# Patient Record
Sex: MALE | Race: WHITE | NOT HISPANIC OR LATINO | Employment: FULL TIME | URBAN - METROPOLITAN AREA
[De-identification: names, ages, dates, MRNs, and addresses within clinical notes are randomized per-mention and may not be internally consistent; named-entity substitution may affect disease eponyms.]

---

## 2014-05-07 LAB
CHOLESTEROL, TOTAL: 141 MG/DL (ref 100–199)
HDLC SERPL-MCNC: 23 MG/DL
LDLC SERPL CALC-MCNC: 59 MG/DL (ref 0–99)
TRIGL SERPL-MCNC: 297 MG/DL (ref 0–149)
VLDLC SERPL-MCNC: 59 MG/DL (ref 5–40)

## 2017-05-01 ENCOUNTER — OFFICE VISIT (OUTPATIENT)
Dept: URGENT CARE | Facility: CLINIC | Age: 32
End: 2017-05-01
Payer: COMMERCIAL

## 2017-05-01 VITALS
OXYGEN SATURATION: 98 % | WEIGHT: 276.25 LBS | HEART RATE: 98 BPM | BODY MASS INDEX: 41.87 KG/M2 | HEIGHT: 68 IN | TEMPERATURE: 97 F | DIASTOLIC BLOOD PRESSURE: 80 MMHG | SYSTOLIC BLOOD PRESSURE: 120 MMHG

## 2017-05-01 DIAGNOSIS — R10.12 LEFT UPPER QUADRANT PAIN: ICD-10-CM

## 2017-05-01 PROCEDURE — 99213 OFFICE O/P EST LOW 20 MIN: CPT | Mod: S$GLB,,, | Performed by: NURSE PRACTITIONER

## 2017-05-01 PROCEDURE — 99999 PR PBB SHADOW E&M-EST. PATIENT-LVL III: CPT | Mod: PBBFAC,,, | Performed by: NURSE PRACTITIONER

## 2017-05-01 PROCEDURE — 1160F RVW MEDS BY RX/DR IN RCRD: CPT | Mod: S$GLB,,, | Performed by: NURSE PRACTITIONER

## 2017-05-01 NOTE — PROGRESS NOTES
Subjective:       Patient ID: Jonas Abdalla is a 32 y.o. male.    Chief Complaint: Abdominal Pain  Jonas states he woke up 4 hours ago with abdominal pain located in the LUQ. He states he has had two bowel movements since that time and the pain is subsiding. The character of the stool was normal. He is requesting a work excuse.  Abdominal Pain   This is a new problem. The current episode started today (4 hrs ago). The onset quality is sudden. The problem occurs constantly. The problem has been gradually improving. The abdominal pain radiates to the LUQ. Pertinent negatives include no anorexia, belching, constipation, diarrhea, dysuria, fever, flatus, frequency, headaches, myalgias, nausea or vomiting. The pain is aggravated by movement. He has tried nothing for the symptoms.     Review of Systems   Constitutional: Negative for chills and fever.   HENT: Negative for congestion and sore throat.    Respiratory: Negative for shortness of breath.    Gastrointestinal: Positive for abdominal pain. Negative for anorexia, constipation, diarrhea, flatus, nausea and vomiting.   Genitourinary: Negative for dysuria and frequency.   Musculoskeletal: Negative for myalgias.   Neurological: Negative for headaches.   Psychiatric/Behavioral: Negative for behavioral problems and confusion.       Objective:      Physical Exam   Constitutional: He is oriented to person, place, and time. He appears well-developed and well-nourished.   Eyes: Conjunctivae are normal.   Neck: Normal range of motion.   Cardiovascular: Normal rate and regular rhythm.    Pulmonary/Chest: Effort normal and breath sounds normal. No respiratory distress.   Abdominal: Soft. Bowel sounds are normal. He exhibits no distension. There is no tenderness. There is no rebound, no guarding, no tenderness at McBurney's point and negative Espinoza's sign.   Musculoskeletal: Normal range of motion.   Neurological: He is alert and oriented to person, place, and time.    Skin: Skin is warm and dry.   Psychiatric: He has a normal mood and affect. His behavior is normal.   Vitals reviewed.      Assessment:       1. Left upper quadrant pain        Plan:   Jonas was seen today for abdominal pain.    Diagnoses and all orders for this visit:    Left upper quadrant pain  Unclear the cause of the pain which is resolving. It is improving after two bowel movements. Likely related to constipation as he admits he had not had a BM for several days prior.   If symptoms worsen or fail to improve, follow-up with primary care doctor or nearest ER. After visit summary given and discussed. Patient verbalized understanding and agrees with treatment plan. Patient remained stable and was discharged in no acute distress.

## 2017-05-01 NOTE — LETTER
May 1, 2017      [facility]  [address] [place] [address]  Phone: [phone]  Fax: [phone]       Patient: Jonas Abdalla   YOB: 1985  Date of Visit: 05/01/2017    To Whom It May Concern:    Jonas Bob was at [facility] on 05/01/2017. He may return to work/school on 05/03/2017 with no restrictions. If you have any questions or concerns, or if I can be of further assistance, please do not hesitate to contact me.    Sincerely,    COLLEEN Mendiola

## 2017-05-01 NOTE — PATIENT INSTRUCTIONS
Uncertain Causes of Abdominal Pain (Male)  Based on your visit today, the exact cause of your abdominal pain is not clear. Your exam and tests do not suggest a dangerous cause at this time. However, the signs of a serious problem may take more time to appear. Although your evaluation was reassuring today, sometimes early in the course of many conditions, exam and lab tests can appear normal. Therefore, it is important for you to watch for any new symptoms or worsening of your condition.  It may not be obvious what caused your symptoms. Pay attention to things that do seem to make your symptoms worse or better and discuss this with your doctor when you follow up.  The evaluation of abdominal pain in the emergency department may only require an exam by the doctor or it may include blood, urine or imaging studies, depending on many factors. Sometimes exams and tests can identify a cause but in many cases, a clear cause is not found. Further testing at follow up visits may help to suggest a clear diagnosis.  Home care  · Rest as much as you can until your next exam.  · Try to avoid any medicines (unless otherwise directed by your doctor), foods, activities, or other factors that may have contributed to your symptoms.  · Try to eat foods that you know that you have tolerated well in the past. Certain diets may be recommended for some conditions that cause abdominal pain. However, since the cause of your symptoms may not be clear, discuss your diet more with your healthcare provider or specialist for further recommendations.   · If you have diarrhea, it may help to avoid dairy (lactose) for the time being. A low fat, low fiber diet can also help.  · Eating several small meals per day as opposed to 2 or 3 larger meals may help.  · Avoid dehydration. Make sure to drink plenty of water. Other options include broth, soup, gelatin, sports drinks, or other clear liquids.  · Watch closely for anything that may make your  symptoms worse or better. Pay close attention to symptoms below that may mean your condition is getting worse.  Follow-up care  Follow up with your healthcare provider if your symptoms are not improving, or as advised. In some cases, you may need more testing.  When to seek medical advice  Call your healthcare provider right away if any of these occur:  · Pain is becoming worse  · You are unable to take your medicines or can't keep water down due to excessive vomiting  · Swelling of the abdomen  · Fever of 100.4ºF (38ºC) or higher, or as directed by your healthcare provider  · Blood in vomit or bowel movements (dark red or black color)  · Jaundice (yellow color of eyes and skin)  · New onset of weakness, dizziness or fainting  · New onset of chest, arm, back, neck or jaw pain  Date Last Reviewed: 12/30/2015  © 3572-4388 [employer]. [address] [place] [address]. All rights reserved. This information is not intended as a substitute for professional medical care. Always follow your healthcare professional's instructions.        Uncertain Causes of Abdominal Pain (Male)  Based on your visit today, the exact cause of your abdominal pain is not clear. Your exam and tests do not suggest a dangerous cause at this time. However, the signs of a serious problem may take more time to appear. Although your evaluation was reassuring today, sometimes early in the course of many conditions, exam and lab tests can appear normal. Therefore, it is important for you to watch for any new symptoms or worsening of your condition.  It may not be obvious what caused your symptoms. Pay attention to things that do seem to make your symptoms worse or better and discuss this with your doctor when you follow up.  The evaluation of abdominal pain in the emergency department may only require an exam by the doctor or it may include blood, urine or imaging studies, depending on many factors. Sometimes exams and tests can  identify a cause but in many cases, a clear cause is not found. Further testing at follow up visits may help to suggest a clear diagnosis.  Home care  · Rest as much as you can until your next exam.  · Try to avoid any medicines (unless otherwise directed by your doctor), foods, activities, or other factors that may have contributed to your symptoms.  · Try to eat foods that you know that you have tolerated well in the past. Certain diets may be recommended for some conditions that cause abdominal pain. However, since the cause of your symptoms may not be clear, discuss your diet more with your healthcare provider or specialist for further recommendations.   · If you have diarrhea, it may help to avoid dairy (lactose) for the time being. A low fat, low fiber diet can also help.  · Eating several small meals per day as opposed to 2 or 3 larger meals may help.  · Avoid dehydration. Make sure to drink plenty of water. Other options include broth, soup, gelatin, sports drinks, or other clear liquids.  · Watch closely for anything that may make your symptoms worse or better. Pay close attention to symptoms below that may mean your condition is getting worse.  Follow-up care  Follow up with your healthcare provider if your symptoms are not improving, or as advised. In some cases, you may need more testing.  When to seek medical advice  Call your healthcare provider right away if any of these occur:  · Pain is becoming worse  · You are unable to take your medicines or can't keep water down due to excessive vomiting  · Swelling of the abdomen  · Fever of 100.4ºF (38ºC) or higher, or as directed by your healthcare provider  · Blood in vomit or bowel movements (dark red or black color)  · Jaundice (yellow color of eyes and skin)  · New onset of weakness, dizziness or fainting  · New onset of chest, arm, back, neck or jaw pain  Date Last Reviewed: 12/30/2015  © 2481-7393 [employer]. [address] [facility],  [place] [address]. All rights reserved. This information is not intended as a substitute for professional medical care. Always follow your healthcare professional's instructions.

## 2017-05-01 NOTE — MR AVS SNAPSHOT
"    [facility]  [address] [place] [place] [address]  Phone: [phone]  Fax: [phone]                  Jonas Abdalla   2017 5:30 PM   Office Visit    Description:  Male : 1985   Provider:  COLLEEN Mendiola   Department:  [facility]           Reason for Visit     Abdominal Pain                To Do List           Goals (5 Years of Data)     None      [facility]     [facility] Nurse Care Line -  Assistance  Unless otherwise directed by your provider, please contact [facility], our nurse care line that is available for  assistance.     Registered nurses in the [facility] Call Center provide: appointment scheduling, clinical advisement, health education, and other advisory services.  Call: [phone] (toll free)               Medications           Message regarding Medications     Verify the changes and/or additions to your medication regime listed below are the same as discussed with your clinician today.  If any of these changes or additions are incorrect, please notify your healthcare provider.             Verify that the below list of medications is an accurate representation of the medications you are currently taking.  If none reported, the list may be blank. If incorrect, please contact your healthcare provider. Carry this list with you in case of emergency.                Clinical Reference Information           Your Vitals Were     BP Pulse Temp Height    120/80 (BP Location: Left arm, Patient Position: Sitting, BP Method: Manual) 98 97.2 °F (36.2 °C) (Tympanic) 5' 7.5" (1.715 m)    Weight SpO2 BMI    125.3 kg (276 lb 3.8 oz) 98% 42.63 kg/m2      Blood Pressure          Most Recent Value    BP  120/80      Allergies as of 2017     Acetaminophen      Immunizations Administered on Date of Encounter - 2017     None      Instructions      Uncertain Causes of Abdominal Pain (Male)  Based on your visit today, " the exact cause of your abdominal pain is not clear. Your exam and tests do not suggest a dangerous cause at this time. However, the signs of a serious problem may take more time to appear. Although your evaluation was reassuring today, sometimes early in the course of many conditions, exam and lab tests can appear normal. Therefore, it is important for you to watch for any new symptoms or worsening of your condition.  It may not be obvious what caused your symptoms. Pay attention to things that do seem to make your symptoms worse or better and discuss this with your doctor when you follow up.  The evaluation of abdominal pain in the emergency department may only require an exam by the doctor or it may include blood, urine or imaging studies, depending on many factors. Sometimes exams and tests can identify a cause but in many cases, a clear cause is not found. Further testing at follow up visits may help to suggest a clear diagnosis.  Home care  · Rest as much as you can until your next exam.  · Try to avoid any medicines (unless otherwise directed by your doctor), foods, activities, or other factors that may have contributed to your symptoms.  · Try to eat foods that you know that you have tolerated well in the past. Certain diets may be recommended for some conditions that cause abdominal pain. However, since the cause of your symptoms may not be clear, discuss your diet more with your healthcare provider or specialist for further recommendations.   · If you have diarrhea, it may help to avoid dairy (lactose) for the time being. A low fat, low fiber diet can also help.  · Eating several small meals per day as opposed to 2 or 3 larger meals may help.  · Avoid dehydration. Make sure to drink plenty of water. Other options include broth, soup, gelatin, sports drinks, or other clear liquids.  · Watch closely for anything that may make your symptoms worse or better. Pay close attention to symptoms below that may mean  your condition is getting worse.  Follow-up care  Follow up with your healthcare provider if your symptoms are not improving, or as advised. In some cases, you may need more testing.  When to seek medical advice  Call your healthcare provider right away if any of these occur:  · Pain is becoming worse  · You are unable to take your medicines or can't keep water down due to excessive vomiting  · Swelling of the abdomen  · Fever of 100.4ºF (38ºC) or higher, or as directed by your healthcare provider  · Blood in vomit or bowel movements (dark red or black color)  · Jaundice (yellow color of eyes and skin)  · New onset of weakness, dizziness or fainting  · New onset of chest, arm, back, neck or jaw pain  Date Last Reviewed: 12/30/2015  © 3143-1490 [employer]. [address] [place] [address], [place] [address]. All rights reserved. This information is not intended as a substitute for professional medical care. Always follow your healthcare professional's instructions.        Uncertain Causes of Abdominal Pain (Male)  Based on your visit today, the exact cause of your abdominal pain is not clear. Your exam and tests do not suggest a dangerous cause at this time. However, the signs of a serious problem may take more time to appear. Although your evaluation was reassuring today, sometimes early in the course of many conditions, exam and lab tests can appear normal. Therefore, it is important for you to watch for any new symptoms or worsening of your condition.  It may not be obvious what caused your symptoms. Pay attention to things that do seem to make your symptoms worse or better and discuss this with your doctor when you follow up.  The evaluation of abdominal pain in the emergency department may only require an exam by the doctor or it may include blood, urine or imaging studies, depending on many factors. Sometimes exams and tests can identify a cause but in many cases, a clear cause is not found. Further  testing at follow up visits may help to suggest a clear diagnosis.  Home care  · Rest as much as you can until your next exam.  · Try to avoid any medicines (unless otherwise directed by your doctor), foods, activities, or other factors that may have contributed to your symptoms.  · Try to eat foods that you know that you have tolerated well in the past. Certain diets may be recommended for some conditions that cause abdominal pain. However, since the cause of your symptoms may not be clear, discuss your diet more with your healthcare provider or specialist for further recommendations.   · If you have diarrhea, it may help to avoid dairy (lactose) for the time being. A low fat, low fiber diet can also help.  · Eating several small meals per day as opposed to 2 or 3 larger meals may help.  · Avoid dehydration. Make sure to drink plenty of water. Other options include broth, soup, gelatin, sports drinks, or other clear liquids.  · Watch closely for anything that may make your symptoms worse or better. Pay close attention to symptoms below that may mean your condition is getting worse.  Follow-up care  Follow up with your healthcare provider if your symptoms are not improving, or as advised. In some cases, you may need more testing.  When to seek medical advice  Call your healthcare provider right away if any of these occur:  · Pain is becoming worse  · You are unable to take your medicines or can't keep water down due to excessive vomiting  · Swelling of the abdomen  · Fever of 100.4ºF (38ºC) or higher, or as directed by your healthcare provider  · Blood in vomit or bowel movements (dark red or black color)  · Jaundice (yellow color of eyes and skin)  · New onset of weakness, dizziness or fainting  · New onset of chest, arm, back, neck or jaw pain  Date Last Reviewed: 12/30/2015  © 4939-1703 [employer]. [address] [place] [address], [place] [address]. All rights reserved. This information is not intended as a  substitute for professional medical care. Always follow your healthcare professional's instructions.             Language Assistance Services     ATTENTION: Language assistance services are available, free of charge. Please call [phone].      ATENCIÓN: Si habsuly shane, tiene a rivas disposición servicios gratuitos de asistencia lingüística. Llame al [phone].     CHÚ Ý: N?u b?n nói Ti?ng Vi?t, có các d?ch v? h? tr? ngôn ng? mi?n phí dành cho b?n. G?i s? [phone].         [facility] complies with applicable Federal civil rights laws and does not discriminate on the basis of race, color, national origin, age, disability, or sex.

## 2017-05-02 PROBLEM — R10.12 LEFT UPPER QUADRANT PAIN: Status: ACTIVE | Noted: 2017-05-02

## 2018-07-09 ENCOUNTER — HOSPITAL ENCOUNTER (OUTPATIENT)
Dept: RADIOLOGY | Facility: HOSPITAL | Age: 33
Discharge: HOME | End: 2018-07-09
Attending: PHYSICIAN ASSISTANT
Payer: COMMERCIAL

## 2018-07-09 ENCOUNTER — OFFICE VISIT (OUTPATIENT)
Dept: INTERNAL MEDICINE | Facility: CLINIC | Age: 33
End: 2018-07-09
Payer: COMMERCIAL

## 2018-07-09 VITALS
WEIGHT: 283.75 LBS | SYSTOLIC BLOOD PRESSURE: 120 MMHG | BODY MASS INDEX: 43.01 KG/M2 | HEIGHT: 68 IN | DIASTOLIC BLOOD PRESSURE: 86 MMHG | TEMPERATURE: 97 F | OXYGEN SATURATION: 98 % | HEART RATE: 94 BPM

## 2018-07-09 DIAGNOSIS — M79.641 BILATERAL HAND PAIN: ICD-10-CM

## 2018-07-09 DIAGNOSIS — B07.9 WART OF SCALP: ICD-10-CM

## 2018-07-09 DIAGNOSIS — K52.9 ENTERITIS: Primary | ICD-10-CM

## 2018-07-09 DIAGNOSIS — M79.642 BILATERAL HAND PAIN: ICD-10-CM

## 2018-07-09 PROCEDURE — 99214 OFFICE O/P EST MOD 30 MIN: CPT | Mod: S$GLB,,, | Performed by: PHYSICIAN ASSISTANT

## 2018-07-09 PROCEDURE — 99999 PR PBB SHADOW E&M-EST. PATIENT-LVL IV: CPT | Mod: PBBFAC,,, | Performed by: PHYSICIAN ASSISTANT

## 2018-07-09 PROCEDURE — 3008F BODY MASS INDEX DOCD: CPT | Mod: CPTII,S$GLB,, | Performed by: PHYSICIAN ASSISTANT

## 2018-07-09 PROCEDURE — 73110 X-RAY EXAM OF WRIST: CPT | Mod: 26,50,, | Performed by: RADIOLOGY

## 2018-07-09 PROCEDURE — 73110 X-RAY EXAM OF WRIST: CPT | Mod: 50,TC

## 2018-07-09 NOTE — PROGRESS NOTES
Subjective:       Patient ID: Jonas Abdalla is a 33 y.o. male.    Chief Complaint: Abdominal Pain    Diarrhea    This is a new problem. The current episode started today. The problem occurs 2 to 4 times per day. The problem has been waxing and waning. The stool consistency is described as watery. The patient states that diarrhea awakens him from sleep. Pertinent negatives include no abdominal pain, bloating, chills, fever or vomiting. Associated symptoms comments: Reports and egg taste with belching. Nothing aggravates the symptoms. He has tried nothing for the symptoms.   Wrist Pain    The pain is present in the left wrist, left hand, right wrist and right hand. This is a new problem. The current episode started more than 1 month ago. There has been no history of extremity trauma. The problem occurs daily. The problem has been waxing and waning. The pain is moderate. Pertinent negatives include no fever. The symptoms are aggravated by activity. He has tried nothing for the symptoms.     Past Medical History:   Diagnosis Date    Gallstone        Review of Systems   Constitutional: Negative for chills and fever.   HENT: Negative for congestion.    Respiratory: Negative for chest tightness and shortness of breath.    Cardiovascular: Negative for chest pain.   Gastrointestinal: Positive for diarrhea. Negative for abdominal pain, bloating and vomiting.   Skin:        Patient has flat warty lesions on the forehead. He desires to see dermatology       Objective:      Physical Exam   Constitutional: He appears well-developed and well-nourished. No distress.   Neck: Neck supple.   Cardiovascular: Normal rate and regular rhythm.  Exam reveals no gallop and no friction rub.    No murmur heard.  Pulmonary/Chest: Effort normal and breath sounds normal. No respiratory distress. He has no wheezes. He has no rales. He exhibits no tenderness.   Abdominal: Soft. Bowel sounds are normal. He exhibits no distension and no  mass. There is no tenderness. There is no rebound and no guarding. No hernia.   Musculoskeletal:        Right hand: Normal.        Left hand: Normal.   Lymphadenopathy:     He has no cervical adenopathy.   Skin: Rash noted. Rash is nodular. He is not diaphoretic.   Nursing note and vitals reviewed.      Assessment:       1. Enteritis    2. Bilateral hand pain    3. Wart of scalp        Plan:       Enteritis    Bilateral hand pain  -     X-Ray Wrist Complete Right; Future; Expected date: 07/09/2018  -     X-Ray Wrist Complete Left; Future; Expected date: 07/09/2018  -     Nerve conduction test; Future    Wart of scalp  -     Ambulatory referral to Dermatology

## 2018-07-09 NOTE — PATIENT INSTRUCTIONS
Noninfectious Gastroenteritis (Ages 6 Years to Adult)    Gastroenteritis can cause nausea, vomiting, diarrhea, and abdominal cramping. This may occur as a result of food sensitivity, inflammation of your gastrointestinal tract, medicines, stress, or other causes not related to infection. Your symptoms will usually last from 1 to 3 days, but can last longer. Antibiotics are not effective, but simple home treatment will be helpful.  Home care  Medicine  · You may use acetaminophen or NSAID medicines like ibuprofen or naproxen to control fever, unless another medicine is prescribed. (Note: If you have chronic liver or kidney disease, or ever had a stomach ulcer or gastrointestinalI bleeding, talk with your healthcare provider before using these medicines.) Aspirin should never be used in anyone under 18 years of age who is ill with a fever. It may cause severe liver damage. Don't increase your NSAID medicines if you are already taking these medicines for another condition (like arthritis). Don't use NSAIDS if you are on aspirin (such as for heart disease, or after a stroke).  · If medicines for diarrhea or vomiting are prescribed, take only as directed.  General care and preventing spread of the illness  · If symptoms are severe, rest at home for the next 24 hours or until you feel better.  · Hand washing with soap and water is the best way to prevent the spread of infection. Wash your hands after touching anyone who is sick.  · Wash your hands after using the toilet and before meals. Clean the toilet after each use.  · Caffeine, tobacco, and alcohol can make your diarrhea, cramping, and pain worse.  Diet  · Water and clear liquids are important so you do not get dehydrated. Drink a small amount at a time.  · Do not force yourself to eat, especially if you have cramps, vomiting, or diarrhea. When you finally decide to start eating, do not eat large amounts at a time, even if you are hungry.  · If you eat, avoid  fatty, greasy, spicy, or fried foods.  · Do not eat dairy products if you have diarrhea; they can make the diarrhea worse.  During the first 24 hours (the first full day), follow the diet below:  · Beverages: Water, clear liquids, soft drinks without caffeine, like ginger ale; mineral water (plain or flavored); decaffeinated tea and coffee.  · Soups: Clear broth, consommé, and bouillon Sports drinks aren't a good choice because they have too much sugar and not enough electrolytes. In this case, commercially available products called oral rehydration solutions are best.  · Desserts: Plain gelatin, popsicles, and fruit juice bars.  During the next 24 hours (the second day), you may add the following to the above if you have improved. If not, continue what you did the first day:  · Hot cereal, plain toast, bread, rolls, crackers  · Plain noodles, rice, mashed potatoes, chicken noodle or rice soup  · Unsweetened canned fruit (avoid pineapple), bananas  · Limit caffeine and chocolate. No spices or seasonings except salt.  During the next 24 hours  · Gradually resume a normal diet, as you feel better and your symptoms improve.  · If at any time your symptoms start getting worse, go back to clear liquids until you feel better.  Food preparation  · If you have diarrhea, you should not prepare food for others. When you  prepare food for yourself, wash your hands before and after.  · Wash your hands after using cutting boards, countertops, and knives that have been in contact with raw food.  · Keep uncooked meats away from cooked and ready-to-eat foods.  Follow-up care  Follow up with your healthcare provider if you are not improving over the next 2 to 3 days, or as advised. If a stool (diarrhea) sample was taken, call for the results as directed.  When to seek medical care  Call your healthcare provider right away if any of these occur:   · Increasing abdominal pain or constant lower right abdominal pain  · Continued  vomiting (unable to keep liquids down)  · Frequent diarrhea (more than 5 times a day)  · Blood in vomit or stool (black or red color)  · Inability to tolerate solid food after a few days.  · Dark urine, reduced urine output  · Weakness, dizziness  · Drowsiness  · Fever of 100.4ºF (38.0ºC) or higher, or as directed by your healthcare provider  · New rash  Call 911  Call 911 if any of these occur:  · Trouble breathing  · Chest pain  · Confusion  · Severe drowsiness or trouble awakening  · Seizure  · Stiff neck  Date Last Reviewed: 11/16/2015  © 4903-2426 [employer]. [address] [place] [address], [place] [address]. All rights reserved. This information is not intended as a substitute for professional medical care. Always follow your healthcare professional's instructions.      Clear liquids to a soft bland diet as tolerated. Take tylenol as needed for fever. Avoid alcohol and coffee.  Go to the emergency room if symptoms do not improve in 24 to 48 hours.

## 2018-07-26 ENCOUNTER — OFFICE VISIT (OUTPATIENT)
Dept: PHYSICAL MEDICINE AND REHAB | Facility: CLINIC | Age: 33
End: 2018-07-26
Payer: COMMERCIAL

## 2018-07-26 VITALS
RESPIRATION RATE: 14 BRPM | HEART RATE: 69 BPM | SYSTOLIC BLOOD PRESSURE: 140 MMHG | BODY MASS INDEX: 42.89 KG/M2 | DIASTOLIC BLOOD PRESSURE: 90 MMHG | WEIGHT: 283 LBS | HEIGHT: 68 IN

## 2018-07-26 DIAGNOSIS — G56.03 BILATERAL CARPAL TUNNEL SYNDROME: ICD-10-CM

## 2018-07-26 PROBLEM — E66.01 MORBID OBESITY WITH BMI OF 40.0-44.9, ADULT: Status: ACTIVE | Noted: 2018-07-26

## 2018-07-26 PROCEDURE — 99999 PR PBB SHADOW E&M-EST. PATIENT-LVL III: CPT | Mod: PBBFAC,,, | Performed by: PHYSICAL MEDICINE & REHABILITATION

## 2018-07-26 PROCEDURE — 3008F BODY MASS INDEX DOCD: CPT | Mod: CPTII,S$GLB,, | Performed by: PHYSICAL MEDICINE & REHABILITATION

## 2018-07-26 PROCEDURE — 95911 NRV CNDJ TEST 9-10 STUDIES: CPT | Mod: S$GLB,,, | Performed by: PHYSICAL MEDICINE & REHABILITATION

## 2018-07-26 PROCEDURE — 99204 OFFICE O/P NEW MOD 45 MIN: CPT | Mod: 25,S$GLB,, | Performed by: PHYSICAL MEDICINE & REHABILITATION

## 2018-07-26 NOTE — PATIENT INSTRUCTIONS
Carpal Tunnel Syndrome Prevention Tips  Some repetitive hand activities put you at higher risk for carpal tunnel syndrome (CTS). But you can reduce your risk. Learn how to change the way you use your hands. Below are tips for at home and on the job. Be sure to also follow the hand and wrist safety policies at your workplace.      Keep your wrist in a neutral (straight) position when exercising.      Keep your wrist in neutral  Keep a neutral (straight) wrist position as often as you can. Dont use your wrist in a bent (flexed) position for long periods of time. This includes extended or twisted positions.  Watch your   Dont just use your thumb and index finger to grasp or lift. This can put stress on your wrist. When you can, use your whole hand and all its fingers to grasp an object.  Minimize repetition  Dont move your arms or hands or hold an object in the same way for long periods of time. Even simple, light tasks can cause injury this way. Instead, alternate tasks or switch hands.  Rest your hands  Give your hands a break from time to time with a rest. Even a few minutes once an hour can help.  Reduce speed and force  Slow down the speed in which you do a forceful, repetitive motion. This gives your wrist time to recover from the effort. Use power tools to help reduce the force.  Strengthen the muscles  Weak muscles may lead to a poor wrist or arm position. Exercises will make your hand and arm muscles stronger. This can help you keep a better position.  Date Last Reviewed: 9/11/2015  © 4792-1887 "[employer]. [address] [place] [address], [place] [address]. All rights reserved. This information is not intended as a substitute for professional medical care. Always follow your healthcare professional's instructions.        Understanding Carpal Tunnel Syndrome    The carpal tunnel is a narrow space inside the wrist. It is ringed by bone and a band of tough tissue called the transverse carpal  ligament. A major nerve called the median nerve runs from the forearm into the hand through the carpal tunnel. Tendons also run through the carpal tunnel.  With carpal tunnel syndrome, the tendons or nearby tissues within the carpal tunnel may swell or thicken. Or the transverse carpal ligament may harden and shorten. This narrows the space in the carpal tunnel and puts pressure on the median nerve. This pressure leads to tingling and numbness of the hand and wrist. In time, the condition can make even simple tasks hard to do.  What causes carpal tunnel syndrome?  Doctors arent entirely clear why the condition occurs. Certain things may make a person more likely to have it. These include:  · Being female  · Being pregnant  · Being overweight  · Having diabetes or rheumatoid arthritis  Symptoms of carpal tunnel syndrome  Symptoms often come and go. At first, symptoms may occur mainly at night. Later, they may be noticed during the day as well. They may get worse with activities such as driving, reading, typing, or holding a phone. Symptoms can include:  · Tingling and numbness in the hand or wrist  · Sharp pain that shoots up the arm or down to the fingers  · Hand stiffness or cramping, especially in the morning  · Trouble making a fist  · Hand weakness and clumsiness  Treatment for carpal tunnel syndrome  Certain treatments help reduce the pressure on the median nerve and relieve symptoms. Choices for treatment may include one or more of the following:  · Wrist splint. This involves wearing a special brace on the wrist and hand. The splint holds the wrist straight, in a neutral position. This helps keep the carpal tunnel as open as possible.  · Cortisone shots. Cortisone is a medicine that helps reduce swelling. It is injected directly into the wrist. It helps shrink tissues inside the carpal tunnel. This relieves symptoms for a time.  · Pain medicines. You may take over-the-counter or prescription medicines to  help reduce swelling and relieve symptoms.  · Surgery. If the condition doesnt respond to other treatments and doesnt go away on its own, you may need surgery. During surgery, the surgeon cuts the transverse carpal ligament to relieve pressure on the median nerve.     When to call your healthcare provider  Call your healthcare provider right away if you have any of these:  · Fever of 100.4°F (38°C) or higher, or as directed  · Symptoms that dont get better, or get worse  · New symptoms   Date Last Reviewed: 3/10/2016  © 4954-4534 [employer]. [address] [place] [address]. All rights reserved. This information is not intended as a substitute for professional medical care. Always follow your healthcare professional's instructions.        Carpal Tunnel Syndrome    Carpal tunnel syndrome is a painful condition of the wrist and arm. It is caused by pressure on the median nerve.  The median nerve is one of the nerves that give feeling and movement to the hand. It passes through a tunnel in the wrist called the carpal tunnel. This tunnel is made up of bones and ligaments. Narrowing of this tunnel or swelling of the tissues inside the tunnel puts pressure on the median nerve. This causes numbness, pins and needles, or electric shooting pains in your hand and forearm. Often the pain is worse at night and may wake you when you are asleep.  Carpal tunnel syndrome may occur during pregnancy and with use of birth control pills. It is more common in workers who must often bend their wrists. It is also common in people who work with power tools that cause strong vibrations.  Home care  · Rest the painful wrist. Avoid repeated bending of the wrist back and forth. This puts pressure on the median nerve. Avoid using power tools with strong vibrations.  · If you were given a splint, wear it at night while you sleep. You may also wear it during the day for comfort.  · Move your fingers and wrists often to  avoid stiffness.  · Elevate your arms on pillows when you lie down.  · Try using the unaffected hand more.  · Try not to hold your wrists in a bent, downward position.  · Sometimes changes in the work place may ease symptoms. If you type most of the day, it may help to change the position of your keyboard or add a wrist support. Your wrist should be in a neutral position and not bent back when typing.  · You may use over-the-counter pain medicine to treat pain and inflammation, unless another medicine was prescribed. Anti-inflammatory pain medicines, such as ibuprofen or naproxen may be more effective than acetaminophen, which treats pain, but not inflammation. If you have chronic liver or kidney disease or ever had a stomach ulcer or GI bleeding, talk with your doctor before using these medicines.  · Opioid pain medicine will only give temporary relief and does not treat the problem. If pain continues, you may need a shot of a steroid drug into your wrist.  · If the above methods fail, you may need surgery. This will open the carpal tunnel and release the pressure on the trapped nerve.  Follow-up care  Follow up with your healthcare provider, or as advised, if the pain doesnt begin to improve within the next week.  If X-rays were taken, you will be notified of any new findings that may affect your care.  When to seek medical advice  Call your healthcare provider right away if any of these occur:  · Pain not improving with the above treatment  · Fingers or hand become cold, blue, numb, or tingly  · Your whole arm becomes swollen or weak  Date Last Reviewed: 11/23/2015  © 6688-5567 [employer]. [address] [place] [address], [place] [address]. All rights reserved. This information is not intended as a substitute for professional medical care. Always follow your healthcare professional's instructions.

## 2018-07-26 NOTE — PROGRESS NOTES
[facility] [address] [place] [address]  Phone: [phone]          Full Name: Jonas Abdalla YOB: 1985  Patient ID: [id number]      Visit Date: 7/26/2018 08:01  Age: 33 Years 4 Months Old  Examining Physician: Lakeshia Amanda M.D.  Referring Physician: Thanh  Reason for Referral: ue pain      Chief Complaint   Patient presents with    Hand Pain     bilateral wrist/hand pain     HPI: This is a 33 y.o.  male being seen in clinic today for evaluation of bilateral wrist/hand achy pain over the past 2 months.  He describes numbness/tingling into his hands/fingers-worse with increased hand usage, driving, and at night when trying to rest.  Changing position of his hands or rubbing them provide minimal relief.  He denies significant weakness or loss of  strength.    History obtained from patient    Past family, medical, social, and surgical history reviewed in chart    Review of Systems:     General- denies lethargy, weight change, fever, chills  Head/neck- denies swallowing difficulties  ENT- denies hearing changes  Cardiovascular-denies chest pain  Pulmonary- denies shortness of breath  GI- denies constipation or bowel incontinence  - denies bladder incontinence  Skin- denies wounds or rashes  Musculoskeletal- denies weakness, +pain  Neurologic- denies numbness and tingling  Psychiatric- denies depressive or psychotic features, denies anxiety  Lymphatic-denies swelling  Endocrine- denies hypoglycemic symptoms/DM history  All other pertinent systems negative     Physical Examination:  General: Well developed, well nourished male, NAD  HEENT:NCAT EOMI bilaterally   Pulmonary:Normal respirations    Spinal Examination: CERVICAL  Active ROM is within normal limits.  Inspection: No deformity of spinal alignment.     Spinal Examination: LUMBAR or THORACIC  Active ROM is within normal limits.  Inspection: No deformity of spinal alignment.      Musculoskeletal  Tests:  Phalen: +on right  Elbow compression (ulnar): neg  Tinels at wrist: neg    Bilateral Upper and Lower Extremities:  Pulses are 2+ at radial bilaterally.  Shoulder/Elbow/Wrist/Hand ROM wnl  Hip/Knee/Ankle ROM   Bilateral Extremities show normal capillary refill.  No signs of cyanosis, rubor, edema, skin changes, or dysvascular changes of appendages.  Nails appear intact.    Neurological Exam:  Cranial Nerves:  II-XII grossly intact    Manual Muscle Testing: (Motor 5=normal)  5/5 strength bilateral upper extremities    No focal atrophy is noted of either upper extremity.    Bilateral Reflexes:hypo at bic tric br  Gautam's response is absent bilaterally.    Sensation: tested to light touch  - intact in arms  Gait: Narrow base and good arm swing.      Entire procedure explained to patient prior to proceeding.  Verbal consent obtained        SNC      Nerve / Sites Rec. Site Onset Lat Peak Lat Amp Segments Distance Velocity     ms ms µV  mm m/s   R Median - Digit II (Antidromic)      Wrist Dig II 3.4 4.0 24.4 Wrist - Dig  41   L Median - Digit II (Antidromic)      Wrist Dig II 3.3 4.0 26.3 Wrist - Dig  43   R Ulnar - Digit V (Antidromic)      Wrist Dig V 2.7 3.2 37.7 Wrist - Dig V 140 53   L Ulnar - Digit V (Antidromic)      Wrist Dig V 2.7 3.3 28.3 Wrist - Dig V 140 53   R Radial - Anatomical snuff box (Forearm)      Forearm Wrist 1.7 2.2 11.6 Forearm - Wrist 100 58   L Radial - Anatomical snuff box (Forearm)      Forearm Wrist 1.4 2.3 15.6 Forearm - Wrist 100 71       MNC      Nerve / Sites Muscle Latency Amplitude Duration Rel Amp Segments Distance Lat Diff Velocity     ms mV ms %  mm ms m/s   R Median - APB      Wrist APB 4.5 6.6 5.9 100 Wrist - APB 80        Elbow APB 8.6 4.1 4.9 62 Elbow - Wrist 210 4.1 51   L Median - APB      Wrist APB 4.2 5.2 5.9 100 Wrist - APB 80        Elbow APB 8.8 5.2 6.1 101 Elbow - Wrist 200 4.6 44   R Ulnar - ADM      Wrist ADM 2.9 8.0 6.0 100 Wrist - ADM 80         B.Elbow ADM 6.3 8.4 6.3 106 B.Elbow - Wrist 220 3.3 66      A.Elbow ADM 7.8 8.1 6.2 95.9 A.Elbow - B.Elbow 100 1.5 66         A.Elbow - Wrist  4.8    L Ulnar - ADM      Wrist ADM 2.8 9.2 5.5 100 Wrist - ADM 80        B.Elbow ADM 6.2 8.3 5.8 90 B.Elbow - Wrist 230 3.4 67      A.Elbow ADM 7.6 8.0 5.9 96.2 A.Elbow - B.Elbow 90 1.4 64         A.Elbow - Wrist  4.8                                         INTERPRETATION  -Bilateral median motor nerve conduction study showed prolonged latency on the right, normal amplitude, and dec conduction velocity on the left  -Bilateral median sensory nerve conduction study showed prolonged peak latency and normal amplitude  -Bilateral ulnar motor nerve conduction study showed normal latency, amplitude, and conduction velocity  -Bilateral ulnar sensory nerve conduction study showed normal peak latency and amplitude  -Bilateral radial sensory nerve conduction study showed normal peak latency and amplitude      IMPRESSION  1. ABNORMAL study  2. 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    PLAN  1. Follow up with referring provider: Yemi Queen III  2. Handouts on CTS and wrist braces provided. Rec eval with orthopedics   3. This study is good for one year. If symptoms worsen or do not improve, please re-consult.    Lakeshia Amanda M.D.  Physical Medicine and Rehab

## 2018-07-26 NOTE — LETTER
July 26, 2018      Yemi Queen III, PA-C  [address] [place] [address]           [facility] - Physiatry  [address] [place] [address]  Phone: [phone]  Fax: [phone]          Patient: Jonas Abdalla   MR Number: [id number]   YOB: 1985   Date of Visit: 7/26/2018       Dear Yemi Queen III:    Thank you for referring Jonas Abdalla to me for evaluation. Attached you will find relevant portions of my assessment and plan of care.    If you have questions, please do not hesitate to call me. I look forward to following Jonas Abdalla along with you.    Sincerely,    Lakeshia Amanda MD    Enclosure  CC:  No Recipients    If you would like to receive this communication electronically, please contact [email or web address] or [phone] to request more information on [employer] Link access.    For providers and/or their staff who would like to refer a patient to [facility], please contact us through our one-stop-shop provider referral line, [facility], at [phone].    If you feel you have received this communication in error or would no longer like to receive these types of communications, please e-mail [email or web address]

## 2018-10-16 ENCOUNTER — OFFICE VISIT (OUTPATIENT)
Dept: DERMATOLOGY | Facility: CLINIC | Age: 33
End: 2018-10-16
Payer: COMMERCIAL

## 2018-10-16 DIAGNOSIS — L98.9 DERMATOSIS: Primary | ICD-10-CM

## 2018-10-16 PROCEDURE — 99202 OFFICE O/P NEW SF 15 MIN: CPT | Mod: 25,S$GLB,, | Performed by: DERMATOLOGY

## 2018-10-16 PROCEDURE — 11100 PR BIOPSY OF SKIN LESION: CPT | Mod: S$GLB,,, | Performed by: DERMATOLOGY

## 2018-10-16 PROCEDURE — 88305 TISSUE EXAM BY PATHOLOGIST: CPT | Performed by: PATHOLOGY

## 2018-10-16 PROCEDURE — 99999 PR PBB SHADOW E&M-EST. PATIENT-LVL II: CPT | Mod: PBBFAC,,, | Performed by: DERMATOLOGY

## 2018-10-16 PROCEDURE — 88305 TISSUE EXAM BY PATHOLOGIST: CPT | Mod: 26,,, | Performed by: PATHOLOGY

## 2018-10-16 NOTE — PROGRESS NOTES
Subjective:       Patient ID:  Jonas Abdalla is a 33 y.o. male who presents for   Chief Complaint   Patient presents with    Mass     bumps on face x 1 year + worsening      History of Present Illness: The patient presents with chief complaint of bumps.  Location: face forehead, left cheek, left temple  Duration: 1 year  Signs/Symptoms: worsening, painful to touch    Prior treatments: none    Pt denies personal and family hx of skin cancer          Review of Systems   Constitutional: Negative for fever and chills.   Gastrointestinal: Negative for nausea and vomiting.   Skin: Positive for activity-related sunscreen use. Negative for itching, rash, daily sunscreen use and recent sunburn.   Hematologic/Lymphatic: Does not bruise/bleed easily.        Objective:    Physical Exam   Constitutional: He appears well-developed and well-nourished. No distress.   Neurological: He is alert and oriented to person, place, and time. He is not disoriented.   Psychiatric: He has a normal mood and affect.   Skin:   Areas Examined (abnormalities noted in diagram):   Head / Face Inspection Performed  Neck Inspection Performed  Chest / Axilla Inspection Performed  Abdomen Inspection Performed  Back Inspection Performed  RUE Inspected  LUE Inspection Performed  Nails and Digits Inspection Performed                       Assessment / Plan:      Pathology Orders:     Normal Orders This Visit    Tissue Specimen To Pathology, Dermatology     Questions:    Directional Terms:  Other(comment)    Clinical information:  molluscum vs. flat wart vs. other    Specific Site:  forehead        Dermatosis  -     Shave biopsy(-ies) done of 1 site(s).   Patient informed to call for results within 2 weeks if have not [facility]             Follow-up for call for results.     PROCEDURE NOTE - SHAVE BIOPSY   Location: see above    After risk, benefits, and alternatives were discussed with the patient, the  patient agrees to the procedure by verbal informed consent.  The area(s) were cleansed with alcohol. 2 cc of lidocaine 1% with epinephrine was injected for local anesthesia into each lesion(s).  A sharp dermablade was used to remove part or all of the lesion(s).  The specimen(s) will be sent for tissue pathology.  Hemostasis was obtained with aluminum chloride and/or hyfrecation.  The area(s) were dressed with vaseline ointment and bandaged.  The patient tolerated the procedure well without adverse events.  Wound care instructions were given to the patient on the AVS.  The patient will be notified of pathology results once available. Results will also be available in Epic.

## 2018-10-16 NOTE — PATIENT INSTRUCTIONS
Shave Biopsy Wound Care    Your doctor has performed a shave biopsy today.  A band aid and vaseline ointment has been placed over the site.  This should remain in place for 24 hours.  It is recommended that you keep the area dry for the first 24 hours.  After 24 hours, you may remove the band aid and wash the area with warm soap and water and apply Vaseline jelly.  Many patients prefer to use Neosporin or Bacitracin ointment.  This is acceptable; however, know that you can develop an allergy to this medication even if you have used it safely for years.  It is important to keep the area moist.  Letting it dry out and get air slows healing time, and will worsen the scar.  Band aid is optional after first 24 hours.      If you notice increasing redness, tenderness, pain, or yellow drainage at the biopsy site, please notify your doctor.  These are signs of an infection.    If your biopsy site is bleeding, apply firm pressure for 15 minutes straight.  Repeat for another 15 minutes, if it is still bleeding.   If the surgical site continues to bleed, then please contact your doctor.      [facility]   DERMATOLOGY  [address]   [place] [address]   Dept: [phone]   Dept Fax: [phone]

## 2018-10-16 NOTE — LETTER
October 16, 2018      Yemi Queen III, PA-C  [address] [place] [address]           [facility]  [address] [place] [address]  Phone: [phone]  Fax: [phone]          Patient: Jonas Abdalla   MR Number: [id number]   YOB: 1985   Date of Visit: 10/16/2018       Dear Yemi Queen III:    Thank you for referring Jonas Abdalla to me for evaluation. Attached you will find relevant portions of my assessment and plan of care.    If you have questions, please do not hesitate to call me. I look forward to following Jonas Abdalla along with you.    Sincerely,    Kalpana Andrade MD    Enclosure  CC:  No Recipients    If you would like to receive this communication electronically, please contact [email or web address] or [phone] to request more information on [employer] Link access.    For providers and/or their staff who would like to refer a patient to [facility], please contact us through our one-stop-shop provider referral line, [facility] , at [phone].    If you feel you have received this communication in error or would no longer like to receive these types of communications, please e-mail [email or web address]

## 2018-10-22 ENCOUNTER — PATIENT MESSAGE (OUTPATIENT)
Dept: DERMATOLOGY | Facility: CLINIC | Age: 33
End: 2018-10-22

## 2019-01-01 ENCOUNTER — OFFICE VISIT (OUTPATIENT)
Dept: INTERNAL MEDICINE | Facility: CLINIC | Age: 34
End: 2019-01-01
Payer: COMMERCIAL

## 2019-01-01 VITALS
WEIGHT: 304.44 LBS | HEIGHT: 67 IN | HEART RATE: 66 BPM | BODY MASS INDEX: 47.78 KG/M2 | DIASTOLIC BLOOD PRESSURE: 80 MMHG | TEMPERATURE: 98 F | SYSTOLIC BLOOD PRESSURE: 120 MMHG

## 2019-01-01 DIAGNOSIS — M25.562 PATELLAR TENDERNESS, LEFT: Primary | ICD-10-CM

## 2019-01-01 PROCEDURE — 99999 PR PBB SHADOW E&M-EST. PATIENT-LVL III: ICD-10-PCS | Mod: PBBFAC,,, | Performed by: PHYSICIAN ASSISTANT

## 2019-01-01 PROCEDURE — 3008F PR BODY MASS INDEX (BMI) DOCUMENTED: ICD-10-PCS | Mod: CPTII,S$GLB,, | Performed by: PHYSICIAN ASSISTANT

## 2019-01-01 PROCEDURE — 3008F BODY MASS INDEX DOCD: CPT | Mod: CPTII,S$GLB,, | Performed by: PHYSICIAN ASSISTANT

## 2019-01-01 PROCEDURE — 99213 PR OFFICE/OUTPT VISIT, EST, LEVL III, 20-29 MIN: ICD-10-PCS | Mod: S$GLB,,, | Performed by: PHYSICIAN ASSISTANT

## 2019-01-01 PROCEDURE — 99999 PR PBB SHADOW E&M-EST. PATIENT-LVL III: CPT | Mod: PBBFAC,,, | Performed by: PHYSICIAN ASSISTANT

## 2019-01-01 PROCEDURE — 99213 OFFICE O/P EST LOW 20 MIN: CPT | Mod: S$GLB,,, | Performed by: PHYSICIAN ASSISTANT

## 2019-01-30 ENCOUNTER — OFFICE VISIT (OUTPATIENT)
Dept: INTERNAL MEDICINE | Facility: CLINIC | Age: 34
End: 2019-01-30
Payer: COMMERCIAL

## 2019-01-30 VITALS
BODY MASS INDEX: 46.19 KG/M2 | HEIGHT: 67 IN | WEIGHT: 294.31 LBS | RESPIRATION RATE: 16 BRPM | SYSTOLIC BLOOD PRESSURE: 122 MMHG | DIASTOLIC BLOOD PRESSURE: 88 MMHG | HEART RATE: 72 BPM | TEMPERATURE: 97 F

## 2019-01-30 DIAGNOSIS — R19.7 DIARRHEA, UNSPECIFIED TYPE: Primary | ICD-10-CM

## 2019-01-30 PROCEDURE — 99999 PR PBB SHADOW E&M-EST. PATIENT-LVL III: CPT | Mod: PBBFAC,,, | Performed by: NURSE PRACTITIONER

## 2019-01-30 PROCEDURE — 3008F BODY MASS INDEX DOCD: CPT | Mod: CPTII,S$GLB,, | Performed by: NURSE PRACTITIONER

## 2019-01-30 PROCEDURE — 99213 OFFICE O/P EST LOW 20 MIN: CPT | Mod: S$GLB,,, | Performed by: NURSE PRACTITIONER

## 2019-01-30 PROCEDURE — 99213 PR OFFICE/OUTPT VISIT, EST, LEVL III, 20-29 MIN: ICD-10-PCS | Mod: S$GLB,,, | Performed by: NURSE PRACTITIONER

## 2019-01-30 PROCEDURE — 99999 PR PBB SHADOW E&M-EST. PATIENT-LVL III: ICD-10-PCS | Mod: PBBFAC,,, | Performed by: NURSE PRACTITIONER

## 2019-01-30 PROCEDURE — 3008F PR BODY MASS INDEX (BMI) DOCUMENTED: ICD-10-PCS | Mod: CPTII,S$GLB,, | Performed by: NURSE PRACTITIONER

## 2019-01-30 NOTE — PROGRESS NOTES
"Subjective:       Patient ID: Jonas Abdalla is a 33 y.o. male.    Chief Complaint: Diarrhea    Diarrhea    This is a new problem. The current episode started today. Episode frequency: 3x. The problem has been waxing and waning. The stool consistency is described as watery. The patient states that diarrhea does not awaken him from sleep. Associated symptoms include abdominal pain (generalized, resolves w bm) and bloating. Pertinent negatives include no arthralgias, chills, coughing, fever, headaches, increased  flatus, myalgias, sweats, URI, vomiting or weight loss. Nothing aggravates the symptoms. Risk factors include ill contacts (works at a senior care, unknown exposures. no recent travel, recent antibiotics, undercooked foods). He has tried nothing for the symptoms. The treatment provided no relief. There is no history of bowel resection, inflammatory bowel disease, malabsorption or a recent abdominal surgery.       /88 (BP Location: Left arm, Patient Position: Sitting, BP Method: Medium (Automatic))   Pulse 72   Temp 97 °F (36.1 °C) (Tympanic)   Resp 16   Ht 5' 7" (1.702 m)   Wt 133.5 kg (294 lb 5 oz)   BMI 46.10 kg/m²     Review of Systems   Constitutional: Positive for activity change. Negative for appetite change, chills, diaphoresis, fatigue, fever, unexpected weight change and weight loss.   HENT: Negative for congestion, ear pain, nosebleeds, postnasal drip, rhinorrhea, sinus pressure, sneezing, sore throat and trouble swallowing.    Eyes: Negative for photophobia, pain and visual disturbance.   Respiratory: Negative for apnea, cough, choking, chest tightness, shortness of breath and wheezing.    Cardiovascular: Negative for chest pain, palpitations and leg swelling.   Gastrointestinal: Positive for abdominal pain (generalized, resolves w bm), bloating and diarrhea. Negative for abdominal distention, anal bleeding, blood in stool, constipation, flatus, nausea, rectal pain and vomiting. "   Genitourinary: Negative for decreased urine volume, difficulty urinating, dysuria, hematuria and urgency.   Musculoskeletal: Negative for arthralgias, gait problem, joint swelling and myalgias.   Skin: Negative for rash.   Neurological: Negative for dizziness, tremors, seizures, syncope, weakness, light-headedness, numbness and headaches.   Psychiatric/Behavioral: Negative for agitation, confusion, decreased concentration, hallucinations and sleep disturbance. The patient is not nervous/anxious.        Objective:      Physical Exam   Constitutional: He is oriented to person, place, and time. He appears well-developed and well-nourished. He is cooperative. No distress.   HENT:   Head: Normocephalic and atraumatic.   Eyes: Conjunctivae are normal. Right eye exhibits no discharge. Left eye exhibits no discharge.   Cardiovascular: Normal rate, regular rhythm and normal heart sounds.   No murmur heard.  Pulmonary/Chest: Effort normal and breath sounds normal. No respiratory distress. He has no wheezes. He has no rales. He exhibits no tenderness.   Abdominal: Soft. Normal appearance and bowel sounds are normal. He exhibits no distension, no pulsatile liver, no fluid wave, no ascites, no pulsatile midline mass and no mass. There is no tenderness. There is no rigidity, no rebound, no guarding, no CVA tenderness, no tenderness at McBurney's point and negative Espinoza's sign.   Musculoskeletal: Normal range of motion.   Neurological: He is alert and oriented to person, place, and time.   Skin: Skin is warm and dry. No rash noted. He is not diaphoretic.   Psychiatric: He has a normal mood and affect. His behavior is normal. Judgment and thought content normal.   Nursing note and vitals reviewed.      Assessment:       1. Diarrhea, unspecified type        Plan:       oJnas was seen today for diarrhea.    Diagnoses and all orders for this visit:    Diarrhea, unspecified type    Follow clear liquid diet guidelines, may progress  to bland diet as tolerated.   Denies nausea  Drink a small sips of clear liquids every 15-20 minutes to re-hydrate yourself.  Avoid Imodium because this can trap the virus in your system.   Work excuse given for today  See PCP or go to ER if decreased urination, inability to hold down fluids, if any dizziness or lightheadedness occurs, or if symptoms worsen of fail to improve with treatment.    Patient admits that he has had diarrhea issues on and off for years. No recent issues. He states that years ago, he was about to have a cscope, and left scope center right before scope due to fear, I informed him if diarrhea continues to be an issue on and off, he should see GI

## 2019-01-30 NOTE — PATIENT INSTRUCTIONS
Diet for Vomiting or Diarrhea (Adult)    Your symptoms may return or get worse after eating certain foods listed below. If this happens, stop eating these foods until your symptoms ease and you feel better.  Once the vomiting stops, follow the steps below.   During the first 12 to 24 hours  During the first 12 to 24 hours, follow this diet:  · Drinks. Plain water, sport drinks like electrolyte solutions, soft drinks without caffeine, mineral water (plain or flavored), clear fruit juices, and decaffeinated tea and coffee.  · Soups. Clear broth.  · Desserts. Plain gelatin, popsicles, and fruit juice bars. As you feel better, you may add 6 to 8 ounces of yogurt per day. If you have diarrhea, don't have foods or drinks that contain sugar, high-fructose corn syrup, or sugar alcohols.  During the next 24 hours  During the next 24 hours you may add the following to the above:  · Hot cereal, plain toast, bread, rolls, and crackers  · Plain noodles, rice, mashed potatoes, and chicken noodle or rice soup  · Unsweetened canned fruit (but not pineapple) and bananas  Don't eat more than 15 grams of fat a day. Do this by staying away from margarine, butter, oils, mayonnaise, sauces, gravies, fried foods, peanut butter, meat, poultry, and fish.  Don't eat much fiber. Stay away from raw or cooked vegetables, fresh fruits (except bananas), and bran cereals.  Limit how much caffeine and chocolate you have. Do not use any spices or seasonings except salt.  During the next 24 hours  Slowly go back to your normal diet, as you feel better and your symptoms ease.  Date Last Reviewed: 8/1/2016  © 8757-9817 [employer]. [address] [place] [address], [place] [address]. All rights reserved. This information is not intended as a substitute for professional medical care. Always follow your healthcare professional's instructions.

## 2019-12-06 NOTE — PATIENT INSTRUCTIONS
Tendonitis  A tendon is the thick fibrous cord that joins muscle to bone and allows joints to move. When a tendon becomes inflamed, it is called tendonitis. This can occur from overuse, injury, or infection. This usually involves the shoulders, forearm, wrist, hands and foot. Symptoms include pain, swelling and tenderness to the touch. Moving the joint increases the pain.  It takes 4 to 6 weeks for tendonitis to heal. It is treated by preventing motion of the tendon with a splint or brace and the use of anti-inflammatory medicine.  Home care  · Some people find relief with ice packs. These can be crushed or cubed ice in a plastic bag or a bag of frozen vegetables wrapped in a thin towel. Other people get better relief with heat. This can include a hot shower, hot bath, or a moist towel warmed in a microwave. Try each and use the method that feels best, for 15 to 20 minutes several times a day.  · Rest the inflamed joint and protect it from movement.  · You may use over-the-counter ibuprofen or naproxen to treat pain and inflammation, unless another medicine was prescribed. If you can't take these medicines, acetaminophen may help with the pain, but does not treat inflammation. If you have chronic liver or kidney disease or ever had a stomach ulcer or gastrointestinal bleeding, talk with your doctor before using these medicines.  · As your symptoms improve, begin gradual motion at the involved joint.  Follow-up care  Follow up with your healthcare provider if you are not improving after 5 days of treatment.  When to seek medical advice  Call your healthcare provider right away if any of these occur:  · Redness over the painful area  · Increasing pain or swelling at the joint  · Fever (1 degree above your normal temperature) lasting 24 to 48 hours Or, whatever your healthcare provider told you to report based on your condition  Date Last Reviewed: 11/21/2015  © 0673-5115 [employer]. [address] [place] [address]  Road, [place] [address]. All rights reserved. This information is not intended as a substitute for professional medical care. Always follow your healthcare professional's instructions.

## 2019-12-06 NOTE — PROGRESS NOTES
"Subjective:       Patient ID: Jonas Abdalla is a 34 y.o. male.    Chief Complaint: Knee Pain (l)    Knee Pain    The incident occurred more than 1 week ago. The incident occurred at home. The pain is present in the left knee. The quality of the pain is described as aching. Pertinent negatives include no inability to bear weight, loss of motion, loss of sensation, muscle weakness, numbness or tingling.     Patient states pain has resolved  But started a few weeks ago   Gets in and out of a truck daily, noticed pain with that activity, pain when present located anteriorly     No swelling, no limited ROM   Pain does not radiate      Health Maintenance Due   Topic Date Due    Lipid Panel  1985    TETANUS VACCINE  03/02/2005       Past Medical History:   Diagnosis Date    Carpal tunnel syndrome     Gallstone        No current outpatient medications on file.     No current facility-administered medications for this visit.        Review of Systems   Constitutional: Negative for activity change and unexpected weight change.   HENT: Negative for hearing loss, rhinorrhea and trouble swallowing.    Eyes: Negative for discharge and visual disturbance.   Respiratory: Negative for chest tightness and wheezing.    Cardiovascular: Negative for chest pain and palpitations.   Gastrointestinal: Negative for blood in stool, constipation, diarrhea and vomiting.   Endocrine: Negative for polydipsia and polyuria.   Genitourinary: Negative for difficulty urinating, hematuria and urgency.   Musculoskeletal: Positive for arthralgias. Negative for joint swelling and neck pain.   Neurological: Negative for tingling, weakness, numbness and headaches.   Psychiatric/Behavioral: Negative for confusion and dysphoric mood.       Objective:   /80   Pulse 66   Temp 98.4 °F (36.9 °C)   Ht 5' 7" (1.702 m)   Wt (!) 138.1 kg (304 lb 7.3 oz)   BMI 47.68 kg/m²      Physical Exam   Constitutional: He appears well-developed. "   Obese    HENT:   Head: Normocephalic and atraumatic.   Right Ear: External ear normal.   Left Ear: External ear normal.   Nose: Nose normal.   Mouth/Throat: Oropharynx is clear and moist.   Cardiovascular: Intact distal pulses.   Pulmonary/Chest: Effort normal and breath sounds normal.   Musculoskeletal:        Right knee: Normal. He exhibits normal range of motion and no swelling. No tenderness found. No medial joint line, no lateral joint line, no MCL and no LCL tenderness noted.        Left knee: Normal. He exhibits normal range of motion and no swelling. No tenderness found. No medial joint line, no lateral joint line, no MCL, no LCL and no patellar tendon tenderness noted.   Skin: He is not diaphoretic.         Lab Results   Component Value Date    WBC 12.99 (H) 09/24/2014    HGB 16.0 09/24/2014    HCT 45.3 09/24/2014     09/24/2014    ALT 42 09/24/2014    AST 19 09/24/2014     09/24/2014    K 3.9 09/24/2014     09/24/2014    CREATININE 1.2 09/24/2014    BUN 13 09/24/2014    CO2 24 09/24/2014       Assessment:       1. Patellar tenderness, left        Plan:   Patellar tenderness, left      Resolved  Discussed tendonitis   Home care for this     Activity modification   No follow-ups on file.

## 2020-01-01 ENCOUNTER — PATIENT MESSAGE (OUTPATIENT)
Dept: INTERNAL MEDICINE | Facility: CLINIC | Age: 35
End: 2020-01-01

## 2020-01-01 ENCOUNTER — OFFICE VISIT (OUTPATIENT)
Dept: INTERNAL MEDICINE | Facility: CLINIC | Age: 35
End: 2020-01-01
Payer: COMMERCIAL

## 2020-01-01 ENCOUNTER — TELEPHONE (OUTPATIENT)
Dept: PULMONOLOGY | Facility: CLINIC | Age: 35
End: 2020-01-01

## 2020-01-01 ENCOUNTER — TELEPHONE (OUTPATIENT)
Dept: PRIMARY CARE CLINIC | Facility: CLINIC | Age: 35
End: 2020-01-01

## 2020-01-01 ENCOUNTER — OFFICE VISIT (OUTPATIENT)
Dept: GASTROENTEROLOGY | Facility: CLINIC | Age: 35
End: 2020-01-01
Payer: COMMERCIAL

## 2020-01-01 ENCOUNTER — ANESTHESIA (OUTPATIENT)
Dept: INTENSIVE CARE | Facility: HOSPITAL | Age: 35
DRG: 870 | End: 2020-01-01
Payer: COMMERCIAL

## 2020-01-01 ENCOUNTER — OFFICE VISIT (OUTPATIENT)
Dept: URGENT CARE | Facility: CLINIC | Age: 35
End: 2020-01-01
Payer: COMMERCIAL

## 2020-01-01 ENCOUNTER — HOSPITAL ENCOUNTER (INPATIENT)
Facility: HOSPITAL | Age: 35
LOS: 19 days | DRG: 870 | End: 2020-08-01
Attending: EMERGENCY MEDICINE | Admitting: INTERNAL MEDICINE
Payer: COMMERCIAL

## 2020-01-01 ENCOUNTER — LAB VISIT (OUTPATIENT)
Dept: LAB | Facility: HOSPITAL | Age: 35
End: 2020-01-01
Attending: FAMILY MEDICINE
Payer: COMMERCIAL

## 2020-01-01 ENCOUNTER — TELEPHONE (OUTPATIENT)
Dept: INTERNAL MEDICINE | Facility: CLINIC | Age: 35
End: 2020-01-01

## 2020-01-01 ENCOUNTER — TELEPHONE (OUTPATIENT)
Dept: OBSTETRICS AND GYNECOLOGY | Facility: CLINIC | Age: 35
End: 2020-01-01

## 2020-01-01 ENCOUNTER — TELEPHONE (OUTPATIENT)
Dept: URGENT CARE | Facility: CLINIC | Age: 35
End: 2020-01-01

## 2020-01-01 ENCOUNTER — ANESTHESIA EVENT (OUTPATIENT)
Dept: INTENSIVE CARE | Facility: HOSPITAL | Age: 35
DRG: 870 | End: 2020-01-01
Payer: COMMERCIAL

## 2020-01-01 ENCOUNTER — TELEPHONE (OUTPATIENT)
Dept: RADIOLOGY | Facility: HOSPITAL | Age: 35
End: 2020-01-01

## 2020-01-01 ENCOUNTER — PATIENT OUTREACH (OUTPATIENT)
Dept: ADMINISTRATIVE | Facility: HOSPITAL | Age: 35
End: 2020-01-01

## 2020-01-01 ENCOUNTER — HOSPITAL ENCOUNTER (OUTPATIENT)
Dept: RADIOLOGY | Facility: HOSPITAL | Age: 35
Discharge: HOME | End: 2020-05-12
Attending: FAMILY MEDICINE
Payer: COMMERCIAL

## 2020-01-01 ENCOUNTER — NURSE TRIAGE (OUTPATIENT)
Dept: ADMINISTRATIVE | Facility: CLINIC | Age: 35
End: 2020-01-01

## 2020-01-01 ENCOUNTER — HOSPITAL ENCOUNTER (OUTPATIENT)
Dept: RADIOLOGY | Facility: HOSPITAL | Age: 35
Discharge: HOME | End: 2020-03-20
Attending: FAMILY MEDICINE
Payer: COMMERCIAL

## 2020-01-01 VITALS
HEIGHT: 67 IN | WEIGHT: 295.44 LBS | HEART RATE: 74 BPM | SYSTOLIC BLOOD PRESSURE: 126 MMHG | DIASTOLIC BLOOD PRESSURE: 80 MMHG | BODY MASS INDEX: 46.37 KG/M2

## 2020-01-01 VITALS
WEIGHT: 295 LBS | SYSTOLIC BLOOD PRESSURE: 140 MMHG | DIASTOLIC BLOOD PRESSURE: 85 MMHG | HEIGHT: 67 IN | RESPIRATION RATE: 16 BRPM | HEART RATE: 120 BPM | OXYGEN SATURATION: 98 % | BODY MASS INDEX: 46.3 KG/M2 | TEMPERATURE: 102 F

## 2020-01-01 VITALS
DIASTOLIC BLOOD PRESSURE: 48 MMHG | TEMPERATURE: 104 F | HEIGHT: 67 IN | BODY MASS INDEX: 45.78 KG/M2 | WEIGHT: 291.69 LBS | SYSTOLIC BLOOD PRESSURE: 90 MMHG | OXYGEN SATURATION: 59 %

## 2020-01-01 VITALS
BODY MASS INDEX: 47.74 KG/M2 | WEIGHT: 315 LBS | HEIGHT: 68 IN | HEART RATE: 66 BPM | SYSTOLIC BLOOD PRESSURE: 136 MMHG | TEMPERATURE: 99 F | DIASTOLIC BLOOD PRESSURE: 100 MMHG

## 2020-01-01 VITALS
DIASTOLIC BLOOD PRESSURE: 84 MMHG | WEIGHT: 298.5 LBS | TEMPERATURE: 97 F | SYSTOLIC BLOOD PRESSURE: 126 MMHG | HEIGHT: 68 IN | BODY MASS INDEX: 45.24 KG/M2 | HEART RATE: 82 BPM

## 2020-01-01 DIAGNOSIS — E78.2 ELEVATED TRIGLYCERIDES WITH HIGH CHOLESTEROL: ICD-10-CM

## 2020-01-01 DIAGNOSIS — N44.2 TESTICULAR CYST: Primary | ICD-10-CM

## 2020-01-01 DIAGNOSIS — E88.819 INSULIN RESISTANCE: ICD-10-CM

## 2020-01-01 DIAGNOSIS — N50.811 TESTICULAR PAIN, RIGHT: Primary | ICD-10-CM

## 2020-01-01 DIAGNOSIS — J96.01 ACUTE RESPIRATORY FAILURE WITH HYPOXIA: ICD-10-CM

## 2020-01-01 DIAGNOSIS — R65.20 SEVERE SEPSIS WITH ACUTE ORGAN DYSFUNCTION: ICD-10-CM

## 2020-01-01 DIAGNOSIS — R74.8 ELEVATED LIVER ENZYMES: ICD-10-CM

## 2020-01-01 DIAGNOSIS — R50.9 FEVER, UNSPECIFIED FEVER CAUSE: ICD-10-CM

## 2020-01-01 DIAGNOSIS — R79.89 TROPONIN LEVEL ELEVATED: ICD-10-CM

## 2020-01-01 DIAGNOSIS — G47.30 SLEEP APNEA, UNSPECIFIED TYPE: ICD-10-CM

## 2020-01-01 DIAGNOSIS — J12.82 PNEUMONIA DUE TO COVID-19 VIRUS: ICD-10-CM

## 2020-01-01 DIAGNOSIS — R51.9 ACUTE NONINTRACTABLE HEADACHE, UNSPECIFIED HEADACHE TYPE: ICD-10-CM

## 2020-01-01 DIAGNOSIS — K62.5 BRIGHT RED BLOOD PER RECTUM: ICD-10-CM

## 2020-01-01 DIAGNOSIS — E66.01 MORBID OBESITY WITH BMI OF 45.0-49.9, ADULT: ICD-10-CM

## 2020-01-01 DIAGNOSIS — I10 HYPERTENSION, UNSPECIFIED TYPE: ICD-10-CM

## 2020-01-01 DIAGNOSIS — R74.8 ELEVATED LIVER ENZYMES: Primary | ICD-10-CM

## 2020-01-01 DIAGNOSIS — I10 ESSENTIAL HYPERTENSION: Chronic | ICD-10-CM

## 2020-01-01 DIAGNOSIS — N17.9 AKI (ACUTE KIDNEY INJURY): ICD-10-CM

## 2020-01-01 DIAGNOSIS — U07.1 PNEUMONIA DUE TO COVID-19 VIRUS: ICD-10-CM

## 2020-01-01 DIAGNOSIS — N50.811 TESTICULAR PAIN, RIGHT: ICD-10-CM

## 2020-01-01 DIAGNOSIS — K62.5 BRIGHT RED BLOOD PER RECTUM: Primary | ICD-10-CM

## 2020-01-01 DIAGNOSIS — E43 SEVERE PROTEIN-CALORIE MALNUTRITION: ICD-10-CM

## 2020-01-01 DIAGNOSIS — R53.83 FATIGUE, UNSPECIFIED TYPE: ICD-10-CM

## 2020-01-01 DIAGNOSIS — R00.0 TACHYCARDIA: ICD-10-CM

## 2020-01-01 DIAGNOSIS — R65.21 SEPTIC SHOCK: ICD-10-CM

## 2020-01-01 DIAGNOSIS — Z20.822 SUSPECTED COVID-19 VIRUS INFECTION: Primary | ICD-10-CM

## 2020-01-01 DIAGNOSIS — U07.1 COVID-19 VIRUS DETECTED: ICD-10-CM

## 2020-01-01 DIAGNOSIS — R11.0 NAUSEA: ICD-10-CM

## 2020-01-01 DIAGNOSIS — G47.30 SLEEP APNEA, UNSPECIFIED TYPE: Primary | ICD-10-CM

## 2020-01-01 DIAGNOSIS — Z00.00 ROUTINE GENERAL MEDICAL EXAMINATION AT A HEALTH CARE FACILITY: ICD-10-CM

## 2020-01-01 DIAGNOSIS — E87.5 HYPERKALEMIA: ICD-10-CM

## 2020-01-01 DIAGNOSIS — D64.9 ANEMIA, UNSPECIFIED TYPE: ICD-10-CM

## 2020-01-01 DIAGNOSIS — A41.9 SEPTIC SHOCK: ICD-10-CM

## 2020-01-01 DIAGNOSIS — R09.02 HYPOXIA: ICD-10-CM

## 2020-01-01 DIAGNOSIS — E66.01 MORBID OBESITY WITH BMI OF 40.0-44.9, ADULT: ICD-10-CM

## 2020-01-01 DIAGNOSIS — J80 ACUTE RESPIRATORY DISTRESS SYNDROME (ARDS) DUE TO COVID-19 VIRUS: ICD-10-CM

## 2020-01-01 DIAGNOSIS — U07.1 ACUTE RESPIRATORY DISTRESS SYNDROME (ARDS) DUE TO COVID-19 VIRUS: ICD-10-CM

## 2020-01-01 DIAGNOSIS — R52 BODY ACHES: ICD-10-CM

## 2020-01-01 DIAGNOSIS — E78.1 HIGH TRIGLYCERIDES: ICD-10-CM

## 2020-01-01 DIAGNOSIS — E66.01 MORBID OBESITY WITH BMI OF 45.0-49.9, ADULT: Chronic | ICD-10-CM

## 2020-01-01 DIAGNOSIS — K62.89 ANAL PAIN: Primary | ICD-10-CM

## 2020-01-01 DIAGNOSIS — J18.9 PNEUMONIA OF RIGHT LOWER LOBE DUE TO INFECTIOUS ORGANISM: Primary | ICD-10-CM

## 2020-01-01 DIAGNOSIS — U07.1 COVID-19 VIRUS INFECTION: ICD-10-CM

## 2020-01-01 DIAGNOSIS — Z31.41 FERTILITY TESTING: ICD-10-CM

## 2020-01-01 DIAGNOSIS — R50.9 FEVER, UNSPECIFIED FEVER CAUSE: Primary | ICD-10-CM

## 2020-01-01 DIAGNOSIS — I49.9 IRREGULAR CARDIAC RHYTHM: ICD-10-CM

## 2020-01-01 DIAGNOSIS — A41.9 SEVERE SEPSIS WITH ACUTE ORGAN DYSFUNCTION: ICD-10-CM

## 2020-01-01 LAB
ABO + RH BLD: NORMAL
ABO + RH BLD: NORMAL
ALBUMIN SERPL BCP-MCNC: 1.7 G/DL (ref 3.5–5.2)
ALBUMIN SERPL BCP-MCNC: 1.8 G/DL (ref 3.5–5.2)
ALBUMIN SERPL BCP-MCNC: 1.9 G/DL (ref 3.5–5.2)
ALBUMIN SERPL BCP-MCNC: 2 G/DL (ref 3.5–5.2)
ALBUMIN SERPL BCP-MCNC: 2.2 G/DL (ref 3.5–5.2)
ALBUMIN SERPL BCP-MCNC: 2.2 G/DL (ref 3.5–5.2)
ALBUMIN SERPL BCP-MCNC: 2.4 G/DL (ref 3.5–5.2)
ALBUMIN SERPL BCP-MCNC: 2.7 G/DL (ref 3.5–5.2)
ALBUMIN SERPL BCP-MCNC: 2.7 G/DL (ref 3.5–5.2)
ALBUMIN SERPL BCP-MCNC: 2.9 G/DL (ref 3.5–5.2)
ALBUMIN SERPL BCP-MCNC: 3 G/DL (ref 3.5–5.2)
ALBUMIN SERPL BCP-MCNC: 3.1 G/DL (ref 3.5–5.2)
ALBUMIN SERPL BCP-MCNC: 3.7 G/DL (ref 3.5–5.2)
ALBUMIN SERPL BCP-MCNC: 4 G/DL (ref 3.5–5.2)
ALBUMIN SERPL BCP-MCNC: 4.3 G/DL (ref 3.5–5.2)
ALLENS TEST: ABNORMAL
ALLENS TEST: NORMAL
ALP SERPL-CCNC: 136 U/L (ref 55–135)
ALP SERPL-CCNC: 40 U/L (ref 55–135)
ALP SERPL-CCNC: 42 U/L (ref 55–135)
ALP SERPL-CCNC: 43 U/L (ref 55–135)
ALP SERPL-CCNC: 44 U/L (ref 55–135)
ALP SERPL-CCNC: 45 U/L (ref 55–135)
ALP SERPL-CCNC: 48 U/L (ref 55–135)
ALP SERPL-CCNC: 57 U/L (ref 55–135)
ALP SERPL-CCNC: 60 U/L (ref 55–135)
ALP SERPL-CCNC: 62 U/L (ref 55–135)
ALP SERPL-CCNC: 63 U/L (ref 55–135)
ALP SERPL-CCNC: 64 U/L (ref 55–135)
ALP SERPL-CCNC: 65 U/L (ref 55–135)
ALP SERPL-CCNC: 66 U/L (ref 55–135)
ALP SERPL-CCNC: 67 U/L (ref 55–135)
ALP SERPL-CCNC: 68 U/L (ref 55–135)
ALP SERPL-CCNC: 69 U/L (ref 55–135)
ALP SERPL-CCNC: 74 U/L (ref 55–135)
ALP SERPL-CCNC: 83 U/L (ref 55–135)
ALT SERPL W/O P-5'-P-CCNC: 127 U/L (ref 10–44)
ALT SERPL W/O P-5'-P-CCNC: 213 U/L (ref 10–44)
ALT SERPL W/O P-5'-P-CCNC: 36 U/L (ref 10–44)
ALT SERPL W/O P-5'-P-CCNC: 36 U/L (ref 10–44)
ALT SERPL W/O P-5'-P-CCNC: 40 U/L (ref 10–44)
ALT SERPL W/O P-5'-P-CCNC: 41 U/L (ref 10–44)
ALT SERPL W/O P-5'-P-CCNC: 45 U/L (ref 10–44)
ALT SERPL W/O P-5'-P-CCNC: 45 U/L (ref 10–44)
ALT SERPL W/O P-5'-P-CCNC: 52 U/L (ref 10–44)
ALT SERPL W/O P-5'-P-CCNC: 55 U/L (ref 10–44)
ALT SERPL W/O P-5'-P-CCNC: 55 U/L (ref 10–44)
ALT SERPL W/O P-5'-P-CCNC: 60 U/L (ref 10–44)
ALT SERPL W/O P-5'-P-CCNC: 65 U/L (ref 10–44)
ALT SERPL W/O P-5'-P-CCNC: 66 U/L (ref 10–44)
ALT SERPL W/O P-5'-P-CCNC: 68 U/L (ref 10–44)
ALT SERPL W/O P-5'-P-CCNC: 73 U/L (ref 10–44)
ALT SERPL W/O P-5'-P-CCNC: 74 U/L (ref 10–44)
ALT SERPL W/O P-5'-P-CCNC: 80 U/L (ref 10–44)
ALT SERPL W/O P-5'-P-CCNC: 850 U/L (ref 10–44)
ALT SERPL W/O P-5'-P-CCNC: 88 U/L (ref 10–44)
ALT SERPL W/O P-5'-P-CCNC: 95 U/L (ref 10–44)
ANION GAP SERPL CALC-SCNC: 10 MMOL/L (ref 8–16)
ANION GAP SERPL CALC-SCNC: 11 MMOL/L (ref 8–16)
ANION GAP SERPL CALC-SCNC: 12 MMOL/L (ref 8–16)
ANION GAP SERPL CALC-SCNC: 13 MMOL/L (ref 8–16)
ANION GAP SERPL CALC-SCNC: 13 MMOL/L (ref 8–16)
ANION GAP SERPL CALC-SCNC: 5 MMOL/L (ref 8–16)
ANION GAP SERPL CALC-SCNC: 6 MMOL/L (ref 8–16)
ANION GAP SERPL CALC-SCNC: 7 MMOL/L (ref 8–16)
ANION GAP SERPL CALC-SCNC: 7 MMOL/L (ref 8–16)
ANION GAP SERPL CALC-SCNC: 8 MMOL/L (ref 8–16)
ANION GAP SERPL CALC-SCNC: 8 MMOL/L (ref 8–16)
ANION GAP SERPL CALC-SCNC: 9 MMOL/L (ref 8–16)
ANISOCYTOSIS BLD QL SMEAR: SLIGHT
APTT BLDCRRT: 116.1 SEC (ref 21–32)
APTT BLDCRRT: 25.9 SEC (ref 21–32)
APTT BLDCRRT: 33.5 SEC (ref 21–32)
APTT BLDCRRT: 35.2 SEC (ref 21–32)
APTT BLDCRRT: 35.3 SEC (ref 21–32)
APTT BLDCRRT: 36.5 SEC (ref 21–32)
APTT BLDCRRT: 36.5 SEC (ref 21–32)
APTT BLDCRRT: 37.4 SEC (ref 21–32)
APTT BLDCRRT: 38.2 SEC (ref 21–32)
APTT BLDCRRT: 38.3 SEC (ref 21–32)
APTT BLDCRRT: 38.7 SEC (ref 21–32)
APTT BLDCRRT: 39.1 SEC (ref 21–32)
APTT BLDCRRT: 39.5 SEC (ref 21–32)
APTT BLDCRRT: 39.6 SEC (ref 21–32)
APTT BLDCRRT: 39.7 SEC (ref 21–32)
APTT BLDCRRT: 39.8 SEC (ref 21–32)
APTT BLDCRRT: 40.9 SEC (ref 21–32)
APTT BLDCRRT: 41.4 SEC (ref 21–32)
APTT BLDCRRT: 41.5 SEC (ref 21–32)
APTT BLDCRRT: 42.2 SEC (ref 21–32)
APTT BLDCRRT: 43.6 SEC (ref 21–32)
APTT BLDCRRT: 46.4 SEC (ref 21–32)
APTT BLDCRRT: 48.1 SEC (ref 21–32)
APTT BLDCRRT: 52.4 SEC (ref 21–32)
APTT BLDCRRT: 56.3 SEC (ref 21–32)
APTT BLDCRRT: 79.2 SEC (ref 21–32)
APTT BLDCRRT: NORMAL SEC (ref 21–32)
AST SERPL-CCNC: 231 U/L (ref 10–40)
AST SERPL-CCNC: 27 U/L (ref 10–40)
AST SERPL-CCNC: 28 U/L (ref 10–40)
AST SERPL-CCNC: 29 U/L (ref 10–40)
AST SERPL-CCNC: 33 U/L (ref 10–40)
AST SERPL-CCNC: 34 U/L (ref 10–40)
AST SERPL-CCNC: 34 U/L (ref 10–40)
AST SERPL-CCNC: 35 U/L (ref 10–40)
AST SERPL-CCNC: 37 U/L (ref 10–40)
AST SERPL-CCNC: 37 U/L (ref 10–40)
AST SERPL-CCNC: 38 U/L (ref 10–40)
AST SERPL-CCNC: 38 U/L (ref 10–40)
AST SERPL-CCNC: 40 U/L (ref 10–40)
AST SERPL-CCNC: 40 U/L (ref 10–40)
AST SERPL-CCNC: 43 U/L (ref 10–40)
AST SERPL-CCNC: 45 U/L (ref 10–40)
AST SERPL-CCNC: 48 U/L (ref 10–40)
AST SERPL-CCNC: 49 U/L (ref 10–40)
AST SERPL-CCNC: 60 U/L (ref 10–40)
AST SERPL-CCNC: 61 U/L (ref 10–40)
AST SERPL-CCNC: 67 U/L (ref 10–40)
AST SERPL-CCNC: 895 U/L (ref 10–40)
AST SERPL-CCNC: 91 U/L (ref 10–40)
BACTERIA BLD CULT: NORMAL
BACTERIA SPEC AEROBE CULT: ABNORMAL
BACTERIA SPEC AEROBE CULT: ABNORMAL
BACTERIA SPEC AEROBE CULT: NORMAL
BACTERIA SPEC AEROBE CULT: NORMAL
BASO STIPL BLD QL SMEAR: ABNORMAL
BASOPHILS # BLD AUTO: 0 K/UL (ref 0–0.2)
BASOPHILS # BLD AUTO: 0 K/UL (ref 0–0.2)
BASOPHILS # BLD AUTO: 0.01 K/UL (ref 0–0.2)
BASOPHILS # BLD AUTO: 0.03 K/UL (ref 0–0.2)
BASOPHILS # BLD AUTO: 0.03 K/UL (ref 0–0.2)
BASOPHILS # BLD AUTO: 0.04 K/UL (ref 0–0.2)
BASOPHILS # BLD AUTO: 0.04 K/UL (ref 0–0.2)
BASOPHILS # BLD AUTO: 0.05 K/UL (ref 0–0.2)
BASOPHILS # BLD AUTO: 0.05 K/UL (ref 0–0.2)
BASOPHILS # BLD AUTO: 0.06 K/UL (ref 0–0.2)
BASOPHILS # BLD AUTO: 0.06 K/UL (ref 0–0.2)
BASOPHILS # BLD AUTO: ABNORMAL K/UL (ref 0–0.2)
BASOPHILS NFR BLD: 0 % (ref 0–1.9)
BASOPHILS NFR BLD: 0.2 % (ref 0–1.9)
BASOPHILS NFR BLD: 0.2 % (ref 0–1.9)
BASOPHILS NFR BLD: 0.3 % (ref 0–1.9)
BASOPHILS NFR BLD: 0.4 % (ref 0–1.9)
BASOPHILS NFR BLD: 1 % (ref 0–1.9)
BASOPHILS NFR BLD: 1 % (ref 0–1.9)
BILIRUB DIRECT SERPL-MCNC: 0.3 MG/DL (ref 0.1–0.3)
BILIRUB SERPL-MCNC: 0.2 MG/DL (ref 0.1–1)
BILIRUB SERPL-MCNC: 0.3 MG/DL (ref 0.1–1)
BILIRUB SERPL-MCNC: 0.3 MG/DL (ref 0.1–1)
BILIRUB SERPL-MCNC: 0.4 MG/DL (ref 0.1–1)
BILIRUB SERPL-MCNC: 0.5 MG/DL (ref 0.1–1)
BILIRUB SERPL-MCNC: 0.6 MG/DL (ref 0.1–1)
BILIRUB SERPL-MCNC: 0.7 MG/DL (ref 0.1–1)
BILIRUB SERPL-MCNC: 0.8 MG/DL (ref 0.1–1)
BILIRUB SERPL-MCNC: 0.8 MG/DL (ref 0.1–1)
BILIRUB SERPL-MCNC: 1 MG/DL (ref 0.1–1)
BILIRUB SERPL-MCNC: 1.1 MG/DL (ref 0.1–1)
BILIRUB UR QL STRIP: NEGATIVE
BLD GP AB SCN CELLS X3 SERPL QL: NORMAL
BLD GP AB SCN CELLS X3 SERPL QL: NORMAL
BLD PROD TYP BPU: NORMAL
BLOOD UNIT EXPIRATION DATE: NORMAL
BLOOD UNIT TYPE CODE: 6200
BLOOD UNIT TYPE: NORMAL
BNP SERPL-MCNC: 19 PG/ML (ref 0–99)
BNP SERPL-MCNC: <10 PG/ML (ref 0–99)
BUN SERPL-MCNC: 12 MG/DL (ref 6–20)
BUN SERPL-MCNC: 13 MG/DL (ref 6–20)
BUN SERPL-MCNC: 16 MG/DL (ref 6–20)
BUN SERPL-MCNC: 17 MG/DL (ref 6–20)
BUN SERPL-MCNC: 18 MG/DL (ref 6–20)
BUN SERPL-MCNC: 18 MG/DL (ref 6–20)
BUN SERPL-MCNC: 19 MG/DL (ref 6–20)
BUN SERPL-MCNC: 23 MG/DL (ref 6–20)
BUN SERPL-MCNC: 23 MG/DL (ref 6–20)
BUN SERPL-MCNC: 24 MG/DL (ref 6–20)
BUN SERPL-MCNC: 25 MG/DL (ref 6–20)
BUN SERPL-MCNC: 27 MG/DL (ref 6–20)
BUN SERPL-MCNC: 28 MG/DL (ref 6–20)
BUN SERPL-MCNC: 34 MG/DL (ref 6–20)
BUN SERPL-MCNC: 35 MG/DL (ref 6–20)
BUN SERPL-MCNC: 38 MG/DL (ref 6–20)
BUN SERPL-MCNC: 40 MG/DL (ref 6–20)
BUN SERPL-MCNC: 41 MG/DL (ref 6–20)
BUN SERPL-MCNC: 42 MG/DL (ref 6–20)
BUN SERPL-MCNC: 43 MG/DL (ref 6–20)
BUN SERPL-MCNC: 44 MG/DL (ref 6–20)
BUN SERPL-MCNC: 46 MG/DL (ref 6–20)
BUN SERPL-MCNC: 48 MG/DL (ref 6–20)
BUN SERPL-MCNC: 52 MG/DL (ref 6–20)
BUN SERPL-MCNC: 54 MG/DL (ref 6–20)
BUN SERPL-MCNC: 55 MG/DL (ref 6–20)
BUN SERPL-MCNC: 57 MG/DL (ref 6–20)
BURR CELLS BLD QL SMEAR: ABNORMAL
CALCIUM SERPL-MCNC: 10.1 MG/DL (ref 8.7–10.5)
CALCIUM SERPL-MCNC: 8.3 MG/DL (ref 8.7–10.5)
CALCIUM SERPL-MCNC: 8.4 MG/DL (ref 8.7–10.5)
CALCIUM SERPL-MCNC: 8.4 MG/DL (ref 8.7–10.5)
CALCIUM SERPL-MCNC: 8.5 MG/DL (ref 8.7–10.5)
CALCIUM SERPL-MCNC: 8.7 MG/DL (ref 8.7–10.5)
CALCIUM SERPL-MCNC: 8.8 MG/DL (ref 8.7–10.5)
CALCIUM SERPL-MCNC: 8.9 MG/DL (ref 8.7–10.5)
CALCIUM SERPL-MCNC: 8.9 MG/DL (ref 8.7–10.5)
CALCIUM SERPL-MCNC: 9 MG/DL (ref 8.7–10.5)
CALCIUM SERPL-MCNC: 9 MG/DL (ref 8.7–10.5)
CALCIUM SERPL-MCNC: 9.1 MG/DL (ref 8.7–10.5)
CALCIUM SERPL-MCNC: 9.2 MG/DL (ref 8.7–10.5)
CALCIUM SERPL-MCNC: 9.2 MG/DL (ref 8.7–10.5)
CALCIUM SERPL-MCNC: 9.3 MG/DL (ref 8.7–10.5)
CALCIUM SERPL-MCNC: 9.4 MG/DL (ref 8.7–10.5)
CALCIUM SERPL-MCNC: 9.4 MG/DL (ref 8.7–10.5)
CALCIUM SERPL-MCNC: 9.6 MG/DL (ref 8.7–10.5)
CALCIUM SERPL-MCNC: 9.8 MG/DL (ref 8.7–10.5)
CHLORIDE SERPL-SCNC: 100 MMOL/L (ref 95–110)
CHLORIDE SERPL-SCNC: 101 MMOL/L (ref 95–110)
CHLORIDE SERPL-SCNC: 102 MMOL/L (ref 95–110)
CHLORIDE SERPL-SCNC: 102 MMOL/L (ref 95–110)
CHLORIDE SERPL-SCNC: 103 MMOL/L (ref 95–110)
CHLORIDE SERPL-SCNC: 104 MMOL/L (ref 95–110)
CHLORIDE SERPL-SCNC: 104 MMOL/L (ref 95–110)
CHLORIDE SERPL-SCNC: 105 MMOL/L (ref 95–110)
CHLORIDE SERPL-SCNC: 106 MMOL/L (ref 95–110)
CHLORIDE SERPL-SCNC: 95 MMOL/L (ref 95–110)
CHLORIDE SERPL-SCNC: 96 MMOL/L (ref 95–110)
CHLORIDE SERPL-SCNC: 97 MMOL/L (ref 95–110)
CHLORIDE SERPL-SCNC: 98 MMOL/L (ref 95–110)
CHLORIDE SERPL-SCNC: 99 MMOL/L (ref 95–110)
CHOLEST SERPL-MCNC: 164 MG/DL (ref 120–199)
CHOLEST SERPL-MCNC: 179 MG/DL (ref 120–199)
CHOLEST/HDLC SERPL: 5.3 {RATIO} (ref 2–5)
CHOLEST/HDLC SERPL: 6.9 {RATIO} (ref 2–5)
CK SERPL-CCNC: 118 U/L (ref 20–200)
CLARITY UR: CLEAR
CO2 SERPL-SCNC: 21 MMOL/L (ref 23–29)
CO2 SERPL-SCNC: 22 MMOL/L (ref 23–29)
CO2 SERPL-SCNC: 23 MMOL/L (ref 23–29)
CO2 SERPL-SCNC: 24 MMOL/L (ref 23–29)
CO2 SERPL-SCNC: 25 MMOL/L (ref 23–29)
CO2 SERPL-SCNC: 26 MMOL/L (ref 23–29)
CO2 SERPL-SCNC: 27 MMOL/L (ref 23–29)
CO2 SERPL-SCNC: 28 MMOL/L (ref 23–29)
CO2 SERPL-SCNC: 29 MMOL/L (ref 23–29)
CO2 SERPL-SCNC: 30 MMOL/L (ref 23–29)
CO2 SERPL-SCNC: 30 MMOL/L (ref 23–29)
CO2 SERPL-SCNC: 32 MMOL/L (ref 23–29)
CODING SYSTEM: NORMAL
COLOR UR: YELLOW
CREAT SERPL-MCNC: 0.6 MG/DL (ref 0.5–1.4)
CREAT SERPL-MCNC: 0.6 MG/DL (ref 0.5–1.4)
CREAT SERPL-MCNC: 0.7 MG/DL (ref 0.5–1.4)
CREAT SERPL-MCNC: 0.8 MG/DL (ref 0.5–1.4)
CREAT SERPL-MCNC: 0.9 MG/DL (ref 0.5–1.4)
CREAT SERPL-MCNC: 1 MG/DL (ref 0.5–1.4)
CREAT SERPL-MCNC: 1.1 MG/DL (ref 0.5–1.4)
CREAT SERPL-MCNC: 1.1 MG/DL (ref 0.5–1.4)
CREAT SERPL-MCNC: 1.2 MG/DL (ref 0.5–1.4)
CREAT SERPL-MCNC: 1.3 MG/DL (ref 0.5–1.4)
CREAT SERPL-MCNC: 1.3 MG/DL (ref 0.5–1.4)
CREAT SERPL-MCNC: 1.4 MG/DL (ref 0.5–1.4)
CREAT SERPL-MCNC: 1.4 MG/DL (ref 0.5–1.4)
CREAT SERPL-MCNC: 1.5 MG/DL (ref 0.5–1.4)
CRP SERPL-MCNC: 109 MG/L (ref 0–8.2)
CRP SERPL-MCNC: 218.8 MG/L (ref 0–8.2)
CRP SERPL-MCNC: 256.5 MG/L (ref 0–8.2)
CRP SERPL-MCNC: 42 MG/L (ref 0–8.2)
CRP SERPL-MCNC: 47.4 MG/L (ref 0–8.2)
CRP SERPL-MCNC: 64.6 MG/L (ref 0–8.2)
CRP SERPL-MCNC: 77.6 MG/L (ref 0–8.2)
CRP SERPL-MCNC: 98.3 MG/L (ref 0–8.2)
D DIMER PPP IA.FEU-MCNC: 0.46 MG/L FEU
D DIMER PPP IA.FEU-MCNC: 0.81 MG/L FEU
D DIMER PPP IA.FEU-MCNC: 1.92 MG/L FEU
D DIMER PPP IA.FEU-MCNC: 1.98 MG/L FEU
D DIMER PPP IA.FEU-MCNC: 3.29 MG/L FEU
D DIMER PPP IA.FEU-MCNC: 3.57 MG/L FEU
D DIMER PPP IA.FEU-MCNC: 3.78 MG/L FEU
D DIMER PPP IA.FEU-MCNC: 4.83 MG/L FEU
DACRYOCYTES BLD QL SMEAR: ABNORMAL
DELSYS: ABNORMAL
DELSYS: NORMAL
DIFFERENTIAL METHOD: ABNORMAL
DISPENSE STATUS: NORMAL
EOSINOPHIL # BLD AUTO: 0 K/UL (ref 0–0.5)
EOSINOPHIL # BLD AUTO: 0.1 K/UL (ref 0–0.5)
EOSINOPHIL # BLD AUTO: 0.1 K/UL (ref 0–0.5)
EOSINOPHIL # BLD AUTO: 0.2 K/UL (ref 0–0.5)
EOSINOPHIL # BLD AUTO: 0.3 K/UL (ref 0–0.5)
EOSINOPHIL # BLD AUTO: ABNORMAL K/UL (ref 0–0.5)
EOSINOPHIL NFR BLD: 0 % (ref 0–8)
EOSINOPHIL NFR BLD: 0.1 % (ref 0–8)
EOSINOPHIL NFR BLD: 0.2 % (ref 0–8)
EOSINOPHIL NFR BLD: 0.5 % (ref 0–8)
EOSINOPHIL NFR BLD: 1 % (ref 0–8)
EOSINOPHIL NFR BLD: 2 % (ref 0–8)
EOSINOPHIL NFR BLD: 2.4 % (ref 0–8)
EOSINOPHIL NFR BLD: 2.6 % (ref 0–8)
EOSINOPHIL NFR BLD: 3 % (ref 0–8)
EOSINOPHIL NFR BLD: 3 % (ref 0–8)
EP: 6
ERYTHROCYTE [DISTWIDTH] IN BLOOD BY AUTOMATED COUNT: 12.6 % (ref 11.5–14.5)
ERYTHROCYTE [DISTWIDTH] IN BLOOD BY AUTOMATED COUNT: 12.7 % (ref 11.5–14.5)
ERYTHROCYTE [DISTWIDTH] IN BLOOD BY AUTOMATED COUNT: 12.8 % (ref 11.5–14.5)
ERYTHROCYTE [DISTWIDTH] IN BLOOD BY AUTOMATED COUNT: 12.8 % (ref 11.5–14.5)
ERYTHROCYTE [DISTWIDTH] IN BLOOD BY AUTOMATED COUNT: 12.9 % (ref 11.5–14.5)
ERYTHROCYTE [DISTWIDTH] IN BLOOD BY AUTOMATED COUNT: 12.9 % (ref 11.5–14.5)
ERYTHROCYTE [DISTWIDTH] IN BLOOD BY AUTOMATED COUNT: 13 % (ref 11.5–14.5)
ERYTHROCYTE [DISTWIDTH] IN BLOOD BY AUTOMATED COUNT: 13.1 % (ref 11.5–14.5)
ERYTHROCYTE [DISTWIDTH] IN BLOOD BY AUTOMATED COUNT: 13.1 % (ref 11.5–14.5)
ERYTHROCYTE [DISTWIDTH] IN BLOOD BY AUTOMATED COUNT: 13.3 % (ref 11.5–14.5)
ERYTHROCYTE [DISTWIDTH] IN BLOOD BY AUTOMATED COUNT: 13.4 % (ref 11.5–14.5)
ERYTHROCYTE [DISTWIDTH] IN BLOOD BY AUTOMATED COUNT: 13.5 % (ref 11.5–14.5)
ERYTHROCYTE [DISTWIDTH] IN BLOOD BY AUTOMATED COUNT: 13.6 % (ref 11.5–14.5)
ERYTHROCYTE [DISTWIDTH] IN BLOOD BY AUTOMATED COUNT: 14.2 % (ref 11.5–14.5)
ERYTHROCYTE [DISTWIDTH] IN BLOOD BY AUTOMATED COUNT: 14.3 % (ref 11.5–14.5)
ERYTHROCYTE [DISTWIDTH] IN BLOOD BY AUTOMATED COUNT: 14.4 % (ref 11.5–14.5)
ERYTHROCYTE [DISTWIDTH] IN BLOOD BY AUTOMATED COUNT: 14.5 % (ref 11.5–14.5)
ERYTHROCYTE [DISTWIDTH] IN BLOOD BY AUTOMATED COUNT: 14.6 % (ref 11.5–14.5)
ERYTHROCYTE [DISTWIDTH] IN BLOOD BY AUTOMATED COUNT: 15 % (ref 11.5–14.5)
ERYTHROCYTE [DISTWIDTH] IN BLOOD BY AUTOMATED COUNT: 15 % (ref 11.5–14.5)
ERYTHROCYTE [DISTWIDTH] IN BLOOD BY AUTOMATED COUNT: 15.5 % (ref 11.5–14.5)
ERYTHROCYTE [DISTWIDTH] IN BLOOD BY AUTOMATED COUNT: 16.1 % (ref 11.5–14.5)
ERYTHROCYTE [DISTWIDTH] IN BLOOD BY AUTOMATED COUNT: 18.6 % (ref 11.5–14.5)
ERYTHROCYTE [SEDIMENTATION RATE] IN BLOOD BY WESTERGREN METHOD: 12 MM/H
ERYTHROCYTE [SEDIMENTATION RATE] IN BLOOD BY WESTERGREN METHOD: 20 MM/HR (ref 0–10)
ERYTHROCYTE [SEDIMENTATION RATE] IN BLOOD BY WESTERGREN METHOD: 25 MM/H
ERYTHROCYTE [SEDIMENTATION RATE] IN BLOOD BY WESTERGREN METHOD: 28 MM/H
ERYTHROCYTE [SEDIMENTATION RATE] IN BLOOD BY WESTERGREN METHOD: 30 MM/H
EST. GFR  (AFRICAN AMERICAN): >60 ML/MIN/1.73 M^2
EST. GFR  (NON AFRICAN AMERICAN): >60 ML/MIN/1.73 M^2
ESTIMATED AVG GLUCOSE: 100 MG/DL (ref 68–131)
ESTIMATED AVG GLUCOSE: 100 MG/DL (ref 68–131)
FERRITIN SERPL-MCNC: 2219 NG/ML (ref 20–300)
FERRITIN SERPL-MCNC: 2229 NG/ML (ref 20–300)
FERRITIN SERPL-MCNC: 2678 NG/ML (ref 20–300)
FERRITIN SERPL-MCNC: 2992 NG/ML (ref 20–300)
FERRITIN SERPL-MCNC: 3088 NG/ML (ref 20–300)
FERRITIN SERPL-MCNC: 3912 NG/ML (ref 20–300)
FERRITIN SERPL-MCNC: 4134 NG/ML (ref 20–300)
FERRITIN SERPL-MCNC: 4983 NG/ML (ref 20–300)
FERRITIN SERPL-MCNC: 5534 NG/ML (ref 20–300)
FERRITIN SERPL-MCNC: 5594 NG/ML (ref 20–300)
FERRITIN SERPL-MCNC: 5752 NG/ML (ref 20–300)
FERRITIN SERPL-MCNC: 5892 NG/ML (ref 20–300)
FERRITIN SERPL-MCNC: 5961 NG/ML (ref 20–300)
FERRITIN SERPL-MCNC: 6524 NG/ML (ref 20–300)
FERRITIN SERPL-MCNC: 8084 NG/ML (ref 20–300)
FERRITIN SERPL-MCNC: 9635 NG/ML (ref 20–300)
FIO2: 100
FIO2: 60
FIO2: 70
FIO2: 75
FIO2: 80
GGT SERPL-CCNC: 43 U/L (ref 8–55)
GIANT PLATELETS BLD QL SMEAR: PRESENT
GIANT PLATELETS BLD QL SMEAR: PRESENT
GLUCOSE SERPL-MCNC: 101 MG/DL (ref 70–110)
GLUCOSE SERPL-MCNC: 103 MG/DL (ref 70–110)
GLUCOSE SERPL-MCNC: 103 MG/DL (ref 70–110)
GLUCOSE SERPL-MCNC: 105 MG/DL (ref 70–110)
GLUCOSE SERPL-MCNC: 108 MG/DL (ref 70–110)
GLUCOSE SERPL-MCNC: 110 MG/DL (ref 70–110)
GLUCOSE SERPL-MCNC: 110 MG/DL (ref 70–110)
GLUCOSE SERPL-MCNC: 112 MG/DL (ref 70–110)
GLUCOSE SERPL-MCNC: 114 MG/DL (ref 70–110)
GLUCOSE SERPL-MCNC: 115 MG/DL (ref 70–110)
GLUCOSE SERPL-MCNC: 116 MG/DL (ref 70–110)
GLUCOSE SERPL-MCNC: 117 MG/DL (ref 70–110)
GLUCOSE SERPL-MCNC: 118 MG/DL (ref 70–110)
GLUCOSE SERPL-MCNC: 119 MG/DL (ref 70–110)
GLUCOSE SERPL-MCNC: 120 MG/DL (ref 70–110)
GLUCOSE SERPL-MCNC: 121 MG/DL (ref 70–110)
GLUCOSE SERPL-MCNC: 123 MG/DL (ref 70–110)
GLUCOSE SERPL-MCNC: 124 MG/DL (ref 70–110)
GLUCOSE SERPL-MCNC: 126 MG/DL (ref 70–110)
GLUCOSE SERPL-MCNC: 126 MG/DL (ref 70–110)
GLUCOSE SERPL-MCNC: 127 MG/DL (ref 70–110)
GLUCOSE SERPL-MCNC: 128 MG/DL (ref 70–110)
GLUCOSE SERPL-MCNC: 129 MG/DL (ref 70–110)
GLUCOSE SERPL-MCNC: 129 MG/DL (ref 70–110)
GLUCOSE SERPL-MCNC: 131 MG/DL (ref 70–110)
GLUCOSE SERPL-MCNC: 134 MG/DL (ref 70–110)
GLUCOSE SERPL-MCNC: 135 MG/DL (ref 70–110)
GLUCOSE SERPL-MCNC: 135 MG/DL (ref 70–110)
GLUCOSE SERPL-MCNC: 138 MG/DL (ref 70–110)
GLUCOSE SERPL-MCNC: 138 MG/DL (ref 70–110)
GLUCOSE SERPL-MCNC: 139 MG/DL (ref 70–110)
GLUCOSE SERPL-MCNC: 145 MG/DL (ref 70–110)
GLUCOSE SERPL-MCNC: 163 MG/DL (ref 70–110)
GLUCOSE SERPL-MCNC: 167 MG/DL (ref 70–110)
GLUCOSE SERPL-MCNC: 172 MG/DL (ref 70–110)
GLUCOSE SERPL-MCNC: 181 MG/DL (ref 70–110)
GLUCOSE SERPL-MCNC: 182 MG/DL (ref 70–110)
GLUCOSE SERPL-MCNC: 182 MG/DL (ref 70–110)
GLUCOSE UR QL STRIP: NEGATIVE
GRAM STN SPEC: ABNORMAL
GRAM STN SPEC: ABNORMAL
GRAM STN SPEC: NORMAL
HAV IGM SERPL QL IA: NEGATIVE
HBA1C MFR BLD HPLC: 5.1 % (ref 4–5.6)
HBA1C MFR BLD HPLC: 5.1 % (ref 4–5.6)
HBV CORE AB SERPL QL IA: NEGATIVE
HBV CORE IGM SERPL QL IA: NEGATIVE
HBV SURFACE AB SER QL IA: POSITIVE
HBV SURFACE AB SER-ACNC: POSITIVE M[IU]/ML
HBV SURFACE AB SERPL IA-ACNC: NORMAL MIU/ML
HBV SURFACE AG SERPL QL IA: NEGATIVE
HBV SURFACE AG SERPL QL IA: NEGATIVE
HCO3 UR-SCNC: 21 MMOL/L (ref 24–28)
HCO3 UR-SCNC: 21.4 MMOL/L (ref 24–28)
HCO3 UR-SCNC: 24.2 MMOL/L (ref 24–28)
HCO3 UR-SCNC: 24.2 MMOL/L (ref 24–28)
HCO3 UR-SCNC: 24.3 MMOL/L (ref 24–28)
HCO3 UR-SCNC: 24.8 MMOL/L (ref 24–28)
HCO3 UR-SCNC: 24.8 MMOL/L (ref 24–28)
HCO3 UR-SCNC: 25.1 MMOL/L (ref 24–28)
HCO3 UR-SCNC: 25.2 MMOL/L (ref 24–28)
HCO3 UR-SCNC: 25.5 MMOL/L (ref 24–28)
HCO3 UR-SCNC: 25.7 MMOL/L (ref 24–28)
HCO3 UR-SCNC: 25.7 MMOL/L (ref 24–28)
HCO3 UR-SCNC: 26.4 MMOL/L (ref 24–28)
HCO3 UR-SCNC: 26.7 MMOL/L (ref 24–28)
HCO3 UR-SCNC: 27.3 MMOL/L (ref 24–28)
HCO3 UR-SCNC: 27.9 MMOL/L (ref 24–28)
HCO3 UR-SCNC: 29.3 MMOL/L (ref 24–28)
HCO3 UR-SCNC: 29.4 MMOL/L (ref 24–28)
HCO3 UR-SCNC: 31.1 MMOL/L (ref 24–28)
HCO3 UR-SCNC: 31.2 MMOL/L (ref 24–28)
HCO3 UR-SCNC: 31.4 MMOL/L (ref 24–28)
HCO3 UR-SCNC: 31.7 MMOL/L (ref 24–28)
HCO3 UR-SCNC: 31.9 MMOL/L (ref 24–28)
HCO3 UR-SCNC: 32.2 MMOL/L (ref 24–28)
HCO3 UR-SCNC: 32.5 MMOL/L (ref 24–28)
HCO3 UR-SCNC: 32.7 MMOL/L (ref 24–28)
HCO3 UR-SCNC: 32.8 MMOL/L (ref 24–28)
HCO3 UR-SCNC: 32.9 MMOL/L (ref 24–28)
HCO3 UR-SCNC: 33.2 MMOL/L (ref 24–28)
HCO3 UR-SCNC: 33.3 MMOL/L (ref 24–28)
HCO3 UR-SCNC: 33.4 MMOL/L (ref 24–28)
HCO3 UR-SCNC: 33.5 MMOL/L (ref 24–28)
HCO3 UR-SCNC: 33.6 MMOL/L (ref 24–28)
HCO3 UR-SCNC: 33.8 MMOL/L (ref 24–28)
HCO3 UR-SCNC: 34.1 MMOL/L (ref 24–28)
HCO3 UR-SCNC: 34.4 MMOL/L (ref 24–28)
HCO3 UR-SCNC: 34.7 MMOL/L (ref 24–28)
HCT VFR BLD AUTO: 24.5 % (ref 40–54)
HCT VFR BLD AUTO: 26.3 % (ref 40–54)
HCT VFR BLD AUTO: 27.8 % (ref 40–54)
HCT VFR BLD AUTO: 27.9 % (ref 40–54)
HCT VFR BLD AUTO: 28.2 % (ref 40–54)
HCT VFR BLD AUTO: 29 % (ref 40–54)
HCT VFR BLD AUTO: 29.5 % (ref 40–54)
HCT VFR BLD AUTO: 29.6 % (ref 40–54)
HCT VFR BLD AUTO: 32.8 % (ref 40–54)
HCT VFR BLD AUTO: 33.1 % (ref 40–54)
HCT VFR BLD AUTO: 36 % (ref 40–54)
HCT VFR BLD AUTO: 36.3 % (ref 40–54)
HCT VFR BLD AUTO: 38.3 % (ref 40–54)
HCT VFR BLD AUTO: 39.8 % (ref 40–54)
HCT VFR BLD AUTO: 39.9 % (ref 40–54)
HCT VFR BLD AUTO: 41.9 % (ref 40–54)
HCT VFR BLD AUTO: 42 % (ref 40–54)
HCT VFR BLD AUTO: 43.4 % (ref 40–54)
HCT VFR BLD AUTO: 43.5 % (ref 40–54)
HCT VFR BLD AUTO: 45 % (ref 40–54)
HCT VFR BLD AUTO: 46.8 % (ref 40–54)
HCT VFR BLD AUTO: 48.2 % (ref 40–54)
HCT VFR BLD AUTO: 48.3 % (ref 40–54)
HCT VFR BLD CALC: 22 %PCV (ref 36–54)
HCT VFR BLD CALC: 23 %PCV (ref 36–54)
HCT VFR BLD CALC: 24 %PCV (ref 36–54)
HCT VFR BLD CALC: 24 %PCV (ref 36–54)
HCT VFR BLD CALC: 25 %PCV (ref 36–54)
HCT VFR BLD CALC: 26 %PCV (ref 36–54)
HCT VFR BLD CALC: 28 %PCV (ref 36–54)
HCT VFR BLD CALC: 28 %PCV (ref 36–54)
HCT VFR BLD CALC: 29 %PCV (ref 36–54)
HCT VFR BLD CALC: 29 %PCV (ref 36–54)
HCT VFR BLD CALC: 30 %PCV (ref 36–54)
HCT VFR BLD CALC: 32 %PCV (ref 36–54)
HCT VFR BLD CALC: 33 %PCV (ref 36–54)
HCT VFR BLD CALC: 39 %PCV (ref 36–54)
HCV AB SERPL QL IA: NEGATIVE
HDLC SERPL-MCNC: 26 MG/DL (ref 40–75)
HDLC SERPL-MCNC: 31 MG/DL (ref 40–75)
HDLC SERPL: 14.5 % (ref 20–50)
HDLC SERPL: 18.9 % (ref 20–50)
HGB BLD-MCNC: 10.4 G/DL (ref 14–18)
HGB BLD-MCNC: 11.4 G/DL (ref 14–18)
HGB BLD-MCNC: 11.6 G/DL (ref 14–18)
HGB BLD-MCNC: 12.5 G/DL (ref 14–18)
HGB BLD-MCNC: 13.8 G/DL (ref 14–18)
HGB BLD-MCNC: 13.9 G/DL (ref 14–18)
HGB BLD-MCNC: 14.6 G/DL (ref 14–18)
HGB BLD-MCNC: 14.8 G/DL (ref 14–18)
HGB BLD-MCNC: 15.1 G/DL (ref 14–18)
HGB BLD-MCNC: 15.4 G/DL (ref 14–18)
HGB BLD-MCNC: 15.6 G/DL (ref 14–18)
HGB BLD-MCNC: 15.7 G/DL (ref 14–18)
HGB BLD-MCNC: 7.3 G/DL (ref 14–18)
HGB BLD-MCNC: 7.6 G/DL (ref 14–18)
HGB BLD-MCNC: 7.9 G/DL (ref 14–18)
HGB BLD-MCNC: 8.4 G/DL (ref 14–18)
HGB BLD-MCNC: 8.7 G/DL (ref 14–18)
HGB BLD-MCNC: 8.7 G/DL (ref 14–18)
HGB BLD-MCNC: 9.1 G/DL (ref 14–18)
HGB BLD-MCNC: 9.3 G/DL (ref 14–18)
HGB BLD-MCNC: 9.4 G/DL (ref 14–18)
HGB UR QL STRIP: ABNORMAL
HGB UR QL STRIP: NEGATIVE
HGB UR QL STRIP: NEGATIVE
HIV 1+2 AB+HIV1 P24 AG SERPL QL IA: NEGATIVE
HYPOCHROMIA BLD QL SMEAR: ABNORMAL
IMM GRANULOCYTES # BLD AUTO: 0.03 K/UL (ref 0–0.04)
IMM GRANULOCYTES # BLD AUTO: 0.05 K/UL (ref 0–0.04)
IMM GRANULOCYTES # BLD AUTO: 0.09 K/UL (ref 0–0.04)
IMM GRANULOCYTES # BLD AUTO: 0.34 K/UL (ref 0–0.04)
IMM GRANULOCYTES # BLD AUTO: 0.37 K/UL (ref 0–0.04)
IMM GRANULOCYTES # BLD AUTO: 0.38 K/UL (ref 0–0.04)
IMM GRANULOCYTES # BLD AUTO: 0.67 K/UL (ref 0–0.04)
IMM GRANULOCYTES # BLD AUTO: 0.68 K/UL (ref 0–0.04)
IMM GRANULOCYTES # BLD AUTO: 1.02 K/UL (ref 0–0.04)
IMM GRANULOCYTES # BLD AUTO: ABNORMAL K/UL (ref 0–0.04)
IMM GRANULOCYTES NFR BLD AUTO: 0.4 % (ref 0–0.5)
IMM GRANULOCYTES NFR BLD AUTO: 0.5 % (ref 0–0.5)
IMM GRANULOCYTES NFR BLD AUTO: 0.6 % (ref 0–0.5)
IMM GRANULOCYTES NFR BLD AUTO: 0.7 % (ref 0–0.5)
IMM GRANULOCYTES NFR BLD AUTO: 1.2 % (ref 0–0.5)
IMM GRANULOCYTES NFR BLD AUTO: 2.6 % (ref 0–0.5)
IMM GRANULOCYTES NFR BLD AUTO: 3.2 % (ref 0–0.5)
IMM GRANULOCYTES NFR BLD AUTO: 3.6 % (ref 0–0.5)
IMM GRANULOCYTES NFR BLD AUTO: 4 % (ref 0–0.5)
IMM GRANULOCYTES NFR BLD AUTO: 4.1 % (ref 0–0.5)
IMM GRANULOCYTES NFR BLD AUTO: 4.6 % (ref 0–0.5)
IMM GRANULOCYTES NFR BLD AUTO: ABNORMAL % (ref 0–0.5)
INFLUENZA A, MOLECULAR: NEGATIVE
INFLUENZA B, MOLECULAR: NEGATIVE
INR PPP: 1.1 (ref 0.8–1.2)
INSULIN COLLECTION INTERVAL: ABNORMAL
INSULIN COLLECTION INTERVAL: NORMAL
INSULIN SERPL-ACNC: 22.9 UU/ML
INSULIN SERPL-ACNC: 47.3 UU/ML
IP: 14
IP: 14
IP: 15
IP: 15
IP: 16
IP: 18
IP: 20
IP: 22
IT: 0.5
IT: 0.53
IT: 0.57
IT: 0.58
IT: 0.58
IT: 0.61
IT: 0.66
IT: 0.8
IT: 0.9
IT: 1.1
IT: 18
KETONES UR QL STRIP: ABNORMAL
KETONES UR QL STRIP: NEGATIVE
KETONES UR QL STRIP: NEGATIVE
LACTATE SERPL-SCNC: 1.2 MMOL/L (ref 0.5–2.2)
LACTATE SERPL-SCNC: 1.4 MMOL/L (ref 0.5–2.2)
LDH SERPL L TO P-CCNC: 1016 U/L (ref 110–260)
LDH SERPL L TO P-CCNC: 1083 U/L (ref 110–260)
LDH SERPL L TO P-CCNC: 1183 U/L (ref 110–260)
LDH SERPL L TO P-CCNC: 1358 U/L (ref 110–260)
LDH SERPL L TO P-CCNC: 1702 U/L (ref 110–260)
LDH SERPL L TO P-CCNC: 309 U/L (ref 110–260)
LDH SERPL L TO P-CCNC: 419 U/L (ref 110–260)
LDH SERPL L TO P-CCNC: 446 U/L (ref 110–260)
LDH SERPL L TO P-CCNC: 468 U/L (ref 110–260)
LDH SERPL L TO P-CCNC: 483 U/L (ref 110–260)
LDH SERPL L TO P-CCNC: 493 U/L (ref 110–260)
LDH SERPL L TO P-CCNC: 508 U/L (ref 110–260)
LDH SERPL L TO P-CCNC: 551 U/L (ref 110–260)
LDH SERPL L TO P-CCNC: 633 U/L (ref 110–260)
LDH SERPL L TO P-CCNC: 665 U/L (ref 110–260)
LDH SERPL L TO P-CCNC: 774 U/L (ref 110–260)
LDH SERPL L TO P-CCNC: 797 U/L (ref 110–260)
LDH SERPL L TO P-CCNC: 886 U/L (ref 110–260)
LDLC SERPL CALC-MCNC: 89 MG/DL (ref 63–159)
LDLC SERPL CALC-MCNC: ABNORMAL MG/DL (ref 63–159)
LEUKOCYTE ESTERASE UR QL STRIP: NEGATIVE
LYMPHOCYTES # BLD AUTO: 0.5 K/UL (ref 1–4.8)
LYMPHOCYTES # BLD AUTO: 0.6 K/UL (ref 1–4.8)
LYMPHOCYTES # BLD AUTO: 0.6 K/UL (ref 1–4.8)
LYMPHOCYTES # BLD AUTO: 0.7 K/UL (ref 1–4.8)
LYMPHOCYTES # BLD AUTO: 0.8 K/UL (ref 1–4.8)
LYMPHOCYTES # BLD AUTO: 0.9 K/UL (ref 1–4.8)
LYMPHOCYTES # BLD AUTO: 1.1 K/UL (ref 1–4.8)
LYMPHOCYTES # BLD AUTO: 1.2 K/UL (ref 1–4.8)
LYMPHOCYTES # BLD AUTO: 1.3 K/UL (ref 1–4.8)
LYMPHOCYTES # BLD AUTO: 1.9 K/UL (ref 1–4.8)
LYMPHOCYTES # BLD AUTO: 2.4 K/UL (ref 1–4.8)
LYMPHOCYTES # BLD AUTO: ABNORMAL K/UL (ref 1–4.8)
LYMPHOCYTES NFR BLD: 1 % (ref 18–48)
LYMPHOCYTES NFR BLD: 11 % (ref 18–48)
LYMPHOCYTES NFR BLD: 12.2 % (ref 18–48)
LYMPHOCYTES NFR BLD: 15.3 % (ref 18–48)
LYMPHOCYTES NFR BLD: 2 % (ref 18–48)
LYMPHOCYTES NFR BLD: 22.1 % (ref 18–48)
LYMPHOCYTES NFR BLD: 22.7 % (ref 18–48)
LYMPHOCYTES NFR BLD: 3.7 % (ref 18–48)
LYMPHOCYTES NFR BLD: 4 % (ref 18–48)
LYMPHOCYTES NFR BLD: 4.8 % (ref 18–48)
LYMPHOCYTES NFR BLD: 5 % (ref 18–48)
LYMPHOCYTES NFR BLD: 6 % (ref 18–48)
LYMPHOCYTES NFR BLD: 6 % (ref 18–48)
LYMPHOCYTES NFR BLD: 6.7 % (ref 18–48)
LYMPHOCYTES NFR BLD: 7.3 % (ref 18–48)
LYMPHOCYTES NFR BLD: 7.8 % (ref 18–48)
LYMPHOCYTES NFR BLD: 8 % (ref 18–48)
LYMPHOCYTES NFR BLD: 8 % (ref 18–48)
LYMPHOCYTES NFR BLD: 8.1 % (ref 18–48)
LYMPHOCYTES NFR BLD: 9 % (ref 18–48)
LYMPHOCYTES NFR BLD: 9.9 % (ref 18–48)
MAGNESIUM SERPL-MCNC: 1.6 MG/DL (ref 1.6–2.6)
MAGNESIUM SERPL-MCNC: 1.9 MG/DL (ref 1.6–2.6)
MAGNESIUM SERPL-MCNC: 2.2 MG/DL (ref 1.6–2.6)
MAGNESIUM SERPL-MCNC: 2.3 MG/DL (ref 1.6–2.6)
MAGNESIUM SERPL-MCNC: 2.5 MG/DL (ref 1.6–2.6)
MAGNESIUM SERPL-MCNC: 2.6 MG/DL (ref 1.6–2.6)
MAGNESIUM SERPL-MCNC: 2.6 MG/DL (ref 1.6–2.6)
MAGNESIUM SERPL-MCNC: 2.7 MG/DL (ref 1.6–2.6)
MAGNESIUM SERPL-MCNC: 2.8 MG/DL (ref 1.6–2.6)
MAGNESIUM SERPL-MCNC: 3.2 MG/DL (ref 1.6–2.6)
MAGNESIUM SERPL-MCNC: 3.6 MG/DL (ref 1.6–2.6)
MAP: 25
MAP: 25
MAP: 26
MAP: 28
MCH RBC QN AUTO: 29.9 PG (ref 27–31)
MCH RBC QN AUTO: 30.2 PG (ref 27–31)
MCH RBC QN AUTO: 30.2 PG (ref 27–31)
MCH RBC QN AUTO: 30.3 PG (ref 27–31)
MCH RBC QN AUTO: 30.4 PG (ref 27–31)
MCH RBC QN AUTO: 30.5 PG (ref 27–31)
MCH RBC QN AUTO: 30.6 PG (ref 27–31)
MCH RBC QN AUTO: 30.7 PG (ref 27–31)
MCH RBC QN AUTO: 30.8 PG (ref 27–31)
MCH RBC QN AUTO: 30.8 PG (ref 27–31)
MCH RBC QN AUTO: 30.9 PG (ref 27–31)
MCH RBC QN AUTO: 31 PG (ref 27–31)
MCH RBC QN AUTO: 31.1 PG (ref 27–31)
MCH RBC QN AUTO: 31.1 PG (ref 27–31)
MCH RBC QN AUTO: 31.2 PG (ref 27–31)
MCH RBC QN AUTO: 31.2 PG (ref 27–31)
MCH RBC QN AUTO: 31.4 PG (ref 27–31)
MCH RBC QN AUTO: 31.4 PG (ref 27–31)
MCH RBC QN AUTO: 31.6 PG (ref 27–31)
MCH RBC QN AUTO: 31.8 PG (ref 27–31)
MCH RBC QN AUTO: 32.4 PG (ref 27–31)
MCHC RBC AUTO-ENTMCNC: 28.1 G/DL (ref 32–36)
MCHC RBC AUTO-ENTMCNC: 28.3 G/DL (ref 32–36)
MCHC RBC AUTO-ENTMCNC: 28.9 G/DL (ref 32–36)
MCHC RBC AUTO-ENTMCNC: 29.4 G/DL (ref 32–36)
MCHC RBC AUTO-ENTMCNC: 29.5 G/DL (ref 32–36)
MCHC RBC AUTO-ENTMCNC: 29.8 G/DL (ref 32–36)
MCHC RBC AUTO-ENTMCNC: 30.2 G/DL (ref 32–36)
MCHC RBC AUTO-ENTMCNC: 31.4 G/DL (ref 32–36)
MCHC RBC AUTO-ENTMCNC: 31.7 G/DL (ref 32–36)
MCHC RBC AUTO-ENTMCNC: 31.9 G/DL (ref 32–36)
MCHC RBC AUTO-ENTMCNC: 32.2 G/DL (ref 32–36)
MCHC RBC AUTO-ENTMCNC: 32.3 G/DL (ref 32–36)
MCHC RBC AUTO-ENTMCNC: 32.3 G/DL (ref 32–36)
MCHC RBC AUTO-ENTMCNC: 32.4 G/DL (ref 32–36)
MCHC RBC AUTO-ENTMCNC: 32.4 G/DL (ref 32–36)
MCHC RBC AUTO-ENTMCNC: 32.6 G/DL (ref 32–36)
MCHC RBC AUTO-ENTMCNC: 32.9 G/DL (ref 32–36)
MCHC RBC AUTO-ENTMCNC: 33.2 G/DL (ref 32–36)
MCHC RBC AUTO-ENTMCNC: 33.6 G/DL (ref 32–36)
MCHC RBC AUTO-ENTMCNC: 34.1 G/DL (ref 32–36)
MCHC RBC AUTO-ENTMCNC: 34.6 G/DL (ref 32–36)
MCHC RBC AUTO-ENTMCNC: 34.7 G/DL (ref 32–36)
MCHC RBC AUTO-ENTMCNC: 34.9 G/DL (ref 32–36)
MCV RBC AUTO: 100 FL (ref 82–98)
MCV RBC AUTO: 102 FL (ref 82–98)
MCV RBC AUTO: 103 FL (ref 82–98)
MCV RBC AUTO: 104 FL (ref 82–98)
MCV RBC AUTO: 104 FL (ref 82–98)
MCV RBC AUTO: 105 FL (ref 82–98)
MCV RBC AUTO: 108 FL (ref 82–98)
MCV RBC AUTO: 109 FL (ref 82–98)
MCV RBC AUTO: 90 FL (ref 82–98)
MCV RBC AUTO: 91 FL (ref 82–98)
MCV RBC AUTO: 92 FL (ref 82–98)
MCV RBC AUTO: 92 FL (ref 82–98)
MCV RBC AUTO: 93 FL (ref 82–98)
MCV RBC AUTO: 93 FL (ref 82–98)
MCV RBC AUTO: 95 FL (ref 82–98)
MCV RBC AUTO: 96 FL (ref 82–98)
MCV RBC AUTO: 97 FL (ref 82–98)
MCV RBC AUTO: 98 FL (ref 82–98)
MCV RBC AUTO: 99 FL (ref 82–98)
MEGAKARYOCYTIC FRAGMENTS: ABNORMAL
METAMYELOCYTES NFR BLD MANUAL: 1 %
METAMYELOCYTES NFR BLD MANUAL: 2 %
METAMYELOCYTES NFR BLD MANUAL: 2 %
METAMYELOCYTES NFR BLD MANUAL: 3 %
METAMYELOCYTES NFR BLD MANUAL: 3 %
METAMYELOCYTES NFR BLD MANUAL: 4 %
MODE: ABNORMAL
MODE: NORMAL
MONOCYTES # BLD AUTO: 0.2 K/UL (ref 0.3–1)
MONOCYTES # BLD AUTO: 0.3 K/UL (ref 0.3–1)
MONOCYTES # BLD AUTO: 0.5 K/UL (ref 0.3–1)
MONOCYTES # BLD AUTO: 0.6 K/UL (ref 0.3–1)
MONOCYTES # BLD AUTO: 0.7 K/UL (ref 0.3–1)
MONOCYTES # BLD AUTO: 0.8 K/UL (ref 0.3–1)
MONOCYTES # BLD AUTO: 0.8 K/UL (ref 0.3–1)
MONOCYTES # BLD AUTO: 0.9 K/UL (ref 0.3–1)
MONOCYTES # BLD AUTO: 1.1 K/UL (ref 0.3–1)
MONOCYTES # BLD AUTO: ABNORMAL K/UL (ref 0.3–1)
MONOCYTES NFR BLD: 0 % (ref 4–15)
MONOCYTES NFR BLD: 0 % (ref 4–15)
MONOCYTES NFR BLD: 1 % (ref 4–15)
MONOCYTES NFR BLD: 1 % (ref 4–15)
MONOCYTES NFR BLD: 11 % (ref 4–15)
MONOCYTES NFR BLD: 2 % (ref 4–15)
MONOCYTES NFR BLD: 3 % (ref 4–15)
MONOCYTES NFR BLD: 3.6 % (ref 4–15)
MONOCYTES NFR BLD: 4 % (ref 4–15)
MONOCYTES NFR BLD: 4 % (ref 4–15)
MONOCYTES NFR BLD: 5.4 % (ref 4–15)
MONOCYTES NFR BLD: 5.9 % (ref 4–15)
MONOCYTES NFR BLD: 6.3 % (ref 4–15)
MONOCYTES NFR BLD: 6.4 % (ref 4–15)
MONOCYTES NFR BLD: 6.5 % (ref 4–15)
MONOCYTES NFR BLD: 6.5 % (ref 4–15)
MONOCYTES NFR BLD: 6.6 % (ref 4–15)
MONOCYTES NFR BLD: 6.6 % (ref 4–15)
MONOCYTES NFR BLD: 6.8 % (ref 4–15)
MONOCYTES NFR BLD: 7 % (ref 4–15)
MYELOCYTES NFR BLD MANUAL: 1 %
MYELOCYTES NFR BLD MANUAL: 1 %
MYELOCYTES NFR BLD MANUAL: 2 %
MYELOCYTES NFR BLD MANUAL: 3 %
MYELOCYTES NFR BLD MANUAL: 3 %
NEUTROPHILS # BLD AUTO: 12 K/UL (ref 1.8–7.7)
NEUTROPHILS # BLD AUTO: 12.8 K/UL (ref 1.8–7.7)
NEUTROPHILS # BLD AUTO: 15.8 K/UL (ref 1.8–7.7)
NEUTROPHILS # BLD AUTO: 22.2 K/UL (ref 1.8–7.7)
NEUTROPHILS # BLD AUTO: 3.3 K/UL (ref 1.8–7.7)
NEUTROPHILS # BLD AUTO: 4 K/UL (ref 1.8–7.7)
NEUTROPHILS # BLD AUTO: 5.7 K/UL (ref 1.8–7.7)
NEUTROPHILS # BLD AUTO: 6.2 K/UL (ref 1.8–7.7)
NEUTROPHILS # BLD AUTO: 6.6 K/UL (ref 1.8–7.7)
NEUTROPHILS # BLD AUTO: 7.3 K/UL (ref 1.8–7.7)
NEUTROPHILS # BLD AUTO: 9.5 K/UL (ref 1.8–7.7)
NEUTROPHILS # BLD AUTO: ABNORMAL K/UL (ref 1.8–7.7)
NEUTROPHILS # BLD AUTO: ABNORMAL K/UL (ref 1.8–7.7)
NEUTROPHILS NFR BLD: 41 % (ref 38–73)
NEUTROPHILS NFR BLD: 65 % (ref 38–73)
NEUTROPHILS NFR BLD: 67.4 % (ref 38–73)
NEUTROPHILS NFR BLD: 67.7 % (ref 38–73)
NEUTROPHILS NFR BLD: 74 % (ref 38–73)
NEUTROPHILS NFR BLD: 75 % (ref 38–73)
NEUTROPHILS NFR BLD: 76 % (ref 38–73)
NEUTROPHILS NFR BLD: 77 % (ref 38–73)
NEUTROPHILS NFR BLD: 78.6 % (ref 38–73)
NEUTROPHILS NFR BLD: 79.1 % (ref 38–73)
NEUTROPHILS NFR BLD: 80.5 % (ref 38–73)
NEUTROPHILS NFR BLD: 81 % (ref 38–73)
NEUTROPHILS NFR BLD: 81 % (ref 38–73)
NEUTROPHILS NFR BLD: 82 % (ref 38–73)
NEUTROPHILS NFR BLD: 83.4 % (ref 38–73)
NEUTROPHILS NFR BLD: 83.5 % (ref 38–73)
NEUTROPHILS NFR BLD: 84.3 % (ref 38–73)
NEUTROPHILS NFR BLD: 87 % (ref 38–73)
NEUTROPHILS NFR BLD: 87.3 % (ref 38–73)
NEUTROPHILS NFR BLD: 87.8 % (ref 38–73)
NEUTROPHILS NFR BLD: 88 % (ref 38–73)
NEUTROPHILS NFR BLD: 92 % (ref 38–73)
NEUTROPHILS NFR BLD: 93 % (ref 38–73)
NEUTS BAND NFR BLD MANUAL: 3 %
NEUTS BAND NFR BLD MANUAL: 30 %
NEUTS BAND NFR BLD MANUAL: 47 %
NEUTS BAND NFR BLD MANUAL: 5 %
NEUTS BAND NFR BLD MANUAL: 6 %
NEUTS BAND NFR BLD MANUAL: 6 %
NEUTS BAND NFR BLD MANUAL: 7 %
NITRITE UR QL STRIP: NEGATIVE
NONHDLC SERPL-MCNC: 133 MG/DL
NONHDLC SERPL-MCNC: 153 MG/DL
NRBC BLD-RTO: 0 /100 WBC
NRBC BLD-RTO: 1 /100 WBC
NRBC BLD-RTO: 5 /100 WBC
NUM UNITS TRANS PACKED RBC: NORMAL
NUM UNITS TRANS PACKED RBC: NORMAL
OVALOCYTES BLD QL SMEAR: ABNORMAL
PCO2 BLDA: 102.2 MMHG (ref 35–45)
PCO2 BLDA: 39.5 MMHG (ref 35–45)
PCO2 BLDA: 39.5 MMHG (ref 35–45)
PCO2 BLDA: 40.7 MMHG (ref 35–45)
PCO2 BLDA: 41.2 MMHG (ref 35–45)
PCO2 BLDA: 41.6 MMHG (ref 35–45)
PCO2 BLDA: 41.9 MMHG (ref 35–45)
PCO2 BLDA: 42.5 MMHG (ref 35–45)
PCO2 BLDA: 42.7 MMHG (ref 35–45)
PCO2 BLDA: 44.6 MMHG (ref 35–45)
PCO2 BLDA: 50.5 MMHG (ref 35–45)
PCO2 BLDA: 52.2 MMHG (ref 35–45)
PCO2 BLDA: 52.2 MMHG (ref 35–45)
PCO2 BLDA: 57.2 MMHG (ref 35–45)
PCO2 BLDA: 57.8 MMHG (ref 35–45)
PCO2 BLDA: 58 MMHG (ref 35–45)
PCO2 BLDA: 59.1 MMHG (ref 35–45)
PCO2 BLDA: 59.4 MMHG (ref 35–45)
PCO2 BLDA: 60 MMHG (ref 35–45)
PCO2 BLDA: 61.9 MMHG (ref 35–45)
PCO2 BLDA: 61.9 MMHG (ref 35–45)
PCO2 BLDA: 62.5 MMHG (ref 35–45)
PCO2 BLDA: 63.2 MMHG (ref 35–45)
PCO2 BLDA: 63.6 MMHG (ref 35–45)
PCO2 BLDA: 64.4 MMHG (ref 35–45)
PCO2 BLDA: 64.5 MMHG (ref 35–45)
PCO2 BLDA: 65.1 MMHG (ref 35–45)
PCO2 BLDA: 68.1 MMHG (ref 35–45)
PCO2 BLDA: 68.1 MMHG (ref 35–45)
PCO2 BLDA: 68.7 MMHG (ref 35–45)
PCO2 BLDA: 70.1 MMHG (ref 35–45)
PCO2 BLDA: 70.8 MMHG (ref 35–45)
PCO2 BLDA: 70.9 MMHG (ref 35–45)
PCO2 BLDA: 71 MMHG (ref 35–45)
PCO2 BLDA: 71.8 MMHG (ref 35–45)
PCO2 BLDA: 73.6 MMHG (ref 35–45)
PCO2 BLDA: 76.8 MMHG (ref 35–45)
PCO2 BLDA: 77.2 MMHG (ref 35–45)
PCO2 BLDA: 83.3 MMHG (ref 35–45)
PEEP: 10
PEEP: 14
PEEP: 16
PEEP: 17
PEEP: 18
PEEP: 20
PEEPH: 30
PEEPH: 35
PEEPL: 0
PEEPL: 350
PH SMN: 7.14 [PH] (ref 7.35–7.45)
PH SMN: 7.17 [PH] (ref 7.35–7.45)
PH SMN: 7.19 [PH] (ref 7.35–7.45)
PH SMN: 7.2 [PH] (ref 7.35–7.45)
PH SMN: 7.2 [PH] (ref 7.35–7.45)
PH SMN: 7.21 [PH] (ref 7.35–7.45)
PH SMN: 7.23 [PH] (ref 7.35–7.45)
PH SMN: 7.23 [PH] (ref 7.35–7.45)
PH SMN: 7.25 [PH] (ref 7.35–7.45)
PH SMN: 7.26 [PH] (ref 7.35–7.45)
PH SMN: 7.27 [PH] (ref 7.35–7.45)
PH SMN: 7.28 [PH] (ref 7.35–7.45)
PH SMN: 7.29 [PH] (ref 7.35–7.45)
PH SMN: 7.29 [PH] (ref 7.35–7.45)
PH SMN: 7.3 [PH] (ref 7.35–7.45)
PH SMN: 7.31 [PH] (ref 7.35–7.45)
PH SMN: 7.33 [PH] (ref 7.35–7.45)
PH SMN: 7.34 [PH] (ref 7.35–7.45)
PH SMN: 7.35 [PH] (ref 7.35–7.45)
PH SMN: 7.37 [PH] (ref 7.35–7.45)
PH SMN: 7.38 [PH] (ref 7.35–7.45)
PH SMN: 7.39 [PH] (ref 7.35–7.45)
PH SMN: 7.41 [PH] (ref 7.35–7.45)
PH SMN: 7.44 [PH] (ref 7.35–7.45)
PH UR STRIP: 6 [PH] (ref 5–8)
PHOSPHATE SERPL-MCNC: 2.7 MG/DL (ref 2.7–4.5)
PHOSPHATE SERPL-MCNC: 2.9 MG/DL (ref 2.7–4.5)
PHOSPHATE SERPL-MCNC: 2.9 MG/DL (ref 2.7–4.5)
PHOSPHATE SERPL-MCNC: 3 MG/DL (ref 2.7–4.5)
PHOSPHATE SERPL-MCNC: 3.2 MG/DL (ref 2.7–4.5)
PHOSPHATE SERPL-MCNC: 3.3 MG/DL (ref 2.7–4.5)
PHOSPHATE SERPL-MCNC: 3.3 MG/DL (ref 2.7–4.5)
PHOSPHATE SERPL-MCNC: 3.7 MG/DL (ref 2.7–4.5)
PHOSPHATE SERPL-MCNC: 4 MG/DL (ref 2.7–4.5)
PHOSPHATE SERPL-MCNC: 4 MG/DL (ref 2.7–4.5)
PHOSPHATE SERPL-MCNC: 4.6 MG/DL (ref 2.7–4.5)
PHOSPHATE SERPL-MCNC: 4.6 MG/DL (ref 2.7–4.5)
PHOSPHATE SERPL-MCNC: 4.8 MG/DL (ref 2.7–4.5)
PHOSPHATE SERPL-MCNC: 5.2 MG/DL (ref 2.7–4.5)
PHOSPHATE SERPL-MCNC: 5.6 MG/DL (ref 2.7–4.5)
PHOSPHATE SERPL-MCNC: 7.2 MG/DL (ref 2.7–4.5)
PIP: 14
PIP: 22
PIP: 32
PIP: 34
PIP: 36
PIP: 36
PIP: 38
PIP: 38
PIP: 39
PLATELET # BLD AUTO: 175 K/UL (ref 150–350)
PLATELET # BLD AUTO: 175 K/UL (ref 150–350)
PLATELET # BLD AUTO: 211 K/UL (ref 150–350)
PLATELET # BLD AUTO: 218 K/UL (ref 150–350)
PLATELET # BLD AUTO: 222 K/UL (ref 150–350)
PLATELET # BLD AUTO: 223 K/UL (ref 150–350)
PLATELET # BLD AUTO: 225 K/UL (ref 150–350)
PLATELET # BLD AUTO: 229 K/UL (ref 150–350)
PLATELET # BLD AUTO: 238 K/UL (ref 150–350)
PLATELET # BLD AUTO: 241 K/UL (ref 150–350)
PLATELET # BLD AUTO: 242 K/UL (ref 150–350)
PLATELET # BLD AUTO: 246 K/UL (ref 150–350)
PLATELET # BLD AUTO: 254 K/UL (ref 150–350)
PLATELET # BLD AUTO: 264 K/UL (ref 150–350)
PLATELET # BLD AUTO: 265 K/UL (ref 150–350)
PLATELET # BLD AUTO: 285 K/UL (ref 150–350)
PLATELET # BLD AUTO: 287 K/UL (ref 150–350)
PLATELET # BLD AUTO: 299 K/UL (ref 150–350)
PLATELET # BLD AUTO: 299 K/UL (ref 150–350)
PLATELET # BLD AUTO: 330 K/UL (ref 150–350)
PLATELET # BLD AUTO: 352 K/UL (ref 150–350)
PLATELET # BLD AUTO: 386 K/UL (ref 150–350)
PLATELET # BLD AUTO: 637 K/UL (ref 150–350)
PLATELET BLD QL SMEAR: ABNORMAL
PMV BLD AUTO: 10.1 FL (ref 9.2–12.9)
PMV BLD AUTO: 10.2 FL (ref 9.2–12.9)
PMV BLD AUTO: 10.3 FL (ref 9.2–12.9)
PMV BLD AUTO: 10.4 FL (ref 9.2–12.9)
PMV BLD AUTO: 10.6 FL (ref 9.2–12.9)
PMV BLD AUTO: 10.6 FL (ref 9.2–12.9)
PMV BLD AUTO: 10.8 FL (ref 9.2–12.9)
PMV BLD AUTO: 10.8 FL (ref 9.2–12.9)
PMV BLD AUTO: 10.9 FL (ref 9.2–12.9)
PMV BLD AUTO: 11.1 FL (ref 9.2–12.9)
PMV BLD AUTO: 11.1 FL (ref 9.2–12.9)
PMV BLD AUTO: 11.3 FL (ref 9.2–12.9)
PMV BLD AUTO: 11.3 FL (ref 9.2–12.9)
PMV BLD AUTO: 11.4 FL (ref 9.2–12.9)
PMV BLD AUTO: 8.9 FL (ref 9.2–12.9)
PMV BLD AUTO: 8.9 FL (ref 9.2–12.9)
PMV BLD AUTO: 9.1 FL (ref 9.2–12.9)
PMV BLD AUTO: 9.4 FL (ref 9.2–12.9)
PMV BLD AUTO: 9.4 FL (ref 9.2–12.9)
PMV BLD AUTO: 9.5 FL (ref 9.2–12.9)
PMV BLD AUTO: 9.7 FL (ref 9.2–12.9)
PMV BLD AUTO: 9.8 FL (ref 9.2–12.9)
PMV BLD AUTO: 9.9 FL (ref 9.2–12.9)
PO2 BLDA: 111 MMHG (ref 80–100)
PO2 BLDA: 121 MMHG (ref 80–100)
PO2 BLDA: 124 MMHG (ref 80–100)
PO2 BLDA: 128 MMHG (ref 80–100)
PO2 BLDA: 135 MMHG (ref 80–100)
PO2 BLDA: 142 MMHG (ref 80–100)
PO2 BLDA: 145 MMHG (ref 80–100)
PO2 BLDA: 147 MMHG (ref 80–100)
PO2 BLDA: 34 MMHG (ref 80–100)
PO2 BLDA: 35 MMHG (ref 80–100)
PO2 BLDA: 41 MMHG (ref 80–100)
PO2 BLDA: 45 MMHG (ref 80–100)
PO2 BLDA: 58 MMHG (ref 80–100)
PO2 BLDA: 59 MMHG (ref 80–100)
PO2 BLDA: 61 MMHG (ref 80–100)
PO2 BLDA: 61 MMHG (ref 80–100)
PO2 BLDA: 65 MMHG (ref 80–100)
PO2 BLDA: 67 MMHG (ref 80–100)
PO2 BLDA: 69 MMHG (ref 80–100)
PO2 BLDA: 70 MMHG (ref 80–100)
PO2 BLDA: 70 MMHG (ref 80–100)
PO2 BLDA: 71 MMHG (ref 80–100)
PO2 BLDA: 71 MMHG (ref 80–100)
PO2 BLDA: 72 MMHG (ref 80–100)
PO2 BLDA: 74 MMHG (ref 80–100)
PO2 BLDA: 75 MMHG (ref 80–100)
PO2 BLDA: 75 MMHG (ref 80–100)
PO2 BLDA: 76 MMHG (ref 80–100)
PO2 BLDA: 77 MMHG (ref 80–100)
PO2 BLDA: 78 MMHG (ref 80–100)
PO2 BLDA: 78 MMHG (ref 80–100)
PO2 BLDA: 79 MMHG (ref 80–100)
PO2 BLDA: 82 MMHG (ref 80–100)
PO2 BLDA: 94 MMHG (ref 80–100)
PO2 BLDA: 96 MMHG (ref 80–100)
POC BE: -1 MMOL/L
POC BE: -2 MMOL/L
POC BE: -2 MMOL/L
POC BE: -3 MMOL/L
POC BE: -3 MMOL/L
POC BE: -5 MMOL/L
POC BE: -7 MMOL/L
POC BE: 0 MMOL/L
POC BE: 1 MMOL/L
POC BE: 1 MMOL/L
POC BE: 3 MMOL/L
POC BE: 4 MMOL/L
POC BE: 5 MMOL/L
POC BE: 6 MMOL/L
POC BE: 7 MMOL/L
POC BE: 8 MMOL/L
POC IONIZED CALCIUM: 1.12 MMOL/L (ref 1.06–1.42)
POC IONIZED CALCIUM: 1.17 MMOL/L (ref 1.06–1.42)
POC IONIZED CALCIUM: 1.18 MMOL/L (ref 1.06–1.42)
POC IONIZED CALCIUM: 1.26 MMOL/L (ref 1.06–1.42)
POC IONIZED CALCIUM: 1.28 MMOL/L (ref 1.06–1.42)
POC IONIZED CALCIUM: 1.29 MMOL/L (ref 1.06–1.42)
POC IONIZED CALCIUM: 1.29 MMOL/L (ref 1.06–1.42)
POC IONIZED CALCIUM: 1.3 MMOL/L (ref 1.06–1.42)
POC IONIZED CALCIUM: 1.31 MMOL/L (ref 1.06–1.42)
POC IONIZED CALCIUM: 1.32 MMOL/L (ref 1.06–1.42)
POC IONIZED CALCIUM: 1.33 MMOL/L (ref 1.06–1.42)
POC IONIZED CALCIUM: 1.34 MMOL/L (ref 1.06–1.42)
POC SATURATED O2: 50 % (ref 95–100)
POC SATURATED O2: 50 % (ref 95–100)
POC SATURATED O2: 69 % (ref 95–100)
POC SATURATED O2: 69 % (ref 95–100)
POC SATURATED O2: 87 % (ref 95–100)
POC SATURATED O2: 87 % (ref 95–100)
POC SATURATED O2: 88 % (ref 95–100)
POC SATURATED O2: 88 % (ref 95–100)
POC SATURATED O2: 89 % (ref 95–100)
POC SATURATED O2: 90 % (ref 95–100)
POC SATURATED O2: 91 % (ref 95–100)
POC SATURATED O2: 92 % (ref 95–100)
POC SATURATED O2: 93 % (ref 95–100)
POC SATURATED O2: 93 % (ref 95–100)
POC SATURATED O2: 94 % (ref 95–100)
POC SATURATED O2: 95 % (ref 95–100)
POC SATURATED O2: 95 % (ref 95–100)
POC SATURATED O2: 96 % (ref 95–100)
POC SATURATED O2: 97 % (ref 95–100)
POC SATURATED O2: 98 % (ref 95–100)
POC SATURATED O2: 99 % (ref 95–100)
POCT GLUCOSE: 102 MG/DL (ref 70–110)
POCT GLUCOSE: 103 MG/DL (ref 70–110)
POCT GLUCOSE: 108 MG/DL (ref 70–110)
POCT GLUCOSE: 109 MG/DL (ref 70–110)
POCT GLUCOSE: 111 MG/DL (ref 70–110)
POCT GLUCOSE: 111 MG/DL (ref 70–110)
POCT GLUCOSE: 112 MG/DL (ref 70–110)
POCT GLUCOSE: 112 MG/DL (ref 70–110)
POCT GLUCOSE: 113 MG/DL (ref 70–110)
POCT GLUCOSE: 115 MG/DL (ref 70–110)
POCT GLUCOSE: 115 MG/DL (ref 70–110)
POCT GLUCOSE: 116 MG/DL (ref 70–110)
POCT GLUCOSE: 117 MG/DL (ref 70–110)
POCT GLUCOSE: 118 MG/DL (ref 70–110)
POCT GLUCOSE: 119 MG/DL (ref 70–110)
POCT GLUCOSE: 119 MG/DL (ref 70–110)
POCT GLUCOSE: 120 MG/DL (ref 70–110)
POCT GLUCOSE: 121 MG/DL (ref 70–110)
POCT GLUCOSE: 122 MG/DL (ref 70–110)
POCT GLUCOSE: 122 MG/DL (ref 70–110)
POCT GLUCOSE: 123 MG/DL (ref 70–110)
POCT GLUCOSE: 124 MG/DL (ref 70–110)
POCT GLUCOSE: 125 MG/DL (ref 70–110)
POCT GLUCOSE: 126 MG/DL (ref 70–110)
POCT GLUCOSE: 126 MG/DL (ref 70–110)
POCT GLUCOSE: 128 MG/DL (ref 70–110)
POCT GLUCOSE: 128 MG/DL (ref 70–110)
POCT GLUCOSE: 129 MG/DL (ref 70–110)
POCT GLUCOSE: 131 MG/DL (ref 70–110)
POCT GLUCOSE: 132 MG/DL (ref 70–110)
POCT GLUCOSE: 132 MG/DL (ref 70–110)
POCT GLUCOSE: 133 MG/DL (ref 70–110)
POCT GLUCOSE: 134 MG/DL (ref 70–110)
POCT GLUCOSE: 135 MG/DL (ref 70–110)
POCT GLUCOSE: 137 MG/DL (ref 70–110)
POCT GLUCOSE: 139 MG/DL (ref 70–110)
POCT GLUCOSE: 140 MG/DL (ref 70–110)
POCT GLUCOSE: 140 MG/DL (ref 70–110)
POCT GLUCOSE: 141 MG/DL (ref 70–110)
POCT GLUCOSE: 142 MG/DL (ref 70–110)
POCT GLUCOSE: 142 MG/DL (ref 70–110)
POCT GLUCOSE: 149 MG/DL (ref 70–110)
POCT GLUCOSE: 150 MG/DL (ref 70–110)
POCT GLUCOSE: 151 MG/DL (ref 70–110)
POCT GLUCOSE: 151 MG/DL (ref 70–110)
POCT GLUCOSE: 154 MG/DL (ref 70–110)
POCT GLUCOSE: 158 MG/DL (ref 70–110)
POCT GLUCOSE: 164 MG/DL (ref 70–110)
POCT GLUCOSE: 166 MG/DL (ref 70–110)
POCT GLUCOSE: 171 MG/DL (ref 70–110)
POCT GLUCOSE: 171 MG/DL (ref 70–110)
POCT GLUCOSE: 181 MG/DL (ref 70–110)
POCT GLUCOSE: 184 MG/DL (ref 70–110)
POCT GLUCOSE: 187 MG/DL (ref 70–110)
POCT GLUCOSE: 192 MG/DL (ref 70–110)
POCT GLUCOSE: 194 MG/DL (ref 70–110)
POCT GLUCOSE: 196 MG/DL (ref 70–110)
POCT GLUCOSE: 199 MG/DL (ref 70–110)
POCT GLUCOSE: 202 MG/DL (ref 70–110)
POCT GLUCOSE: 69 MG/DL (ref 70–110)
POCT GLUCOSE: 76 MG/DL (ref 70–110)
POCT GLUCOSE: 90 MG/DL (ref 70–110)
POIKILOCYTOSIS BLD QL SMEAR: SLIGHT
POLYCHROMASIA BLD QL SMEAR: ABNORMAL
POTASSIUM BLD-SCNC: 3.6 MMOL/L (ref 3.5–5.1)
POTASSIUM BLD-SCNC: 4 MMOL/L (ref 3.5–5.1)
POTASSIUM BLD-SCNC: 4 MMOL/L (ref 3.5–5.1)
POTASSIUM BLD-SCNC: 4.3 MMOL/L (ref 3.5–5.1)
POTASSIUM BLD-SCNC: 4.5 MMOL/L (ref 3.5–5.1)
POTASSIUM BLD-SCNC: 4.6 MMOL/L (ref 3.5–5.1)
POTASSIUM BLD-SCNC: 4.8 MMOL/L (ref 3.5–5.1)
POTASSIUM BLD-SCNC: 4.9 MMOL/L (ref 3.5–5.1)
POTASSIUM BLD-SCNC: 5.1 MMOL/L (ref 3.5–5.1)
POTASSIUM BLD-SCNC: 5.2 MMOL/L (ref 3.5–5.1)
POTASSIUM BLD-SCNC: 5.2 MMOL/L (ref 3.5–5.1)
POTASSIUM BLD-SCNC: 5.3 MMOL/L (ref 3.5–5.1)
POTASSIUM BLD-SCNC: 5.3 MMOL/L (ref 3.5–5.1)
POTASSIUM BLD-SCNC: 5.5 MMOL/L (ref 3.5–5.1)
POTASSIUM BLD-SCNC: 6.1 MMOL/L (ref 3.5–5.1)
POTASSIUM BLD-SCNC: 6.7 MMOL/L (ref 3.5–5.1)
POTASSIUM SERPL-SCNC: 3.5 MMOL/L (ref 3.5–5.1)
POTASSIUM SERPL-SCNC: 3.9 MMOL/L (ref 3.5–5.1)
POTASSIUM SERPL-SCNC: 4 MMOL/L (ref 3.5–5.1)
POTASSIUM SERPL-SCNC: 4.1 MMOL/L (ref 3.5–5.1)
POTASSIUM SERPL-SCNC: 4.2 MMOL/L (ref 3.5–5.1)
POTASSIUM SERPL-SCNC: 4.3 MMOL/L (ref 3.5–5.1)
POTASSIUM SERPL-SCNC: 4.5 MMOL/L (ref 3.5–5.1)
POTASSIUM SERPL-SCNC: 4.5 MMOL/L (ref 3.5–5.1)
POTASSIUM SERPL-SCNC: 4.6 MMOL/L (ref 3.5–5.1)
POTASSIUM SERPL-SCNC: 4.7 MMOL/L (ref 3.5–5.1)
POTASSIUM SERPL-SCNC: 4.8 MMOL/L (ref 3.5–5.1)
POTASSIUM SERPL-SCNC: 4.8 MMOL/L (ref 3.5–5.1)
POTASSIUM SERPL-SCNC: 5 MMOL/L (ref 3.5–5.1)
POTASSIUM SERPL-SCNC: 5.5 MMOL/L (ref 3.5–5.1)
POTASSIUM SERPL-SCNC: 5.7 MMOL/L (ref 3.5–5.1)
POTASSIUM SERPL-SCNC: 6 MMOL/L (ref 3.5–5.1)
POTASSIUM SERPL-SCNC: 6.1 MMOL/L (ref 3.5–5.1)
POTASSIUM SERPL-SCNC: 6.3 MMOL/L (ref 3.5–5.1)
POTASSIUM SERPL-SCNC: 6.3 MMOL/L (ref 3.5–5.1)
POTASSIUM SERPL-SCNC: 6.4 MMOL/L (ref 3.5–5.1)
POTASSIUM SERPL-SCNC: 6.5 MMOL/L (ref 3.5–5.1)
POTASSIUM SERPL-SCNC: 6.7 MMOL/L (ref 3.5–5.1)
POTASSIUM SERPL-SCNC: 6.9 MMOL/L (ref 3.5–5.1)
POTASSIUM SERPL-SCNC: 7 MMOL/L (ref 3.5–5.1)
POTASSIUM SERPL-SCNC: 7.3 MMOL/L (ref 3.5–5.1)
PROCALCITONIN SERPL IA-MCNC: 0.08 NG/ML
PROCALCITONIN SERPL IA-MCNC: 0.14 NG/ML
PROCALCITONIN SERPL IA-MCNC: 0.86 NG/ML
PROT SERPL-MCNC: 5.8 G/DL (ref 6–8.4)
PROT SERPL-MCNC: 6 G/DL (ref 6–8.4)
PROT SERPL-MCNC: 6.1 G/DL (ref 6–8.4)
PROT SERPL-MCNC: 6.2 G/DL (ref 6–8.4)
PROT SERPL-MCNC: 6.3 G/DL (ref 6–8.4)
PROT SERPL-MCNC: 6.4 G/DL (ref 6–8.4)
PROT SERPL-MCNC: 6.5 G/DL (ref 6–8.4)
PROT SERPL-MCNC: 6.6 G/DL (ref 6–8.4)
PROT SERPL-MCNC: 7 G/DL (ref 6–8.4)
PROT SERPL-MCNC: 7.2 G/DL (ref 6–8.4)
PROT SERPL-MCNC: 7.3 G/DL (ref 6–8.4)
PROT SERPL-MCNC: 7.8 G/DL (ref 6–8.4)
PROT UR QL STRIP: ABNORMAL
PROT UR QL STRIP: NEGATIVE
PROT UR QL STRIP: NEGATIVE
PROTHROMBIN TIME: 12.1 SEC (ref 9–12.5)
PS: 10
RBC # BLD AUTO: 2.34 M/UL (ref 4.6–6.2)
RBC # BLD AUTO: 2.54 M/UL (ref 4.6–6.2)
RBC # BLD AUTO: 2.59 M/UL (ref 4.6–6.2)
RBC # BLD AUTO: 2.73 M/UL (ref 4.6–6.2)
RBC # BLD AUTO: 2.85 M/UL (ref 4.6–6.2)
RBC # BLD AUTO: 2.86 M/UL (ref 4.6–6.2)
RBC # BLD AUTO: 2.87 M/UL (ref 4.6–6.2)
RBC # BLD AUTO: 2.9 M/UL (ref 4.6–6.2)
RBC # BLD AUTO: 3.03 M/UL (ref 4.6–6.2)
RBC # BLD AUTO: 3.38 M/UL (ref 4.6–6.2)
RBC # BLD AUTO: 3.78 M/UL (ref 4.6–6.2)
RBC # BLD AUTO: 3.79 M/UL (ref 4.6–6.2)
RBC # BLD AUTO: 4.1 M/UL (ref 4.6–6.2)
RBC # BLD AUTO: 4.39 M/UL (ref 4.6–6.2)
RBC # BLD AUTO: 4.39 M/UL (ref 4.6–6.2)
RBC # BLD AUTO: 4.57 M/UL (ref 4.6–6.2)
RBC # BLD AUTO: 4.57 M/UL (ref 4.6–6.2)
RBC # BLD AUTO: 4.7 M/UL (ref 4.6–6.2)
RBC # BLD AUTO: 4.78 M/UL (ref 4.6–6.2)
RBC # BLD AUTO: 4.85 M/UL (ref 4.6–6.2)
RBC # BLD AUTO: 4.94 M/UL (ref 4.6–6.2)
RBC # BLD AUTO: 4.99 M/UL (ref 4.6–6.2)
RBC # BLD AUTO: 5.03 M/UL (ref 4.6–6.2)
SAMPLE: ABNORMAL
SAMPLE: NORMAL
SARS-COV-2 RDRP RESP QL NAA+PROBE: POSITIVE
SARS-COV-2 RNA RESP QL NAA+PROBE: NOT DETECTED
SET RATE: 20
SITE: ABNORMAL
SITE: NORMAL
SMUDGE CELLS BLD QL SMEAR: PRESENT
SODIUM BLD-SCNC: 132 MMOL/L (ref 136–145)
SODIUM BLD-SCNC: 133 MMOL/L (ref 136–145)
SODIUM BLD-SCNC: 133 MMOL/L (ref 136–145)
SODIUM BLD-SCNC: 134 MMOL/L (ref 136–145)
SODIUM BLD-SCNC: 135 MMOL/L (ref 136–145)
SODIUM BLD-SCNC: 136 MMOL/L (ref 136–145)
SODIUM BLD-SCNC: 137 MMOL/L (ref 136–145)
SODIUM BLD-SCNC: 138 MMOL/L (ref 136–145)
SODIUM BLD-SCNC: 139 MMOL/L (ref 136–145)
SODIUM BLD-SCNC: 141 MMOL/L (ref 136–145)
SODIUM BLD-SCNC: 141 MMOL/L (ref 136–145)
SODIUM BLD-SCNC: 144 MMOL/L (ref 136–145)
SODIUM SERPL-SCNC: 131 MMOL/L (ref 136–145)
SODIUM SERPL-SCNC: 132 MMOL/L (ref 136–145)
SODIUM SERPL-SCNC: 133 MMOL/L (ref 136–145)
SODIUM SERPL-SCNC: 134 MMOL/L (ref 136–145)
SODIUM SERPL-SCNC: 135 MMOL/L (ref 136–145)
SODIUM SERPL-SCNC: 136 MMOL/L (ref 136–145)
SODIUM SERPL-SCNC: 136 MMOL/L (ref 136–145)
SODIUM SERPL-SCNC: 137 MMOL/L (ref 136–145)
SODIUM SERPL-SCNC: 137 MMOL/L (ref 136–145)
SODIUM SERPL-SCNC: 138 MMOL/L (ref 136–145)
SODIUM SERPL-SCNC: 139 MMOL/L (ref 136–145)
SODIUM SERPL-SCNC: 139 MMOL/L (ref 136–145)
SODIUM SERPL-SCNC: 141 MMOL/L (ref 136–145)
SODIUM SERPL-SCNC: 142 MMOL/L (ref 136–145)
SODIUM SERPL-SCNC: 143 MMOL/L (ref 136–145)
SP GR UR STRIP: 1.01 (ref 1–1.03)
SP GR UR STRIP: 1.02 (ref 1–1.03)
SP GR UR STRIP: 1.02 (ref 1–1.03)
SP02: 100
SP02: 89
SP02: 91
SPECIMEN SOURCE: NORMAL
STOMATOCYTES BLD QL SMEAR: ABNORMAL
STOMATOCYTES BLD QL SMEAR: PRESENT
T4 FREE SERPL-MCNC: 0.97 NG/DL (ref 0.71–1.51)
TARGETS BLD QL SMEAR: ABNORMAL
TRIGL SERPL-MCNC: 217 MG/DL (ref 30–150)
TRIGL SERPL-MCNC: 220 MG/DL (ref 30–150)
TRIGL SERPL-MCNC: 282 MG/DL (ref 30–150)
TRIGL SERPL-MCNC: 287 MG/DL (ref 30–150)
TRIGL SERPL-MCNC: 333 MG/DL (ref 30–150)
TRIGL SERPL-MCNC: 366 MG/DL (ref 30–150)
TRIGL SERPL-MCNC: 396 MG/DL (ref 30–150)
TRIGL SERPL-MCNC: 421 MG/DL (ref 30–150)
TRIGL SERPL-MCNC: 422 MG/DL (ref 30–150)
TRIGL SERPL-MCNC: 432 MG/DL (ref 30–150)
TROPONIN I SERPL DL<=0.01 NG/ML-MCNC: 0.01 NG/ML (ref 0–0.03)
TSH SERPL DL<=0.005 MIU/L-ACNC: 1.41 UIU/ML (ref 0.4–4)
UNIT NUMBER: NORMAL
UNIT NUMBER: NORMAL
URN SPEC COLLECT METH UR: ABNORMAL
URN SPEC COLLECT METH UR: ABNORMAL
URN SPEC COLLECT METH UR: NORMAL
UROBILINOGEN UR STRIP-ACNC: 1 EU/DL
UROBILINOGEN UR STRIP-ACNC: NEGATIVE EU/DL
UROBILINOGEN UR STRIP-ACNC: NEGATIVE EU/DL
VANCOMYCIN SERPL-MCNC: 13.1 UG/ML
VANCOMYCIN SERPL-MCNC: 16.8 UG/ML
VANCOMYCIN SERPL-MCNC: 9.6 UG/ML
VT: 450
VT: 547
VT: 576
VT: 581
WBC # BLD AUTO: 10.77 K/UL (ref 3.9–12.7)
WBC # BLD AUTO: 11.6 K/UL (ref 3.9–12.7)
WBC # BLD AUTO: 14.27 K/UL (ref 3.9–12.7)
WBC # BLD AUTO: 14.43 K/UL (ref 3.9–12.7)
WBC # BLD AUTO: 14.68 K/UL (ref 3.9–12.7)
WBC # BLD AUTO: 15.56 K/UL (ref 3.9–12.7)
WBC # BLD AUTO: 17.81 K/UL (ref 3.9–12.7)
WBC # BLD AUTO: 18.9 K/UL (ref 3.9–12.7)
WBC # BLD AUTO: 20.4 K/UL (ref 3.9–12.7)
WBC # BLD AUTO: 20.85 K/UL (ref 3.9–12.7)
WBC # BLD AUTO: 21.29 K/UL (ref 3.9–12.7)
WBC # BLD AUTO: 22.19 K/UL (ref 3.9–12.7)
WBC # BLD AUTO: 25.17 K/UL (ref 3.9–12.7)
WBC # BLD AUTO: 25.31 K/UL (ref 3.9–12.7)
WBC # BLD AUTO: 26.51 K/UL (ref 3.9–12.7)
WBC # BLD AUTO: 28.35 K/UL (ref 3.9–12.7)
WBC # BLD AUTO: 30.76 K/UL (ref 3.9–12.7)
WBC # BLD AUTO: 4.25 K/UL (ref 3.9–12.7)
WBC # BLD AUTO: 4.91 K/UL (ref 3.9–12.7)
WBC # BLD AUTO: 46.34 K/UL (ref 3.9–12.7)
WBC # BLD AUTO: 7.4 K/UL (ref 3.9–12.7)
WBC # BLD AUTO: 8.29 K/UL (ref 3.9–12.7)
WBC # BLD AUTO: 8.38 K/UL (ref 3.9–12.7)

## 2020-01-01 PROCEDURE — 99214 PR OFFICE/OUTPT VISIT, EST, LEVL IV, 30-39 MIN: ICD-10-PCS | Mod: S$GLB,,, | Performed by: FAMILY MEDICINE

## 2020-01-01 PROCEDURE — 94761 N-INVAS EAR/PLS OXIMETRY MLT: CPT

## 2020-01-01 PROCEDURE — 36415 COLL VENOUS BLD VENIPUNCTURE: CPT | Mod: PO

## 2020-01-01 PROCEDURE — 83615 LACTATE (LD) (LDH) ENZYME: CPT

## 2020-01-01 PROCEDURE — 63600175 PHARM REV CODE 636 W HCPCS: Performed by: NURSE PRACTITIONER

## 2020-01-01 PROCEDURE — 85730 THROMBOPLASTIN TIME PARTIAL: CPT | Mod: 91

## 2020-01-01 PROCEDURE — 85379 FIBRIN DEGRADATION QUANT: CPT

## 2020-01-01 PROCEDURE — 85730 THROMBOPLASTIN TIME PARTIAL: CPT

## 2020-01-01 PROCEDURE — 99291 CRITICAL CARE FIRST HOUR: CPT | Mod: ,,, | Performed by: NURSE PRACTITIONER

## 2020-01-01 PROCEDURE — 25000003 PHARM REV CODE 250: Performed by: NURSE PRACTITIONER

## 2020-01-01 PROCEDURE — C1751 CATH, INF, PER/CENT/MIDLINE: HCPCS

## 2020-01-01 PROCEDURE — 20000000 HC ICU ROOM

## 2020-01-01 PROCEDURE — 99291 PR CRITICAL CARE, E/M 30-74 MINUTES: ICD-10-PCS | Mod: ,,, | Performed by: NURSE PRACTITIONER

## 2020-01-01 PROCEDURE — 25000003 PHARM REV CODE 250: Performed by: INTERNAL MEDICINE

## 2020-01-01 PROCEDURE — S0028 INJECTION, FAMOTIDINE, 20 MG: HCPCS | Performed by: HOSPITALIST

## 2020-01-01 PROCEDURE — 84145 PROCALCITONIN (PCT): CPT

## 2020-01-01 PROCEDURE — 99900026 HC AIRWAY MAINTENANCE (STAT)

## 2020-01-01 PROCEDURE — 21400001 HC TELEMETRY ROOM

## 2020-01-01 PROCEDURE — 99214 PR OFFICE/OUTPT VISIT, EST, LEVL IV, 30-39 MIN: ICD-10-PCS | Mod: 95,,, | Performed by: NURSE PRACTITIONER

## 2020-01-01 PROCEDURE — 84100 ASSAY OF PHOSPHORUS: CPT

## 2020-01-01 PROCEDURE — 99292 PR CRITICAL CARE, ADDL 30 MIN: ICD-10-PCS | Mod: ,,, | Performed by: NURSE PRACTITIONER

## 2020-01-01 PROCEDURE — 25000003 PHARM REV CODE 250: Performed by: HOSPITALIST

## 2020-01-01 PROCEDURE — 83036 HEMOGLOBIN GLYCOSYLATED A1C: CPT

## 2020-01-01 PROCEDURE — 99291 CRITICAL CARE FIRST HOUR: CPT | Mod: 25,,, | Performed by: NURSE PRACTITIONER

## 2020-01-01 PROCEDURE — 63600175 PHARM REV CODE 636 W HCPCS: Performed by: INTERNAL MEDICINE

## 2020-01-01 PROCEDURE — 99213 OFFICE O/P EST LOW 20 MIN: CPT | Mod: 95,,, | Performed by: NURSE PRACTITIONER

## 2020-01-01 PROCEDURE — 85007 BL SMEAR W/DIFF WBC COUNT: CPT

## 2020-01-01 PROCEDURE — 25000242 PHARM REV CODE 250 ALT 637 W/ HCPCS: Performed by: NURSE PRACTITIONER

## 2020-01-01 PROCEDURE — 84132 ASSAY OF SERUM POTASSIUM: CPT

## 2020-01-01 PROCEDURE — 94003 VENT MGMT INPAT SUBQ DAY: CPT

## 2020-01-01 PROCEDURE — 94640 AIRWAY INHALATION TREATMENT: CPT

## 2020-01-01 PROCEDURE — 3079F PR MOST RECENT DIASTOLIC BLOOD PRESSURE 80-89 MM HG: ICD-10-PCS | Mod: CPTII,S$GLB,, | Performed by: FAMILY MEDICINE

## 2020-01-01 PROCEDURE — 94799 UNLISTED PULMONARY SVC/PX: CPT

## 2020-01-01 PROCEDURE — 84295 ASSAY OF SERUM SODIUM: CPT

## 2020-01-01 PROCEDURE — 85027 COMPLETE CBC AUTOMATED: CPT

## 2020-01-01 PROCEDURE — 94660 CPAP INITIATION&MGMT: CPT

## 2020-01-01 PROCEDURE — 27000190 HC CPAP FULL FACE MASK W/VALVE

## 2020-01-01 PROCEDURE — 82728 ASSAY OF FERRITIN: CPT

## 2020-01-01 PROCEDURE — 99900035 HC TECH TIME PER 15 MIN (STAT)

## 2020-01-01 PROCEDURE — 82803 BLOOD GASES ANY COMBINATION: CPT

## 2020-01-01 PROCEDURE — 80053 COMPREHEN METABOLIC PANEL: CPT

## 2020-01-01 PROCEDURE — 86706 HEP B SURFACE ANTIBODY: CPT

## 2020-01-01 PROCEDURE — 37799 UNLISTED PX VASCULAR SURGERY: CPT

## 2020-01-01 PROCEDURE — 36556 INSERT NON-TUNNEL CV CATH: CPT | Mod: ,,, | Performed by: NURSE PRACTITIONER

## 2020-01-01 PROCEDURE — 96361 HYDRATE IV INFUSION ADD-ON: CPT

## 2020-01-01 PROCEDURE — 99232 PR SUBSEQUENT HOSPITAL CARE,LEVL II: ICD-10-PCS | Mod: ,,, | Performed by: INTERNAL MEDICINE

## 2020-01-01 PROCEDURE — 99999 PR PBB SHADOW E&M-EST. PATIENT-LVL IV: ICD-10-PCS | Mod: PBBFAC,,, | Performed by: PHYSICIAN ASSISTANT

## 2020-01-01 PROCEDURE — 3074F SYST BP LT 130 MM HG: CPT | Mod: CPTII,S$GLB,, | Performed by: PHYSICIAN ASSISTANT

## 2020-01-01 PROCEDURE — 99232 SBSQ HOSP IP/OBS MODERATE 35: CPT | Mod: ,,, | Performed by: INTERNAL MEDICINE

## 2020-01-01 PROCEDURE — 81003 URINALYSIS AUTO W/O SCOPE: CPT

## 2020-01-01 PROCEDURE — 97803 MED NUTRITION INDIV SUBSEQ: CPT

## 2020-01-01 PROCEDURE — 83735 ASSAY OF MAGNESIUM: CPT

## 2020-01-01 PROCEDURE — 99999 PR PBB SHADOW E&M-EST. PATIENT-LVL III: CPT | Mod: PBBFAC,,, | Performed by: FAMILY MEDICINE

## 2020-01-01 PROCEDURE — 3079F DIAST BP 80-89 MM HG: CPT | Mod: CPTII,S$GLB,, | Performed by: PHYSICIAN ASSISTANT

## 2020-01-01 PROCEDURE — 93010 EKG 12-LEAD: ICD-10-PCS | Mod: ,,, | Performed by: INTERNAL MEDICINE

## 2020-01-01 PROCEDURE — 99292 CRITICAL CARE ADDL 30 MIN: CPT | Mod: 25,,, | Performed by: NURSE PRACTITIONER

## 2020-01-01 PROCEDURE — 80202 ASSAY OF VANCOMYCIN: CPT

## 2020-01-01 PROCEDURE — 94760 N-INVAS EAR/PLS OXIMETRY 1: CPT

## 2020-01-01 PROCEDURE — 3079F DIAST BP 80-89 MM HG: CPT | Mod: CPTII,S$GLB,, | Performed by: FAMILY MEDICINE

## 2020-01-01 PROCEDURE — 85025 COMPLETE CBC W/AUTO DIFF WBC: CPT

## 2020-01-01 PROCEDURE — 99292 CRITICAL CARE ADDL 30 MIN: CPT | Mod: ,,, | Performed by: NURSE PRACTITIONER

## 2020-01-01 PROCEDURE — 36415 COLL VENOUS BLD VENIPUNCTURE: CPT

## 2020-01-01 PROCEDURE — 36430 TRANSFUSION BLD/BLD COMPNT: CPT

## 2020-01-01 PROCEDURE — 82977 ASSAY OF GGT: CPT

## 2020-01-01 PROCEDURE — 3075F SYST BP GE 130 - 139MM HG: CPT | Mod: CPTII,S$GLB,, | Performed by: FAMILY MEDICINE

## 2020-01-01 PROCEDURE — 87205 SMEAR GRAM STAIN: CPT

## 2020-01-01 PROCEDURE — 82800 BLOOD PH: CPT

## 2020-01-01 PROCEDURE — 86850 RBC ANTIBODY SCREEN: CPT

## 2020-01-01 PROCEDURE — 76870 US EXAM SCROTUM: CPT | Mod: 26,,, | Performed by: RADIOLOGY

## 2020-01-01 PROCEDURE — S0030 INJECTION, METRONIDAZOLE: HCPCS | Performed by: NURSE PRACTITIONER

## 2020-01-01 PROCEDURE — 90686 IIV4 VACC NO PRSV 0.5 ML IM: CPT | Mod: S$GLB,,, | Performed by: FAMILY MEDICINE

## 2020-01-01 PROCEDURE — 87502 INFLUENZA DNA AMP PROBE: CPT

## 2020-01-01 PROCEDURE — 3008F BODY MASS INDEX DOCD: CPT | Mod: CPTII,S$GLB,, | Performed by: FAMILY MEDICINE

## 2020-01-01 PROCEDURE — 82330 ASSAY OF CALCIUM: CPT

## 2020-01-01 PROCEDURE — 90471 IMMUNIZATION ADMIN: CPT | Mod: S$GLB,,, | Performed by: FAMILY MEDICINE

## 2020-01-01 PROCEDURE — 99999 PR PBB SHADOW E&M-EST. PATIENT-LVL III: ICD-10-PCS | Mod: PBBFAC,,, | Performed by: FAMILY MEDICINE

## 2020-01-01 PROCEDURE — 25000003 PHARM REV CODE 250

## 2020-01-01 PROCEDURE — 87070 CULTURE OTHR SPECIMN AEROBIC: CPT

## 2020-01-01 PROCEDURE — 99291 PR CRITICAL CARE, E/M 30-74 MINUTES: ICD-10-PCS | Mod: 25,,, | Performed by: NURSE PRACTITIONER

## 2020-01-01 PROCEDURE — 86140 C-REACTIVE PROTEIN: CPT

## 2020-01-01 PROCEDURE — 99291 PR CRITICAL CARE, E/M 30-74 MINUTES: ICD-10-PCS | Mod: ,,, | Performed by: INTERNAL MEDICINE

## 2020-01-01 PROCEDURE — 63600175 PHARM REV CODE 636 W HCPCS: Performed by: STUDENT IN AN ORGANIZED HEALTH CARE EDUCATION/TRAINING PROGRAM

## 2020-01-01 PROCEDURE — 80100014 HC HEMODIALYSIS 1:1

## 2020-01-01 PROCEDURE — 90686 FLU VACCINE (QUAD) GREATER THAN OR EQUAL TO 3YO PRESERVATIVE FREE IM: ICD-10-PCS | Mod: S$GLB,,, | Performed by: FAMILY MEDICINE

## 2020-01-01 PROCEDURE — 27100171 HC OXYGEN HIGH FLOW UP TO 24 HOURS

## 2020-01-01 PROCEDURE — 84478 ASSAY OF TRIGLYCERIDES: CPT

## 2020-01-01 PROCEDURE — 86703 HIV-1/HIV-2 1 RESULT ANTBDY: CPT

## 2020-01-01 PROCEDURE — 36569 INSJ PICC 5 YR+ W/O IMAGING: CPT

## 2020-01-01 PROCEDURE — 25000003 PHARM REV CODE 250: Performed by: STUDENT IN AN ORGANIZED HEALTH CARE EDUCATION/TRAINING PROGRAM

## 2020-01-01 PROCEDURE — 3074F SYST BP LT 130 MM HG: CPT | Mod: CPTII,S$GLB,, | Performed by: FAMILY MEDICINE

## 2020-01-01 PROCEDURE — 97802 MEDICAL NUTRITION INDIV IN: CPT

## 2020-01-01 PROCEDURE — 90471 FLU VACCINE (QUAD) GREATER THAN OR EQUAL TO 3YO PRESERVATIVE FREE IM: ICD-10-PCS | Mod: S$GLB,,, | Performed by: FAMILY MEDICINE

## 2020-01-01 PROCEDURE — 83615 LACTATE (LD) (LDH) ENZYME: CPT | Mod: ER

## 2020-01-01 PROCEDURE — 80048 BASIC METABOLIC PNL TOTAL CA: CPT

## 2020-01-01 PROCEDURE — 93010 ELECTROCARDIOGRAM REPORT: CPT | Mod: ,,, | Performed by: INTERNAL MEDICINE

## 2020-01-01 PROCEDURE — 96367 TX/PROPH/DG ADDL SEQ IV INF: CPT

## 2020-01-01 PROCEDURE — 99214 OFFICE O/P EST MOD 30 MIN: CPT | Mod: 95,,, | Performed by: NURSE PRACTITIONER

## 2020-01-01 PROCEDURE — 85014 HEMATOCRIT: CPT

## 2020-01-01 PROCEDURE — 80048 BASIC METABOLIC PNL TOTAL CA: CPT | Mod: 91

## 2020-01-01 PROCEDURE — 76705 ECHO EXAM OF ABDOMEN: CPT | Mod: 26,,, | Performed by: RADIOLOGY

## 2020-01-01 PROCEDURE — 84484 ASSAY OF TROPONIN QUANT: CPT

## 2020-01-01 PROCEDURE — 85651 RBC SED RATE NONAUTOMATED: CPT

## 2020-01-01 PROCEDURE — 80069 RENAL FUNCTION PANEL: CPT

## 2020-01-01 PROCEDURE — 3075F PR MOST RECENT SYSTOLIC BLOOD PRESS GE 130-139MM HG: ICD-10-PCS | Mod: CPTII,S$GLB,, | Performed by: FAMILY MEDICINE

## 2020-01-01 PROCEDURE — 85610 PROTHROMBIN TIME: CPT

## 2020-01-01 PROCEDURE — 86920 COMPATIBILITY TEST SPIN: CPT

## 2020-01-01 PROCEDURE — 99213 OFFICE O/P EST LOW 20 MIN: CPT | Mod: S$GLB,,, | Performed by: NURSE PRACTITIONER

## 2020-01-01 PROCEDURE — 76870 US SCROTUM AND TESTICLES: ICD-10-PCS | Mod: 26,,, | Performed by: RADIOLOGY

## 2020-01-01 PROCEDURE — P9016 RBC LEUKOCYTES REDUCED: HCPCS

## 2020-01-01 PROCEDURE — 36620 INSERTION CATHETER ARTERY: CPT

## 2020-01-01 PROCEDURE — G0378 HOSPITAL OBSERVATION PER HR: HCPCS

## 2020-01-01 PROCEDURE — C9399 UNCLASSIFIED DRUGS OR BIOLOG: HCPCS | Performed by: HOSPITALIST

## 2020-01-01 PROCEDURE — 99203 PR OFFICE/OUTPT VISIT, NEW, LEVL III, 30-44 MIN: ICD-10-PCS | Mod: S$GLB,,, | Performed by: PHYSICIAN ASSISTANT

## 2020-01-01 PROCEDURE — 27000221 HC OXYGEN, UP TO 24 HOURS

## 2020-01-01 PROCEDURE — 87340 HEPATITIS B SURFACE AG IA: CPT

## 2020-01-01 PROCEDURE — 82550 ASSAY OF CK (CPK): CPT

## 2020-01-01 PROCEDURE — 86706 HEP B SURFACE ANTIBODY: CPT | Mod: 91

## 2020-01-01 PROCEDURE — 99291 CRITICAL CARE FIRST HOUR: CPT | Mod: 25

## 2020-01-01 PROCEDURE — 99213 PR OFFICE/OUTPT VISIT, EST, LEVL III, 20-29 MIN: ICD-10-PCS | Mod: 95,,, | Performed by: NURSE PRACTITIONER

## 2020-01-01 PROCEDURE — 99203 OFFICE O/P NEW LOW 30 MIN: CPT | Mod: S$GLB,,, | Performed by: PHYSICIAN ASSISTANT

## 2020-01-01 PROCEDURE — 63700000 PHARM REV CODE 250 ALT 637 W/O HCPCS: Performed by: NURSE PRACTITIONER

## 2020-01-01 PROCEDURE — 76870 US EXAM SCROTUM: CPT | Mod: TC

## 2020-01-01 PROCEDURE — 80053 COMPREHEN METABOLIC PANEL: CPT | Mod: ER

## 2020-01-01 PROCEDURE — 3074F PR MOST RECENT SYSTOLIC BLOOD PRESSURE < 130 MM HG: ICD-10-PCS | Mod: CPTII,S$GLB,, | Performed by: PHYSICIAN ASSISTANT

## 2020-01-01 PROCEDURE — 87040 BLOOD CULTURE FOR BACTERIA: CPT | Mod: 59

## 2020-01-01 PROCEDURE — 3080F DIAST BP >= 90 MM HG: CPT | Mod: CPTII,S$GLB,, | Performed by: FAMILY MEDICINE

## 2020-01-01 PROCEDURE — 3074F PR MOST RECENT SYSTOLIC BLOOD PRESSURE < 130 MM HG: ICD-10-PCS | Mod: CPTII,S$GLB,, | Performed by: FAMILY MEDICINE

## 2020-01-01 PROCEDURE — 86901 BLOOD TYPING SEROLOGIC RH(D): CPT

## 2020-01-01 PROCEDURE — 36556 PR INSERT NON-TUNNEL CV CATH 5+ YRS OLD: ICD-10-PCS | Mod: ,,, | Performed by: NURSE PRACTITIONER

## 2020-01-01 PROCEDURE — 99214 PR OFFICE/OUTPT VISIT, EST, LEVL IV, 30-39 MIN: ICD-10-PCS | Mod: 25,S$GLB,, | Performed by: FAMILY MEDICINE

## 2020-01-01 PROCEDURE — 80061 LIPID PANEL: CPT

## 2020-01-01 PROCEDURE — 99999 PR PBB SHADOW E&M-EST. PATIENT-LVL IV: CPT | Mod: PBBFAC,,, | Performed by: PHYSICIAN ASSISTANT

## 2020-01-01 PROCEDURE — 63600175 PHARM REV CODE 636 W HCPCS: Performed by: CLINIC/CENTER

## 2020-01-01 PROCEDURE — 3008F PR BODY MASS INDEX (BMI) DOCUMENTED: ICD-10-PCS | Mod: CPTII,S$GLB,, | Performed by: FAMILY MEDICINE

## 2020-01-01 PROCEDURE — 3080F PR MOST RECENT DIASTOLIC BLOOD PRESSURE >= 90 MM HG: ICD-10-PCS | Mod: CPTII,S$GLB,, | Performed by: FAMILY MEDICINE

## 2020-01-01 PROCEDURE — 80202 ASSAY OF VANCOMYCIN: CPT | Mod: 91

## 2020-01-01 PROCEDURE — 94002 VENT MGMT INPAT INIT DAY: CPT

## 2020-01-01 PROCEDURE — 83735 ASSAY OF MAGNESIUM: CPT | Mod: ER

## 2020-01-01 PROCEDURE — 36600 WITHDRAWAL OF ARTERIAL BLOOD: CPT

## 2020-01-01 PROCEDURE — 99292 PR CRITICAL CARE, ADDL 30 MIN: ICD-10-PCS | Mod: 25,,, | Performed by: NURSE PRACTITIONER

## 2020-01-01 PROCEDURE — 84443 ASSAY THYROID STIM HORMONE: CPT

## 2020-01-01 PROCEDURE — 93005 ELECTROCARDIOGRAM TRACING: CPT

## 2020-01-01 PROCEDURE — 25000003 PHARM REV CODE 250: Performed by: EMERGENCY MEDICINE

## 2020-01-01 PROCEDURE — 63600175 PHARM REV CODE 636 W HCPCS: Performed by: HOSPITALIST

## 2020-01-01 PROCEDURE — 96376 TX/PRO/DX INJ SAME DRUG ADON: CPT

## 2020-01-01 PROCEDURE — 63600175 PHARM REV CODE 636 W HCPCS: Performed by: EMERGENCY MEDICINE

## 2020-01-01 PROCEDURE — 83525 ASSAY OF INSULIN: CPT

## 2020-01-01 PROCEDURE — 76705 US ABDOMEN LIMITED: ICD-10-PCS | Mod: 26,,, | Performed by: RADIOLOGY

## 2020-01-01 PROCEDURE — 63600175 PHARM REV CODE 636 W HCPCS

## 2020-01-01 PROCEDURE — 76705 ECHO EXAM OF ABDOMEN: CPT | Mod: TC

## 2020-01-01 PROCEDURE — 87040 BLOOD CULTURE FOR BACTERIA: CPT

## 2020-01-01 PROCEDURE — 97163 PT EVAL HIGH COMPLEX 45 MIN: CPT | Performed by: PHYSICAL THERAPIST

## 2020-01-01 PROCEDURE — 80074 ACUTE HEPATITIS PANEL: CPT

## 2020-01-01 PROCEDURE — 84439 ASSAY OF FREE THYROXINE: CPT

## 2020-01-01 PROCEDURE — 96365 THER/PROPH/DIAG IV INF INIT: CPT | Mod: 59

## 2020-01-01 PROCEDURE — 99291 CRITICAL CARE FIRST HOUR: CPT | Mod: ,,, | Performed by: INTERNAL MEDICINE

## 2020-01-01 PROCEDURE — U0002 COVID-19 LAB TEST NON-CDC: HCPCS

## 2020-01-01 PROCEDURE — 86704 HEP B CORE ANTIBODY TOTAL: CPT

## 2020-01-01 PROCEDURE — 87077 CULTURE AEROBIC IDENTIFY: CPT

## 2020-01-01 PROCEDURE — 80076 HEPATIC FUNCTION PANEL: CPT

## 2020-01-01 PROCEDURE — 99214 OFFICE O/P EST MOD 30 MIN: CPT | Mod: 25,S$GLB,, | Performed by: FAMILY MEDICINE

## 2020-01-01 PROCEDURE — 99213 PR OFFICE/OUTPT VISIT, EST, LEVL III, 20-29 MIN: ICD-10-PCS | Mod: S$GLB,,, | Performed by: NURSE PRACTITIONER

## 2020-01-01 PROCEDURE — 83880 ASSAY OF NATRIURETIC PEPTIDE: CPT

## 2020-01-01 PROCEDURE — 84100 ASSAY OF PHOSPHORUS: CPT | Mod: ER

## 2020-01-01 PROCEDURE — 3008F BODY MASS INDEX DOCD: CPT | Mod: CPTII,S$GLB,, | Performed by: PHYSICIAN ASSISTANT

## 2020-01-01 PROCEDURE — U0003 INFECTIOUS AGENT DETECTION BY NUCLEIC ACID (DNA OR RNA); SEVERE ACUTE RESPIRATORY SYNDROME CORONAVIRUS 2 (SARS-COV-2) (CORONAVIRUS DISEASE [COVID-19]), AMPLIFIED PROBE TECHNIQUE, MAKING USE OF HIGH THROUGHPUT TECHNOLOGIES AS DESCRIBED BY CMS-2020-01-R: HCPCS

## 2020-01-01 PROCEDURE — 36556 INSERT NON-TUNNEL CV CATH: CPT

## 2020-01-01 PROCEDURE — 87186 SC STD MICRODIL/AGAR DIL: CPT

## 2020-01-01 PROCEDURE — 83605 ASSAY OF LACTIC ACID: CPT

## 2020-01-01 PROCEDURE — 3008F PR BODY MASS INDEX (BMI) DOCUMENTED: ICD-10-PCS | Mod: CPTII,S$GLB,, | Performed by: PHYSICIAN ASSISTANT

## 2020-01-01 PROCEDURE — 96375 TX/PRO/DX INJ NEW DRUG ADDON: CPT

## 2020-01-01 PROCEDURE — 99214 OFFICE O/P EST MOD 30 MIN: CPT | Mod: S$GLB,,, | Performed by: FAMILY MEDICINE

## 2020-01-01 PROCEDURE — 90945 DIALYSIS ONE EVALUATION: CPT

## 2020-01-01 PROCEDURE — 3079F PR MOST RECENT DIASTOLIC BLOOD PRESSURE 80-89 MM HG: ICD-10-PCS | Mod: CPTII,S$GLB,, | Performed by: PHYSICIAN ASSISTANT

## 2020-01-01 RX ORDER — CEFEPIME HYDROCHLORIDE 1 G/50ML
2 INJECTION, SOLUTION INTRAVENOUS
Status: COMPLETED | OUTPATIENT
Start: 2020-01-01 | End: 2020-01-01

## 2020-01-01 RX ORDER — METFORMIN HYDROCHLORIDE 500 MG/1
TABLET, EXTENDED RELEASE ORAL
Qty: 120 TABLET | Refills: 2 | Status: ON HOLD | OUTPATIENT
Start: 2020-01-01 | End: 2020-01-01 | Stop reason: HOSPADM

## 2020-01-01 RX ORDER — NOREPINEPHRINE BITARTRATE/D5W 4MG/250ML
0.02 PLASTIC BAG, INJECTION (ML) INTRAVENOUS CONTINUOUS
Status: DISCONTINUED | OUTPATIENT
Start: 2020-01-01 | End: 2020-01-01

## 2020-01-01 RX ORDER — ZINC SULFATE 50(220)MG
220 CAPSULE ORAL DAILY
Status: DISCONTINUED | OUTPATIENT
Start: 2020-01-01 | End: 2020-01-01

## 2020-01-01 RX ORDER — PROPOFOL 10 MG/ML
5 INJECTION, EMULSION INTRAVENOUS CONTINUOUS
Status: DISCONTINUED | OUTPATIENT
Start: 2020-01-01 | End: 2020-01-01

## 2020-01-01 RX ORDER — TRIPROLIDINE/PSEUDOEPHEDRINE 2.5MG-60MG
200 TABLET ORAL ONCE AS NEEDED
Status: COMPLETED | OUTPATIENT
Start: 2020-01-01 | End: 2020-01-01

## 2020-01-01 RX ORDER — MIDAZOLAM HYDROCHLORIDE 1 MG/ML
2 INJECTION INTRAMUSCULAR; INTRAVENOUS
Status: DISCONTINUED | OUTPATIENT
Start: 2020-01-01 | End: 2020-01-01

## 2020-01-01 RX ORDER — INDOMETHACIN 25 MG/1
CAPSULE ORAL
Status: DISCONTINUED
Start: 2020-01-01 | End: 2020-01-01 | Stop reason: HOSPADM

## 2020-01-01 RX ORDER — IBUPROFEN 200 MG
16 TABLET ORAL
Status: DISCONTINUED | OUTPATIENT
Start: 2020-01-01 | End: 2020-01-01

## 2020-01-01 RX ORDER — ONDANSETRON HYDROCHLORIDE 8 MG/1
8 TABLET, FILM COATED ORAL EVERY 8 HOURS PRN
Qty: 20 TABLET | Refills: 0 | Status: ON HOLD | OUTPATIENT
Start: 2020-01-01 | End: 2020-01-01 | Stop reason: HOSPADM

## 2020-01-01 RX ORDER — ENOXAPARIN SODIUM 100 MG/ML
40 INJECTION SUBCUTANEOUS EVERY 24 HOURS
Status: DISCONTINUED | OUTPATIENT
Start: 2020-01-01 | End: 2020-01-01

## 2020-01-01 RX ORDER — TALC
6 POWDER (GRAM) TOPICAL NIGHTLY
Status: DISCONTINUED | OUTPATIENT
Start: 2020-01-01 | End: 2020-01-01 | Stop reason: HOSPADM

## 2020-01-01 RX ORDER — IPRATROPIUM BROMIDE AND ALBUTEROL SULFATE 2.5; .5 MG/3ML; MG/3ML
3 SOLUTION RESPIRATORY (INHALATION) EVERY 8 HOURS
Status: DISCONTINUED | OUTPATIENT
Start: 2020-01-01 | End: 2020-01-01

## 2020-01-01 RX ORDER — ENOXAPARIN SODIUM 100 MG/ML
70 INJECTION SUBCUTANEOUS EVERY 12 HOURS
Status: DISCONTINUED | OUTPATIENT
Start: 2020-01-01 | End: 2020-01-01 | Stop reason: HOSPADM

## 2020-01-01 RX ORDER — FUROSEMIDE 10 MG/ML
60 INJECTION INTRAMUSCULAR; INTRAVENOUS ONCE
Status: COMPLETED | OUTPATIENT
Start: 2020-01-01 | End: 2020-01-01

## 2020-01-01 RX ORDER — INDOMETHACIN 25 MG/1
50 CAPSULE ORAL ONCE
Status: COMPLETED | OUTPATIENT
Start: 2020-01-01 | End: 2020-01-01

## 2020-01-01 RX ORDER — FENTANYL CITRAT/DEXTROSE 5%/PF 100 MCG/10
25 PATIENT CONTROLLED ANALGESIA SYRINGE INTRAVENOUS CONTINUOUS
Status: DISCONTINUED | OUTPATIENT
Start: 2020-01-01 | End: 2020-01-01

## 2020-01-01 RX ORDER — HEPARIN SODIUM,PORCINE/D5W 25000/250
18 INTRAVENOUS SOLUTION INTRAVENOUS CONTINUOUS
Status: DISCONTINUED | OUTPATIENT
Start: 2020-01-01 | End: 2020-01-01

## 2020-01-01 RX ORDER — BENZONATATE 100 MG/1
100 CAPSULE ORAL 3 TIMES DAILY PRN
Status: DISCONTINUED | OUTPATIENT
Start: 2020-01-01 | End: 2020-01-01

## 2020-01-01 RX ORDER — CLONIDINE 0.1 MG/24H
1 PATCH, EXTENDED RELEASE TRANSDERMAL
Status: DISCONTINUED | OUTPATIENT
Start: 2020-01-01 | End: 2020-01-01

## 2020-01-01 RX ORDER — IBUPROFEN 400 MG/1
400 TABLET ORAL EVERY 6 HOURS PRN
Status: DISCONTINUED | OUTPATIENT
Start: 2020-01-01 | End: 2020-01-01

## 2020-01-01 RX ORDER — FUROSEMIDE 10 MG/ML
40 INJECTION INTRAMUSCULAR; INTRAVENOUS ONCE
Status: COMPLETED | OUTPATIENT
Start: 2020-01-01 | End: 2020-01-01

## 2020-01-01 RX ORDER — AZITHROMYCIN 250 MG/1
500 TABLET, FILM COATED ORAL
Status: DISCONTINUED | OUTPATIENT
Start: 2020-01-01 | End: 2020-01-01

## 2020-01-01 RX ORDER — LEVOFLOXACIN 5 MG/ML
750 INJECTION, SOLUTION INTRAVENOUS
Status: DISCONTINUED | OUTPATIENT
Start: 2020-01-01 | End: 2020-01-01

## 2020-01-01 RX ORDER — IBUPROFEN 200 MG
400 TABLET ORAL
Status: COMPLETED | OUTPATIENT
Start: 2020-01-01 | End: 2020-01-01

## 2020-01-01 RX ORDER — HYDRALAZINE HYDROCHLORIDE 20 MG/ML
10 INJECTION INTRAMUSCULAR; INTRAVENOUS EVERY 6 HOURS PRN
Status: DISCONTINUED | OUTPATIENT
Start: 2020-01-01 | End: 2020-01-01

## 2020-01-01 RX ORDER — MUPIROCIN 20 MG/G
OINTMENT TOPICAL 2 TIMES DAILY
Status: COMPLETED | OUTPATIENT
Start: 2020-01-01 | End: 2020-01-01

## 2020-01-01 RX ORDER — LEVOFLOXACIN 5 MG/ML
750 INJECTION, SOLUTION INTRAVENOUS
Status: DISCONTINUED | OUTPATIENT
Start: 2020-01-01 | End: 2020-01-01 | Stop reason: HOSPADM

## 2020-01-01 RX ORDER — LISINOPRIL 20 MG/1
20 TABLET ORAL DAILY
Status: DISCONTINUED | OUTPATIENT
Start: 2020-01-01 | End: 2020-01-01

## 2020-01-01 RX ORDER — TRIPROLIDINE/PSEUDOEPHEDRINE 2.5MG-60MG
200 TABLET ORAL EVERY 8 HOURS PRN
Status: DISCONTINUED | OUTPATIENT
Start: 2020-01-01 | End: 2020-01-01

## 2020-01-01 RX ORDER — METOPROLOL TARTRATE 1 MG/ML
5 INJECTION, SOLUTION INTRAVENOUS ONCE
Status: COMPLETED | OUTPATIENT
Start: 2020-01-01 | End: 2020-01-01

## 2020-01-01 RX ORDER — ASCORBIC ACID 500 MG
500 TABLET ORAL 2 TIMES DAILY
Status: DISCONTINUED | OUTPATIENT
Start: 2020-01-01 | End: 2020-01-01 | Stop reason: HOSPADM

## 2020-01-01 RX ORDER — SODIUM CHLORIDE 9 MG/ML
INJECTION, SOLUTION INTRAVENOUS
Status: DISCONTINUED | OUTPATIENT
Start: 2020-01-01 | End: 2020-01-01 | Stop reason: HOSPADM

## 2020-01-01 RX ORDER — PROMETHAZINE HYDROCHLORIDE AND DEXTROMETHORPHAN HYDROBROMIDE 6.25; 15 MG/5ML; MG/5ML
5 SYRUP ORAL EVERY 6 HOURS PRN
Qty: 180 ML | Refills: 0 | Status: ON HOLD | OUTPATIENT
Start: 2020-01-01 | End: 2020-01-01 | Stop reason: HOSPADM

## 2020-01-01 RX ORDER — HEPARIN SODIUM 1000 [USP'U]/ML
1200 INJECTION, SOLUTION INTRAVENOUS; SUBCUTANEOUS
Status: DISCONTINUED | OUTPATIENT
Start: 2020-01-01 | End: 2020-01-01 | Stop reason: HOSPADM

## 2020-01-01 RX ORDER — INDOMETHACIN 25 MG/1
50 CAPSULE ORAL ONCE
Status: DISCONTINUED | OUTPATIENT
Start: 2020-01-01 | End: 2020-01-01 | Stop reason: HOSPADM

## 2020-01-01 RX ORDER — IBUPROFEN 200 MG
24 TABLET ORAL
Status: DISCONTINUED | OUTPATIENT
Start: 2020-01-01 | End: 2020-01-01

## 2020-01-01 RX ORDER — NOREPINEPHRINE BITARTRATE/D5W 4MG/250ML
0.04 PLASTIC BAG, INJECTION (ML) INTRAVENOUS CONTINUOUS
Status: DISCONTINUED | OUTPATIENT
Start: 2020-01-01 | End: 2020-01-01

## 2020-01-01 RX ORDER — HYDROCODONE BITARTRATE AND ACETAMINOPHEN 500; 5 MG/1; MG/1
TABLET ORAL
Status: DISCONTINUED | OUTPATIENT
Start: 2020-01-01 | End: 2020-01-01

## 2020-01-01 RX ORDER — CALCIUM CHLORIDE INJECTION 100 MG/ML
1 INJECTION, SOLUTION INTRAVENOUS ONCE
Status: COMPLETED | OUTPATIENT
Start: 2020-01-01 | End: 2020-01-01

## 2020-01-01 RX ORDER — SODIUM CHLORIDE, SODIUM LACTATE, POTASSIUM CHLORIDE, CALCIUM CHLORIDE 600; 310; 30; 20 MG/100ML; MG/100ML; MG/100ML; MG/100ML
INJECTION, SOLUTION INTRAVENOUS CONTINUOUS
Status: DISCONTINUED | OUTPATIENT
Start: 2020-01-01 | End: 2020-01-01

## 2020-01-01 RX ORDER — SODIUM POLYSTYRENE SULFONATE 15 G/60ML
30 SUSPENSION ORAL; RECTAL ONCE
Status: COMPLETED | OUTPATIENT
Start: 2020-01-01 | End: 2020-01-01

## 2020-01-01 RX ORDER — INSULIN ASPART 100 [IU]/ML
1-10 INJECTION, SOLUTION INTRAVENOUS; SUBCUTANEOUS EVERY 4 HOURS PRN
Status: DISCONTINUED | OUTPATIENT
Start: 2020-01-01 | End: 2020-01-01

## 2020-01-01 RX ORDER — TALC
9 POWDER (GRAM) TOPICAL NIGHTLY PRN
Status: DISCONTINUED | OUTPATIENT
Start: 2020-01-01 | End: 2020-01-01

## 2020-01-01 RX ORDER — FAMOTIDINE 10 MG/ML
20 INJECTION INTRAVENOUS 2 TIMES DAILY
Status: DISCONTINUED | OUTPATIENT
Start: 2020-01-01 | End: 2020-01-01 | Stop reason: HOSPADM

## 2020-01-01 RX ORDER — KETAMINE HCL IN 0.9 % NACL 2 MG/ML
10 PLASTIC BAG, INJECTION (ML) INTRAVENOUS CONTINUOUS
Status: DISCONTINUED | OUTPATIENT
Start: 2020-01-01 | End: 2020-01-01

## 2020-01-01 RX ORDER — FAMOTIDINE 20 MG/1
20 TABLET, FILM COATED ORAL 2 TIMES DAILY
Status: DISCONTINUED | OUTPATIENT
Start: 2020-01-01 | End: 2020-01-01

## 2020-01-01 RX ORDER — ASCORBIC ACID 500 MG
500 TABLET ORAL 2 TIMES DAILY
Status: DISCONTINUED | OUTPATIENT
Start: 2020-01-01 | End: 2020-01-01

## 2020-01-01 RX ORDER — CHLORHEXIDINE GLUCONATE ORAL RINSE 1.2 MG/ML
15 SOLUTION DENTAL 2 TIMES DAILY
Status: DISCONTINUED | OUTPATIENT
Start: 2020-01-01 | End: 2020-01-01 | Stop reason: HOSPADM

## 2020-01-01 RX ORDER — OXYCODONE HYDROCHLORIDE 5 MG/1
5 TABLET ORAL EVERY 6 HOURS PRN
Status: DISCONTINUED | OUTPATIENT
Start: 2020-01-01 | End: 2020-01-01

## 2020-01-01 RX ORDER — SODIUM CHLORIDE 9 MG/ML
INJECTION, SOLUTION INTRAVENOUS ONCE
Status: DISCONTINUED | OUTPATIENT
Start: 2020-01-01 | End: 2020-01-01 | Stop reason: HOSPADM

## 2020-01-01 RX ORDER — GLUCAGON 1 MG
1 KIT INJECTION
Status: DISCONTINUED | OUTPATIENT
Start: 2020-01-01 | End: 2020-01-01

## 2020-01-01 RX ORDER — SODIUM CHLORIDE 9 MG/ML
INJECTION, SOLUTION INTRAVENOUS ONCE
Status: COMPLETED | OUTPATIENT
Start: 2020-01-01 | End: 2020-01-01

## 2020-01-01 RX ORDER — FUROSEMIDE 10 MG/ML
40 INJECTION INTRAMUSCULAR; INTRAVENOUS EVERY 8 HOURS
Status: DISCONTINUED | OUTPATIENT
Start: 2020-01-01 | End: 2020-01-01

## 2020-01-01 RX ORDER — ONDANSETRON 8 MG/1
8 TABLET, ORALLY DISINTEGRATING ORAL ONCE
Status: COMPLETED | OUTPATIENT
Start: 2020-01-01 | End: 2020-01-01

## 2020-01-01 RX ORDER — FLUDROCORTISONE ACETATE 0.1 MG/1
200 TABLET ORAL EVERY 4 HOURS
Status: COMPLETED | OUTPATIENT
Start: 2020-01-01 | End: 2020-01-01

## 2020-01-01 RX ORDER — INSULIN ASPART 100 [IU]/ML
0-5 INJECTION, SOLUTION INTRAVENOUS; SUBCUTANEOUS
Status: DISCONTINUED | OUTPATIENT
Start: 2020-01-01 | End: 2020-01-01

## 2020-01-01 RX ORDER — VALSARTAN AND HYDROCHLOROTHIAZIDE 80; 12.5 MG/1; MG/1
1 TABLET, FILM COATED ORAL DAILY
Qty: 90 TABLET | Refills: 3 | Status: SHIPPED | OUTPATIENT
Start: 2020-01-01 | End: 2020-01-01

## 2020-01-01 RX ORDER — ROCURONIUM BROMIDE 10 MG/ML
0.1 INJECTION, SOLUTION INTRAVENOUS ONCE
Status: COMPLETED | OUTPATIENT
Start: 2020-01-01 | End: 2020-01-01

## 2020-01-01 RX ORDER — DOXYCYCLINE 100 MG/1
100 CAPSULE ORAL 2 TIMES DAILY
Qty: 20 CAPSULE | Refills: 0 | Status: SHIPPED | OUTPATIENT
Start: 2020-01-01 | End: 2020-01-01

## 2020-01-01 RX ORDER — METOPROLOL TARTRATE 50 MG/1
50 TABLET ORAL EVERY 6 HOURS
Status: DISCONTINUED | OUTPATIENT
Start: 2020-01-01 | End: 2020-01-01

## 2020-01-01 RX ORDER — BISACODYL 10 MG
10 SUPPOSITORY, RECTAL RECTAL DAILY PRN
Status: DISCONTINUED | OUTPATIENT
Start: 2020-01-01 | End: 2020-01-01 | Stop reason: HOSPADM

## 2020-01-01 RX ORDER — IPRATROPIUM BROMIDE AND ALBUTEROL SULFATE 2.5; .5 MG/3ML; MG/3ML
3 SOLUTION RESPIRATORY (INHALATION) EVERY 4 HOURS
Status: DISCONTINUED | OUTPATIENT
Start: 2020-01-01 | End: 2020-01-01 | Stop reason: HOSPADM

## 2020-01-01 RX ORDER — MAGNESIUM SULFATE HEPTAHYDRATE 40 MG/ML
2 INJECTION, SOLUTION INTRAVENOUS ONCE
Status: COMPLETED | OUTPATIENT
Start: 2020-01-01 | End: 2020-01-01

## 2020-01-01 RX ORDER — AZITHROMYCIN 250 MG/1
250 TABLET, FILM COATED ORAL DAILY
Status: COMPLETED | OUTPATIENT
Start: 2020-01-01 | End: 2020-01-01

## 2020-01-01 RX ORDER — SODIUM CHLORIDE 0.9 % (FLUSH) 0.9 %
10 SYRINGE (ML) INJECTION
Status: DISCONTINUED | OUTPATIENT
Start: 2020-01-01 | End: 2020-01-01 | Stop reason: HOSPADM

## 2020-01-01 RX ORDER — DEXMEDETOMIDINE HYDROCHLORIDE 4 UG/ML
0.2 INJECTION INTRAVENOUS CONTINUOUS
Status: DISCONTINUED | OUTPATIENT
Start: 2020-01-01 | End: 2020-01-01

## 2020-01-01 RX ORDER — METOPROLOL TARTRATE 25 MG/1
25 TABLET, FILM COATED ORAL
Status: DISCONTINUED | OUTPATIENT
Start: 2020-01-01 | End: 2020-01-01 | Stop reason: HOSPADM

## 2020-01-01 RX ORDER — ONDANSETRON 2 MG/ML
4 INJECTION INTRAMUSCULAR; INTRAVENOUS EVERY 8 HOURS PRN
Status: DISCONTINUED | OUTPATIENT
Start: 2020-01-01 | End: 2020-01-01 | Stop reason: HOSPADM

## 2020-01-01 RX ORDER — POLYETHYLENE GLYCOL 3350 17 G/17G
17 POWDER, FOR SOLUTION ORAL 2 TIMES DAILY
Status: DISCONTINUED | OUTPATIENT
Start: 2020-01-01 | End: 2020-01-01 | Stop reason: HOSPADM

## 2020-01-01 RX ORDER — IBUPROFEN 600 MG/1
600 TABLET ORAL
Status: COMPLETED | OUTPATIENT
Start: 2020-01-01 | End: 2020-01-01

## 2020-01-01 RX ORDER — METRONIDAZOLE 500 MG/100ML
500 INJECTION, SOLUTION INTRAVENOUS
Status: COMPLETED | OUTPATIENT
Start: 2020-01-01 | End: 2020-01-01

## 2020-01-01 RX ORDER — LISINOPRIL AND HYDROCHLOROTHIAZIDE 12.5; 2 MG/1; MG/1
1 TABLET ORAL DAILY
Qty: 90 TABLET | Refills: 3 | Status: ON HOLD | OUTPATIENT
Start: 2020-01-01 | End: 2020-01-01 | Stop reason: HOSPADM

## 2020-01-01 RX ORDER — NOREPINEPHRINE BITARTRATE/D5W 4MG/250ML
PLASTIC BAG, INJECTION (ML) INTRAVENOUS
Status: COMPLETED
Start: 2020-01-01 | End: 2020-01-01

## 2020-01-01 RX ORDER — ALBUTEROL SULFATE 90 UG/1
2 AEROSOL, METERED RESPIRATORY (INHALATION) EVERY 8 HOURS
Status: DISCONTINUED | OUTPATIENT
Start: 2020-01-01 | End: 2020-01-01

## 2020-01-01 RX ORDER — INDOMETHACIN 25 MG/1
CAPSULE ORAL
Status: COMPLETED
Start: 2020-01-01 | End: 2020-01-01

## 2020-01-01 RX ORDER — HYDROCODONE BITARTRATE AND ACETAMINOPHEN 500; 5 MG/1; MG/1
TABLET ORAL
Status: DISCONTINUED | OUTPATIENT
Start: 2020-01-01 | End: 2020-01-01 | Stop reason: HOSPADM

## 2020-01-01 RX ORDER — MIDAZOLAM IN 0.9 % SOD.CHLORID 1 MG/ML
3 PLASTIC BAG, INJECTION (ML) INTRAVENOUS CONTINUOUS
Status: DISCONTINUED | OUTPATIENT
Start: 2020-01-01 | End: 2020-01-01

## 2020-01-01 RX ORDER — FENTANYL CITRAT/DEXTROSE 5%/PF 100 MCG/10
25 PATIENT CONTROLLED ANALGESIA SYRINGE INTRAVENOUS CONTINUOUS
Status: DISCONTINUED | OUTPATIENT
Start: 2020-01-01 | End: 2020-01-01 | Stop reason: HOSPADM

## 2020-01-01 RX ADMIN — ENOXAPARIN SODIUM 40 MG: 40 INJECTION SUBCUTANEOUS at 06:07

## 2020-01-01 RX ADMIN — INSULIN HUMAN 10 UNITS: 100 INJECTION, SOLUTION PARENTERAL at 08:07

## 2020-01-01 RX ADMIN — PROPOFOL 50 MCG/KG/MIN: 10 INJECTION, EMULSION INTRAVENOUS at 04:07

## 2020-01-01 RX ADMIN — FAMOTIDINE 20 MG: 20 TABLET ORAL at 08:07

## 2020-01-01 RX ADMIN — IPRATROPIUM BROMIDE AND ALBUTEROL SULFATE 3 ML: .5; 3 SOLUTION RESPIRATORY (INHALATION) at 07:07

## 2020-01-01 RX ADMIN — FAMOTIDINE 20 MG: 10 INJECTION INTRAVENOUS at 08:07

## 2020-01-01 RX ADMIN — OXYCODONE HYDROCHLORIDE AND ACETAMINOPHEN 500 MG: 500 TABLET ORAL at 09:07

## 2020-01-01 RX ADMIN — FUROSEMIDE 40 MG: 10 INJECTION, SOLUTION INTRAMUSCULAR; INTRAVENOUS at 09:07

## 2020-01-01 RX ADMIN — OXYCODONE HYDROCHLORIDE AND ACETAMINOPHEN 500 MG: 500 TABLET ORAL at 08:07

## 2020-01-01 RX ADMIN — THERA TABS 1 TABLET: TAB at 08:07

## 2020-01-01 RX ADMIN — METOPROLOL TARTRATE 25 MG: 25 TABLET ORAL at 08:07

## 2020-01-01 RX ADMIN — POLYETHYLENE GLYCOL 3350 17 G: 17 POWDER, FOR SOLUTION ORAL at 08:07

## 2020-01-01 RX ADMIN — Medication 200 MCG/HR: at 02:07

## 2020-01-01 RX ADMIN — ALBUTEROL SULFATE 2 PUFF: 90 AEROSOL, METERED RESPIRATORY (INHALATION) at 08:07

## 2020-01-01 RX ADMIN — SODIUM POLYSTYRENE SULFONATE 30 G: 15 SUSPENSION ORAL; RECTAL at 02:08

## 2020-01-01 RX ADMIN — KETAMINE HYDROCHLORIDE 12.5 MCG/KG/MIN: 100 INJECTION INTRAMUSCULAR; INTRAVENOUS at 12:07

## 2020-01-01 RX ADMIN — IPRATROPIUM BROMIDE AND ALBUTEROL SULFATE 3 ML: .5; 3 SOLUTION RESPIRATORY (INHALATION) at 11:07

## 2020-01-01 RX ADMIN — INSULIN ASPART 2 UNITS: 100 INJECTION, SOLUTION INTRAVENOUS; SUBCUTANEOUS at 03:07

## 2020-01-01 RX ADMIN — VANCOMYCIN HYDROCHLORIDE 2000 MG: 100 INJECTION, POWDER, LYOPHILIZED, FOR SOLUTION INTRAVENOUS at 01:07

## 2020-01-01 RX ADMIN — METRONIDAZOLE 500 MG: 500 INJECTION, SOLUTION INTRAVENOUS at 07:07

## 2020-01-01 RX ADMIN — IPRATROPIUM BROMIDE AND ALBUTEROL SULFATE 3 ML: .5; 3 SOLUTION RESPIRATORY (INHALATION) at 12:07

## 2020-01-01 RX ADMIN — MUPIROCIN: 20 OINTMENT TOPICAL at 08:07

## 2020-01-01 RX ADMIN — MULTIVITAMIN 5 ML: LIQUID ORAL at 08:07

## 2020-01-01 RX ADMIN — MINERAL OIL AND PETROLATUM: 150; 830 OINTMENT OPHTHALMIC at 09:07

## 2020-01-01 RX ADMIN — KETAMINE HYDROCHLORIDE 12.5 MCG/KG/MIN: 100 INJECTION INTRAMUSCULAR; INTRAVENOUS at 05:07

## 2020-01-01 RX ADMIN — VASOPRESSIN 0.04 UNITS/MIN: 20 INJECTION INTRAVENOUS at 01:08

## 2020-01-01 RX ADMIN — CEFEPIME HYDROCHLORIDE 2 G: 2 INJECTION, SOLUTION INTRAVENOUS at 11:07

## 2020-01-01 RX ADMIN — LISINOPRIL 20 MG: 20 TABLET ORAL at 10:07

## 2020-01-01 RX ADMIN — SODIUM ZIRCONIUM CYCLOSILICATE 10 G: 10 POWDER, FOR SUSPENSION ORAL at 05:07

## 2020-01-01 RX ADMIN — Medication 200 MCG/HR: at 09:07

## 2020-01-01 RX ADMIN — METOPROLOL TARTRATE 50 MG: 50 TABLET, FILM COATED ORAL at 05:07

## 2020-01-01 RX ADMIN — METOPROLOL TARTRATE 75 MG: 50 TABLET, FILM COATED ORAL at 05:07

## 2020-01-01 RX ADMIN — METRONIDAZOLE 500 MG: 500 INJECTION, SOLUTION INTRAVENOUS at 08:07

## 2020-01-01 RX ADMIN — MIDAZOLAM HYDROCHLORIDE 3 MG/HR: 5 INJECTION, SOLUTION INTRAMUSCULAR; INTRAVENOUS at 11:07

## 2020-01-01 RX ADMIN — PROPOFOL 50 MCG/KG/MIN: 10 INJECTION, EMULSION INTRAVENOUS at 12:07

## 2020-01-01 RX ADMIN — IPRATROPIUM BROMIDE AND ALBUTEROL SULFATE 3 ML: .5; 3 SOLUTION RESPIRATORY (INHALATION) at 04:07

## 2020-01-01 RX ADMIN — FUROSEMIDE 60 MG: 10 INJECTION, SOLUTION INTRAMUSCULAR; INTRAVENOUS at 11:07

## 2020-01-01 RX ADMIN — AZITHROMYCIN MONOHYDRATE 250 MG: 250 TABLET ORAL at 10:07

## 2020-01-01 RX ADMIN — PROPOFOL 45 MCG/KG/MIN: 10 INJECTION, EMULSION INTRAVENOUS at 12:07

## 2020-01-01 RX ADMIN — HEPARIN SODIUM 16 UNITS/KG/HR: 10000 INJECTION, SOLUTION INTRAVENOUS at 04:07

## 2020-01-01 RX ADMIN — Medication 200 MCG/HR: at 04:07

## 2020-01-01 RX ADMIN — IBUPROFEN 400 MG: 400 TABLET, FILM COATED ORAL at 09:07

## 2020-01-01 RX ADMIN — PROPOFOL 29.98 MCG/KG/MIN: 10 INJECTION, EMULSION INTRAVENOUS at 11:07

## 2020-01-01 RX ADMIN — MINERAL OIL AND PETROLATUM: 150; 830 OINTMENT OPHTHALMIC at 02:07

## 2020-01-01 RX ADMIN — ENOXAPARIN SODIUM 40 MG: 40 INJECTION SUBCUTANEOUS at 05:07

## 2020-01-01 RX ADMIN — IPRATROPIUM BROMIDE AND ALBUTEROL SULFATE 3 ML: .5; 3 SOLUTION RESPIRATORY (INHALATION) at 03:07

## 2020-01-01 RX ADMIN — MIDAZOLAM HYDROCHLORIDE 3 MG/HR: 5 INJECTION, SOLUTION INTRAMUSCULAR; INTRAVENOUS at 07:07

## 2020-01-01 RX ADMIN — IPRATROPIUM BROMIDE AND ALBUTEROL SULFATE 3 ML: .5; 3 SOLUTION RESPIRATORY (INHALATION) at 01:07

## 2020-01-01 RX ADMIN — CALCIUM CHLORIDE 1 G: 100 INJECTION, SOLUTION INTRAVENOUS at 02:08

## 2020-01-01 RX ADMIN — LISINOPRIL 20 MG: 20 TABLET ORAL at 08:07

## 2020-01-01 RX ADMIN — INSULIN HUMAN 10 UNITS: 100 INJECTION, SOLUTION PARENTERAL at 05:07

## 2020-01-01 RX ADMIN — SODIUM ZIRCONIUM CYCLOSILICATE 5 G: 5 POWDER, FOR SUSPENSION ORAL at 03:07

## 2020-01-01 RX ADMIN — INSULIN HUMAN 10 UNITS: 100 INJECTION, SOLUTION PARENTERAL at 01:08

## 2020-01-01 RX ADMIN — INSULIN ASPART 4 UNITS: 100 INJECTION, SOLUTION INTRAVENOUS; SUBCUTANEOUS at 06:07

## 2020-01-01 RX ADMIN — CALCIUM CHLORIDE 1 G: 100 INJECTION, SOLUTION INTRAVENOUS at 01:08

## 2020-01-01 RX ADMIN — PROPOFOL 45 MCG/KG/MIN: 10 INJECTION, EMULSION INTRAVENOUS at 08:07

## 2020-01-01 RX ADMIN — SODIUM CHLORIDE 500 ML: 0.9 INJECTION, SOLUTION INTRAVENOUS at 04:07

## 2020-01-01 RX ADMIN — CHLORHEXIDINE GLUCONATE 0.12% ORAL RINSE 15 ML: 1.2 LIQUID ORAL at 08:07

## 2020-01-01 RX ADMIN — ONDANSETRON 4 MG: 2 INJECTION INTRAMUSCULAR; INTRAVENOUS at 04:07

## 2020-01-01 RX ADMIN — IPRATROPIUM BROMIDE AND ALBUTEROL SULFATE 3 ML: .5; 3 SOLUTION RESPIRATORY (INHALATION) at 08:07

## 2020-01-01 RX ADMIN — SODIUM ZIRCONIUM CYCLOSILICATE 10 G: 10 POWDER, FOR SUSPENSION ORAL at 09:07

## 2020-01-01 RX ADMIN — METRONIDAZOLE 500 MG: 500 INJECTION, SOLUTION INTRAVENOUS at 12:07

## 2020-01-01 RX ADMIN — Medication 200 MCG/HR: at 10:07

## 2020-01-01 RX ADMIN — MIDAZOLAM HYDROCHLORIDE 2 MG: 1 INJECTION, SOLUTION INTRAMUSCULAR; INTRAVENOUS at 05:07

## 2020-01-01 RX ADMIN — POLYETHYLENE GLYCOL 3350 17 G: 17 POWDER, FOR SOLUTION ORAL at 07:07

## 2020-01-01 RX ADMIN — ONDANSETRON 4 MG: 2 INJECTION INTRAMUSCULAR; INTRAVENOUS at 08:07

## 2020-01-01 RX ADMIN — CEFEPIME HYDROCHLORIDE 2 G: 2 INJECTION, SOLUTION INTRAVENOUS at 12:07

## 2020-01-01 RX ADMIN — METRONIDAZOLE 500 MG: 500 INJECTION, SOLUTION INTRAVENOUS at 01:07

## 2020-01-01 RX ADMIN — NOREPINEPHRINE BITARTRATE 0.34 MCG/KG/MIN: 1 INJECTION, SOLUTION, CONCENTRATE INTRAVENOUS at 12:07

## 2020-01-01 RX ADMIN — DEXTROSE MONOHYDRATE 25 G: 500 INJECTION PARENTERAL at 12:07

## 2020-01-01 RX ADMIN — KETAMINE HYDROCHLORIDE 20 MCG/KG/MIN: 100 INJECTION INTRAMUSCULAR; INTRAVENOUS at 09:07

## 2020-01-01 RX ADMIN — CHLORHEXIDINE GLUCONATE 0.12% ORAL RINSE 15 ML: 1.2 LIQUID ORAL at 09:07

## 2020-01-01 RX ADMIN — KETAMINE HYDROCHLORIDE 10 MCG/KG/MIN: 50 INJECTION INTRAMUSCULAR; INTRAVENOUS at 02:07

## 2020-01-01 RX ADMIN — HEPARIN SODIUM 23 UNITS/KG/HR: 10000 INJECTION, SOLUTION INTRAVENOUS at 02:07

## 2020-01-01 RX ADMIN — CEFEPIME HYDROCHLORIDE 2 G: 2 INJECTION, SOLUTION INTRAVENOUS at 07:07

## 2020-01-01 RX ADMIN — AZITHROMYCIN MONOHYDRATE 250 MG: 250 TABLET ORAL at 11:07

## 2020-01-01 RX ADMIN — MINERAL OIL AND PETROLATUM: 150; 830 OINTMENT OPHTHALMIC at 05:07

## 2020-01-01 RX ADMIN — DEXAMETHASONE 6 MG: 4 TABLET ORAL at 08:07

## 2020-01-01 RX ADMIN — IBUPROFEN 200 MG: 100 SUSPENSION ORAL at 10:07

## 2020-01-01 RX ADMIN — OXYCODONE HYDROCHLORIDE AND ACETAMINOPHEN 500 MG: 500 TABLET ORAL at 10:07

## 2020-01-01 RX ADMIN — Medication 6 MG: at 08:07

## 2020-01-01 RX ADMIN — FUROSEMIDE 60 MG: 10 INJECTION, SOLUTION INTRAMUSCULAR; INTRAVENOUS at 09:07

## 2020-01-01 RX ADMIN — SODIUM ZIRCONIUM CYCLOSILICATE 5 G: 5 POWDER, FOR SUSPENSION ORAL at 08:07

## 2020-01-01 RX ADMIN — PROPOFOL 45 MCG/KG/MIN: 10 INJECTION, EMULSION INTRAVENOUS at 07:07

## 2020-01-01 RX ADMIN — ENOXAPARIN SODIUM 70 MG: 100 INJECTION SUBCUTANEOUS at 08:07

## 2020-01-01 RX ADMIN — KETAMINE HYDROCHLORIDE 20 MCG/KG/MIN: 100 INJECTION INTRAMUSCULAR; INTRAVENOUS at 07:07

## 2020-01-01 RX ADMIN — PROPOFOL 50 MCG/KG/MIN: 10 INJECTION, EMULSION INTRAVENOUS at 05:07

## 2020-01-01 RX ADMIN — IBUPROFEN 400 MG: 400 TABLET, FILM COATED ORAL at 12:07

## 2020-01-01 RX ADMIN — ENOXAPARIN SODIUM 40 MG: 100 INJECTION SUBCUTANEOUS at 10:07

## 2020-01-01 RX ADMIN — HEPARIN SODIUM 1200 UNITS: 1000 INJECTION, SOLUTION INTRAVENOUS; SUBCUTANEOUS at 07:07

## 2020-01-01 RX ADMIN — CISATRACURIUM BESYLATE 1 MCG/KG/MIN: 10 INJECTION INTRAVENOUS at 05:07

## 2020-01-01 RX ADMIN — KETAMINE HYDROCHLORIDE 20 MCG/KG/MIN: 100 INJECTION INTRAMUSCULAR; INTRAVENOUS at 12:07

## 2020-01-01 RX ADMIN — FAMOTIDINE 20 MG: 10 INJECTION INTRAVENOUS at 07:07

## 2020-01-01 RX ADMIN — CALCIUM GLUCONATE 1 G: 98 INJECTION, SOLUTION INTRAVENOUS at 12:07

## 2020-01-01 RX ADMIN — CEFEPIME HYDROCHLORIDE 2 G: 2 INJECTION, SOLUTION INTRAVENOUS at 05:07

## 2020-01-01 RX ADMIN — METOPROLOL TARTRATE 5 MG: 5 INJECTION INTRAVENOUS at 12:07

## 2020-01-01 RX ADMIN — Medication 6 MG: at 09:07

## 2020-01-01 RX ADMIN — PROPOFOL 50 MCG/KG/MIN: 10 INJECTION, EMULSION INTRAVENOUS at 09:07

## 2020-01-01 RX ADMIN — CHLORHEXIDINE GLUCONATE 0.12% ORAL RINSE 15 ML: 1.2 LIQUID ORAL at 03:07

## 2020-01-01 RX ADMIN — PROPOFOL 30 MCG/KG/MIN: 10 INJECTION, EMULSION INTRAVENOUS at 03:07

## 2020-01-01 RX ADMIN — MIDAZOLAM HYDROCHLORIDE 10 MG/HR: 5 INJECTION, SOLUTION INTRAMUSCULAR; INTRAVENOUS at 10:07

## 2020-01-01 RX ADMIN — PROPOFOL 50 MCG/KG/MIN: 10 INJECTION, EMULSION INTRAVENOUS at 07:07

## 2020-01-01 RX ADMIN — SODIUM CHLORIDE 500 ML: 0.9 INJECTION, SOLUTION INTRAVENOUS at 06:07

## 2020-01-01 RX ADMIN — LEVOFLOXACIN 750 MG: 750 INJECTION, SOLUTION INTRAVENOUS at 11:07

## 2020-01-01 RX ADMIN — METOPROLOL TARTRATE 75 MG: 50 TABLET, FILM COATED ORAL at 11:07

## 2020-01-01 RX ADMIN — PROPOFOL 45 MCG/KG/MIN: 10 INJECTION, EMULSION INTRAVENOUS at 06:07

## 2020-01-01 RX ADMIN — NOREPINEPHRINE BITARTRATE 0.06 MCG/KG/MIN: 1 INJECTION, SOLUTION, CONCENTRATE INTRAVENOUS at 06:07

## 2020-01-01 RX ADMIN — PROPOFOL 30 MCG/KG/MIN: 10 INJECTION, EMULSION INTRAVENOUS at 10:07

## 2020-01-01 RX ADMIN — PROPOFOL 45 MCG/KG/MIN: 10 INJECTION, EMULSION INTRAVENOUS at 03:07

## 2020-01-01 RX ADMIN — MIDAZOLAM HYDROCHLORIDE 7 MG/HR: 5 INJECTION, SOLUTION INTRAMUSCULAR; INTRAVENOUS at 08:07

## 2020-01-01 RX ADMIN — MIDAZOLAM HYDROCHLORIDE 10 MG/HR: 5 INJECTION, SOLUTION INTRAMUSCULAR; INTRAVENOUS at 05:07

## 2020-01-01 RX ADMIN — MIDAZOLAM HYDROCHLORIDE 5 MG/HR: 5 INJECTION, SOLUTION INTRAMUSCULAR; INTRAVENOUS at 12:07

## 2020-01-01 RX ADMIN — KETAMINE HYDROCHLORIDE 10 MCG/KG/MIN: 50 INJECTION INTRAMUSCULAR; INTRAVENOUS at 03:07

## 2020-01-01 RX ADMIN — CEFEPIME HYDROCHLORIDE 2 G: 2 INJECTION, SOLUTION INTRAVENOUS at 04:07

## 2020-01-01 RX ADMIN — Medication 220 MG: at 08:07

## 2020-01-01 RX ADMIN — PROPOFOL 30 MCG/KG/MIN: 10 INJECTION, EMULSION INTRAVENOUS at 06:07

## 2020-01-01 RX ADMIN — Medication 400 MG: at 07:07

## 2020-01-01 RX ADMIN — PROPOFOL 5 MCG/KG/MIN: 10 INJECTION, EMULSION INTRAVENOUS at 04:07

## 2020-01-01 RX ADMIN — HEPARIN SODIUM 13 UNITS/KG/HR: 10000 INJECTION, SOLUTION INTRAVENOUS at 08:07

## 2020-01-01 RX ADMIN — Medication 200 MCG/HR: at 03:07

## 2020-01-01 RX ADMIN — PROMETHAZINE HYDROCHLORIDE 12.5 MG: 25 INJECTION INTRAMUSCULAR; INTRAVENOUS at 11:07

## 2020-01-01 RX ADMIN — ONDANSETRON 8 MG: 8 TABLET, ORALLY DISINTEGRATING ORAL at 09:07

## 2020-01-01 RX ADMIN — METOPROLOL TARTRATE 25 MG: 25 TABLET ORAL at 03:07

## 2020-01-01 RX ADMIN — CEFTRIAXONE 1 G: 1 INJECTION, SOLUTION INTRAVENOUS at 11:07

## 2020-01-01 RX ADMIN — Medication 0.04 MCG: at 04:07

## 2020-01-01 RX ADMIN — SODIUM ZIRCONIUM CYCLOSILICATE 10 G: 10 POWDER, FOR SUSPENSION ORAL at 02:07

## 2020-01-01 RX ADMIN — SODIUM CHLORIDE, SODIUM LACTATE, POTASSIUM CHLORIDE, AND CALCIUM CHLORIDE 250 ML: .6; .31; .03; .02 INJECTION, SOLUTION INTRAVENOUS at 09:07

## 2020-01-01 RX ADMIN — LISINOPRIL 20 MG: 20 TABLET ORAL at 09:07

## 2020-01-01 RX ADMIN — HEPARIN SODIUM 1200 UNITS: 1000 INJECTION, SOLUTION INTRAVENOUS; SUBCUTANEOUS at 02:08

## 2020-01-01 RX ADMIN — KETAMINE HYDROCHLORIDE 10 MCG/KG/MIN: 50 INJECTION INTRAMUSCULAR; INTRAVENOUS at 08:07

## 2020-01-01 RX ADMIN — FLUDROCORTISONE ACETATE 200 MCG: 0.1 TABLET ORAL at 09:07

## 2020-01-01 RX ADMIN — INSULIN ASPART 2 UNITS: 100 INJECTION, SOLUTION INTRAVENOUS; SUBCUTANEOUS at 04:07

## 2020-01-01 RX ADMIN — FUROSEMIDE 40 MG: 10 INJECTION, SOLUTION INTRAMUSCULAR; INTRAVENOUS at 08:07

## 2020-01-01 RX ADMIN — ALBUTEROL SULFATE 2 PUFF: 90 AEROSOL, METERED RESPIRATORY (INHALATION) at 11:07

## 2020-01-01 RX ADMIN — MIDAZOLAM HYDROCHLORIDE 2 MG: 1 INJECTION, SOLUTION INTRAMUSCULAR; INTRAVENOUS at 01:07

## 2020-01-01 RX ADMIN — PROPOFOL 45 MCG/KG/MIN: 10 INJECTION, EMULSION INTRAVENOUS at 04:07

## 2020-01-01 RX ADMIN — MUPIROCIN: 20 OINTMENT TOPICAL at 10:07

## 2020-01-01 RX ADMIN — KETAMINE HYDROCHLORIDE 12.5 MCG/KG/MIN: 100 INJECTION INTRAMUSCULAR; INTRAVENOUS at 07:07

## 2020-01-01 RX ADMIN — CEFEPIME HYDROCHLORIDE 2 G: 2 INJECTION, SOLUTION INTRAVENOUS at 08:07

## 2020-01-01 RX ADMIN — MIDAZOLAM HYDROCHLORIDE 10 MG/HR: 5 INJECTION, SOLUTION INTRAMUSCULAR; INTRAVENOUS at 11:07

## 2020-01-01 RX ADMIN — ALBUTEROL SULFATE 2 PUFF: 90 AEROSOL, METERED RESPIRATORY (INHALATION) at 04:07

## 2020-01-01 RX ADMIN — DEXTROSE MONOHYDRATE 25 G: 500 INJECTION PARENTERAL at 09:07

## 2020-01-01 RX ADMIN — KETAMINE HYDROCHLORIDE 20 MCG/KG/MIN: 100 INJECTION INTRAMUSCULAR; INTRAVENOUS at 02:07

## 2020-01-01 RX ADMIN — ACETAZOLAMIDE 500 MG: 500 INJECTION, POWDER, LYOPHILIZED, FOR SOLUTION INTRAVENOUS at 10:07

## 2020-01-01 RX ADMIN — KETAMINE HYDROCHLORIDE 20 MCG/KG/MIN: 100 INJECTION INTRAMUSCULAR; INTRAVENOUS at 05:07

## 2020-01-01 RX ADMIN — INSULIN HUMAN 10 UNITS: 100 INJECTION, SOLUTION PARENTERAL at 01:07

## 2020-01-01 RX ADMIN — POLYETHYLENE GLYCOL 3350 17 G: 17 POWDER, FOR SOLUTION ORAL at 09:07

## 2020-01-01 RX ADMIN — NOREPINEPHRINE BITARTRATE 0.02 MCG/KG/MIN: 1 INJECTION, SOLUTION, CONCENTRATE INTRAVENOUS at 11:07

## 2020-01-01 RX ADMIN — PROPOFOL 29.98 MCG/KG/MIN: 10 INJECTION, EMULSION INTRAVENOUS at 03:07

## 2020-01-01 RX ADMIN — MIDAZOLAM HYDROCHLORIDE 10 MG/HR: 5 INJECTION, SOLUTION INTRAMUSCULAR; INTRAVENOUS at 09:07

## 2020-01-01 RX ADMIN — METOPROLOL TARTRATE 50 MG: 50 TABLET, FILM COATED ORAL at 12:07

## 2020-01-01 RX ADMIN — Medication 25 MCG/HR: at 04:07

## 2020-01-01 RX ADMIN — FUROSEMIDE 40 MG: 10 INJECTION, SOLUTION INTRAMUSCULAR; INTRAVENOUS at 05:07

## 2020-01-01 RX ADMIN — HEPARIN SODIUM 13 UNITS/KG/HR: 10000 INJECTION, SOLUTION INTRAVENOUS at 01:07

## 2020-01-01 RX ADMIN — PROPOFOL 50 MCG/KG/MIN: 10 INJECTION, EMULSION INTRAVENOUS at 03:07

## 2020-01-01 RX ADMIN — HEPARIN SODIUM 14 UNITS/KG/HR: 10000 INJECTION, SOLUTION INTRAVENOUS at 06:07

## 2020-01-01 RX ADMIN — HEPARIN SODIUM 17 UNITS/KG/HR: 10000 INJECTION, SOLUTION INTRAVENOUS at 06:07

## 2020-01-01 RX ADMIN — KETAMINE HYDROCHLORIDE 10 MCG/KG/MIN: 50 INJECTION INTRAMUSCULAR; INTRAVENOUS at 09:07

## 2020-01-01 RX ADMIN — MULTIVITAMIN 5 ML: LIQUID ORAL at 09:07

## 2020-01-01 RX ADMIN — PROPOFOL 45 MCG/KG/MIN: 10 INJECTION, EMULSION INTRAVENOUS at 10:07

## 2020-01-01 RX ADMIN — THERA TABS 1 TABLET: TAB at 09:07

## 2020-01-01 RX ADMIN — PROMETHAZINE HYDROCHLORIDE 6.25 MG: 25 INJECTION INTRAMUSCULAR; INTRAVENOUS at 08:07

## 2020-01-01 RX ADMIN — PROPOFOL 45 MCG/KG/MIN: 10 INJECTION, EMULSION INTRAVENOUS at 01:07

## 2020-01-01 RX ADMIN — AZITHROMYCIN MONOHYDRATE 250 MG: 250 TABLET ORAL at 08:07

## 2020-01-01 RX ADMIN — ROCURONIUM BROMIDE 13 MG: 10 INJECTION, SOLUTION INTRAVENOUS at 04:07

## 2020-01-01 RX ADMIN — HEPARIN SODIUM 17 UNITS/KG/HR: 10000 INJECTION, SOLUTION INTRAVENOUS at 01:07

## 2020-01-01 RX ADMIN — SODIUM CHLORIDE, SODIUM LACTATE, POTASSIUM CHLORIDE, AND CALCIUM CHLORIDE 1000 ML: .6; .31; .03; .02 INJECTION, SOLUTION INTRAVENOUS at 05:07

## 2020-01-01 RX ADMIN — SODIUM CHLORIDE: 0.9 INJECTION, SOLUTION INTRAVENOUS at 03:07

## 2020-01-01 RX ADMIN — ALBUTEROL SULFATE 2 PUFF: 90 AEROSOL, METERED RESPIRATORY (INHALATION) at 03:07

## 2020-01-01 RX ADMIN — IBUPROFEN 400 MG: 400 TABLET, FILM COATED ORAL at 05:07

## 2020-01-01 RX ADMIN — Medication 9 MG: at 09:07

## 2020-01-01 RX ADMIN — MULTIVITAMIN 5 ML: LIQUID ORAL at 07:07

## 2020-01-01 RX ADMIN — SODIUM CHLORIDE 100 MG: 9 INJECTION, SOLUTION INTRAVENOUS at 03:07

## 2020-01-01 RX ADMIN — SODIUM CHLORIDE 500 ML: 0.9 INJECTION, SOLUTION INTRAVENOUS at 01:07

## 2020-01-01 RX ADMIN — FUROSEMIDE 40 MG: 10 INJECTION, SOLUTION INTRAMUSCULAR; INTRAVENOUS at 01:07

## 2020-01-01 RX ADMIN — OXYCODONE HYDROCHLORIDE AND ACETAMINOPHEN 500 MG: 500 TABLET ORAL at 07:07

## 2020-01-01 RX ADMIN — Medication 200 MCG/HR: at 06:07

## 2020-01-01 RX ADMIN — DEXMEDETOMIDINE HYDROCHLORIDE 0.2 MCG/KG/HR: 4 INJECTION INTRAVENOUS at 03:07

## 2020-01-01 RX ADMIN — FUROSEMIDE 40 MG: 10 INJECTION, SOLUTION INTRAMUSCULAR; INTRAVENOUS at 02:07

## 2020-01-01 RX ADMIN — Medication 6 MG: at 07:07

## 2020-01-01 RX ADMIN — METOPROLOL TARTRATE 75 MG: 50 TABLET, FILM COATED ORAL at 02:07

## 2020-01-01 RX ADMIN — MULTIVITAMIN 5 ML: LIQUID ORAL at 03:07

## 2020-01-01 RX ADMIN — OXYCODONE 5 MG: 5 TABLET ORAL at 10:07

## 2020-01-01 RX ADMIN — MAGNESIUM SULFATE 2 G: 2 INJECTION INTRAVENOUS at 02:07

## 2020-01-01 RX ADMIN — AZITHROMYCIN MONOHYDRATE 250 MG: 250 TABLET ORAL at 09:07

## 2020-01-01 RX ADMIN — THERA TABS 1 TABLET: TAB at 03:07

## 2020-01-01 RX ADMIN — CHLORHEXIDINE GLUCONATE 0.12% ORAL RINSE 15 ML: 1.2 LIQUID ORAL at 07:07

## 2020-01-01 RX ADMIN — PROPOFOL 45 MCG/KG/MIN: 10 INJECTION, EMULSION INTRAVENOUS at 11:07

## 2020-01-01 RX ADMIN — CALCIUM CHLORIDE 1 G: 100 INJECTION INTRAVENOUS; INTRAVENTRICULAR at 05:07

## 2020-01-01 RX ADMIN — HEPARIN SODIUM 18 UNITS/KG/HR: 10000 INJECTION, SOLUTION INTRAVENOUS at 12:07

## 2020-01-01 RX ADMIN — MIDAZOLAM HYDROCHLORIDE 2 MG/HR: 5 INJECTION, SOLUTION INTRAMUSCULAR; INTRAVENOUS at 09:07

## 2020-01-01 RX ADMIN — MINERAL OIL AND PETROLATUM: 150; 830 OINTMENT OPHTHALMIC at 10:07

## 2020-01-01 RX ADMIN — Medication 200 MCG/HR: at 07:07

## 2020-01-01 RX ADMIN — CEFTRIAXONE 1 G: 1 INJECTION, SOLUTION INTRAVENOUS at 01:07

## 2020-01-01 RX ADMIN — DEXTROSE MONOHYDRATE 25 G: 500 INJECTION PARENTERAL at 08:07

## 2020-01-01 RX ADMIN — PROPOFOL 30 MCG/KG/MIN: 10 INJECTION, EMULSION INTRAVENOUS at 12:07

## 2020-01-01 RX ADMIN — METRONIDAZOLE 500 MG: 500 INJECTION, SOLUTION INTRAVENOUS at 03:07

## 2020-01-01 RX ADMIN — FAMOTIDINE 20 MG: 10 INJECTION INTRAVENOUS at 03:07

## 2020-01-01 RX ADMIN — FAMOTIDINE 20 MG: 10 INJECTION INTRAVENOUS at 09:07

## 2020-01-01 RX ADMIN — HEPARIN SODIUM 14 UNITS/KG/HR: 10000 INJECTION, SOLUTION INTRAVENOUS at 12:07

## 2020-01-01 RX ADMIN — LORAZEPAM 0.5 MG: 2 INJECTION INTRAMUSCULAR; INTRAVENOUS at 12:07

## 2020-01-01 RX ADMIN — MIDAZOLAM HYDROCHLORIDE 3.5 MG/HR: 5 INJECTION, SOLUTION INTRAMUSCULAR; INTRAVENOUS at 08:07

## 2020-01-01 RX ADMIN — LORAZEPAM 0.5 MG: 2 INJECTION INTRAMUSCULAR; INTRAVENOUS at 02:07

## 2020-01-01 RX ADMIN — KETAMINE HYDROCHLORIDE 12.5 MCG/KG/MIN: 100 INJECTION INTRAMUSCULAR; INTRAVENOUS at 08:07

## 2020-01-01 RX ADMIN — IBUPROFEN 400 MG: 400 TABLET, FILM COATED ORAL at 08:07

## 2020-01-01 RX ADMIN — SODIUM ZIRCONIUM CYCLOSILICATE 10 G: 10 POWDER, FOR SUSPENSION ORAL at 03:07

## 2020-01-01 RX ADMIN — VANCOMYCIN HYDROCHLORIDE 2000 MG: 100 INJECTION, POWDER, LYOPHILIZED, FOR SOLUTION INTRAVENOUS at 10:07

## 2020-01-01 RX ADMIN — IBUPROFEN 400 MG: 400 TABLET, FILM COATED ORAL at 07:07

## 2020-01-01 RX ADMIN — DEXMEDETOMIDINE HYDROCHLORIDE 0.2 MCG/KG/HR: 4 INJECTION INTRAVENOUS at 11:07

## 2020-01-01 RX ADMIN — INDOMETHACIN 50 MEQ: 25 CAPSULE ORAL at 01:08

## 2020-01-01 RX ADMIN — HEPARIN SODIUM 17 UNITS/KG/HR: 10000 INJECTION, SOLUTION INTRAVENOUS at 12:07

## 2020-01-01 RX ADMIN — CEFEPIME HYDROCHLORIDE 2 G: 2 INJECTION, SOLUTION INTRAVENOUS at 03:07

## 2020-01-01 RX ADMIN — KETAMINE HYDROCHLORIDE 20 MCG/KG/MIN: 100 INJECTION INTRAMUSCULAR; INTRAVENOUS at 01:07

## 2020-01-01 RX ADMIN — LORAZEPAM 0.5 MG: 2 INJECTION INTRAMUSCULAR; INTRAVENOUS at 04:07

## 2020-01-01 RX ADMIN — PROPOFOL 50 MCG/KG/MIN: 10 INJECTION, EMULSION INTRAVENOUS at 10:07

## 2020-01-01 RX ADMIN — CALCIUM CHLORIDE 2 G: 100 INJECTION, SOLUTION INTRAVENOUS at 07:07

## 2020-01-01 RX ADMIN — BISACODYL 10 MG: 10 SUPPOSITORY RECTAL at 08:07

## 2020-01-01 RX ADMIN — PROPOFOL 30 MCG/KG/MIN: 10 INJECTION, EMULSION INTRAVENOUS at 02:07

## 2020-01-01 RX ADMIN — MIDAZOLAM HYDROCHLORIDE 3 MG/HR: 5 INJECTION, SOLUTION INTRAMUSCULAR; INTRAVENOUS at 09:07

## 2020-01-01 RX ADMIN — SODIUM CHLORIDE 200 MG: 0.9 INJECTION, SOLUTION INTRAVENOUS at 05:07

## 2020-01-01 RX ADMIN — KETAMINE HYDROCHLORIDE 12.5 MCG/KG/MIN: 100 INJECTION INTRAMUSCULAR; INTRAVENOUS at 10:07

## 2020-01-01 RX ADMIN — THERA TABS 1 TABLET: TAB at 10:07

## 2020-01-01 RX ADMIN — FUROSEMIDE 40 MG: 10 INJECTION, SOLUTION INTRAMUSCULAR; INTRAVENOUS at 03:07

## 2020-01-01 RX ADMIN — Medication 200 MCG/HR: at 11:07

## 2020-01-01 RX ADMIN — VANCOMYCIN HYDROCHLORIDE 2000 MG: 100 INJECTION, POWDER, LYOPHILIZED, FOR SOLUTION INTRAVENOUS at 03:07

## 2020-01-01 RX ADMIN — Medication 200 MCG/HR: at 05:07

## 2020-01-01 RX ADMIN — Medication 200 MCG/HR: at 08:07

## 2020-01-01 RX ADMIN — BENZONATATE 100 MG: 100 CAPSULE ORAL at 05:07

## 2020-01-01 RX ADMIN — METRONIDAZOLE 500 MG: 500 INJECTION, SOLUTION INTRAVENOUS at 04:07

## 2020-01-01 RX ADMIN — KETAMINE HYDROCHLORIDE 10 MCG/KG/MIN: 50 INJECTION INTRAMUSCULAR; INTRAVENOUS at 05:07

## 2020-01-01 RX ADMIN — LORAZEPAM 0.5 MG: 2 INJECTION INTRAMUSCULAR; INTRAVENOUS at 11:07

## 2020-01-01 RX ADMIN — MIDAZOLAM HYDROCHLORIDE 10 MG/HR: 5 INJECTION, SOLUTION INTRAMUSCULAR; INTRAVENOUS at 04:07

## 2020-01-01 RX ADMIN — AZITHROMYCIN MONOHYDRATE 500 MG: 500 INJECTION, POWDER, LYOPHILIZED, FOR SOLUTION INTRAVENOUS at 12:07

## 2020-01-01 RX ADMIN — HEPARIN SODIUM 21 UNITS/KG/HR: 10000 INJECTION, SOLUTION INTRAVENOUS at 03:07

## 2020-01-01 RX ADMIN — KETAMINE HYDROCHLORIDE 20 MCG/KG/MIN: 100 INJECTION INTRAMUSCULAR; INTRAVENOUS at 06:07

## 2020-01-01 RX ADMIN — IPRATROPIUM BROMIDE AND ALBUTEROL SULFATE 3 ML: .5; 3 SOLUTION RESPIRATORY (INHALATION) at 12:08

## 2020-01-01 RX ADMIN — SODIUM CHLORIDE, SODIUM LACTATE, POTASSIUM CHLORIDE, AND CALCIUM CHLORIDE: .6; .31; .03; .02 INJECTION, SOLUTION INTRAVENOUS at 03:07

## 2020-01-01 RX ADMIN — SODIUM CHLORIDE 100 MG: 9 INJECTION, SOLUTION INTRAVENOUS at 04:07

## 2020-01-01 RX ADMIN — CALCIUM CHLORIDE 2 G: 100 INJECTION, SOLUTION INTRAVENOUS at 09:07

## 2020-01-01 RX ADMIN — HEPARIN SODIUM 16 UNITS/KG/HR: 10000 INJECTION, SOLUTION INTRAVENOUS at 09:07

## 2020-01-01 RX ADMIN — IBUPROFEN 400 MG: 400 TABLET, FILM COATED ORAL at 03:07

## 2020-01-01 RX ADMIN — MIDAZOLAM HYDROCHLORIDE 10 MG/HR: 5 INJECTION, SOLUTION INTRAMUSCULAR; INTRAVENOUS at 03:07

## 2020-01-01 RX ADMIN — PROPOFOL 45 MCG/KG/MIN: 10 INJECTION, EMULSION INTRAVENOUS at 05:07

## 2020-01-01 RX ADMIN — Medication 0.16 MCG/KG/MIN: at 09:07

## 2020-01-01 RX ADMIN — MIDAZOLAM HYDROCHLORIDE 8 MG/HR: 5 INJECTION, SOLUTION INTRAMUSCULAR; INTRAVENOUS at 03:07

## 2020-01-01 RX ADMIN — SODIUM CHLORIDE 2784 ML: 0.9 INJECTION, SOLUTION INTRAVENOUS at 10:07

## 2020-01-01 RX ADMIN — DEXTROSE MONOHYDRATE 25 G: 500 INJECTION PARENTERAL at 05:07

## 2020-01-01 RX ADMIN — HEPARIN SODIUM 25 UNITS/KG/HR: 10000 INJECTION, SOLUTION INTRAVENOUS at 12:07

## 2020-01-01 RX ADMIN — MINERAL OIL AND PETROLATUM: 150; 830 OINTMENT OPHTHALMIC at 04:07

## 2020-01-01 RX ADMIN — ALBUTEROL SULFATE 2 PUFF: 90 AEROSOL, METERED RESPIRATORY (INHALATION) at 12:07

## 2020-01-01 RX ADMIN — MUPIROCIN: 20 OINTMENT TOPICAL at 09:07

## 2020-01-01 RX ADMIN — FAMOTIDINE 20 MG: 20 TABLET ORAL at 10:07

## 2020-01-01 RX ADMIN — LORAZEPAM 0.25 MG: 2 INJECTION INTRAMUSCULAR; INTRAVENOUS at 11:07

## 2020-01-01 RX ADMIN — FLUDROCORTISONE ACETATE 200 MCG: 0.1 TABLET ORAL at 03:07

## 2020-01-01 RX ADMIN — BENZONATATE 100 MG: 100 CAPSULE ORAL at 03:07

## 2020-01-01 RX ADMIN — METOPROLOL TARTRATE 75 MG: 50 TABLET, FILM COATED ORAL at 12:07

## 2020-01-01 RX ADMIN — INSULIN ASPART 2 UNITS: 100 INJECTION, SOLUTION INTRAVENOUS; SUBCUTANEOUS at 07:07

## 2020-01-01 RX ADMIN — DEXTROSE MONOHYDRATE 25 G: 500 INJECTION PARENTERAL at 01:08

## 2020-01-01 RX ADMIN — PROPOFOL 45 MCG/KG/MIN: 10 INJECTION, EMULSION INTRAVENOUS at 09:07

## 2020-01-01 RX ADMIN — MINERAL OIL AND PETROLATUM: 150; 830 OINTMENT OPHTHALMIC at 01:07

## 2020-01-01 RX ADMIN — Medication 220 MG: at 10:07

## 2020-01-01 RX ADMIN — DEXAMETHASONE 6 MG: 4 TABLET ORAL at 03:07

## 2020-01-01 RX ADMIN — INSULIN HUMAN 10 UNITS: 100 INJECTION, SOLUTION PARENTERAL at 12:07

## 2020-01-01 RX ADMIN — DEXAMETHASONE 6 MG: 4 TABLET ORAL at 10:07

## 2020-01-01 RX ADMIN — PROPOFOL 30 MCG/KG/MIN: 10 INJECTION, EMULSION INTRAVENOUS at 08:07

## 2020-01-01 RX ADMIN — FAMOTIDINE 20 MG: 20 TABLET ORAL at 07:07

## 2020-01-01 RX ADMIN — PROMETHAZINE HYDROCHLORIDE 6.25 MG: 25 INJECTION INTRAMUSCULAR; INTRAVENOUS at 10:07

## 2020-01-01 RX ADMIN — IBUPROFEN 200 MG: 100 SUSPENSION ORAL at 11:07

## 2020-01-01 RX ADMIN — ONDANSETRON 4 MG: 2 INJECTION INTRAMUSCULAR; INTRAVENOUS at 05:07

## 2020-01-01 RX ADMIN — NOREPINEPHRINE BITARTRATE 0.04 MCG/KG/MIN: 1 INJECTION, SOLUTION, CONCENTRATE INTRAVENOUS at 03:07

## 2020-01-01 RX ADMIN — INSULIN ASPART 4 UNITS: 100 INJECTION, SOLUTION INTRAVENOUS; SUBCUTANEOUS at 11:07

## 2020-01-01 RX ADMIN — PROPOFOL 30 MCG/KG/MIN: 10 INJECTION, EMULSION INTRAVENOUS at 07:07

## 2020-01-01 RX ADMIN — DEXAMETHASONE 6 MG: 4 TABLET ORAL at 09:07

## 2020-01-01 RX ADMIN — CEFTRIAXONE 1 G: 1 INJECTION, SOLUTION INTRAVENOUS at 12:07

## 2020-01-01 RX ADMIN — IBUPROFEN 600 MG: 600 TABLET, FILM COATED ORAL at 10:07

## 2020-01-01 RX ADMIN — METRONIDAZOLE 500 MG: 500 INJECTION, SOLUTION INTRAVENOUS at 11:07

## 2020-01-01 RX ADMIN — DEXTROSE MONOHYDRATE 25 G: 500 INJECTION PARENTERAL at 01:07

## 2020-01-01 RX ADMIN — SODIUM CHLORIDE, SODIUM LACTATE, POTASSIUM CHLORIDE, AND CALCIUM CHLORIDE: .6; .31; .03; .02 INJECTION, SOLUTION INTRAVENOUS at 09:07

## 2020-01-01 RX ADMIN — HEPARIN SODIUM 19 UNITS/KG/HR: 10000 INJECTION, SOLUTION INTRAVENOUS at 01:07

## 2020-01-01 RX ADMIN — SODIUM BICARBONATE 50 MEQ: 84 INJECTION, SOLUTION INTRAVENOUS at 01:08

## 2020-01-01 RX ADMIN — MIDAZOLAM HYDROCHLORIDE 2 MG: 1 INJECTION, SOLUTION INTRAMUSCULAR; INTRAVENOUS at 09:07

## 2020-01-01 RX ADMIN — METRONIDAZOLE 500 MG: 500 INJECTION, SOLUTION INTRAVENOUS at 05:07

## 2020-01-01 RX ADMIN — PROPOFOL 50 MCG/KG/MIN: 10 INJECTION, EMULSION INTRAVENOUS at 01:07

## 2020-01-29 NOTE — PROGRESS NOTES
Legacy/media:no new records  Enter/edited lipid panel found in [facility] from 2014  HM reviewed.   Immunizations abstracted.  Care Everywhere abstracted. No new results found.  Health Maintenance Due   Topic    TETANUS VACCINE      Previsit chart audit completed.  *KDL*

## 2020-03-12 NOTE — TELEPHONE ENCOUNTER
----- Message from Gertrudis Kang sent at 3/12/2020 10:43 AM CDT -----  Contact: pt wife   Stated she calling about a form that should have been given to the pt, she can be reached at [phone] Thanks

## 2020-03-12 NOTE — PROGRESS NOTES
Subjective:      Patient ID: Jonas Abdalla is a 35 y.o. male.    Chief Complaint: Establish Care    Disclaimer:  This note is prepared using voice recognition software and as such is likely to have errors and has not been proof read. Please contact me for questions.     Jonas Abdalla is a 35 y.o. male who presents today to establish care.   Wife is stacy.   Has htn- has been on meds before but never regularly. Gets headaches every now and then or bad posture.    Does snore, cna't sleep on back, ? Sleep anpnea? Uncertain about family history.  Is mo. Easily to fall asleep if opportunity.  [place] is at 7.  Is morbidly obese.  Worse 15 lb of police where.  Is wearing it today.  Does not seem very motivated but his wife wants him to become more active.  He has had hypertension in the past and never taken medicine even though he has been prescribed before.  Does not really feel the need to do additional exercise.  Works extended hours and so is tired and fatigued afterwards.  Left knee pain wsa told he has jumper's knee and has tall van and has to fall out of it. Doesn't hurt much any more. Off and on.    Wife started seeing Dr. Singh.  They are considering about getting him tested for fertility issues.      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     Does snore when he lays on his back.  Mainly sleeps on his side.  Feels like his rest of the at times.    Does report that he also was bitten by his cat about 3 weeks ago.  Really no further pain at this time          Lab Results   Component Value Date    WBC 12.99 (H) 09/24/2014    HGB 16.0 09/24/2014    HCT 45.3 09/24/2014     09/24/2014    TRIG 297 (H) 05/07/2014    HDL 23 (L) 05/07/2014    ALT 42 09/24/2014    AST 19 09/24/2014     09/24/2014    K 3.9 09/24/2014     09/24/2014    CREATININE 1.2 09/24/2014    BUN 13 09/24/2014    CO2 24 09/24/2014       X-Ray Wrist Complete 3 views Bilateral  Narrative: EXAMINATION:  XR WRIST COMPLETE 3 VIEWS  BILATERAL    CLINICAL HISTORY:  Pain in right hand    TECHNIQUE:  PA, lateral, and oblique views of both wrists were performed.    COMPARISON:  None    FINDINGS:  Right hand: Small well corticated osseous density project just distal to the ulnar styloid.  Additional calcific density is seen immediately adjacent to the aforementioned ossific density..  There is no acute fracture, subluxation, or dislocation seen.  Soft tissues appear to be within normal limits.  No significant degenerative changes.  No osseous erosions.    Left hand: Partially visualized distal left radius orthopedic hardware.  Well corticated osseous density projects about the ulnar styloid likely sequelae from remote injury.  There is no acute fracture, subluxation, or dislocation seen.  Impression: As above    Electronically signed by: Ivan Cormier MD  Date:    07/09/2018  Time:    15:57        Review of Systems   Constitutional: Negative for activity change, appetite change, fatigue and unexpected weight change.   HENT: Negative for hearing loss, rhinorrhea and trouble swallowing.    Eyes: Positive for visual disturbance. Negative for discharge.   Respiratory: Negative for chest tightness and wheezing.    Cardiovascular: Negative for chest pain and palpitations.   Gastrointestinal: Negative for blood in stool, constipation, diarrhea and vomiting.   Endocrine: Negative for polydipsia and polyuria.   Genitourinary: Negative for difficulty urinating, hematuria and urgency.   Musculoskeletal: Positive for arthralgias. Negative for joint swelling and neck pain.   Neurological: Positive for headaches. Negative for weakness.   Psychiatric/Behavioral: Positive for dysphoric mood and sleep disturbance. Negative for confusion. The patient is not nervous/anxious.      Objective:     Vitals:    03/12/20 0739   BP: (!) 136/100   Pulse: 66   Temp: 98.6 °F (37 °C)   TempSrc: Oral   Weight: (!) 146.8 kg (323 lb 10.2 oz)  Comment: weighed with duty rig   Height:  "5' 7.75" (1.721 m)     Physical Exam   Constitutional: He is oriented to person, place, and time. He appears well-developed and well-nourished.   MO WM, with police gear on up to 15lbs.    HENT:   Head: Normocephalic and atraumatic.   Right Ear: Tympanic membrane and external ear normal.   Left Ear: Tympanic membrane and external ear normal.   Mouth/Throat: Oropharynx is clear and moist.   Eyes: Pupils are equal, round, and reactive to light. Conjunctivae and EOM are normal.   Neck: Normal range of motion. Neck supple.   Neck cirm 18.5 inch   Cardiovascular: Normal rate and regular rhythm.   Pulmonary/Chest: Effort normal and breath sounds normal.   Abdominal: Soft. Bowel sounds are normal.   Neurological: He is alert and oriented to person, place, and time.   Psychiatric: He has a normal mood and affect. His behavior is normal.   Flat affect. Uninterested in engaging in health care at this time.  He is inattentive.   Nursing note and vitals reviewed.    Assessment:     1. Sleep apnea, unspecified type    2. Hypertension, unspecified type    3. Morbid obesity with BMI of 45.0-49.9, adult    4. Fertility testing    5. Routine general medical examination at a health care facility      Plan:   Jonas was seen today for establish care.    Diagnoses and all orders for this visit:    Sleep apnea, unspecified type-highly suspected due to BMI due to [place] and due to blood pressure issues.  Will set up for home sleep study.  Discussed diet exercise and weight loss discussed positional changes.  -     Home Sleep Studies; Future  -     TSH; Future  -     Lipid panel; Future  -     Hemoglobin A1c; Future  -     Comprehensive metabolic panel; Future  -     CBC auto differential; Future  -     Microalbumin/creatinine urine ratio; Future  -     Semen analysis; Future  -     T4, free; Future  -     Insulin, random; Future    Hypertension, unspecified type-noted will start Diovan HCTZ today.  Suspect also sleep apnea.  Also with " morbid obesity.  Lab work today.  Follow-up based on the results  -     Home Sleep Studies; Future  -     TSH; Future  -     Lipid panel; Future  -     Hemoglobin A1c; Future  -     Comprehensive metabolic panel; Future  -     CBC auto differential; Future  -     Microalbumin/creatinine urine ratio; Future  -     Semen analysis; Future  -     T4, free; Future  -     Insulin, random; Future    Morbid obesity with BMI of 45.0-49.9, adult lab work today discussed diet exercise weight loss adjust accordingly based on labs  -     Home Sleep Studies; Future  -     TSH; Future  -     Lipid panel; Future  -     Hemoglobin A1c; Future  -     Comprehensive metabolic panel; Future  -     CBC auto differential; Future  -     Microalbumin/creatinine urine ratio; Future  -     Semen analysis; Future  -     T4, free; Future  -     Insulin, random; Future    Fertility testing-desires to do advise that order has been placed but will need to coordinate with his wife's gynecologist determine how and when to do the testing    Routine general medical examination at a health care facility Code for additional labs  -     Home Sleep Studies; Future  -     TSH; Future  -     Lipid panel; Future  -     Hemoglobin A1c; Future  -     Comprehensive metabolic panel; Future  -     CBC auto differential; Future  -     Microalbumin/creatinine urine ratio; Future  -     Semen analysis; Future  -     T4, free; Future  -     Insulin, random; Future    Other orders  -     valsartan-hydrochlorothiazide (DIOVAN-HCT) 80-12.5 mg per tablet; Take 1 tablet by mouth once daily.            Follow up in about 6 weeks (around 4/23/2020) for F/u WT, Labs, Meds Dr Tabares.    There are no Patient Instructions on file for this visit.

## 2020-03-12 NOTE — TELEPHONE ENCOUNTER
"Called and spoke with pts wife. She is stating that someone contacted her from Dr. Singh's office stating that we should have given a "specimen form" that needs to be filled out for him to collect sample. We do not have forms here in office. Can you assist me with this? I am not sure what form this would be.   "

## 2020-03-13 NOTE — TELEPHONE ENCOUNTER
Returned pt call in regards to semen analysis. Advised pt that he or wife would need to call [facility] for instructions and drop off information. Informed pt that I left a voicemail for his wife with the number to the facility. Offered to give pt number, pt declined and stated that if his wife has it in her vm, that's sufficient.

## 2020-03-15 NOTE — TELEPHONE ENCOUNTER
Needs to start metformin. Needs to work on weight loss. Needs to do liver u/s and additional testing due to elevated liver enzymes.     Will send in meds for metformin and schedule liver u/s.   Would repeat labs again in 1 month.     Please inform.     Triglycerides way to high but suspect with major diet changes and weight loss this will improve also. Will repeat lipid and sugar/insulin levels in 1 month also.

## 2020-03-20 NOTE — TELEPHONE ENCOUNTER
Called and spoke with pts wife. She was calling stating that patient had labs done on the 12th, the labs came back pretty abnormal. He was told to do some additional labs to look into the liver more. He came in yesterday to do those and whomever scheduled those scheduled him for the lab work that he should have been doing in 3 months not the liver ones. Pt will do the liver labs today as he is going for his ultrasound. However wife is concerned that this time on labs the numbers looked a whole lot better.

## 2020-03-20 NOTE — TELEPHONE ENCOUNTER
----- Message from Debi Mayes sent at 3/20/2020 12:54 PM CDT -----  Contact: Mrs. Melancon   Caller request call back regarding lab results , she stated pt repeated the same lab twice and the number are different  Call back :  [phone]

## 2020-03-20 NOTE — TELEPHONE ENCOUNTER
Changed visit over to telemed. It was for Thursday. Should they be worried about the difference in labs?

## 2020-03-20 NOTE — PROGRESS NOTES
Jonas Abdalla,     Attached are your lab results. We will go over them in more detail at your upcoming appointment and answer any questions at that time. Because of the COVID-19 risks, we are also reaching out to patients to either change their visit to a telemedicine visit with me or pushing it out a few months unless the visit is absolutely necessary. If you are interested in either option please respond back with a message so my staff can assist you with re-scheduling your visit and give you more information.     Sincerely,    Carmel Tabares MD

## 2020-04-14 NOTE — TELEPHONE ENCOUNTER
----- Message from Mary Marx sent at 4/14/2020  8:14 AM CDT -----  Contact: Pt   Pt called and stated he was returning a call to the nurse. He can be reached at [phone].    Thanks,  Tf

## 2020-04-14 NOTE — TELEPHONE ENCOUNTER
Couldn't get in touch with pt. Spoke to significant other. Informed of connection problems. She advised to reschedule any day at 7am.

## 2020-05-07 NOTE — TELEPHONE ENCOUNTER
Offered pt first available appt with any provider, pt declined. Pt would like to see either Sarah or Dr. Tabares. Pt is scheduled for an appt with Dr. Tabares on 5/11/20. He is aware of date, time and location and was informed pt report to ER or Urgent Care if pain becomes severe. Pt verbalized understanding.

## 2020-05-07 NOTE — TELEPHONE ENCOUNTER
----- Message from Laure Rangel sent at 5/7/2020 12:38 PM CDT -----  Contact: Patient Wife  Needing a call back about pain in testical area. Wanting to be seen.

## 2020-05-07 NOTE — TELEPHONE ENCOUNTER
Informed pt that per Sarah, he should be seen in office. Pt stated that he will call back to schedule appointment or he will schedule it on his doug.

## 2020-05-11 NOTE — LETTER
May 11, 2020    Jonas Abdalla  [address] [facility] [address]         [facility]  [address] [place] [address]  Phone: [phone]  Fax: [phone] May 11, 2020     Patient: Jonas Abdalla   YOB: 1985   Date of Visit: 5/11/2020       To Whom It May Concern:    It is my medical opinion that Jonas Abdalla may return to work on 5/11/2020.    If you have any questions or concerns, please don't hesitate to call.    Sincerely,        MD EDILBERTO Garibay LPN

## 2020-05-11 NOTE — PROGRESS NOTES
Subjective:      Patient ID: Jonas Abdalla is a 35 y.o. male.    Chief Complaint: Testicle Pain (right and felt lump )    Disclaimer:  This note is prepared using voice recognition software and as such is likely to have errors and has not been proof read. Please contact me for questions.     Jonas Abdalla is a 35 y.o. male who presents today for urgent visit. The patient's PCP is Carmel Tabares MD.  He presents with concern today for testicular pain. Achy, going on for 2 weeks. Sometimes worse after urination, not always.  Sometimes the ache is worse than usual than other times is aware of it.no discharge from penis.  No pain with lifting.  No new sexual partners. No stds. No groin swelling. No stds that he is aware of.     BP is doing well. Doing good with meds. On lisinopril hctz.   Wt Readings from Last 10 Encounters:  05/11/20 : 135.4 kg (298 lb 8.1 oz)  03/12/20 : (!) 146.8 kg (323 lb 10.2 oz)  12/06/19 : (!) 138.1 kg (304 lb 7.3 oz)  01/30/19 : 133.5 kg (294 lb 5 oz)  07/26/18 : 128.4 kg (283 lb)  07/09/18 : 128.7 kg (283 lb 11.7 oz)  05/01/17 : 125.3 kg (276 lb 3.8 oz)  10/08/16 : 126 kg (277 lb 12.5 oz)  07/28/15 : 114.5 kg (252 lb 8 oz)  10/13/14 : 118.8 kg (262 lb)        Testicle Pain   The patient's primary symptoms include testicular pain. The patient's pertinent negatives include no genital injury, genital itching, genital lesions, pelvic pain, penile discharge, penile pain, priapism or scrotal swelling. This is a new problem. The current episode started 1 to 4 weeks ago. The problem occurs intermittently. The problem has been unchanged. The pain is medium. Pertinent negatives include no abdominal pain, anorexia, chest pain, chills, constipation, coughing, diarrhea, fever, flank pain, frequency, headaches, hematuria, hesitancy, joint pain, nausea, painful intercourse, rash, shortness of breath, sore throat, urgency, urinary retention or vomiting. The testicular pain affects the right  testicle. The color of the testicles is normal. Nothing aggravates the symptoms. He has tried nothing for the symptoms. The treatment provided no relief. He is sexually active. No, his partner does not have an STD. There is no history of chlamydia, gonorrhea, HIV, prostatitis or varicocele.       Lab Results   Component Value Date    WBC 8.38 03/12/2020    HGB 15.6 03/12/2020    HCT 48.2 03/12/2020     03/12/2020    CHOL 164 03/19/2020    TRIG 220 (H) 03/19/2020    HDL 31 (L) 03/19/2020    ALT 73 (H) 03/20/2020    AST 27 03/20/2020     03/12/2020    K 4.5 03/12/2020     03/12/2020    CREATININE 1.0 03/12/2020    BUN 13 03/12/2020    CO2 24 03/12/2020    TSH 1.408 03/12/2020    HGBA1C 5.1 03/19/2020       US Abdomen Limited  Narrative: EXAMINATION:  US ABDOMEN LIMITED    CLINICAL HISTORY:  Abnormal levels of other serum enzymes    TECHNIQUE:  Limited ultrasound of the right upper quadrant of the abdomen (including pancreas, liver, gallbladder, common bile duct, and right kidney) was performed.    COMPARISON:  None.    FINDINGS:  Pancreas: Obscured by overlying bowel gas    Liver: Enlarged measuring 19.1 cm. Diffuse increased hepatic parenchymal echogenicity is in keeping with fatty infiltration. No focal hepatic lesions.    Gallbladder: Prior cholecystectomy    Biliary system: The common duct is not dilated, measuring 3.0 mm.  No intrahepatic ductal dilatation.    Right Kidney: Normal in size, measuring 11.9 cm. with no hydronephrosis    Miscellaneous: No upper abdominal ascites.  Impression: Hepatomegaly with diffuse fatty infiltration of the liver.    Prior cholecystectomy.    Electronically signed by: Vinnie Acosta MD  Date:    03/20/2020  Time:    14:35        Review of Systems   Constitutional: Negative for activity change, chills, fever and unexpected weight change.   HENT: Negative for hearing loss, rhinorrhea, sore throat and trouble swallowing.    Eyes: Negative for discharge and visual  "disturbance.   Respiratory: Negative for cough, chest tightness, shortness of breath and wheezing.    Cardiovascular: Negative for chest pain and palpitations.   Gastrointestinal: Negative for abdominal pain, anorexia, blood in stool, constipation, diarrhea, nausea and vomiting.   Endocrine: Negative for polydipsia and polyuria.   Genitourinary: Positive for testicular pain. Negative for decreased urine volume, difficulty urinating, discharge, flank pain, frequency, hematuria, hesitancy, pelvic pain, penile pain, penile swelling, scrotal swelling and urgency.   Musculoskeletal: Negative for arthralgias, joint pain, joint swelling and neck pain.   Skin: Negative for rash.   Neurological: Negative for weakness and headaches.   Psychiatric/Behavioral: Negative for confusion and dysphoric mood.     Objective:     Vitals:    05/11/20 0715   BP: 126/84   Pulse: 82   Temp: 97 °F (36.1 °C)   Weight: 135.4 kg (298 lb 8.1 oz)   Height: 5' 7.75" (1.721 m)     Physical Exam   Constitutional: He appears well-developed and well-nourished.   MO WM   HENT:   Head: Normocephalic and atraumatic.   Eyes: Conjunctivae are normal.   Pulmonary/Chest: Effort normal.   Genitourinary: Right testis shows swelling and tenderness. Left testis shows no swelling and no tenderness. Circumcised.   Vitals reviewed.    Assessment:     1. Testicular pain, right    2. Hypertension, unspecified type    3. Morbid obesity with BMI of 40.0-44.9, adult      Plan:   Jonas was seen today for testicle pain.    Diagnoses and all orders for this visit:    Testicular pain, right- will send a ultrasound of the testicles to evaluate for possible varicocele versus prednisone versus mass than palpation of the right has a with more difficult due to his morbid obesity and retraction.  When placed on doxycycline to cover him for possible SCDs.  However he declines any new sexual partners or his current partner having symptoms of an STD.  Follow up if not improving or " based on the ultrasound results.  -     US Scrotum And Testicles; Future    Hypertension, unspecified type-improved control with lisinopril hydrochlorothiazide losing weight.  Continue    Morbid obesity with BMI of 40.0-44.9, adult-affecting ability for adequate exam will perform ultrasound.  Continue with weight loss.    Other orders  -     doxycycline (MONODOX) 100 MG capsule; Take 1 capsule (100 mg total) by mouth 2 (two) times daily.            Follow up if symptoms worsen or fail to improve.    Patient Instructions     Testicular Pain, Unclear Cause  You have had pain in one or both testicles. Based on your exam today, the exact cause of your pain is not certain. But your condition does not appear to be dangerous. Testicles are very sensitive. Even a small injury can cause quite a bit of pain. Other possible causes of testicular pain include kidney stones, cysts, mumps, inflammatory conditions, chronic conditions, hernia, infection, and a twisted testicle.  Certain tests may be done to rule out an underlying problem causing the pain. Nothing conclusive was found today. Most likely, the pain will go away on its own. If it doesnt, you may need more tests.    Home care  Medicine may be prescribed to help relieve pain and swelling. This may be an over-the-counter pain reliever or prescription pain medication. Take all medicine as directed.  The following are general care guidelines:  · To relieve pain and swelling, apply an ice pack wrapped in a thin towel for 10 minutes at a time. Continue this on and off for 1 to 2 days.  · When lying down, place a small rolled towel under your scrotum. When moving around, wear a jockstrap (athletic supporter) or supportive underwear. These will help support and protect your testicles.  · If it hurts to walk, walk as little as possible until you feel better.  · Avoid strenuous activity until you feel better.  · Do not have sex until you feel better.  · If you have severe pain in  the testicle, seek care right away. Delay may lead to permanent loss of the testicles function.  Follow-up care  Follow up with your healthcare provider, or as advised.  When to seek medical advice  Call your healthcare provider right away if any of these occur:  · Fever of 100.4°F (38°C) or higher  · Worsening of the pain or severe pain  · Swelling of the testicle or scrotum  · A lump in the scrotum  · Warm and red scrotum (signs of infection)  · Nausea and vomiting  · Pain or swelling in abdomen  · Trouble urinating  · Numbness or weakness in the leg  · Shrinking of the testicle  · Blood in your urine  Date Last Reviewed: 10/1/2016  © 7559-0171 [employer]. [address] [place] [address], [place] [address]. All rights reserved. This information is not intended as a substitute for professional medical care. Always follow your healthcare professional's instructions.

## 2020-05-11 NOTE — PATIENT INSTRUCTIONS
Testicular Pain, Unclear Cause  You have had pain in one or both testicles. Based on your exam today, the exact cause of your pain is not certain. But your condition does not appear to be dangerous. Testicles are very sensitive. Even a small injury can cause quite a bit of pain. Other possible causes of testicular pain include kidney stones, cysts, mumps, inflammatory conditions, chronic conditions, hernia, infection, and a twisted testicle.  Certain tests may be done to rule out an underlying problem causing the pain. Nothing conclusive was found today. Most likely, the pain will go away on its own. If it doesnt, you may need more tests.    Home care  Medicine may be prescribed to help relieve pain and swelling. This may be an over-the-counter pain reliever or prescription pain medication. Take all medicine as directed.  The following are general care guidelines:  · To relieve pain and swelling, apply an ice pack wrapped in a thin towel for 10 minutes at a time. Continue this on and off for 1 to 2 days.  · When lying down, place a small rolled towel under your scrotum. When moving around, wear a jockstrap (athletic supporter) or supportive underwear. These will help support and protect your testicles.  · If it hurts to walk, walk as little as possible until you feel better.  · Avoid strenuous activity until you feel better.  · Do not have sex until you feel better.  · If you have severe pain in the testicle, seek care right away. Delay may lead to permanent loss of the testicles function.  Follow-up care  Follow up with your healthcare provider, or as advised.  When to seek medical advice  Call your healthcare provider right away if any of these occur:  · Fever of 100.4°F (38°C) or higher  · Worsening of the pain or severe pain  · Swelling of the testicle or scrotum  · A lump in the scrotum  · Warm and red scrotum (signs of infection)  · Nausea and vomiting  · Pain or swelling in abdomen  · Trouble  urinating  · Numbness or weakness in the leg  · Shrinking of the testicle  · Blood in your urine  Date Last Reviewed: 10/1/2016  © 7857-2115 [employer]. [address] [place] [address], [place] [address]. All rights reserved. This information is not intended as a substitute for professional medical care. Always follow your healthcare professional's instructions.

## 2020-05-13 NOTE — TELEPHONE ENCOUNTER
Both testicles demonstrates smooth homogeneous appearance and demonstrates flow on Doppler interrogation.      Mildly complex right testicular cyst measuring 2.2 cm.      Will have pt do a Follow-up exam in 6 months to document stability as conservative measure.      Additional simple right testicular and complex left epididymal head cyst as above.  Small right-sided hydrocele.    These can cause discomfort and pain in the scrotum and testicles.     Will place order for repeat u/s in 6 months but will also suggest he followup with urology if pain is not improving with antibiotics after course.   Please inform.

## 2020-06-10 NOTE — TELEPHONE ENCOUNTER
Called and left detailed message about appt, time and place.  Call if need to reschedule or send portal message.

## 2020-06-10 NOTE — PROGRESS NOTES
Subjective:       Patient ID: Jonas Abdalla is a 35 y.o. male.    Chief Complaint: Rectal Bleeding    The patient location is: home  The chief complaint leading to consultation is: blood in stool    Visit type: audiovisual    Face to Face time with patient: 12 minutes  20 minutes of total time spent on the encounter, which includes face to face time and non-face to face time preparing to see the patient (eg, review of tests), Obtaining and/or reviewing separately obtained history, Documenting clinical information in the electronic or other health record, Independently interpreting results (not separately reported) and communicating results to the patient/family/caregiver, or Care coordination (not separately reported).         Each patient to whom he or she provides medical services by telemedicine is:  (1) informed of the relationship between the physician and patient and the respective role of any other health care provider with respect to management of the patient; and (2) notified that he or she may decline to receive medical services by telemedicine and may withdraw from such care at any time.     Patient doing telemed for concern of blood in stool and lots of clots and blood on toilet tissue x 3 days. There is mild pain but not much at all. Denies hx of hemorrhoids. Denies diet changes, blood thinners, asa, nsaids. Bowels move regular. No nausea or vomiting. No unintentional weight loss. Patient has been using preporation H cooling pads with no improvement in blood. Wife is on the telemed visit and states that there was a large amount of blood like a heavy menstural cycle       Rectal Bleeding   Pertinent negatives include no abdominal pain, arthralgias, chest pain, headaches, joint swelling, neck pain, vomiting or weakness.       There were no vitals taken for this visit.    Review of Systems   Constitutional: Negative for activity change and unexpected weight change.   HENT: Negative for hearing  loss, rhinorrhea and trouble swallowing.    Eyes: Negative for discharge and visual disturbance.   Respiratory: Negative for chest tightness and wheezing.    Cardiovascular: Negative for chest pain and palpitations.   Gastrointestinal: Positive for blood in stool, diarrhea and hematochezia. Negative for abdominal distention, abdominal pain, anal bleeding, constipation and vomiting.   Endocrine: Negative for polydipsia and polyuria.   Genitourinary: Negative for difficulty urinating, hematuria and urgency.   Musculoskeletal: Negative for arthralgias, joint swelling and neck pain.   Neurological: Negative for weakness and headaches.   Psychiatric/Behavioral: Negative for confusion and dysphoric mood.       Objective:      Physical Exam   Constitutional: He appears well-developed and well-nourished.  Non-toxic appearance. He does not have a sickly appearance. He does not appear ill. No distress.   HENT:   Head: Normocephalic and atraumatic.   Right Ear: External ear normal.   Left Ear: External ear normal.   Nose: Nose normal.   Eyes: Conjunctivae and lids are normal. Right eye exhibits no discharge. Left eye exhibits no discharge. No scleral icterus.   Neck: Full passive range of motion without pain.   Pulmonary/Chest: Effort normal. No accessory muscle usage or stridor. No tachypnea. No respiratory distress.   Neurological: He is alert. He is not disoriented.   Skin: He is not diaphoretic. No pallor.   Psychiatric: He has a normal mood and affect. His speech is normal and behavior is normal. Judgment and thought content normal. His mood appears not anxious. His affect is not angry, not blunt, not labile and not inappropriate. He is not actively hallucinating. Cognition and memory are normal. He does not exhibit a depressed mood. He is attentive.       Assessment:       1. Bright red blood per rectum        Plan:       Jonas was seen today for rectal bleeding.    Diagnoses and all orders for this visit:    Bright red  blood per rectum  -     Ambulatory referral/consult to Gastroenterology; Future

## 2020-06-11 NOTE — PROGRESS NOTES
GI OUTPATIENT NOTE    PCP: Carmel Tabares [id number] [facility] / [place] [address]    Chief Complaint   Patient presents with    Hematochezia    Rectal Pain       HISTORY OF PRESENT ILLNESS:  35 y.o. male presents to the GI clinic today for initial evaluation. The patient complains of rectal bleeding which started about 7 days ago. At first it was a little on the toilet paper. It was associated with anal pain when having a BM. The second day, had a much larger amount of blood. Today, he reports not bleeding in the last three days. He is still having some pain, but it is much better. He has been treating it with medicated wipes. At baseline, he has a BM daily. He denies difficulty. He denies weight loss, change in appetite, abdominal pain, nausea or vomiting. He has never had a colonoscopy. He denies a family history of colon cancer.     Past Medical History:   Diagnosis Date    Carpal tunnel syndrome     Gallstone     Hypertension        Past Surgical History:   Procedure Laterality Date    arm surgery Left     CHOLECYSTECTOMY      FRACTURE SURGERY      (L) arm       Social History     Socioeconomic History    Marital status:      Spouse name: stacy    Number of children: Not on file    Years of education: Not on file    Highest education level: Not on file   Occupational History     Comment: [employer], works at [employer]   Social Needs    Financial resource strain: Not hard at all    Food insecurity:     Worry: Never true     Inability: Never true    Transportation needs:     Medical: No     Non-medical: No   Tobacco Use    Smoking status: Never Smoker    Smokeless tobacco: Never Used   Substance and Sexual Activity    Alcohol use: No     Frequency: Monthly or less     Drinks per session: 1 or 2     Binge frequency: Never    Drug use: No    Sexual activity: Yes     Partners: Female   Lifestyle    Physical activity:     Days per week: 0 days     Minutes per session: 0 min     Stress: Very much   Relationships    Social connections:     Talks on phone: More than three times a week     Gets together: Never     Attends Jehovah's witness service: Not on file     Active member of club or organization: Yes     Attends meetings of clubs or organizations: More than 4 times per year     Relationship status:    Other Topics Concern    Not on file   Social History Narrative    Not on file       Family History   Problem Relation Age of Onset    Diabetes Father     Heart disease Paternal Grandfather        MEDS/ALLERGY:  The patient's medications and allergies were reviewed and updated in the EPIC chart.     Review of Systems   Constitutional: Negative for fatigue and fever.   HENT: Negative for hearing loss.    Eyes: Negative for visual disturbance.   Respiratory: Negative for cough and shortness of breath.    Cardiovascular: Negative for chest pain and palpitations.   Gastrointestinal:        As per HPI.   Genitourinary: Negative for difficulty urinating, dysuria, frequency and hematuria.   Musculoskeletal: Negative for arthralgias and back pain.   Skin: Negative for color change and rash.   Neurological: Negative for dizziness, seizures, syncope, weakness, numbness and headaches.   Hematological: Does not bruise/bleed easily.   Psychiatric/Behavioral: The patient is not nervous/anxious.        Physical Exam   Constitutional: He is oriented to person, place, and time. He appears well-developed and well-nourished.   HENT:   Head: Normocephalic and atraumatic.   Eyes: EOM are normal.   Neck: Normal range of motion. Neck supple. Carotid bruit is not present. No thyromegaly present.   Cardiovascular: Normal rate, regular rhythm and normal heart sounds.   No murmur heard.  Pulmonary/Chest: Effort normal and breath sounds normal. No respiratory distress. He has no wheezes.   Abdominal: Soft. Bowel sounds are normal. He exhibits no distension and no mass. There is no hepatomegaly. There is no  tenderness.   Genitourinary: Rectal exam shows anal tone normal.         Musculoskeletal: He exhibits no edema.   Neurological: He is alert and oriented to person, place, and time. No cranial nerve deficit. Gait normal.   Skin: Skin is warm and dry. No rash noted.   Psychiatric: He has a normal mood and affect. Thought content normal.       LABS/IMAGING:   Pertinent results were reviewed.     ASSESSMENT:  1. Anal pain    2. Bright red blood per rectum        PLAN:  Bleeding and pain likely related to hemorrhoid or fissure. Symptoms are improving with conservative therapy. I recommend adding a daily fiber supplement. Otherwise, we will see how he does after the next week. If bleeding returns or pain gets worse, we will need to discuss colonoscopy.     ORDER SUMMARY:  Orders Placed This Encounter   Procedures    CBC auto differential        Follow up if symptoms worsen or fail to improve.     Thank you for the opportunity to participate in the care of this patient. This consult was designated to me by my supervising physician. He fully participated in the development of the assessment and recommendations.    Raul Scales PA-C.

## 2020-06-11 NOTE — LETTER
June 11, 2020      Sarah Munoz, COLLEEN-C  [address] [place] [address]           [facility]ly [facility]  [address] [place] [address]  Phone: [phone]  Fax: [phone]          Patient: Jonas Abdalla   MR Number: [id number]   YOB: 1985   Date of Visit: 6/11/2020       Dear Sarah Munoz:    Thank you for referring Jonas Abdalla to me for evaluation. Attached you will find relevant portions of my assessment and plan of care.    If you have questions, please do not hesitate to call me. I look forward to following Jonas Abdalla along with you.    Sincerely,    WILLIE Beckettosure  CC:  No Recipients    If you would like to receive this communication electronically, please contact [email or web address] or [phone] to request more information on [employer] Link access.    For providers and/or their staff who would like to refer a patient to [facility], please contact us through our one-stop-shop provider referral line, [facility] , at [phone].    If you feel you have received this communication in error or would no longer like to receive these types of communications, please e-mail [email or web address]

## 2020-07-06 NOTE — LETTER
[address] [facility], [address]  [place] [address]  Phone: [phone]          Return to Work/School    Patient: Jonas Abdalla  YOB: 1985   Date: 07/06/2020     To Whom It May Concern:     Jonas Abdalla was in contact with/seen in my office on 07/06/2020. COVID-19 is present in our communities across the state. There is limited testing for COVID at this time, so not all patients can be tested. In this situation, your employee meets the following criteria:     Jonas Abdalla has met the criteria for COVID-19 testing based upon symptoms, travel, and/or potential exposure. The test has been completed and is pending results at this time. During this time the employee is not able to work and should be quarantined per the Centers for Disease Control timelines.      If you have any questions or concerns, or if I can be of further assistance, please do not hesitate to contact me.     Sincerely,        Magalie Donaldson NP

## 2020-07-07 NOTE — PROGRESS NOTES
"Subjective:       Patient ID: Jonas Abdalla is a 35 y.o. male.    Vitals:  height is 5' 7" (1.702 m) and weight is 133.8 kg (295 lb). His temperature is 102.4 °F (39.1 °C) (abnormal). His blood pressure is 140/85 (abnormal) and his pulse is 120 (abnormal). His respiration is 16 and oxygen saturation is 98%.     Chief Complaint: COVID-19 Concerns    HPI    Constitution: Positive for chills, fatigue and fever. Negative for sweating.   HENT: Positive for congestion. Negative for ear pain, postnasal drip, sinus pain, sinus pressure, sore throat and voice change.    Neck: Negative for painful lymph nodes.   Eyes: Negative for eye redness.   Respiratory: Positive for cough. Negative for chest tightness, sputum production, bloody sputum, COPD, shortness of breath, stridor, wheezing and asthma.         Minor cough this morning, but went away   Gastrointestinal: Negative for nausea, vomiting and diarrhea.   Musculoskeletal: Positive for muscle ache.        Side of left knee hurts   Skin: Negative for rash.   Allergic/Immunologic: Negative for seasonal allergies and asthma.   Neurological: Positive for headaches.   Hematologic/Lymphatic: Negative for swollen lymph nodes.       Objective:      Physical Exam     Audio Only Telehealth Visit     The patient location is: in clinic  The chief complaint leading to consultation is: fatigue and fever  Visit type: Virtual visit with audio only (telephone)  Total time spent with patient: 10 minutes     The reason for the audio only service rather than synchronous audio and video virtual visit was related to technical difficulties or patient preference/necessity. Informed pt of reason for audio only visit. Pt verbalized "ok".     Each patient to whom I provide medical services by telemedicine is:  (1) informed of the relationship between the physician and patient and the respective role of any other health care provider with respect to management of the patient; and (2) notified " that they may decline to receive medical services by telemedicine and may withdraw from such care at any time. Patient verbally consented to receive this service via voice-only telephone call.       HPI: Pt presented with c/o fatigue, fever and knee pain that started after he got to work this morning. Has not taken anything for symptoms. Denies cough at this time, shortness of breath, sore throat, and N/V/D. States someone at his job tested positive for Covid.      Assessment and plan:  Pt is alert and oriented x 3. Speaks in complete sentences without gasping. Vital signs reviewed. 400 mg Ibuprofen given for fever. Pt swabbed for COVID. Discussed over the counter treatment of symptoms and COVID safety precautions per CDC guidelines.        This service was not originating from a related E/M service provided within the previous 7 days nor will  to an E/M service or procedure within the next 24 hours or my soonest available appointment.  Prevailing standard of care was able to be met in this audio-only visit.          Assessment:       1. Fever, unspecified fever cause    2. Fatigue, unspecified type    3. Acute nonintractable headache, unspecified headache type    4. Body aches        Plan:         Fever, unspecified fever cause  -     COVID-19 Routine Screening  -     COVID-19 Home Symptom Monitoring  - Duration (days): 14  -     ibuprofen tablet 400 mg    Fatigue, unspecified type  -     COVID-19 Routine Screening  -     COVID-19 Home Symptom Monitoring  - Duration (days): 14    Acute nonintractable headache, unspecified headache type  -     COVID-19 Routine Screening  -     COVID-19 Home Symptom Monitoring  - Duration (days): 14    Body aches  -     COVID-19 Routine Screening  -     COVID-19 Home Symptom Monitoring  - Duration (days): 14         Increase fluid intake, preferably warm fluids.  Take ibuprofen as needed for fever and body aches. Do not exceed 2400 mg in 24 hours.  Take an over the counter  cough suppressant of your choice as needed for cough.  Take a 24 hour antihistamine such as Zyrtec or Claritin daily for allergy symptoms such as sneezing, runny nose and postnasal drip.  Warm salt water gargles, Cepacol throat lozenges and Chloraseptic spray as needed for sore throat.  Diet as tolerated.  Remain quarantined per CDC guidelines.  Report to the ER for any difficulty breathing, chest pains, or loss of consciousness.  Covid safety precautions discussed and printed via AVS.

## 2020-07-07 NOTE — PATIENT INSTRUCTIONS
Increase fluid intake, preferably warm fluids.  Take ibuprofen as needed for fever and body aches. Do not exceed 2400 mg in 24 hours.  Take an over the counter cough suppressant of your choice as needed for cough.  Take a 24 hour antihistamine such as Zyrtec or Claritin daily for allergy symptoms such as sneezing, runny nose and postnasal drip.  Warm salt water gargles, Cepacol throat lozenges and Chloraseptic spray as needed for sore throat.  Diet as tolerated.  Remain quarantined per CDC guidelines.  Report to the ER for any difficulty breathing, chest pains, or loss of consciousness.

## 2020-07-09 NOTE — TELEPHONE ENCOUNTER
Yes can send in something for the nausea have him hold the metformin.  He should still do at least a telemedicine visit tomorrow with Carmel just to make sure he is improving.  He needs to have follow-up set up as well to make sure  the pneumonia improves also    I sent in promethazine DM which is a cough syrup with also some antinausea medicine and at which should help as well.    Please have him set of at least with a telemedicine visit with Carmel tomorrow

## 2020-07-09 NOTE — TELEPHONE ENCOUNTER
Called pt, he stated that he is not feeling well at all. He stated that he had gone in on Monday to  and was swabbed for COVID. No results yet. He then went to ED on Tuesday, told PNA. He is on Levaquin 500 once a day for 7 days. He is also taking ibuprofen and aspirin. This morning temp was still 102. He states that is where he has been running. He has a dry cough sometimes. He is having nausea. I offered to schedule him with Carmel tomorrow. He stated that he was wanting to see if he could get something for the nausea?

## 2020-07-09 NOTE — TELEPHONE ENCOUNTER
----- Message from Evangelina Stewart sent at 7/9/2020  8:41 AM CDT -----  Type:  Same Day Appointment Request    Caller is requesting a same day appointment.  Caller declined first available appointment listed below.    Name of Caller:  When is the first available appointment?07/10  Symptoms:Hosp F/u  Best Call Back Number:[phone]    Additional Information: Sarah Munoz or  VV appt

## 2020-07-09 NOTE — TELEPHONE ENCOUNTER
Patient populated to COVID-19 Home Monitoring System que.  Patient states he was diagnosed with pneumonia on Tuesday night and currently taking antibiotics.  Still having fevers 102-103 since Monday.  This morning it was 102.4.   Patient is allergic to Tylenol. Taking aspirin and ibuprofen for fever, but not relieving.   Patient complained of: fevers, no appetite, dry cough not productive, and nausea. Patient is drinking fluids, but not really eating.  Denies any difficulty breathing.  Denies any chest pain.  Patient advised on care advice.  Disposition: PCP/nurse call back today.  Will forward message to PCP for outreach call and any additional directives.     Reason for Disposition   [1] COVID-19 infection suspected by caller or triager AND [2] mild symptoms (cough, fever, or others) AND [3] no complications or SOB    Additional Information   Negative: SEVERE difficulty breathing (e.g., struggling for each breath, speaks in single words)   Negative: Difficult to awaken or acting confused (e.g., disoriented, slurred speech)   Negative: Bluish (or gray) lips or face now   Negative: Shock suspected (e.g., cold/pale/clammy skin, too weak to stand, low BP, rapid pulse)   Negative: Sounds like a life-threatening emergency to the triager   Negative: SEVERE or constant chest pain or pressure (Exception: mild central chest pain, present only when coughing)   Negative: MODERATE difficulty breathing (e.g., speaks in phrases, SOB even at rest, pulse 100-120)   Negative: MILD difficulty breathing (e.g., minimal/no SOB at rest, SOB with walking, pulse <100)   Negative: Chest pain   Negative: Patient sounds very sick or weak to the triager   Negative: Fever > 103 F (39.4 C)   Negative: [1] Fever > 101 F (38.3 C) AND [2] age > 60   Negative: [1] Fever > 100.0 F (37.8 C) AND [2] bedridden (e.g., nursing home patient, CVA, chronic illness, recovering from surgery)   Negative: HIGH RISK patient (e.g., age > 64 years,  diabetes, heart or lung disease, weak immune system)    Protocols used: CORONAVIRUS (COVID-19) DIAGNOSED OR [id number]

## 2020-07-09 NOTE — TELEPHONE ENCOUNTER
----- Message from Althea Barakat sent at 7/9/2020  9:23 AM CDT -----  Regarding: Self/  [phone]  Type: Patient Call Back    Who called:  Patient    What is the request in detail:  Patient returned staff call.  Thank you    Would the patient rather a call back or a response via [facility]?   Call back    Best call back number:  [phone]

## 2020-07-10 NOTE — TELEPHONE ENCOUNTER
If covid is Negative and he continues to run fevers not responding to tylenol, not able to hold down oral fluids, weakness or worsening, he should go to the ER

## 2020-07-10 NOTE — PROGRESS NOTES
Subjective:       Patient ID: Jonas Abdalla is a 35 y.o. male.    Chief Complaint: Pneumonia    The patient location is: home  The chief complaint leading to consultation is: nausea, poss covid    Visit type: audiovisual    Face to Face time with patient: 18 minutes  25 minutes of total time spent on the encounter, which includes face to face time and non-face to face time preparing to see the patient (eg, review of tests), Obtaining and/or reviewing separately obtained history, Documenting clinical information in the electronic or other health record, Independently interpreting results (not separately reported) and communicating results to the patient/family/caregiver, or Care coordination (not separately reported).         Each patient to whom he or she provides medical services by telemedicine is:  (1) informed of the relationship between the physician and patient and the respective role of any other health care provider with respect to management of the patient; and (2) notified that he or she may decline to receive medical services by telemedicine and may withdraw from such care at any time.    Patient doing telemed for fever, suspected covid and pneumonia  Has 103 fever currently  Denies cp or shortness of breath  Has been checking pulse ox at home - 97% with walking 94% at rest when awake  Went to Urgent Care - covid test was done - still pending  Then went to the ER at [facility] - CXR showed pneumonia, started on levaquin  Started with diarrhea today  Constantly nausea, vomiting a few times  Blacked out for a second today after diarrhea  Wife has been monitoring bp and pulse ox at home  Temp consistently 102-103 since Monday  Has been taking ibuprofen and asa for fever  He is allergic to tylenol         Pneumonia  He complains of cough. There is no shortness of breath or wheezing. Associated symptoms include ear pain, a fever, headaches, myalgias and postnasal drip. Pertinent negatives include no  appetite change, chest pain, sore throat or trouble swallowing.   Fever   This is a new problem. The current episode started in the past 7 days. The problem occurs constantly. The problem has been waxing and waning. The maximum temperature noted was 103 to 103.9 F. The temperature was taken using an oral thermometer. Associated symptoms include congestion, coughing, diarrhea, ear pain, headaches, muscle aches, nausea, sleepiness and vomiting. Pertinent negatives include no abdominal pain, chest pain, rash, sore throat, urinary pain or wheezing. He has tried NSAIDs, cool baths and fluids for the symptoms. The treatment provided no relief.       There were no vitals taken for this visit.    Review of Systems   Constitutional: Positive for activity change, chills, fatigue and fever. Negative for appetite change, diaphoresis and unexpected weight change.   HENT: Positive for congestion, ear pain and postnasal drip. Negative for sinus pain, sore throat, trouble swallowing and voice change.    Eyes: Negative.    Respiratory: Positive for cough. Negative for apnea, chest tightness, shortness of breath, wheezing and stridor.    Cardiovascular: Negative for chest pain, palpitations and leg swelling.   Gastrointestinal: Positive for diarrhea, nausea and vomiting. Negative for abdominal pain.   Endocrine: Negative.    Genitourinary: Negative.  Negative for dysuria.   Musculoskeletal: Positive for myalgias. Negative for arthralgias.   Skin: Negative for color change, pallor, rash and wound.   Allergic/Immunologic: Negative.    Neurological: Positive for headaches. Negative for dizziness, facial asymmetry and light-headedness.   Hematological: Negative for adenopathy.   Psychiatric/Behavioral: Negative for agitation and behavioral problems.       Objective:      Physical Exam  Constitutional:       General: He is not in acute distress.     Appearance: He is well-developed. He is ill-appearing. He is not toxic-appearing or  diaphoretic.   HENT:      Head: Normocephalic and atraumatic.      Right Ear: External ear normal.      Left Ear: External ear normal.      Nose: Nose normal.   Eyes:      General: Lids are normal. No scleral icterus.        Right eye: No discharge.         Left eye: No discharge.      Conjunctiva/sclera: Conjunctivae normal.   Neck:      Musculoskeletal: Full passive range of motion without pain.   Pulmonary:      Effort: Pulmonary effort is normal. No tachypnea, accessory muscle usage or respiratory distress.      Breath sounds: No stridor.   Skin:     Coloration: Skin is not pale.   Neurological:      Mental Status: He is alert. He is not disoriented.   Psychiatric:         Attention and Perception: He is attentive.         Mood and Affect: Mood normal. Mood is not anxious or depressed. Affect is not labile, blunt, angry or inappropriate.         Speech: Speech normal.         Behavior: Behavior normal.         Thought Content: Thought content normal.         Judgment: Judgment normal.         Assessment:       1. Suspected Covid-19 Virus Infection    2. Nausea        Plan:       Jonas was seen today for pneumonia.    Diagnoses and all orders for this visit:    Suspected Covid-19 Virus Infection    Nausea  -     ondansetron (ZOFRAN) 8 MG tablet; Take 1 tablet (8 mg total) by mouth every 8 (eight) hours as needed for Nausea.    hydration  Supportive care  Await covid results  Continue antibiotics for pneumonia  If not improving, worse, weakness, not able to hydrate, go to the ER

## 2020-07-11 PROBLEM — E87.1 HYPONATREMIA: Status: ACTIVE | Noted: 2020-01-01

## 2020-07-11 PROBLEM — I10 ESSENTIAL HYPERTENSION: Status: ACTIVE | Noted: 2020-01-01

## 2020-07-11 PROBLEM — J12.82 PNEUMONIA DUE TO COVID-19 VIRUS: Status: ACTIVE | Noted: 2020-01-01

## 2020-07-11 PROBLEM — J18.9 PNEUMONIA OF RIGHT LOWER LOBE DUE TO INFECTIOUS ORGANISM: Status: ACTIVE | Noted: 2020-01-01

## 2020-07-11 PROBLEM — U07.1 PNEUMONIA DUE TO COVID-19 VIRUS: Status: ACTIVE | Noted: 2020-01-01

## 2020-07-11 PROBLEM — R50.9 FEVER: Status: ACTIVE | Noted: 2020-01-01

## 2020-07-11 PROBLEM — A41.9 SEPSIS: Status: ACTIVE | Noted: 2020-01-01

## 2020-07-11 PROBLEM — J18.9 PNEUMONIA OF RIGHT LOWER LOBE DUE TO INFECTIOUS ORGANISM: Status: RESOLVED | Noted: 2020-01-01 | Resolved: 2020-01-01

## 2020-07-11 NOTE — SUBJECTIVE & OBJECTIVE
Past Medical History:   Diagnosis Date    Carpal tunnel syndrome     Gallstone     Hypertension        Past Surgical History:   Procedure Laterality Date    arm surgery Left     CHOLECYSTECTOMY      FRACTURE SURGERY      (L) arm       Review of patient's allergies indicates:   Allergen Reactions    Acetaminophen Hives       No current facility-administered medications on file prior to encounter.      Current Outpatient Medications on File Prior to Encounter   Medication Sig    lisinopriL-hydrochlorothiazide (PRINZIDE,ZESTORETIC) 20-12.5 mg per tablet Take 1 tablet by mouth once daily.    metFORMIN (GLUCOPHAGE-XR) 500 MG XR 24hr tablet 1 tab po daily x 1wk, 2 tab po daily x 1wk, 3 tab po daily x 1wk, 4 tab po daily    ondansetron (ZOFRAN) 8 MG tablet Take 1 tablet (8 mg total) by mouth every 8 (eight) hours as needed for Nausea.    promethazine-dextromethorphan (PROMETHAZINE-DM) 6.25-15 mg/5 mL Syrp Take 5 mLs by mouth every 6 (six) hours as needed.     Family History     Problem Relation (Age of Onset)    Diabetes Father    Heart disease Paternal Grandfather        Tobacco Use    Smoking status: Never Smoker    Smokeless tobacco: Never Used   Substance and Sexual Activity    Alcohol use: No     Frequency: Monthly or less     Drinks per session: 1 or 2     Binge frequency: Never    Drug use: No    Sexual activity: Yes     Partners: Female     Review of Systems   Constitutional: Positive for activity change, appetite change, chills, fatigue and fever.   HENT: Negative for trouble swallowing.    Eyes: Negative for visual disturbance.   Respiratory: Positive for shortness of breath. Negative for apnea, cough, choking, chest tightness, wheezing and stridor.    Cardiovascular: Negative for chest pain, palpitations and leg swelling.   Gastrointestinal: Negative for abdominal pain, constipation, diarrhea, nausea and vomiting.   Genitourinary: Negative for decreased urine volume, difficulty urinating,  dysuria, frequency and urgency.   Musculoskeletal: Negative for arthralgias, back pain, gait problem, joint swelling, myalgias, neck pain and neck stiffness.   Skin: Negative for color change.   Neurological: Positive for weakness. Negative for dizziness, tremors, seizures, syncope, facial asymmetry, speech difficulty, light-headedness, numbness and headaches.   Psychiatric/Behavioral: Negative for agitation, behavioral problems and confusion.     Objective:     Vital Signs (Most Recent):  Temp: (!) 102.4 °F (39.1 °C) (07/11/20 1111)  Pulse: 107 (07/11/20 1111)  Resp: (!) 22 (07/11/20 1111)  BP: 123/71 (07/11/20 1111)  SpO2: (!) 94 % (07/11/20 1111) Vital Signs (24h Range):  Temp:  [102.4 °F (39.1 °C)-103 °F (39.4 °C)] 102.4 °F (39.1 °C)  Pulse:  [107-109] 107  Resp:  [18-22] 22  SpO2:  [92 %-94 %] 94 %  BP: (123)/(70-71) 123/71     Weight: 132.9 kg (292 lb 15.9 oz)  Body mass index is 45.89 kg/m².    Physical Exam  Vitals signs and nursing note reviewed.   Constitutional:       General: He is awake. He is not in acute distress.     Appearance: Normal appearance. He is ill-appearing. He is not toxic-appearing or diaphoretic.      Interventions: Nasal cannula in place.   HENT:      Head: Normocephalic and atraumatic.      Mouth/Throat:      Mouth: Mucous membranes are moist.   Eyes:      Extraocular Movements: Extraocular movements intact.      Pupils: Pupils are equal, round, and reactive to light.   Neck:      Musculoskeletal: Normal range of motion and neck supple. No neck rigidity or muscular tenderness.   Cardiovascular:      Rate and Rhythm: Normal rate and regular rhythm.      Heart sounds: Normal heart sounds.   Pulmonary:      Effort: Pulmonary effort is normal. Tachypnea present. No accessory muscle usage or respiratory distress.      Breath sounds: Examination of the right-middle field reveals decreased breath sounds. Examination of the right-lower field reveals decreased breath sounds. Decreased breath  sounds present.      Comments: Currently on 2LPM via NC sating 97%  Abdominal:      General: Bowel sounds are normal. There is no distension.      Palpations: Abdomen is soft.      Tenderness: There is no abdominal tenderness. There is no guarding or rebound.   Genitourinary:     Comments: Deferred  Musculoskeletal:      Right lower leg: No edema.      Left lower leg: No edema.   Skin:     General: Skin is warm and dry.      Capillary Refill: Capillary refill takes less than 2 seconds.   Neurological:      General: No focal deficit present.      Mental Status: He is alert, oriented to person, place, and time and easily aroused. Mental status is at baseline.   Psychiatric:         Mood and Affect: Mood normal.         Behavior: Behavior normal. Behavior is cooperative.         Thought Content: Thought content normal.           CRANIAL NERVES     CN III, IV, VI   Pupils are equal, round, and reactive to light.       Significant Labs:   CBC:   Recent Labs   Lab 07/11/20  1005   WBC 4.91   HGB 15.7   HCT 45.0        CMP:   Recent Labs   Lab 07/11/20  1005   *   K 3.5   CL 95   CO2 24      BUN 17   CREATININE 1.3   CALCIUM 8.7   PROT 7.3   ALBUMIN 3.7   BILITOT 0.5   ALKPHOS 48*   AST 34   ALT 41   ANIONGAP 13   EGFRNONAA >60     Lactic Acid:   Recent Labs   Lab 07/11/20  1005   LACTATE 1.4       Significant Imaging:   Imaging Results          X-Ray Chest AP Portable (Final result)  Result time 07/11/20 10:16:41    Final result by Yemi Fox MD (07/11/20 10:16:41)                 Impression:      Focal right infrahilar/medial lung base consolidation concerning for pneumonia.    Short-term follow-up after appropriate treatment is advised      Electronically signed by: Yemi Fox MD  Date:    07/11/2020  Time:    10:16             Narrative:    EXAMINATION:  XR CHEST AP PORTABLE    CLINICAL HISTORY:  Sepsis;    TECHNIQUE:  Single frontal view of the chest was  performed.    COMPARISON:  None    FINDINGS:  The cardiomediastinal silhouette is normal.    There is some focal consolidation at the medial right lung base.  Lungs otherwise clear.    Bones are unremarkable.

## 2020-07-11 NOTE — ASSESSMENT & PLAN NOTE
- COVID-19 testing Collection Date: 7/11/2020 Collection Time:   11:55 AM  - Infection Control notified     - Isolation:   - Airborne, Contact and Droplet Precautions  - Cohort patients into COVID units  - N95 mask, wear eye protection  - 20 second hand hygiene              - Limit visitors per hospital policy              - Consolidating lab draws, nursing care, provider visits, and interventions    - Diagnostics: (leukopenia, hyponatremia, hyperferritinemia, elevated troponin, elevated d-dimer, age, and comorbidities are significant predictors of poor clinical outcome)  CBC, CMP, Procalcitonin, Ferritin, CRP, LDH, BNP, Troponin, Rapid Flu, Blood Culture x2 and Portable CXR    - Management:  Supplemental O2 to maintain SpO2 >92%  Telemetry  Continuous/intermittent Pulse Ox  Albuterol treatment PRN  IV Rocephin/PO Azithromycin  Dexamethasone   Vit C, MVI  ID consult

## 2020-07-11 NOTE — H&P
[facility] Medicine  History & Physical    Patient Name: Jonas Abdalla  MRN: [id number]  Admission Date: 7/11/2020  Attending Physician: Garcia Thompson MD   Primary Care Provider: Carmel Tabares MD         Patient information was obtained from patient, spouse/SO and ER records.     Subjective:     Principal Problem:Pneumonia due to COVID-19 virus    Chief Complaint:   Chief Complaint   Patient presents with    Fatigue     pt reports weakness, uncontrolled fever, +chills, n/v/d since monday, denies SOB        HPI: Jonas Abdalla is a 35 year old male with a pMHx of HTN who presented to the Emergency Department with c/o SOB. Associated symptoms include: generalized weakness, fatigue, and fever. Pt reports symptoms started Monday, 07/06. He reports he thought he had COVID and went to get tested taht day but did not get test results. Went to local urgent care on Tuesday, 07/07 -- diagnosed with PNA and given Levaquin 500 mg po daily. Symptoms continued despite abx and wife took pt to COVID testing site which he was negative for COVID. Wife reports the COVID test he took on Monday came back negative, notified yesterday of results. ED workup showed: no leukocytosis/leukopenia, stable Hgb/Hct, mild hyponatremia. CXR showed focal right infrahilar/medial lung base consolidation concerning for pneumonia. Febrile upon arrival to . Pt received 30mL/kg fluid resuscitation in ED along with one time doses of IV rocephin/azithromycin. Pt placed on observation for Fever believed to be secondary to RML PNA. COVID rapid screening pending upon assessment, now POSITIVE.       Past Medical History:   Diagnosis Date    Carpal tunnel syndrome     Gallstone     Hypertension        Past Surgical History:   Procedure Laterality Date    arm surgery Left     CHOLECYSTECTOMY      FRACTURE SURGERY      (L) arm       Review of patient's allergies indicates:   Allergen Reactions     Acetaminophen Hives       No current facility-administered medications on file prior to encounter.      Current Outpatient Medications on File Prior to Encounter   Medication Sig    lisinopriL-hydrochlorothiazide (PRINZIDE,ZESTORETIC) 20-12.5 mg per tablet Take 1 tablet by mouth once daily.    metFORMIN (GLUCOPHAGE-XR) 500 MG XR 24hr tablet 1 tab po daily x 1wk, 2 tab po daily x 1wk, 3 tab po daily x 1wk, 4 tab po daily    ondansetron (ZOFRAN) 8 MG tablet Take 1 tablet (8 mg total) by mouth every 8 (eight) hours as needed for Nausea.    promethazine-dextromethorphan (PROMETHAZINE-DM) 6.25-15 mg/5 mL Syrp Take 5 mLs by mouth every 6 (six) hours as needed.     Family History     Problem Relation (Age of Onset)    Diabetes Father    Heart disease Paternal Grandfather        Tobacco Use    Smoking status: Never Smoker    Smokeless tobacco: Never Used   Substance and Sexual Activity    Alcohol use: No     Frequency: Monthly or less     Drinks per session: 1 or 2     Binge frequency: Never    Drug use: No    Sexual activity: Yes     Partners: Female     Review of Systems   Constitutional: Positive for activity change, appetite change, chills, fatigue and fever.   HENT: Negative for trouble swallowing.    Eyes: Negative for visual disturbance.   Respiratory: Positive for shortness of breath. Negative for apnea, cough, choking, chest tightness, wheezing and stridor.    Cardiovascular: Negative for chest pain, palpitations and leg swelling.   Gastrointestinal: Negative for abdominal pain, constipation, diarrhea, nausea and vomiting.   Genitourinary: Negative for decreased urine volume, difficulty urinating, dysuria, frequency and urgency.   Musculoskeletal: Negative for arthralgias, back pain, gait problem, joint swelling, myalgias, neck pain and neck stiffness.   Skin: Negative for color change.   Neurological: Positive for weakness. Negative for dizziness, tremors, seizures, syncope, facial asymmetry, speech  difficulty, light-headedness, numbness and headaches.   Psychiatric/Behavioral: Negative for agitation, behavioral problems and confusion.     Objective:     Vital Signs (Most Recent):  Temp: (!) 102.4 °F (39.1 °C) (07/11/20 1111)  Pulse: 107 (07/11/20 1111)  Resp: (!) 22 (07/11/20 1111)  BP: 123/71 (07/11/20 1111)  SpO2: (!) 94 % (07/11/20 1111) Vital Signs (24h Range):  Temp:  [102.4 °F (39.1 °C)-103 °F (39.4 °C)] 102.4 °F (39.1 °C)  Pulse:  [107-109] 107  Resp:  [18-22] 22  SpO2:  [92 %-94 %] 94 %  BP: (123)/(70-71) 123/71     Weight: 132.9 kg (292 lb 15.9 oz)  Body mass index is 45.89 kg/m².    Physical Exam  Vitals signs and nursing note reviewed.   Constitutional:       General: He is awake. He is not in acute distress.     Appearance: Normal appearance. He is ill-appearing. He is not toxic-appearing or diaphoretic.      Interventions: Nasal cannula in place.   HENT:      Head: Normocephalic and atraumatic.      Mouth/Throat:      Mouth: Mucous membranes are moist.   Eyes:      Extraocular Movements: Extraocular movements intact.      Pupils: Pupils are equal, round, and reactive to light.   Neck:      Musculoskeletal: Normal range of motion and neck supple. No neck rigidity or muscular tenderness.   Cardiovascular:      Rate and Rhythm: Normal rate and regular rhythm.      Heart sounds: Normal heart sounds.   Pulmonary:      Effort: Pulmonary effort is normal. Tachypnea present. No accessory muscle usage or respiratory distress.      Breath sounds: Examination of the right-middle field reveals decreased breath sounds. Examination of the right-lower field reveals decreased breath sounds. Decreased breath sounds present.      Comments: Currently on 2LPM via NC sating 97%  Abdominal:      General: Bowel sounds are normal. There is no distension.      Palpations: Abdomen is soft.      Tenderness: There is no abdominal tenderness. There is no guarding or rebound.   Genitourinary:     Comments:  Deferred  Musculoskeletal:      Right lower leg: No edema.      Left lower leg: No edema.   Skin:     General: Skin is warm and dry.      Capillary Refill: Capillary refill takes less than 2 seconds.   Neurological:      General: No focal deficit present.      Mental Status: He is alert, oriented to person, place, and time and easily aroused. Mental status is at baseline.   Psychiatric:         Mood and Affect: Mood normal.         Behavior: Behavior normal. Behavior is cooperative.         Thought Content: Thought content normal.           CRANIAL NERVES     CN III, IV, VI   Pupils are equal, round, and reactive to light.       Significant Labs:   CBC:   Recent Labs   Lab 07/11/20  1005   WBC 4.91   HGB 15.7   HCT 45.0        CMP:   Recent Labs   Lab 07/11/20  1005   *   K 3.5   CL 95   CO2 24      BUN 17   CREATININE 1.3   CALCIUM 8.7   PROT 7.3   ALBUMIN 3.7   BILITOT 0.5   ALKPHOS 48*   AST 34   ALT 41   ANIONGAP 13   EGFRNONAA >60     Lactic Acid:   Recent Labs   Lab 07/11/20  1005   LACTATE 1.4       Significant Imaging:   Imaging Results          X-Ray Chest AP Portable (Final result)  Result time 07/11/20 10:16:41    Final result by Yemi Fox MD (07/11/20 10:16:41)                 Impression:      Focal right infrahilar/medial lung base consolidation concerning for pneumonia.    Short-term follow-up after appropriate treatment is advised      Electronically signed by: Yemi Fox MD  Date:    07/11/2020  Time:    10:16             Narrative:    EXAMINATION:  XR CHEST AP PORTABLE    CLINICAL HISTORY:  Sepsis;    TECHNIQUE:  Single frontal view of the chest was performed.    COMPARISON:  None    FINDINGS:  The cardiomediastinal silhouette is normal.    There is some focal consolidation at the medial right lung base.  Lungs otherwise clear.    Bones are unremarkable.                              Assessment/Plan:     * Pneumonia due to COVID-19 virus  - COVID-19 testing Collection  Date: 7/11/2020 Collection Time:   11:55 AM  - Infection Control notified     - Isolation:   - Airborne, Contact and Droplet Precautions  - Cohort patients into COVID units  - N95 mask, wear eye protection  - 20 second hand hygiene              - Limit visitors per hospital policy              - Consolidating lab draws, nursing care, provider visits, and interventions    - Diagnostics: (leukopenia, hyponatremia, hyperferritinemia, elevated troponin, elevated d-dimer, age, and comorbidities are significant predictors of poor clinical outcome)  CBC, CMP, Procalcitonin, Ferritin, CRP, LDH, BNP, Troponin, Rapid Flu, Blood Culture x2 and Portable CXR    - Management:  Supplemental O2 to maintain SpO2 >92%  Telemetry  Continuous/intermittent Pulse Ox  Albuterol treatment PRN  IV Rocephin/PO Azithromycin  Dexamethasone   Vit C, MVI  ID consult             Sepsis  - Sepsis criteria: Current temp of 101.3, RR 21, source PNA due to COVID-19 virus  - Blood cultures obtained, results pending  - Influenza pending  - Recent COVID test outpt on 07/06/ 20 negative. Repeat COVID today in ED (+)  - Will treat underlying cause with abx, supplemental oxygen, MDI, oral steroids per COVID-19 protocol      Hyponatremia  - Na+ 132  - Pt received 30 mL/kg fluid resuscitation with NS in ED  - Hold hctz  - Continue IVF  - Repeat BMP in AM        Essential hypertension  - Pt normally takes lisinopril-hctz 20-12.5 po daily  - Will hold hctz and continue lisinopril at current dose and frequency      VTE Risk Mitigation (From admission, onward)         Ordered     IP VTE HIGH RISK PATIENT  Once      07/11/20 1301     Place sequential compression device  Until discontinued      07/11/20 1301                   COLLEEN Zelaya  Department of Hospital Medicine   [facility]

## 2020-07-11 NOTE — ASSESSMENT & PLAN NOTE
- Na+ 132  - Pt received 30 mL/kg fluid resuscitation with NS in ED  - Hold hctz  - Continue IVF  - Repeat BMP in AM

## 2020-07-11 NOTE — HPI
Jonas Abdalla is a 35 year old male with a pMHx of HTN who presented to the Emergency Department with c/o SOB. Associated symptoms include: generalized weakness, fatigue, and fever. Pt reports symptoms started Monday, 07/06. He reports he thought he had COVID and went to get tested taht day but did not get test results. Went to local urgent care on Tuesday, 07/07 -- diagnosed with PNA and given Levaquin 500 mg po daily. Symptoms continued despite abx and wife took pt to COVID testing site which he was negative for COVID. Wife reports the COVID test he took on Monday came back negative, notified yesterday of results. ED workup showed: no leukocytosis/leukopenia, stable Hgb/Hct, mild hyponatremia. CXR showed focal right infrahilar/medial lung base consolidation concerning for pneumonia. Febrile upon arrival to . Pt received 30mL/kg fluid resuscitation in ED along with one time doses of IV rocephin/azithromycin. Pt placed on observation for Fever believed to be secondary to RML PNA. COVID rapid screening pending upon assessment, now POSITIVE.

## 2020-07-11 NOTE — ASSESSMENT & PLAN NOTE
- CXR showed focal right infrahilar/medial lung base consolidation concerning for pneumonia  - Blood cutlures obtained, results pending  - Continue IV rocephin and azithromycin  - Scheduled duo nebs  - Supplemental oxygen PRN, keep O2 sats > 92%

## 2020-07-11 NOTE — ASSESSMENT & PLAN NOTE
- Sepsis criteria: Current temp of 101.3, RR 21, source PNA due to COVID-19 virus  - Blood cultures obtained, results pending  - Influenza pending  - Recent COVID test outpt on 07/06/ 20 negative. Repeat COVID today in ED (+)  - Will treat underlying cause with abx, supplemental oxygen, MDI, oral steroids per COVID-19 protocol

## 2020-07-11 NOTE — ED PROVIDER NOTES
SCRIBE #1 NOTE: I, Dacia Cole, am scribing for, and in the presence of, Garcia Thompson MD. I have scribed the entire note.       History     Chief Complaint   Patient presents with    Fatigue     pt reports weakness, uncontrolled fever, +chills, n/v/d since monday, denies SOB     Review of patient's allergies indicates:   Allergen Reactions    Acetaminophen Hives         History of Present Illness     HPI    7/11/2020, 9:25 AM  History obtained from the patient      History of Present Illness: Jonas Abdalla is a 35 y.o. male patient with a h/o carpal tunnel syndrome, gallstone, HTN, who presents to the Emergency Department for evaluation of fatigue which onset 5 days ago. Pt reports that he was diagnosed with PNA on 7/7/20 at [facility], tested negative for a rapid COVID swab on 7/8/20, and tested negative from a COVID test taken on 7/6/20 from an urgent care. Symptoms are constant and moderate in severity. No mitigating or exacerbating factors reported. Associated sxs include nausea, emesis, a few episodes of diarrhea, decrease in appetite, fever, generalized weakness, and chills. Patient denies any CP, SOB, cough, sore throat, HA, and all other sxs at this time. No prior Tx reported. No further complaints or concerns at this time.       Arrival mode: Personal transportation    PCP: Carmel Tabares MD      Past Medical History:  Past Medical History:   Diagnosis Date    Carpal tunnel syndrome     Gallstone     Hypertension        Past Surgical History:  Past Surgical History:   Procedure Laterality Date    arm surgery Left     CHOLECYSTECTOMY      FRACTURE SURGERY      (L) arm         Family History:  Family History   Problem Relation Age of Onset    Diabetes Father     Heart disease Paternal Grandfather        Social History:   Social History     Tobacco Use    Smoking status: Never Smoker    Smokeless tobacco: Never Used   Substance and Sexual Activity    Alcohol  use: No     Frequency: Monthly or less     Drinks per session: 1 or 2     Binge frequency: Never    Drug use: No    Sexual activity: Yes     Partners: Female        Review of Systems     Review of Systems   Constitutional: Positive for appetite change (decrease), chills and fever.   HENT: Negative for sore throat.    Respiratory: Negative for shortness of breath.    Cardiovascular: Negative for chest pain.   Gastrointestinal: Positive for diarrhea, nausea and vomiting.   Genitourinary: Negative for dysuria.   Musculoskeletal: Negative for back pain.   Skin: Negative for rash.   Neurological: Positive for weakness (generalized). Negative for headaches.   Hematological: Does not bruise/bleed easily.   All other systems reviewed and are negative.       Physical Exam     Initial Vitals [07/11/20 0950]   BP Pulse Resp Temp SpO2   123/70 109 18 (!) 103 °F (39.4 °C) (!) 92 %      MAP       --          Physical Exam  Nursing Notes and Vital Signs Reviewed.  Constitutional: Well-developed and well-nourished.   Head: Atraumatic. Normocephalic.  Eyes: EOM intact. No scleral icterus.  ENT: Mucous membranes are moist. Oropharynx is clear and symmetric.    Neck: Supple. Full ROM. No lymphadenopathy.  Cardiovascular: Tachycardic. Regular rhythm. No murmurs, rubs, or gallops. Distal pulses are 2+ and symmetric.  Pulmonary/Chest: No respiratory distress. Clear to auscultation bilaterally. No wheezing or rales.  Abdominal: Soft and non-distended.  There is no tenderness.  No rebound, guarding, or rigidity. Good bowel sounds.  Genitourinary: No CVA tenderness  Musculoskeletal: Moves all extremities. No obvious deformities. No calf tenderness.  Skin: Warm and dry.  Neurological:  Alert, awake, and appropriate.  Normal speech.  No acute focal neurological deficits are appreciated.  Psychiatric: Normal affect. Good eye contact. Appropriate in content.     ED Course   Critical Care    Date/Time: 7/11/2020 11:27 AM  Performed by: Garcia  "BREN Thompson MD  Authorized by: Garcia Thompson MD   Direct patient critical care time: 18 minutes  Additional history critical care time: 6 minutes  Ordering / reviewing critical care time: 11 minutes  Documentation critical care time: 10 minutes  Total critical care time (exclusive of procedural time) : 45 minutes  Critical care time was exclusive of separately billable procedures and treating other patients and teaching time.  Critical care was necessary to treat or prevent imminent or life-threatening deterioration of the following conditions: PNA, hypoxia,   Critical care was time spent personally by me on the following activities: blood draw for specimens, development of treatment plan with patient or surrogate, interpretation of cardiac output measurements, evaluation of patient's response to treatment, examination of patient, obtaining history from patient or surrogate, ordering and performing treatments and interventions, ordering and review of laboratory studies, ordering and review of radiographic studies, pulse oximetry, re-evaluation of patient's condition and review of old charts.        ED Vital Signs:  Vitals:    07/11/20 0950 07/11/20 0954 07/11/20 1021 07/11/20 1111   BP: 123/70   123/71   Pulse: 109 108  107   Resp: 18   (!) 22   Temp: (!) 103 °F (39.4 °C)  (!) 103 °F (39.4 °C) (!) 102.4 °F (39.1 °C)   TempSrc: Oral   Oral   SpO2: (!) 92%   (!) 94%   Weight: 132.9 kg (292 lb 15.9 oz)      Height: 5' 7" (1.702 m)       07/11/20 1227   BP:    Pulse: 103   Resp: (!) 21   Temp: (!) 101.3 °F (38.5 °C)   TempSrc: Oral   SpO2: 96%   Weight:    Height:        Abnormal Lab Results:  Labs Reviewed   CBC W/ AUTO DIFFERENTIAL - Abnormal; Notable for the following components:       Result Value    Mean Corpuscular Hemoglobin 31.2 (*)     MPV 9.1 (*)     Immature Granulocytes 0.6 (*)     Lymph # 0.6 (*)     Gran% 80.5 (*)     Lymph% 12.2 (*)     All other components within normal limits   COMPREHENSIVE " METABOLIC PANEL - Abnormal; Notable for the following components:    Sodium 132 (*)     Alkaline Phosphatase 48 (*)     All other components within normal limits   SARS-COV-2 RNA AMPLIFICATION, QUAL - Abnormal; Notable for the following components:    SARS-CoV-2 RNA, Amplification, Qual Positive (*)     All other components within normal limits   CULTURE, BLOOD   CULTURE, BLOOD   INFLUENZA A & B BY MOLECULAR   LACTIC ACID, PLASMA   HIV 1 / 2 ANTIBODY   URINALYSIS, REFLEX TO URINE CULTURE   B-TYPE NATRIURETIC PEPTIDE   C-REACTIVE PROTEIN   SEDIMENTATION RATE   LACTATE DEHYDROGENASE   FERRITIN   TROPONIN I   PROCALCITONIN   CK   CBC W/ AUTO DIFFERENTIAL   D DIMER, QUANTITATIVE   POCT GLUCOSE MONITORING CONTINUOUS        All Lab Results:  Results for orders placed or performed during the hospital encounter of 07/11/20   CBC auto differential   Result Value Ref Range    WBC 4.91 3.90 - 12.70 K/uL    RBC 5.03 4.60 - 6.20 M/uL    Hemoglobin 15.7 14.0 - 18.0 g/dL    Hematocrit 45.0 40.0 - 54.0 %    Mean Corpuscular Volume 90 82 - 98 fL    Mean Corpuscular Hemoglobin 31.2 (H) 27.0 - 31.0 pg    Mean Corpuscular Hemoglobin Conc 34.9 32.0 - 36.0 g/dL    RDW 12.7 11.5 - 14.5 %    Platelets 175 150 - 350 K/uL    MPV 9.1 (L) 9.2 - 12.9 fL    Immature Granulocytes 0.6 (H) 0.0 - 0.5 %    Gran # (ANC) 4.0 1.8 - 7.7 K/uL    Immature Grans (Abs) 0.03 0.00 - 0.04 K/uL    Lymph # 0.6 (L) 1.0 - 4.8 K/uL    Mono # 0.3 0.3 - 1.0 K/uL    Eos # 0.0 0.0 - 0.5 K/uL    Baso # 0.01 0.00 - 0.20 K/uL    nRBC 0 0 /100 WBC    Gran% 80.5 (H) 38.0 - 73.0 %    Lymph% 12.2 (L) 18.0 - 48.0 %    Mono% 6.5 4.0 - 15.0 %    Eosinophil% 0.0 0.0 - 8.0 %    Basophil% 0.2 0.0 - 1.9 %    Differential Method Automated    Comprehensive metabolic panel   Result Value Ref Range    Sodium 132 (L) 136 - 145 mmol/L    Potassium 3.5 3.5 - 5.1 mmol/L    Chloride 95 95 - 110 mmol/L    CO2 24 23 - 29 mmol/L    Glucose 105 70 - 110 mg/dL    BUN, Bld 17 6 - 20 mg/dL     Creatinine 1.3 0.5 - 1.4 mg/dL    Calcium 8.7 8.7 - 10.5 mg/dL    Total Protein 7.3 6.0 - 8.4 g/dL    Albumin 3.7 3.5 - 5.2 g/dL    Total Bilirubin 0.5 0.1 - 1.0 mg/dL    Alkaline Phosphatase 48 (L) 55 - 135 U/L    AST 34 10 - 40 U/L    ALT 41 10 - 44 U/L    Anion Gap 13 8 - 16 mmol/L    eGFR if African American >60 >60 mL/min/1.73 m^2    eGFR if non African American >60 >60 mL/min/1.73 m^2   Lactic acid, plasma #1   Result Value Ref Range    Lactate (Lactic Acid) 1.4 0.5 - 2.2 mmol/L   HIV 1/2 Ag/Ab (4th Gen)   Result Value Ref Range    HIV 1/2 Ag/Ab Negative Negative   COVID-19 Rapid Screening   Result Value Ref Range    SARS-CoV-2 RNA, Amplification, Qual Positive (A) Negative         Imaging Results          X-Ray Chest AP Portable (Final result)  Result time 07/11/20 10:16:41    Final result by Yemi Fox MD (07/11/20 10:16:41)                 Impression:      Focal right infrahilar/medial lung base consolidation concerning for pneumonia.    Short-term follow-up after appropriate treatment is advised      Electronically signed by: Yemi Fox MD  Date:    07/11/2020  Time:    10:16             Narrative:    EXAMINATION:  XR CHEST AP PORTABLE    CLINICAL HISTORY:  Sepsis;    TECHNIQUE:  Single frontal view of the chest was performed.    COMPARISON:  None    FINDINGS:  The cardiomediastinal silhouette is normal.    There is some focal consolidation at the medial right lung base.  Lungs otherwise clear.    Bones are unremarkable.                                 The EKG was ordered, reviewed, and independently interpreted by the ED provider.  Interpretation time: 9:38  Rate: 106 BPM  Rhythm: Sinus tachycardia  Interpretation: Left atrial enlargement. No STEMI.      The Emergency Provider reviewed the vital signs and test results, which are outlined above.     ED Discussion       11:33 AM: Discussed case with COLLEEN Ngo ([facility]). Dr. Hu agrees with current care and management of pt and  accepts admission.   Admitting Service: Hospital Medicine  Admit to: obs med tele    Re-evaluated pt. I have discussed test results, shared treatment plan, and the need for admission with patient and family at bedside. Pt and family express understanding at this time and agree with all information. All questions answered. Pt and family have no further questions or concerns at this time. Pt is ready for admit.       [facility]                  ED Medication(s):  Medications   azithromycin 500 mg in dextrose 5 % 250 mL IVPB (ready to mix system) (500 mg Intravenous New Bag 7/11/20 1217)   lisinopriL tablet 20 mg (has no administration in time range)   sodium chloride 0.9% flush 10 mL (has no administration in time range)   dexAMETHasone tablet 6 mg (has no administration in time range)   glucose chewable tablet 16 g (has no administration in time range)   glucose chewable tablet 24 g (has no administration in time range)   dextrose 50% injection 12.5 g (has no administration in time range)   dextrose 50% injection 25 g (has no administration in time range)   glucagon (human recombinant) injection 1 mg (has no administration in time range)   ondansetron injection 4 mg (has no administration in time range)   promethazine (PHENERGAN) 6.25 mg in dextrose 5 % 50 mL IVPB (has no administration in time range)   insulin aspart U-100 pen 0-5 Units (has no administration in time range)   albuterol inhaler 2 puff (has no administration in time range)   ascorbic acid (vitamin C) tablet 500 mg (has no administration in time range)   multivitamin tablet (has no administration in time range)   oxyCODONE immediate release tablet 5 mg (has no administration in time range)   azithromycin tablet 500 mg (has no administration in time range)   ibuprofen tablet 400 mg (has no administration in time range)   0.9%  NaCl infusion (has no administration in time range)   sodium chloride 0.9% bolus 2,784 mL (0 mL/kg × 92.8 kg (Adjusted) Intravenous  Stopped 7/11/20 1200)   ibuprofen tablet 600 mg (600 mg Oral Given 7/11/20 1021)   promethazine (PHENERGAN) 12.5 mg in dextrose 5 % 50 mL IVPB (0 mg Intravenous Stopped 7/11/20 1156)   cefTRIAXone (ROCEPHIN) 1 g/50 mL D5W IVPB (0 g Intravenous Stopped 7/11/20 1314)       New Prescriptions    No medications on file               Scribe Attestation:   Scribe #1: I performed the above scribed service and the documentation accurately describes the services I performed. I attest to the accuracy of the note.     Attending:   Physician Attestation Statement for Scribe #1: I, Garcia Thompson MD, personally performed the services described in this documentation, as scribed by Dacia Cole, in my presence, and it is both accurate and complete.           Clinical Impression       ICD-10-CM ICD-9-CM   1. Pneumonia of right lower lobe due to infectious organism  J18.1 486   2. Tachycardia  R00.0 785.0   3. Hypoxia  R09.02 799.02   4. Fever, unspecified fever cause  R50.9 780.60   5. COVID-19 virus detected  U07.1    6. COVID-19 virus infection  U07.1        Disposition:   Disposition: Placed in Observation  Condition: Fair         Garcia Thompson MD  07/11/20 0751

## 2020-07-11 NOTE — ASSESSMENT & PLAN NOTE
- Pt normally takes lisinopril-hctz 20-12.5 po daily  - Will hold hctz and continue lisinopril at current dose and frequency

## 2020-07-12 NOTE — ASSESSMENT & PLAN NOTE
- Na+ 132 >>>> 136  - Pt received 30 mL/kg fluid resuscitation with NS in ED  - Hold hctz  - Continue IVF  - Repeat BMP in AM

## 2020-07-12 NOTE — SUBJECTIVE & OBJECTIVE
Interval History: Pt denies any SOB, DRAKE. Is taking prn antiemetic for nausea.     Review of Systems   Constitutional: Positive for activity change, appetite change, chills, fatigue and fever.   HENT: Negative for trouble swallowing.    Eyes: Negative for visual disturbance.   Respiratory: Positive for cough. Negative for apnea, choking, chest tightness, shortness of breath, wheezing and stridor.    Cardiovascular: Negative for chest pain, palpitations and leg swelling.   Gastrointestinal: Negative for abdominal pain, constipation, diarrhea, nausea and vomiting.   Genitourinary: Negative for decreased urine volume, difficulty urinating, dysuria, frequency and urgency.   Musculoskeletal: Negative for arthralgias, back pain, gait problem, joint swelling, myalgias, neck pain and neck stiffness.   Skin: Negative for color change.   Neurological: Positive for weakness. Negative for dizziness, tremors, seizures, syncope, facial asymmetry, speech difficulty, light-headedness, numbness and headaches.   Psychiatric/Behavioral: Negative for agitation, behavioral problems and confusion.     Objective:     Vital Signs (Most Recent):  Temp: 98.7 °F (37.1 °C) (07/12/20 1153)  Pulse: 88 (07/12/20 1153)  Resp: 18 (07/12/20 1153)  BP: (!) 110/58 (07/12/20 1153)  SpO2: 95 % (07/12/20 1153) Vital Signs (24h Range):  Temp:  [98.6 °F (37 °C)-102.6 °F (39.2 °C)] 98.7 °F (37.1 °C)  Pulse:  [] 88  Resp:  [17-22] 18  SpO2:  [95 %-99 %] 95 %  BP: (110-163)/(57-86) 110/58     Weight: 135.6 kg (298 lb 15.1 oz)  Body mass index is 46.82 kg/m².    Intake/Output Summary (Last 24 hours) at 7/12/2020 1326  Last data filed at 7/12/2020 0500  Gross per 24 hour   Intake 460 ml   Output 600 ml   Net -140 ml      Physical Exam  Vitals signs and nursing note reviewed.   Constitutional:       General: He is awake. He is not in acute distress.     Appearance: Normal appearance. He is not ill-appearing, toxic-appearing or diaphoretic.       Interventions: Nasal cannula in place.   HENT:      Head: Normocephalic and atraumatic.      Mouth/Throat:      Mouth: Mucous membranes are moist.   Eyes:      Extraocular Movements: Extraocular movements intact.      Pupils: Pupils are equal, round, and reactive to light.   Neck:      Musculoskeletal: Normal range of motion and neck supple. No neck rigidity or muscular tenderness.   Cardiovascular:      Rate and Rhythm: Normal rate and regular rhythm.      Heart sounds: Normal heart sounds.   Pulmonary:      Effort: Pulmonary effort is normal. No tachypnea, accessory muscle usage or respiratory distress.      Breath sounds: Examination of the right-middle field reveals decreased breath sounds. Examination of the right-lower field reveals decreased breath sounds. Decreased breath sounds present.      Comments: Currently on 2LPM via NC sating 97%  Abdominal:      General: Bowel sounds are normal. There is no distension.      Palpations: Abdomen is soft.      Tenderness: There is no abdominal tenderness. There is no guarding or rebound.   Genitourinary:     Comments: Deferred  Musculoskeletal:      Right lower leg: No edema.      Left lower leg: No edema.   Skin:     General: Skin is warm and dry.      Capillary Refill: Capillary refill takes less than 2 seconds.   Neurological:      General: No focal deficit present.      Mental Status: He is alert, oriented to person, place, and time and easily aroused. Mental status is at baseline.   Psychiatric:         Mood and Affect: Mood normal.         Behavior: Behavior normal. Behavior is cooperative.         Thought Content: Thought content normal.         Significant Labs:   CBC:   Recent Labs   Lab 07/11/20  1005 07/11/20  1358   WBC 4.91 4.25   HGB 15.7 13.8*   HCT 45.0 39.8*    175     CMP:   Recent Labs   Lab 07/11/20  1005 07/12/20  0009   * 136   K 3.5 4.1   CL 95 101   CO2 24 23    131*   BUN 17 12   CREATININE 1.3 1.1   CALCIUM 8.7 8.4*   PROT  7.3 6.4   ALBUMIN 3.7 3.1*   BILITOT 0.5 0.4   ALKPHOS 48* 43*   AST 34 33   ALT 41 36   ANIONGAP 13 12   EGFRNONAA >60 >60     All pertinent labs within the past 24 hours have been reviewed.    Significant Imaging: I have reviewed all pertinent imaging results/findings within the past 24 hours.

## 2020-07-12 NOTE — ASSESSMENT & PLAN NOTE
- Sepsis criteria: Current temp of 101.3, RR 21, source PNA due to COVID-19 virus  - Blood cultures obtained >>>>> NGTD  - Influenza pending  - Recent COVID test outpt on 07/06/ 20 negative. Repeat COVID today in ED (+)  - Will treat underlying cause with abx, supplemental oxygen, MDI, oral steroids per COVID-19 protocol

## 2020-07-12 NOTE — PROGRESS NOTES
[facility] Hospital Medicine  Progress Note    Patient Name: Jonas Abdalla  MRN: [id number]  Patient Class: OP- Observation   Admission Date: 7/11/2020  Length of Stay: 0 days  Attending Physician: Enmanuel Hu MD  Primary Care Provider: Carmel Tabares MD        Subjective:     Principal Problem:Pneumonia due to COVID-19 virus        HPI:  Jonas Abdalla is a 35 year old male with a pMHx of HTN who presented to the Emergency Department with c/o SOB. Associated symptoms include: generalized weakness, fatigue, and fever. Pt reports symptoms started Monday, 07/06. He reports he thought he had COVID and went to get tested taht day but did not get test results. Went to local urgent care on Tuesday, 07/07 -- diagnosed with PNA and given Levaquin 500 mg po daily. Symptoms continued despite abx and wife took pt to COVID testing site which he was negative for COVID. Wife reports the COVID test he took on Monday came back negative, notified yesterday of results. ED workup showed: no leukocytosis/leukopenia, stable Hgb/Hct, mild hyponatremia. CXR showed focal right infrahilar/medial lung base consolidation concerning for pneumonia. Febrile upon arrival to . Pt received 30mL/kg fluid resuscitation in ED along with one time doses of IV rocephin/azithromycin. Pt placed on observation for Fever believed to be secondary to RML PNA. COVID rapid screening pending upon assessment, now POSITIVE.     Overview/Hospital Course:  Pt presented to the ED due to weakness, nausea/vomiting with concerns for dehydration. COVID ++. Running temps 103, 102.4, 101.8. He denied any SOB and DRAKE. He describes an occasional dry cough. On 2 L nasal cannula. On azithromycin, Rocephin, decadron and scheduled albuterol.     Interval History: Pt denies any SOB, DRAKE. Is taking prn antiemetic for nausea.     Review of Systems   Constitutional: Positive for activity change, appetite change, chills, fatigue and fever.    HENT: Negative for trouble swallowing.    Eyes: Negative for visual disturbance.   Respiratory: Positive for cough. Negative for apnea, choking, chest tightness, shortness of breath, wheezing and stridor.    Cardiovascular: Negative for chest pain, palpitations and leg swelling.   Gastrointestinal: Negative for abdominal pain, constipation, diarrhea, nausea and vomiting.   Genitourinary: Negative for decreased urine volume, difficulty urinating, dysuria, frequency and urgency.   Musculoskeletal: Negative for arthralgias, back pain, gait problem, joint swelling, myalgias, neck pain and neck stiffness.   Skin: Negative for color change.   Neurological: Positive for weakness. Negative for dizziness, tremors, seizures, syncope, facial asymmetry, speech difficulty, light-headedness, numbness and headaches.   Psychiatric/Behavioral: Negative for agitation, behavioral problems and confusion.     Objective:     Vital Signs (Most Recent):  Temp: 98.7 °F (37.1 °C) (07/12/20 1153)  Pulse: 88 (07/12/20 1153)  Resp: 18 (07/12/20 1153)  BP: (!) 110/58 (07/12/20 1153)  SpO2: 95 % (07/12/20 1153) Vital Signs (24h Range):  Temp:  [98.6 °F (37 °C)-102.6 °F (39.2 °C)] 98.7 °F (37.1 °C)  Pulse:  [] 88  Resp:  [17-22] 18  SpO2:  [95 %-99 %] 95 %  BP: (110-163)/(57-86) 110/58     Weight: 135.6 kg (298 lb 15.1 oz)  Body mass index is 46.82 kg/m².    Intake/Output Summary (Last 24 hours) at 7/12/2020 1326  Last data filed at 7/12/2020 0500  Gross per 24 hour   Intake 460 ml   Output 600 ml   Net -140 ml      Physical Exam  Vitals signs and nursing note reviewed.   Constitutional:       General: He is awake. He is not in acute distress.     Appearance: Normal appearance. He is not ill-appearing, toxic-appearing or diaphoretic.      Interventions: Nasal cannula in place.   HENT:      Head: Normocephalic and atraumatic.      Mouth/Throat:      Mouth: Mucous membranes are moist.   Eyes:      Extraocular Movements: Extraocular movements  intact.      Pupils: Pupils are equal, round, and reactive to light.   Neck:      Musculoskeletal: Normal range of motion and neck supple. No neck rigidity or muscular tenderness.   Cardiovascular:      Rate and Rhythm: Normal rate and regular rhythm.      Heart sounds: Normal heart sounds.   Pulmonary:      Effort: Pulmonary effort is normal. No tachypnea, accessory muscle usage or respiratory distress.      Breath sounds: Examination of the right-middle field reveals decreased breath sounds. Examination of the right-lower field reveals decreased breath sounds. Decreased breath sounds present.      Comments: Currently on 2LPM via NC sating 97%  Abdominal:      General: Bowel sounds are normal. There is no distension.      Palpations: Abdomen is soft.      Tenderness: There is no abdominal tenderness. There is no guarding or rebound.   Genitourinary:     Comments: Deferred  Musculoskeletal:      Right lower leg: No edema.      Left lower leg: No edema.   Skin:     General: Skin is warm and dry.      Capillary Refill: Capillary refill takes less than 2 seconds.   Neurological:      General: No focal deficit present.      Mental Status: He is alert, oriented to person, place, and time and easily aroused. Mental status is at baseline.   Psychiatric:         Mood and Affect: Mood normal.         Behavior: Behavior normal. Behavior is cooperative.         Thought Content: Thought content normal.         Significant Labs:   CBC:   Recent Labs   Lab 07/11/20  1005 07/11/20  1358   WBC 4.91 4.25   HGB 15.7 13.8*   HCT 45.0 39.8*    175     CMP:   Recent Labs   Lab 07/11/20  1005 07/12/20  0009   * 136   K 3.5 4.1   CL 95 101   CO2 24 23    131*   BUN 17 12   CREATININE 1.3 1.1   CALCIUM 8.7 8.4*   PROT 7.3 6.4   ALBUMIN 3.7 3.1*   BILITOT 0.5 0.4   ALKPHOS 48* 43*   AST 34 33   ALT 41 36   ANIONGAP 13 12   EGFRNONAA >60 >60     All pertinent labs within the past 24 hours have been  reviewed.    Significant Imaging: I have reviewed all pertinent imaging results/findings within the past 24 hours.      Assessment/Plan:      * Pneumonia due to COVID-19 virus  - COVID-19 testing Collection Date: 7/11/2020 Collection Time:   11:55 AM  - Infection Control notified     - Isolation:   - Airborne, Contact and Droplet Precautions  - Cohort patients into COVID units  - N95 mask, wear eye protection  - 20 second hand hygiene              - Limit visitors per hospital policy              - Consolidating lab draws, nursing care, provider visits, and interventions    - Diagnostics: (leukopenia, hyponatremia, hyperferritinemia, elevated troponin, elevated d-dimer, age, and comorbidities are significant predictors of poor clinical outcome)  CBC, CMP, Procalcitonin, Ferritin, CRP, LDH, BNP, Troponin, Rapid Flu, Blood Culture x2 and Portable CXR    - Management:  Supplemental O2 to maintain SpO2 >92%  Telemetry  Continuous/intermittent Pulse Ox  Albuterol treatment PRN  IV Rocephin/PO Azithromycin  Dexamethasone   Vit C, MVI  Scheduled albuterol               Sepsis  - Sepsis criteria: Current temp of 101.3, RR 21, source PNA due to COVID-19 virus  - Blood cultures obtained >>>>> NGTD  - Influenza pending  - Recent COVID test outpt on 07/06/ 20 negative. Repeat COVID today in ED (+)  - Will treat underlying cause with abx, supplemental oxygen, MDI, oral steroids per COVID-19 protocol      Hyponatremia  - Na+ 132 >>>> 136  - Pt received 30 mL/kg fluid resuscitation with NS in ED  - Hold hctz  - Continue IVF  - Repeat BMP in AM        Essential hypertension  - Pt normally takes lisinopril-hctz 20-12.5 po daily  - Will hold hctz and continue lisinopril at current dose and frequency        VTE Risk Mitigation (From admission, onward)         Ordered     IP VTE HIGH RISK PATIENT  Once      07/11/20 1301     Place sequential compression device  Until discontinued      07/11/20 1301                      Savita RAMOS  JUWAN Cheung  Department of Hospital Medicine   [facility]

## 2020-07-12 NOTE — ASSESSMENT & PLAN NOTE
- COVID-19 testing Collection Date: 7/11/2020 Collection Time:   11:55 AM  - Infection Control notified     - Isolation:   - Airborne, Contact and Droplet Precautions  - Cohort patients into COVID units  - N95 mask, wear eye protection  - 20 second hand hygiene              - Limit visitors per hospital policy              - Consolidating lab draws, nursing care, provider visits, and interventions    - Diagnostics: (leukopenia, hyponatremia, hyperferritinemia, elevated troponin, elevated d-dimer, age, and comorbidities are significant predictors of poor clinical outcome)  CBC, CMP, Procalcitonin, Ferritin, CRP, LDH, BNP, Troponin, Rapid Flu, Blood Culture x2 and Portable CXR    - Management:  Supplemental O2 to maintain SpO2 >92%  Telemetry  Continuous/intermittent Pulse Ox  Albuterol treatment PRN  IV Rocephin/PO Azithromycin  Dexamethasone   Vit C, MVI  Scheduled albuterol

## 2020-07-12 NOTE — PLAN OF CARE
Pt aao x 4.  VSS minus fever during beginning of shift.  Fever managed with prn ibuprofen.  Denies pain.  Speech clear.  Able to position self.  Frequent weight shifting encouraged.  Plan of care reviewed, pt verbalized understanding.  Educated on sepsis, and infection control, aseptic technique.  Educated on safety, how to utilize call light.  Pt remain free from falls.  2L NC. O2 sat 98%.  Safety interventions maintained.  Will continue to monitor.

## 2020-07-12 NOTE — PLAN OF CARE
Care plan reviewed with patient. Nausea and fever managed by PRN med. Free from falls. Still on oxygen. No other complaints made.

## 2020-07-12 NOTE — HOSPITAL COURSE
Pt presented to the ED due to weakness, nausea/vomiting with concerns for dehydration. COVID ++. Running temps 103, 102.4, 101.8. He denied any SOB and DRAKE. He describes an occasional dry cough. On 2 L nasal cannula. On azithromycin, Rocephin, decadron and scheduled albuterol.   7/13/2020 - pt has continued to be febrile and now reporting SOB and productive cough. Provided remdesivir information and patient has consented. Oxygen escalated to 4 L.  7/14/2020 - Sudden increase to oxygen 10 L, oxygen saturation 85 %. Temp 101.6 at 4 AM this AM. Current plan of care continued.   7/18- Pt was transferred to ICU on 7/15 with worsening respiratory status requiring NIPPV . Pt is awake , alert and oriented today .Mexed out on  vapotherm with hypoxia and placed on  NIPPV / BiPAP support. Remains  Tachypneic, easily desats with exertion or movement . Continue to monitor closely respiratory status  in ICU setting .   7/19 - Intubated electively  overnight due to increased WOB on bipap . Sedated on propofol, fentanyl. Levophed gtt added to maintain MAP>65. Heparin high intensity nomogram continues. Pt completed 5 days Remdesivir. On Decadron x 10 days . WBC count is markedly elevated . Likely reactive . Procalcitinin is normal. Creatinine bumped to 1.3 from 0.8 overnight.   7/2- Remains intubated . Tmax 100. Tachycardia and hypotension noted . On Levo to maintain MAP >65. Vent setting and weaning per ICU. 1st unit of convalescent plasma transfused  Overnight with no adverse effect. WBC trended down to 25.3 from 46.3, Pl down to 265 from 637, D d-rosa 3.7, CRP trended up 218>109.  7/21- Tmax 100.4 last evening and none since . Remains intubated . Some improvement with decreased oxygen demand. fiO2 down to 60%. Completed 2 units of convalescent plasma infusion without adverse effects. WBC slowly trending down 22.1, Hgb 11.6, Pl normal today . Ferritin uptrend 5752, D-dimer 3.57, Creatinine bumps to 1.5 from 1.0 yesterday . LDL  trending down 886  - Fever noted again. tmax 101.3. Noted pt be tachycardic. Remains intubated and sedated . Remains on Levo for to maintain MAP. Noted bolus fluid given. FiO2 bumped to 70%. Current SpO2 is satisfactory . Received 2 unit of convalescent plasma. WBC trending down. Hgb 11.4 , Pl count stable . Serum creatinine improved to 1.0 today     - Tmax 100.3. Overnight developed decompensation with tachycardia , irregular rhythm , vent dyssynchrony , increased oxygen demand required paralytics Nimbex . FiO2 now 100% yet hypoxia. . Labs are fairly stable.     - Tamx 100.2 last night. Remains intubated and sedated . Worsening hypoxia requiring high PEEP. fiO2 remains 100%. Unable to wean. ICU will try prone position today . WBC count bumped . H/H fairly stable. Hyperkalemia is noted in am lab.        20 - This morning patient was deteriorating, became hypoxic while on vent, acidotic, hyperkalemia. Family was notified and presented to MICU. Discussed goals of care and patient wife wanted everything done, Dr. Clark placed vasc cath and HD called out to dialyses patient. At 3:07AM patient went into cardiac arrest, CPR started. Patients wife at bedside informed team to stop and patient . No cardiac, respiratory or neuro activity detected. TOD 3:09AM.

## 2020-07-13 PROBLEM — J18.9 PNEUMONIA OF RIGHT LOWER LOBE DUE TO INFECTIOUS ORGANISM: Status: ACTIVE | Noted: 2020-01-01

## 2020-07-13 PROBLEM — J96.01 ACUTE RESPIRATORY FAILURE WITH HYPOXIA: Status: ACTIVE | Noted: 2020-01-01

## 2020-07-13 PROBLEM — E87.1 HYPONATREMIA: Status: RESOLVED | Noted: 2020-01-01 | Resolved: 2020-01-01

## 2020-07-13 NOTE — CARE UPDATE
Remdesivir FDA EUA Verbal Consent  The patient or parent/caregiver has been provided with the remdesivir Fact Sheet for Patients and Parents/Caregivers and has been counseled that the FDA has authorized the emergency use of remdesivir, which is not an FDA approved drug. The significant known and potential risks and benefits are unknown. The patient or parent/caregiver has been given the option to accept or refuse and has verbally agreed to receive remdesivir. Daily labs will be ordered and monitoring for Serious Adverse Events will be performed.

## 2020-07-13 NOTE — PLAN OF CARE
Initial assessment completed. Unable to meet with patient secondary to isolation/COVID. Contacted patient's wife by phone (Joan @ [phone]). Wife reports that patient was independent with ambulation and ADLs prior to admission. Patient's wife denies any post hospital needs or services at this time.Transitional Care Folder, Discharge Planning Begins on Admission pamphlet, [facility] Bedside Delivery pamphlet, Advance Directive information placed in patient's chart slot. CM name and contact number given to patient's wife. Instructed patient's wife to call with any questions or concerns. Patient's insurance is [employer] of [place] [facility]    PCP: Carmel Tabares MD  Mychart: yes  Bedside delivery: yes        07/13/20 4602   Discharge Assessment   Assessment Type Discharge Planning Assessment   Confirmed/corrected address and phone number on facesheet? Yes   Assessment information obtained from? Other  (Wife gave information by phone- [phone])   Communicated expected length of stay with patient/caregiver no   Prior to hospitilization cognitive status: Alert/Oriented   Prior to hospitalization functional status: Independent   Current cognitive status: Alert/Oriented   Current Functional Status: Independent   Facility Arrived From: home   Lives With spouse   Able to Return to Prior Arrangements yes   Is patient able to care for self after discharge? Yes   Who are your caregiver(s) and their phone number(s)? wife- Joan [facility] - [phone]   Patient's perception of discharge disposition home or selfcare   Readmission Within the Last 30 Days no previous admission in last 30 days   Patient currently being followed by outpatient case management? No   Patient currently receives any other outside agency services? No   Equipment Currently Used at Home none   Part D Coverage n/a   Do you have any problems affording any of your prescribed medications? No   Is the patient taking medications as prescribed? yes   Does the  patient have transportation home? Yes   Transportation Anticipated family or friend will provide   Dialysis Name and Scheduled days n/a   Does the patient receive services at the Coumadin Clinic? No   Discharge Plan A Home;Home with family   Discharge Plan B Home;Home with family   DME Needed Upon Discharge  none   Patient/Family in Agreement with Plan yes     [address] [address] [address] [facility]  [address] [address] [facility] [address]  Phone: [phone] Fax: [phone]    Carmel Tabares MD  Payor: [facility] / Plan: [employer] [place] PPO / Product Type: PPO /

## 2020-07-13 NOTE — ASSESSMENT & PLAN NOTE
- Sepsis criteria: Current temp of 101.3, RR 21, source PNA due to COVID-19 virus  - Blood cultures obtained >>>>> NGTD  - Recent COVID test outpt on 07/06/ 20 negative. Repeat COVID today in ED (+)  - Will treat underlying cause with abx, supplemental oxygen, MDI, oral steroids per COVID-19 protocol

## 2020-07-13 NOTE — ASSESSMENT & PLAN NOTE
- COVID-19 testing Collection Date: 7/11/2020 Collection Time:   11:55 AM  - Infection Control notified     - Isolation:   - Airborne, Contact and Droplet Precautions  - Cohort patients into COVID units  - N95 mask, wear eye protection  - 20 second hand hygiene              - Limit visitors per hospital policy              - Consolidating lab draws, nursing care, provider visits, and interventions    - Diagnostics: (leukopenia, hyponatremia, hyperferritinemia, elevated troponin, elevated d-dimer, age, and comorbidities are significant predictors of poor clinical outcome)  CBC, CMP, Procalcitonin, Ferritin, CRP, LDH, BNP, Troponin, Rapid Flu, Blood Culture x2 and Portable CXR    - Management:  Supplemental O2 to maintain SpO2 >92%  Telemetry  Continuous/intermittent Pulse Ox  Albuterol treatment PRN  IV Rocephin/PO Azithromycin  Dexamethasone   Vit C, MVI  Scheduled albuterol  7/13/2020 - pt agreeable to receive Remdesivir - information received

## 2020-07-13 NOTE — ASSESSMENT & PLAN NOTE
7/13/2020 - worsening hypoxia - 2 L nasal cannula now increased to 4 L  No tachypnea  Albuterol  Encouraged deep breathing and coughing

## 2020-07-13 NOTE — PROGRESS NOTES
[facility] Hospital Medicine  Progress Note    Patient Name: Jonas Abdalla  MRN: [id number]  Patient Class: OP- Observation   Admission Date: 7/11/2020  Length of Stay: 0 days  Attending Physician: Enmanuel Hu MD  Primary Care Provider: Carmel Tabares MD        Subjective:     Principal Problem:Pneumonia due to COVID-19 virus        HPI:  Jonas Abdalla is a 35 year old male with a pMHx of HTN who presented to the Emergency Department with c/o SOB. Associated symptoms include: generalized weakness, fatigue, and fever. Pt reports symptoms started Monday, 07/06. He reports he thought he had COVID and went to get tested taht day but did not get test results. Went to local urgent care on Tuesday, 07/07 -- diagnosed with PNA and given Levaquin 500 mg po daily. Symptoms continued despite abx and wife took pt to COVID testing site which he was negative for COVID. Wife reports the COVID test he took on Monday came back negative, notified yesterday of results. ED workup showed: no leukocytosis/leukopenia, stable Hgb/Hct, mild hyponatremia. CXR showed focal right infrahilar/medial lung base consolidation concerning for pneumonia. Febrile upon arrival to . Pt received 30mL/kg fluid resuscitation in ED along with one time doses of IV rocephin/azithromycin. Pt placed on observation for Fever believed to be secondary to RML PNA. COVID rapid screening pending upon assessment, now POSITIVE.     Overview/Hospital Course:  Pt presented to the ED due to weakness, nausea/vomiting with concerns for dehydration. COVID ++. Running temps 103, 102.4, 101.8. He denied any SOB and DRAKE. He describes an occasional dry cough. On 2 L nasal cannula. On azithromycin, Rocephin, decadron and scheduled albuterol.   7/13/2020 - pt has continued to be febrile and now reporting SOB and productive cough. Provided remdesivir information and patient has consented. Oxygen escalated to 4 L.    Interval History:   Pt describing SOB and productive cough. Running fevers, has nausea.     Review of Systems   Constitutional: Positive for activity change, appetite change, chills, fatigue and fever.   HENT: Negative for trouble swallowing.    Eyes: Negative for visual disturbance.   Respiratory: Positive for cough and shortness of breath. Negative for apnea, choking, chest tightness, wheezing and stridor.    Cardiovascular: Negative for chest pain, palpitations and leg swelling.   Gastrointestinal: Positive for nausea. Negative for abdominal pain, constipation, diarrhea and vomiting.   Genitourinary: Negative for decreased urine volume, difficulty urinating, dysuria, frequency and urgency.   Musculoskeletal: Negative for arthralgias, back pain, gait problem, joint swelling, myalgias, neck pain and neck stiffness.   Skin: Negative for color change.   Neurological: Positive for weakness. Negative for dizziness, tremors, seizures, syncope, facial asymmetry, speech difficulty, light-headedness, numbness and headaches.   Psychiatric/Behavioral: Negative for agitation, behavioral problems and confusion.     Objective:     Vital Signs (Most Recent):  Temp: 99.7 °F (37.6 °C) (07/13/20 1544)  Pulse: (!) 111 (07/13/20 1544)  Resp: 18 (07/13/20 1544)  BP: 132/70 (07/13/20 1544)  SpO2: 95 % (07/13/20 1544) Vital Signs (24h Range):  Temp:  [97.6 °F (36.4 °C)-102.6 °F (39.2 °C)] 99.7 °F (37.6 °C)  Pulse:  [] 111  Resp:  [16-20] 18  SpO2:  [91 %-98 %] 95 %  BP: (110-140)/(56-80) 132/70     Weight: (!) 136.8 kg (301 lb 9.4 oz)  Body mass index is 47.24 kg/m².    Intake/Output Summary (Last 24 hours) at 7/13/2020 1616  Last data filed at 7/13/2020 1600  Gross per 24 hour   Intake 768 ml   Output 1310 ml   Net -542 ml      Physical Exam  Vitals signs and nursing note reviewed.   Constitutional:       General: He is awake. He is not in acute distress.     Appearance: Normal appearance. He is not ill-appearing, toxic-appearing or diaphoretic.       Interventions: Nasal cannula in place.   HENT:      Head: Normocephalic and atraumatic.      Mouth/Throat:      Mouth: Mucous membranes are moist.   Eyes:      Extraocular Movements: Extraocular movements intact.      Pupils: Pupils are equal, round, and reactive to light.   Neck:      Musculoskeletal: Normal range of motion and neck supple. No neck rigidity or muscular tenderness.   Cardiovascular:      Rate and Rhythm: Normal rate and regular rhythm.      Heart sounds: Normal heart sounds.   Pulmonary:      Effort: Pulmonary effort is normal. No tachypnea, accessory muscle usage or respiratory distress.      Breath sounds: Examination of the right-middle field reveals decreased breath sounds. Examination of the right-lower field reveals decreased breath sounds. Decreased breath sounds, wheezing and rales present.   Abdominal:      General: Bowel sounds are normal. There is no distension.      Palpations: Abdomen is soft.      Tenderness: There is no abdominal tenderness. There is no guarding or rebound.   Genitourinary:     Comments: Deferred  Musculoskeletal:      Right lower leg: No edema.      Left lower leg: No edema.   Skin:     General: Skin is warm and dry.      Capillary Refill: Capillary refill takes less than 2 seconds.   Neurological:      General: No focal deficit present.      Mental Status: He is alert, oriented to person, place, and time and easily aroused. Mental status is at baseline.   Psychiatric:         Mood and Affect: Mood normal.         Behavior: Behavior normal. Behavior is cooperative.         Thought Content: Thought content normal.         Significant Labs:   CBC:   Recent Labs   Lab 07/13/20  1415   WBC 7.40   HGB 13.8*   HCT 42.0        CMP:   Recent Labs   Lab 07/12/20  0009      K 4.1      CO2 23   *   BUN 12   CREATININE 1.1   CALCIUM 8.4*   PROT 6.4   ALBUMIN 3.1*   BILITOT 0.4   ALKPHOS 43*   AST 33   ALT 36   ANIONGAP 12   EGFRNONAA >60     All  pertinent labs within the past 24 hours have been reviewed.    Significant Imaging: I have reviewed all pertinent imaging results/findings within the past 24 hours.      Assessment/Plan:      * Pneumonia due to COVID-19 virus  - COVID-19 testing Collection Date: 7/11/2020 Collection Time:   11:55 AM  - Infection Control notified     - Isolation:   - Airborne, Contact and Droplet Precautions  - Cohort patients into COVID units  - N95 mask, wear eye protection  - 20 second hand hygiene              - Limit visitors per hospital policy              - Consolidating lab draws, nursing care, provider visits, and interventions    - Diagnostics: (leukopenia, hyponatremia, hyperferritinemia, elevated troponin, elevated d-dimer, age, and comorbidities are significant predictors of poor clinical outcome)  CBC, CMP, Procalcitonin, Ferritin, CRP, LDH, BNP, Troponin, Rapid Flu, Blood Culture x2 and Portable CXR    - Management:  Supplemental O2 to maintain SpO2 >92%  Telemetry  Continuous/intermittent Pulse Ox  Albuterol treatment PRN  IV Rocephin/PO Azithromycin  Dexamethasone   Vit C, MVI  Scheduled albuterol  7/13/2020 - pt agreeable to receive Remdesivir - information received               Acute respiratory failure with hypoxia  7/13/2020 - worsening hypoxia - 2 L nasal cannula now increased to 4 L  No tachypnea  Albuterol  Encouraged deep breathing and coughing      Sepsis  - Sepsis criteria: Current temp of 101.3, RR 21, source PNA due to COVID-19 virus  - Blood cultures obtained >>>>> NGTD  - Recent COVID test outpt on 07/06/ 20 negative. Repeat COVID today in ED (+)  - Will treat underlying cause with abx, supplemental oxygen, MDI, oral steroids per COVID-19 protocol      Essential hypertension  - Pt normally takes lisinopril-hctz 20-12.5 po daily  - Will hold hctz and continue lisinopril at current dose and frequency        VTE Risk Mitigation (From admission, onward)         Ordered     IP VTE HIGH RISK PATIENT  Once       07/11/20 1301     Place sequential compression device  Until discontinued      07/11/20 1301                      Savita Cheung NP  Department of Hospital Medicine   [facility]

## 2020-07-13 NOTE — SUBJECTIVE & OBJECTIVE
Interval History:  Pt describing SOB and productive cough. Running fevers, has nausea.     Review of Systems   Constitutional: Positive for activity change, appetite change, chills, fatigue and fever.   HENT: Negative for trouble swallowing.    Eyes: Negative for visual disturbance.   Respiratory: Positive for cough and shortness of breath. Negative for apnea, choking, chest tightness, wheezing and stridor.    Cardiovascular: Negative for chest pain, palpitations and leg swelling.   Gastrointestinal: Positive for nausea. Negative for abdominal pain, constipation, diarrhea and vomiting.   Genitourinary: Negative for decreased urine volume, difficulty urinating, dysuria, frequency and urgency.   Musculoskeletal: Negative for arthralgias, back pain, gait problem, joint swelling, myalgias, neck pain and neck stiffness.   Skin: Negative for color change.   Neurological: Positive for weakness. Negative for dizziness, tremors, seizures, syncope, facial asymmetry, speech difficulty, light-headedness, numbness and headaches.   Psychiatric/Behavioral: Negative for agitation, behavioral problems and confusion.     Objective:     Vital Signs (Most Recent):  Temp: 99.7 °F (37.6 °C) (07/13/20 1544)  Pulse: (!) 111 (07/13/20 1544)  Resp: 18 (07/13/20 1544)  BP: 132/70 (07/13/20 1544)  SpO2: 95 % (07/13/20 1544) Vital Signs (24h Range):  Temp:  [97.6 °F (36.4 °C)-102.6 °F (39.2 °C)] 99.7 °F (37.6 °C)  Pulse:  [] 111  Resp:  [16-20] 18  SpO2:  [91 %-98 %] 95 %  BP: (110-140)/(56-80) 132/70     Weight: (!) 136.8 kg (301 lb 9.4 oz)  Body mass index is 47.24 kg/m².    Intake/Output Summary (Last 24 hours) at 7/13/2020 1616  Last data filed at 7/13/2020 1600  Gross per 24 hour   Intake 768 ml   Output 1310 ml   Net -542 ml      Physical Exam  Vitals signs and nursing note reviewed.   Constitutional:       General: He is awake. He is not in acute distress.     Appearance: Normal appearance. He is not ill-appearing, toxic-appearing  or diaphoretic.      Interventions: Nasal cannula in place.   HENT:      Head: Normocephalic and atraumatic.      Mouth/Throat:      Mouth: Mucous membranes are moist.   Eyes:      Extraocular Movements: Extraocular movements intact.      Pupils: Pupils are equal, round, and reactive to light.   Neck:      Musculoskeletal: Normal range of motion and neck supple. No neck rigidity or muscular tenderness.   Cardiovascular:      Rate and Rhythm: Normal rate and regular rhythm.      Heart sounds: Normal heart sounds.   Pulmonary:      Effort: Pulmonary effort is normal. No tachypnea, accessory muscle usage or respiratory distress.      Breath sounds: Examination of the right-middle field reveals decreased breath sounds. Examination of the right-lower field reveals decreased breath sounds. Decreased breath sounds, wheezing and rales present.   Abdominal:      General: Bowel sounds are normal. There is no distension.      Palpations: Abdomen is soft.      Tenderness: There is no abdominal tenderness. There is no guarding or rebound.   Genitourinary:     Comments: Deferred  Musculoskeletal:      Right lower leg: No edema.      Left lower leg: No edema.   Skin:     General: Skin is warm and dry.      Capillary Refill: Capillary refill takes less than 2 seconds.   Neurological:      General: No focal deficit present.      Mental Status: He is alert, oriented to person, place, and time and easily aroused. Mental status is at baseline.   Psychiatric:         Mood and Affect: Mood normal.         Behavior: Behavior normal. Behavior is cooperative.         Thought Content: Thought content normal.         Significant Labs:   CBC:   Recent Labs   Lab 07/13/20  1415   WBC 7.40   HGB 13.8*   HCT 42.0        CMP:   Recent Labs   Lab 07/12/20  0009      K 4.1      CO2 23   *   BUN 12   CREATININE 1.1   CALCIUM 8.4*   PROT 6.4   ALBUMIN 3.1*   BILITOT 0.4   ALKPHOS 43*   AST 33   ALT 36   ANIONGAP 12    EGFRNONAA >60     All pertinent labs within the past 24 hours have been reviewed.    Significant Imaging: I have reviewed all pertinent imaging results/findings within the past 24 hours.

## 2020-07-13 NOTE — PLAN OF CARE
Pt aao x 4.  Fever managed with prn ibuprofen.  Denies pain.  Speech clear.  Able to position self.  Frequent weight shifting encouraged.  Plan of care reviewed, pt verbalized understanding.  Educated on safety, and fall precautions.  Pt remain free from falls.  2L NC. O2 sat 96%.  Safety interventions maintained.  Will continue to monitor

## 2020-07-13 NOTE — PLAN OF CARE
Care plan reviewed with patient. No nausea as of this time. Afebrile. Will try Boost due to  not able too eat his meals. Still on oxygen. No other complaints made.

## 2020-07-13 NOTE — PROGRESS NOTES
Oxygen Evaluation    1) Patient's O2 Sat on room air while at rest: 93%        If at rest Sat is 89% or above, continue.    2) Patient's O2 Sat on room air while exercisin%         If exercise Sat is 88% or below, continue.    3) Patient's O2 Sat while exercising on O2: 93% at 2 LPM         (Must show improvement from #2 for patients to qualify)    If exercise Sat on O2 shows improvement from #2, this patient qualifies for portable Oxygen.  If not, the patient does not qualify.

## 2020-07-14 NOTE — SUBJECTIVE & OBJECTIVE
Interval History:  Had SOB, DRAKE, weakness. Presently, sitting in high fowlers position. Respirations even and unlabored. No SOB at present. Able to complete sentences with SOB. Encouraged use of IS, cough, deep breath.     Review of Systems   Constitutional: Positive for activity change, appetite change, chills, fatigue and fever.   HENT: Negative for trouble swallowing.    Eyes: Negative for visual disturbance.   Respiratory: Positive for cough and shortness of breath. Negative for apnea, choking, chest tightness, wheezing and stridor.    Cardiovascular: Negative for chest pain, palpitations and leg swelling.   Gastrointestinal: Positive for nausea. Negative for abdominal pain, constipation, diarrhea and vomiting.   Genitourinary: Negative for decreased urine volume, difficulty urinating, dysuria, frequency and urgency.   Musculoskeletal: Negative for arthralgias, back pain, gait problem, joint swelling, myalgias, neck pain and neck stiffness.   Skin: Negative for color change.   Neurological: Positive for weakness. Negative for dizziness, tremors, seizures, syncope, facial asymmetry, speech difficulty, light-headedness, numbness and headaches.   Psychiatric/Behavioral: Negative for agitation, behavioral problems and confusion.     Objective:     Vital Signs (Most Recent):  Temp: 99.5 °F (37.5 °C) (07/14/20 1125)  Pulse: 103 (07/14/20 1125)  Resp: 18 (07/14/20 1125)  BP: (!) 112/59 (07/14/20 1125)  SpO2: (!) 90 % (07/14/20 1240) Vital Signs (24h Range):  Temp:  [99.3 °F (37.4 °C)-102.5 °F (39.2 °C)] 99.5 °F (37.5 °C)  Pulse:  [] 103  Resp:  [18-24] 18  SpO2:  [85 %-97 %] 90 %  BP: (112-143)/(56-91) 112/59     Weight: (!) 136.6 kg (301 lb 2.4 oz)  Body mass index is 47.17 kg/m².    Intake/Output Summary (Last 24 hours) at 7/14/2020 1250  Last data filed at 7/14/2020 0425  Gross per 24 hour   Intake 1130 ml   Output 1000 ml   Net 130 ml      Physical Exam  Vitals signs and nursing note reviewed.    Constitutional:       General: He is awake. He is not in acute distress.     Appearance: Normal appearance. He is not ill-appearing, toxic-appearing or diaphoretic.      Interventions: Nasal cannula in place.   HENT:      Head: Normocephalic and atraumatic.      Mouth/Throat:      Mouth: Mucous membranes are moist.   Eyes:      Extraocular Movements: Extraocular movements intact.      Pupils: Pupils are equal, round, and reactive to light.   Neck:      Musculoskeletal: Normal range of motion and neck supple. No neck rigidity or muscular tenderness.   Cardiovascular:      Rate and Rhythm: Normal rate and regular rhythm.      Heart sounds: Normal heart sounds.   Pulmonary:      Effort: Pulmonary effort is normal. No tachypnea, accessory muscle usage or respiratory distress.      Breath sounds: Examination of the right-middle field reveals decreased breath sounds. Examination of the right-lower field reveals decreased breath sounds. Decreased breath sounds, wheezing and rales present.   Abdominal:      General: Bowel sounds are normal. There is no distension.      Palpations: Abdomen is soft.      Tenderness: There is no abdominal tenderness. There is no guarding or rebound.   Genitourinary:     Comments: Deferred  Musculoskeletal:      Right lower leg: No edema.      Left lower leg: No edema.   Skin:     General: Skin is warm and dry.      Capillary Refill: Capillary refill takes less than 2 seconds.   Neurological:      General: No focal deficit present.      Mental Status: He is alert, oriented to person, place, and time and easily aroused. Mental status is at baseline.   Psychiatric:         Mood and Affect: Mood normal.         Behavior: Behavior normal. Behavior is cooperative.         Thought Content: Thought content normal.         Significant Labs:   CBC:   Recent Labs   Lab 07/13/20  1415   WBC 7.40   HGB 13.8*   HCT 42.0        CMP:   Recent Labs   Lab 07/14/20  0543   *   K 4.3   CL 98   CO2  25   *   BUN 16   CREATININE 0.9   CALCIUM 8.4*   PROT 6.4   ALBUMIN 3.0*   BILITOT 0.7   ALKPHOS 40*   AST 61*   ALT 55*   ANIONGAP 11   EGFRNONAA >60     All pertinent labs within the past 24 hours have been reviewed.    Significant Imaging: I have reviewed all pertinent imaging results/findings within the past 24 hours.

## 2020-07-14 NOTE — PLAN OF CARE
Pt aao x 4.  Fever managed with Ibuprofen  Denies pain.  Speech clear.  Able to position self.  Frequent weight shifting encouraged.  Plan of care reviewed, pt verbalized understanding.  Educated on oxygen therapy.   Pt remain free from falls.  Safety interventions maintained.  Will continue to monitor

## 2020-07-14 NOTE — ASSESSMENT & PLAN NOTE
7/13/2020 - worsening hypoxia - 2 L nasal cannula now increased to 4 L  No tachypnea  Albuterol  Encouraged deep breathing and coughing  7/14/2020 - increased oxygen demand to 10 L high flow oxygen. No obvious respiratory distress currently

## 2020-07-14 NOTE — SIGNIFICANT EVENT
Deterioration alert received via Secure Chat    Vitals signs noted with tachycardia and pt hypoxic 85 %    His now on 10 L Vapotherm applied via Respiratory      Pt is awake and alert and sitting in Hi Fowlers position in bed    Mild tachypnea - not obviously distressed at present    Encouraged use of IS, deep breathing and cough    To monitor closely

## 2020-07-14 NOTE — PROGRESS NOTES
[facility] Hospital Medicine  Progress Note    Patient Name: Jonas Abdalla  MRN: [id number]  Patient Class: IP- Inpatient   Admission Date: 7/11/2020  Length of Stay: 1 days  Attending Physician: Enmanuel Hu MD  Primary Care Provider: Carmel Tabares MD        Subjective:     Principal Problem:Pneumonia due to COVID-19 virus        HPI:  Jonas Abdalla is a 35 year old male with a pMHx of HTN who presented to the Emergency Department with c/o SOB. Associated symptoms include: generalized weakness, fatigue, and fever. Pt reports symptoms started Monday, 07/06. He reports he thought he had COVID and went to get tested taht day but did not get test results. Went to local urgent care on Tuesday, 07/07 -- diagnosed with PNA and given Levaquin 500 mg po daily. Symptoms continued despite abx and wife took pt to COVID testing site which he was negative for COVID. Wife reports the COVID test he took on Monday came back negative, notified yesterday of results. ED workup showed: no leukocytosis/leukopenia, stable Hgb/Hct, mild hyponatremia. CXR showed focal right infrahilar/medial lung base consolidation concerning for pneumonia. Febrile upon arrival to . Pt received 30mL/kg fluid resuscitation in ED along with one time doses of IV rocephin/azithromycin. Pt placed on observation for Fever believed to be secondary to RML PNA. COVID rapid screening pending upon assessment, now POSITIVE.     Overview/Hospital Course:  Pt presented to the ED due to weakness, nausea/vomiting with concerns for dehydration. COVID ++. Running temps 103, 102.4, 101.8. He denied any SOB and DRAKE. He describes an occasional dry cough. On 2 L nasal cannula. On azithromycin, Rocephin, decadron and scheduled albuterol.   7/13/2020 - pt has continued to be febrile and now reporting SOB and productive cough. Provided remdesivir information and patient has consented. Oxygen escalated to 4 L.  7/14/2020 - Sudden  increase to oxygen 10 L, oxygen saturation 85 %. Temp 101.6 at 4 AM this AM. Current plan of care continued.     Interval History:  Had SOB, DRAKE, weakness. Presently, sitting in high fowlers position. Respirations even and unlabored. No SOB at present. Able to complete sentences with SOB. Encouraged use of IS, cough, deep breath.     Review of Systems   Constitutional: Positive for activity change, appetite change, chills, fatigue and fever.   HENT: Negative for trouble swallowing.    Eyes: Negative for visual disturbance.   Respiratory: Positive for cough and shortness of breath. Negative for apnea, choking, chest tightness, wheezing and stridor.    Cardiovascular: Negative for chest pain, palpitations and leg swelling.   Gastrointestinal: Positive for nausea. Negative for abdominal pain, constipation, diarrhea and vomiting.   Genitourinary: Negative for decreased urine volume, difficulty urinating, dysuria, frequency and urgency.   Musculoskeletal: Negative for arthralgias, back pain, gait problem, joint swelling, myalgias, neck pain and neck stiffness.   Skin: Negative for color change.   Neurological: Positive for weakness. Negative for dizziness, tremors, seizures, syncope, facial asymmetry, speech difficulty, light-headedness, numbness and headaches.   Psychiatric/Behavioral: Negative for agitation, behavioral problems and confusion.     Objective:     Vital Signs (Most Recent):  Temp: 99.5 °F (37.5 °C) (07/14/20 1125)  Pulse: 103 (07/14/20 1125)  Resp: 18 (07/14/20 1125)  BP: (!) 112/59 (07/14/20 1125)  SpO2: (!) 90 % (07/14/20 1240) Vital Signs (24h Range):  Temp:  [99.3 °F (37.4 °C)-102.5 °F (39.2 °C)] 99.5 °F (37.5 °C)  Pulse:  [] 103  Resp:  [18-24] 18  SpO2:  [85 %-97 %] 90 %  BP: (112-143)/(56-91) 112/59     Weight: (!) 136.6 kg (301 lb 2.4 oz)  Body mass index is 47.17 kg/m².    Intake/Output Summary (Last 24 hours) at 7/14/2020 1250  Last data filed at 7/14/2020 0423  Gross per 24 hour   Intake  1130 ml   Output 1000 ml   Net 130 ml      Physical Exam  Vitals signs and nursing note reviewed.   Constitutional:       General: He is awake. He is not in acute distress.     Appearance: Normal appearance. He is not ill-appearing, toxic-appearing or diaphoretic.      Interventions: Nasal cannula in place.   HENT:      Head: Normocephalic and atraumatic.      Mouth/Throat:      Mouth: Mucous membranes are moist.   Eyes:      Extraocular Movements: Extraocular movements intact.      Pupils: Pupils are equal, round, and reactive to light.   Neck:      Musculoskeletal: Normal range of motion and neck supple. No neck rigidity or muscular tenderness.   Cardiovascular:      Rate and Rhythm: Normal rate and regular rhythm.      Heart sounds: Normal heart sounds.   Pulmonary:      Effort: Pulmonary effort is normal. No tachypnea, accessory muscle usage or respiratory distress.      Breath sounds: Examination of the right-middle field reveals decreased breath sounds. Examination of the right-lower field reveals decreased breath sounds. Decreased breath sounds, wheezing and rales present.   Abdominal:      General: Bowel sounds are normal. There is no distension.      Palpations: Abdomen is soft.      Tenderness: There is no abdominal tenderness. There is no guarding or rebound.   Genitourinary:     Comments: Deferred  Musculoskeletal:      Right lower leg: No edema.      Left lower leg: No edema.   Skin:     General: Skin is warm and dry.      Capillary Refill: Capillary refill takes less than 2 seconds.   Neurological:      General: No focal deficit present.      Mental Status: He is alert, oriented to person, place, and time and easily aroused. Mental status is at baseline.   Psychiatric:         Mood and Affect: Mood normal.         Behavior: Behavior normal. Behavior is cooperative.         Thought Content: Thought content normal.         Significant Labs:   CBC:   Recent Labs   Lab 07/13/20  1415   WBC 7.40   HGB 13.8*    HCT 42.0        CMP:   Recent Labs   Lab 07/14/20  0543   *   K 4.3   CL 98   CO2 25   *   BUN 16   CREATININE 0.9   CALCIUM 8.4*   PROT 6.4   ALBUMIN 3.0*   BILITOT 0.7   ALKPHOS 40*   AST 61*   ALT 55*   ANIONGAP 11   EGFRNONAA >60     All pertinent labs within the past 24 hours have been reviewed.    Significant Imaging: I have reviewed all pertinent imaging results/findings within the past 24 hours.      Assessment/Plan:      * Pneumonia due to COVID-19 virus  - COVID-19 testing Collection Date: 7/11/2020 Collection Time:   11:55 AM  - Infection Control notified     - Isolation:   - Airborne, Contact and Droplet Precautions  - Cohort patients into COVID units  - N95 mask, wear eye protection  - 20 second hand hygiene              - Limit visitors per hospital policy              - Consolidating lab draws, nursing care, provider visits, and interventions    - Diagnostics: (leukopenia, hyponatremia, hyperferritinemia, elevated troponin, elevated d-dimer, age, and comorbidities are significant predictors of poor clinical outcome)  CBC, CMP, Procalcitonin, Ferritin, CRP, LDH, BNP, Troponin, Rapid Flu, Blood Culture x2 and Portable CXR    - Management:  Supplemental O2 to maintain SpO2 >92%  Telemetry  Continuous/intermittent Pulse Ox  Albuterol treatment PRN  IV Rocephin/PO Azithromycin  Dexamethasone   Vit C, MVI  Scheduled albuterol  7/13/2020 - pt agreeable to receive Remdesivir - information received               Pneumonia of right lower lobe due to infectious organism  IV Rocephin/Azithromycin  - empiric treatment  Supplemental oxygen  Sputum culture    Acute respiratory failure with hypoxia  7/13/2020 - worsening hypoxia - 2 L nasal cannula now increased to 4 L  No tachypnea  Albuterol  Encouraged deep breathing and coughing  7/14/2020 - increased oxygen demand to 10 L high flow oxygen. No obvious respiratory distress currently      Sepsis  - Sepsis criteria: Current temp of 101.3, RR  21, source PNA due to COVID-19 virus  - Blood cultures obtained >>>>> NGTD  - Recent COVID test outpt on 07/06/ 20 negative. Repeat COVID today in ED (+)  - Will treat underlying cause with abx, supplemental oxygen, MDI, oral steroids per COVID-19 protocol      Essential hypertension  - Pt normally takes lisinopril-hctz 20-12.5 po daily  - Will hold hctz and continue lisinopril at current dose and frequency        VTE Risk Mitigation (From admission, onward)         Ordered     IP VTE HIGH RISK PATIENT  Once      07/11/20 1301     Place sequential compression device  Until discontinued      07/11/20 1301                      Savita Cheung NP  Department of Hospital Medicine   [facility]

## 2020-07-14 NOTE — PLAN OF CARE
Pt aao x 4.  Fever managed with prn ibuprofen.  Denies pain.  Speech clear.  Able to position self.  Frequent weight shifting encouraged.  Plan of care reviewed, pt verbalized understanding.  Educated on oxygen therapy.   Pt remain free from falls.  Safety interventions maintained.  Will continue to monitor

## 2020-07-15 PROBLEM — E66.01 MORBID OBESITY WITH BMI OF 45.0-49.9, ADULT: Chronic | Status: ACTIVE | Noted: 2018-07-26

## 2020-07-15 PROBLEM — I10 ESSENTIAL HYPERTENSION: Chronic | Status: ACTIVE | Noted: 2020-01-01

## 2020-07-15 NOTE — ASSESSMENT & PLAN NOTE
Respiratory/Contact/Droplet Isolation with appropriate PPE -N95 mask, gown, goggles and gloves  Fluid Sparing Resuscitation  Empiric Antibiotics - completed 5 days Rocephin and Zithromax  No Visitors  Consolidation of tests, nursing care and provider visits  LDH and D-dimer increased  Zinc and Vit C  Remdisivir Day #2  Decadron Day #5  CXR worsening

## 2020-07-15 NOTE — NURSING
Patient transferred to ICU with personal belongings at hand, cellphone and . Nurse to clal family in the morning as suggested by patient.

## 2020-07-15 NOTE — SIGNIFICANT EVENT
Patient Deterioration Alert     Deterioration alert received.  Patient seen and examined, reports worsening SOB.  O2 sat 85-88%, RR 30-40 on 15L high flow NC.  Patient placed on VapoTherm, which improved O2 sat to 93% and RR in mid 20s.  Continuous pulse ox placed.  ICU transfer not indicated at present.  Will monitor closely.          Cristel Vazquez, NP

## 2020-07-15 NOTE — HOSPITAL COURSE
7/15 - This AM upright in bed fully awake, alert and responsive not in distress but has tachypnea and mild work of breathing while on NPPV and FiO2 at .95.    7/16 - remains on NIPPV with some decline in oxygen demand with FiO2 0.75 but continued tachypnea; ongoing desaturation with exertion or momentary removal of NIPPV for fluid intake  7/17 - tolerated short time on vapotherm support last evening, returned to NIPPV for sleep and has remained today s/t tachypnea, hypoxia; afebrile but wbc slight trend up 14.4k   7/18 - awake, alert,oriented; continues to alternate max support vapotherm with NIPPV support, tachypnea at baseline and worsens along with drop in pulse ox sat with exertion/movement that slowly recovers with rest  7/19 - respiratory distress overnight; required intubation, heavy sedation, mechanical ventilation, low dose pressor support; post intubation abg shows continued worsening acidosis along with now worsening leukocytosis, ddimer, LD, and CRP  7/20 - remains intubated and sedated on mechanical ventilation with inverse ratio ventilation, some decrease in oxygen demand today; tmax yesterday 101.6; requiring low dose pressor support with sedation  7/21 - remains intubated and sedated on mechanical inverse ratio ventilation, oxygen demand down to 60%; t max 100.4; tolerating TF  7/22 - remains intubated and sedated, mode of ventilation adjusted to PCV today with high PEEP, moderate oxygen demand; tmax today 101.8 with wbc down to 14.7k and LD trend down  7/23 - Semi-erect in bed intubated on mech ventilation and sedated heavily on Fentanyl and Propofol infusions.  Requiring some Levophed infusion.  Tolerating TF.  Restless on vent overnight and this AM tachypnic and without vent synchrony.  A-flutter yesterday.    7/24 - Supine in bed sedated on Fentanyl, Propofol and Versed infusions with intubation on mech ventilation.  On low dose Levophed infusion.  Tolerating TF.  Still severe hypoxemia requiring  high PEEP and 100% FiO2.  7/25 - Prone in bed since yesterday afternoon.  Intubated on mech ventilation and sedated on Versed, Propofol and Versed infusions.  Off TF while prone.  On Heparin infusion and low dose Levophed infusion  7/26 - Returned to supine position yesterday afternoon.  Semi-erect in bed intubated on mech ventilation some increased resp distress improved with increasing Versed infusion.  Persistent Hyperkalemia refractory to Rx discussed with Renal post consult and placed VasCath starting RRT.  Still requiring Versed, Fentanyl and Ketamine infusions for sedation.  Weaned off Levophed infusion.  Good UOP.  Heparin infusion stopped due to concern with hyperkalemia  7/27 - Supine in bed intubated still on mech ventilation with high O2 demand heavily sedated on Ketamine, Versed and Fentanyl infusions.  Still off pressors.  Tachycardia persistent in 130s.  Good UOP.  7/28 taken off proning this am, fair urine output  7/29 - Upright in bed intubated on mech ventilation with work of breathing heavily sedated on Ketamine, Fentanyl and Versed infusions.  More hypoxic, tachypnic and dyspnic today compared to yesterday.  Good UOP still + I/O balance.   7/30 - remains intubated, heavily sedated on mechanical ventilation with high PEEP and 100%FiO2; potassium stable  7/31 - remains intubated, heavily sedated on mechanical ventilation with high PEEP and 100%FiO2, prone position overnight and am abg reveals minimal improvement in oxygenation; t max 100.4; urine output good and potassium stable

## 2020-07-15 NOTE — CONSULTS
[facility] -   Critical Care Medicine  Consult Note    Patient Name: Jonas Abdalla  MRN: [id number]  Admission Date: 7/11/2020  Hospital Length of Stay: 2 days  Code Status: Full Code  Attending Physician: Enmanuel Hu MD   Primary Care Provider: Carmel Tabares MD   Principal Problem: Acute respiratory failure with hypoxia      Subjective:     HPI:  Mr Abdalla is a morbidly obese WM with a PMH of cholelithiasis and HTN who was admitted 7/12 with Sepsis and COVID PNA after presentation with weakness, persistent fever, chills and N/V/D for 6 days progressive.  Had 102.4 temp in ED.  Admitted to [place] started on emperic IVAB, Decadron and NC low flow.  Oxygenation worsened over next few days.  He consented and was started on Remdesivir 7/14.  On evening of 7/14.  At [place] last night patient became more hypoxic despite being maxed out on VapoTherm.  He was placed in prone position, which improved O2 sat to 90-93% but patient remained in notable respiratory distress.  He was transferred to ICU and initiated on BiPAP.      Hospital/ICU Course:  7/15 - This AM upright in bed fully awake, alert and responsive not in distress but has tachypnea and mild work of breathing while on NPPV and FiO2 at .95.      Past Medical History:   Diagnosis Date    Carpal tunnel syndrome     Gallstone     Hypertension        Past Surgical History:   Procedure Laterality Date    arm surgery Left     CHOLECYSTECTOMY      FRACTURE SURGERY      (L) arm       Review of patient's allergies indicates:   Allergen Reactions    Acetaminophen Hives       Family History     Problem Relation (Age of Onset)    Diabetes Father    Heart disease Paternal Grandfather        Tobacco Use    Smoking status: Never Smoker    Smokeless tobacco: Never Used   Substance and Sexual Activity    Alcohol use: No     Frequency: Monthly or less     Drinks per session: 1 or 2     Binge frequency: Never    Drug use: No    Sexual  activity: Yes     Partners: Female         Review of Systems   Constitutional: Positive for activity change and fatigue. Negative for appetite change and diaphoresis.   HENT: Negative for congestion.    Eyes: Negative for discharge.   Respiratory: Positive for shortness of breath. Negative for cough, chest tightness and wheezing.    Cardiovascular: Negative for chest pain, palpitations and leg swelling.   Gastrointestinal: Negative for abdominal distention, abdominal pain, blood in stool, nausea and vomiting.   Endocrine: Negative for polydipsia and polyuria.   Genitourinary: Negative for difficulty urinating.   Musculoskeletal: Negative for arthralgias.   Skin: Negative for color change.   Allergic/Immunologic: Negative for immunocompromised state.   Neurological: Positive for weakness (generalized). Negative for dizziness and headaches.   Hematological: Does not bruise/bleed easily.   Psychiatric/Behavioral: Negative for agitation, behavioral problems and confusion.     Objective:     Vital Signs (Most Recent):  Temp: 98.3 °F (36.8 °C) (07/15/20 1115)  Pulse: 86 (07/15/20 1505)  Resp: (!) 33 (07/15/20 1505)  BP: 136/73 (07/15/20 1400)  SpO2: (!) 92 % (07/15/20 1505) Vital Signs (24h Range):  Temp:  [98 °F (36.7 °C)-102 °F (38.9 °C)] 98.3 °F (36.8 °C)  Pulse:  [] 86  Resp:  [15-40] 33  SpO2:  [80 %-97 %] 92 %  BP: (109-155)/(47-87) 136/73     Weight: 132.3 kg (291 lb 10.7 oz)  Body mass index is 45.68 kg/m².      Intake/Output Summary (Last 24 hours) at 7/15/2020 1514  Last data filed at 7/15/2020 0830  Gross per 24 hour   Intake 720 ml   Output 700 ml   Net 20 ml       Physical Exam  Constitutional:       General: He is awake. He is not in acute distress.     Appearance: Normal appearance. He is well-developed. He is morbidly obese. He is ill-appearing. He is not toxic-appearing or diaphoretic.      Interventions: He is not intubated.Face mask in place.   HENT:      Head: Normocephalic and atraumatic.       Mouth/Throat:      Mouth: Mucous membranes are dry.   Eyes:      General: Lids are normal.      Extraocular Movements: Extraocular movements intact.      Pupils: Pupils are equal, round, and reactive to light.   Neck:      Musculoskeletal: Normal range of motion.      Vascular: No carotid bruit.      Trachea: Trachea normal.   Cardiovascular:      Rate and Rhythm: Normal rate and regular rhythm.      Pulses: Normal pulses.           Radial pulses are 2+ on the right side and 2+ on the left side.        Dorsalis pedis pulses are 2+ on the right side and 2+ on the left side.      Heart sounds: Normal heart sounds.   Pulmonary:      Effort: Tachypnea and accessory muscle usage (mild) present. No respiratory distress. He is not intubated.      Breath sounds: Decreased breath sounds and rales present.   Abdominal:      General: Bowel sounds are decreased. There is no distension.      Palpations: Abdomen is soft.      Tenderness: There is no abdominal tenderness.      Comments: Obese   Genitourinary:     Penis: Normal.    Musculoskeletal: Normal range of motion.      Right lower leg: No edema.      Left lower leg: No edema.      Right foot: No deformity.      Left foot: No deformity.      Comments: No edema   Lymphadenopathy:      Cervical: No cervical adenopathy.   Skin:     General: Skin is warm and dry.      Capillary Refill: Capillary refill takes 2 to 3 seconds.      Findings: No rash.   Neurological:      Mental Status: He is alert and oriented to person, place, and time.   Psychiatric:         Mood and Affect: Mood is anxious.         Speech: Speech normal.         Behavior: Behavior normal. Behavior is cooperative.         Thought Content: Thought content normal.         Cognition and Memory: Cognition normal.         Judgment: Judgment normal.         Vents:  Oxygen Concentration (%): 95 (07/15/20 1505)    Lines/Drains/Airways     Peripheral Intravenous Line                 Peripheral IV - Single Lumen 07/11/20  1003 20 G Right Forearm 4 days                Significant Labs:    CBC/Anemia Profile:  Recent Labs   Lab 07/15/20  1105   WBC 8.29   HGB 14.8   HCT 43.4      MCV 91   RDW 13.1        Chemistries:  Recent Labs   Lab 07/13/20  2345 07/14/20  0543 07/15/20  0336   NA  --  134* 133*   K  --  4.3 4.1   CL  --  98 100   CO2  --  25 21*   BUN  --  16 18   CREATININE  --  0.9 0.8   CALCIUM  --  8.4* 8.7   ALBUMIN  --  3.0* 2.9*   PROT  --  6.4 6.2   BILITOT  --  0.7 0.7   ALKPHOS  --  40* 42*   ALT  --  55* 80*   AST  --  61* 91*   MG 1.9  --   --    PHOS 4.6*  --   --        All pertinent labs within the past 24 hours have been reviewed.    Significant Imaging:   CXR: I have reviewed all pertinent results/findings within the past 24 hours and my personal findings are:  diffuse worsening bilat patchy infiltrates    Assessment/Plan:     Pulmonary  * Acute respiratory failure with hypoxia  Cont NPPV may alter with Vapotherm if patient tolerates  Will likely need intubation and [facility] ventilation will re-assess this afternoon  Patient wants to wait till end of shift for intubation if needed and not perform unless necessary  ICU pulm monitoring  Cont pulse oximetry    Cardiac/Vascular  Essential hypertension  Cont Lisinopril    Endocrine  Morbid obesity with BMI of 45.0-49.9, adult  Will encourage weight loss once more clinically stable    Other  Pneumonia due to COVID-19 virus  Respiratory/Contact/Droplet Isolation with appropriate PPE -N95 mask, gown, goggles and gloves  Fluid Sparing Resuscitation  Empiric Antibiotics - completed 5 days Rocephin and Zithromax  No Visitors  Consolidation of tests, nursing care and provider visits  LDH and D-dimer increased  Zinc and Vit C  Remdisivir Day #2  Decadron Day #5  CXR worsening           Preventive Measures and Monitoring:   Stress Ulcer: Pepcid  Nutrition: Diet  Glucose control: stable  Bowel prophylaxis: PRN dulcolax  DVT prophylaxis: LMWH/SCDs  Hx CAD on B-Blocker: no hx  CAD  Head of Bed/Reposition: Elevate HOB and turn Q1-2 hours   Early Mobility: bed rest  Code Status: Full  Pneumonia Vaccine: ordered    Counseling/Consultation:I have discussed the care of this patient in detail with the bedside nursing staff and Dr. Vaca and Dr. Hu    Critical Care Time: 59 minutes  Critical secondary to Patient has a condition that poses threat to life and bodily function: Acute Hypoxic Resp Failure on NPPV     Critical care was time spent personally by me on the following activities: development of treatment plan with patient or surrogate and bedside caregivers, discussions with consultants, evaluation of patient's response to treatment, examination of patient, ordering and performing treatments and interventions, ordering and review of laboratory studies, ordering and review of radiographic studies, pulse oximetry, re-evaluation of patient's condition. This critical care time did not overlap with that of any other provider or involve time for any procedures.    Thank you for your consult. I will follow-up with patient. Please contact us if you have any additional questions.     Yemi Koroma, NP  Critical Care Medicine  [facility]

## 2020-07-15 NOTE — SIGNIFICANT EVENT
Patient becoming more hypoxic despite being maxed out on VapoTherm.  He was placed in prone position, which improved O2 sat to 90-93% but patient remained in notable respiratory distress.  Will transfer patient to ICU and initiate BiPAP.  Case discussed with Dr. Hernandez, who agrees with treatment plan.  Discussed POC with patient and answered all questions.  Attempted to update patient's spouse via telephone, but was unable to reach her.          Cristel Vazquez, NP

## 2020-07-15 NOTE — NURSING
Attempted to place pt back on Vapotherm after being on BiPAP all shift. Tolerated at first, but started coughing, sats dropped to the 70's, and sats couldn't recover. Placed back on BiPAP. Sat back to 93%. Will continue to monitor.

## 2020-07-15 NOTE — HPI
Mr Abdalla is a morbidly obese WM with a PMH of cholelithiasis and HTN who was admitted 7/12 with Sepsis and COVID PNA after presentation with weakness, persistent fever, chills and N/V/D for 6 days progressive.  Had 102.4 temp in ED.  Admitted to [facility] started on emperic IVAB, Decadron and NC low flow.  Oxygenation worsened over next few days.  He consented and was started on Remdesivir 7/14.  On evening of 7/14.  At [place] last night patient became more hypoxic despite being maxed out on VapoTherm.  He was placed in prone position, which improved O2 sat to 90-93% but patient remained in notable respiratory distress.  He was transferred to ICU and initiated on BiPAP.

## 2020-07-15 NOTE — SUBJECTIVE & OBJECTIVE
Past Medical History:   Diagnosis Date    Carpal tunnel syndrome     Gallstone     Hypertension        Past Surgical History:   Procedure Laterality Date    arm surgery Left     CHOLECYSTECTOMY      FRACTURE SURGERY      (L) arm       Review of patient's allergies indicates:   Allergen Reactions    Acetaminophen Hives       Family History     Problem Relation (Age of Onset)    Diabetes Father    Heart disease Paternal Grandfather        Tobacco Use    Smoking status: Never Smoker    Smokeless tobacco: Never Used   Substance and Sexual Activity    Alcohol use: No     Frequency: Monthly or less     Drinks per session: 1 or 2     Binge frequency: Never    Drug use: No    Sexual activity: Yes     Partners: Female         Review of Systems   Constitutional: Positive for activity change and fatigue. Negative for appetite change and diaphoresis.   HENT: Negative for congestion.    Eyes: Negative for discharge.   Respiratory: Positive for shortness of breath. Negative for cough, chest tightness and wheezing.    Cardiovascular: Negative for chest pain, palpitations and leg swelling.   Gastrointestinal: Negative for abdominal distention, abdominal pain, blood in stool, nausea and vomiting.   Endocrine: Negative for polydipsia and polyuria.   Genitourinary: Negative for difficulty urinating.   Musculoskeletal: Negative for arthralgias.   Skin: Negative for color change.   Allergic/Immunologic: Negative for immunocompromised state.   Neurological: Positive for weakness (generalized). Negative for dizziness and headaches.   Hematological: Does not bruise/bleed easily.   Psychiatric/Behavioral: Negative for agitation, behavioral problems and confusion.     Objective:     Vital Signs (Most Recent):  Temp: 98.3 °F (36.8 °C) (07/15/20 1115)  Pulse: 86 (07/15/20 1505)  Resp: (!) 33 (07/15/20 1505)  BP: 136/73 (07/15/20 1400)  SpO2: (!) 92 % (07/15/20 1505) Vital Signs (24h Range):  Temp:  [98 °F (36.7 °C)-102 °F (38.9  °C)] 98.3 °F (36.8 °C)  Pulse:  [] 86  Resp:  [15-40] 33  SpO2:  [80 %-97 %] 92 %  BP: (109-155)/(47-87) 136/73     Weight: 132.3 kg (291 lb 10.7 oz)  Body mass index is 45.68 kg/m².      Intake/Output Summary (Last 24 hours) at 7/15/2020 1514  Last data filed at 7/15/2020 0830  Gross per 24 hour   Intake 720 ml   Output 700 ml   Net 20 ml       Physical Exam  Constitutional:       General: He is awake. He is not in acute distress.     Appearance: Normal appearance. He is well-developed. He is morbidly obese. He is ill-appearing. He is not toxic-appearing or diaphoretic.      Interventions: He is not intubated.Face mask in place.   HENT:      Head: Normocephalic and atraumatic.      Mouth/Throat:      Mouth: Mucous membranes are dry.   Eyes:      General: Lids are normal.      Extraocular Movements: Extraocular movements intact.      Pupils: Pupils are equal, round, and reactive to light.   Neck:      Musculoskeletal: Normal range of motion.      Vascular: No carotid bruit.      Trachea: Trachea normal.   Cardiovascular:      Rate and Rhythm: Normal rate and regular rhythm.      Pulses: Normal pulses.           Radial pulses are 2+ on the right side and 2+ on the left side.        Dorsalis pedis pulses are 2+ on the right side and 2+ on the left side.      Heart sounds: Normal heart sounds.   Pulmonary:      Effort: Tachypnea and accessory muscle usage (mild) present. No respiratory distress. He is not intubated.      Breath sounds: Decreased breath sounds and rales present.   Abdominal:      General: Bowel sounds are decreased. There is no distension.      Palpations: Abdomen is soft.      Tenderness: There is no abdominal tenderness.      Comments: Obese   Genitourinary:     Penis: Normal.    Musculoskeletal: Normal range of motion.      Right lower leg: No edema.      Left lower leg: No edema.      Right foot: No deformity.      Left foot: No deformity.      Comments: No edema   Lymphadenopathy:       Cervical: No cervical adenopathy.   Skin:     General: Skin is warm and dry.      Capillary Refill: Capillary refill takes 2 to 3 seconds.      Findings: No rash.   Neurological:      Mental Status: He is alert and oriented to person, place, and time.   Psychiatric:         Mood and Affect: Mood is anxious.         Speech: Speech normal.         Behavior: Behavior normal. Behavior is cooperative.         Thought Content: Thought content normal.         Cognition and Memory: Cognition normal.         Judgment: Judgment normal.         Vents:  Oxygen Concentration (%): 95 (07/15/20 1505)    Lines/Drains/Airways     Peripheral Intravenous Line                 Peripheral IV - Single Lumen 07/11/20 1003 20 G Right Forearm 4 days                Significant Labs:    CBC/Anemia Profile:  Recent Labs   Lab 07/15/20  1105   WBC 8.29   HGB 14.8   HCT 43.4      MCV 91   RDW 13.1        Chemistries:  Recent Labs   Lab 07/13/20  2345 07/14/20  0543 07/15/20  0336   NA  --  134* 133*   K  --  4.3 4.1   CL  --  98 100   CO2  --  25 21*   BUN  --  16 18   CREATININE  --  0.9 0.8   CALCIUM  --  8.4* 8.7   ALBUMIN  --  3.0* 2.9*   PROT  --  6.4 6.2   BILITOT  --  0.7 0.7   ALKPHOS  --  40* 42*   ALT  --  55* 80*   AST  --  61* 91*   MG 1.9  --   --    PHOS 4.6*  --   --        All pertinent labs within the past 24 hours have been reviewed.    Significant Imaging:   CXR: I have reviewed all pertinent results/findings within the past 24 hours and my personal findings are:  diffuse worsening bilat patchy infiltrates

## 2020-07-15 NOTE — PLAN OF CARE
Pt remains on BiPAP, sat 94 % and NSR on the monitor. ALL other VSS.  for shift thus far. Cont abx. Pt turned q 2 hours, heels floated, labs monitored POC reviewed with pt and family, appears in no distress, will continue to monitor.

## 2020-07-15 NOTE — PLAN OF CARE
Patient transferred from [facility] at 0040. Patient is awake, alert, and oriented. On Vapotherm @ 40L, with O2 saturation 90-92%, tachypneic, RR 30-40. Updated patient on POC, patient reports he would like to call wife later in the morning to update her. Patient in bed, bed locked in lowest position, call light within reach.

## 2020-07-15 NOTE — NURSING
Pt continues to decline despite Vapotherm therapy. Assisted primary nurse DESHAUN Amaya with transfer to ICU.

## 2020-07-15 NOTE — ASSESSMENT & PLAN NOTE
Cont NPPV may alter with Vapotherm if patient tolerates  [facility]  Patient wants to wait till end of shift for intubation if needed and not perform unless necessary  ICU pulm monitoring  Cont pulse oximetry

## 2020-07-16 NOTE — PROGRESS NOTES
[facility] -   Critical Care Medicine  Progress Note    Patient Name: Jonas Abdalla  MRN: [id number]  Admission Date: 7/11/2020  Hospital Length of Stay: 3 days  Code Status: Full Code  Attending Provider: Enmanuel Hu MD  Primary Care Provider: Carmel Tabaers MD   Principal Problem: Acute respiratory failure with hypoxia    Subjective:     HPI:  Mr Abdalla is a morbidly obese WM with a PMH of cholelithiasis and HTN who was admitted 7/12 with Sepsis and COVID PNA after presentation with weakness, persistent fever, chills and N/V/D for 6 days progressive.  Had 102.4 temp in ED.  Admitted to [facility] started on emperic IVAB, Decadron and NC low flow.  Oxygenation worsened over next few days.  He consented and was started on Remdesivir 7/14.  On evening of 7/14.  At [place] last night patient became more hypoxic despite being maxed out on VapoTherm.  He was placed in prone position, which improved O2 sat to 90-93% but patient remained in notable respiratory distress.  He was transferred to ICU and initiated on BiPAP.      Hospital/ICU Course:  7/15 - This AM upright in bed fully awake, alert and responsive not in distress but has tachypnea and mild work of breathing while on NPPV and FiO2 at .95.    7/16 - remains on NIPPV with some decline in oxygen demand with FiO2 0.75 but continued tachypnea; ongoing desaturation with exertion or momentary removal of NIPPV for fluid intake    Remains dependent on NIPPV with tachypnea; ongoing desat with exertion    Objective:     Vital Signs (Most Recent):  Temp: 98.1 °F (36.7 °C) (07/16/20 0305)  Pulse: 77 (07/16/20 0944)  Resp: (!) 37 (07/16/20 0944)  BP: 108/65 (07/16/20 0800)  SpO2: (!) 90 % (07/16/20 0944) Vital Signs (24h Range):  Temp:  [97.4 °F (36.3 °C)-98.5 °F (36.9 °C)] 98.1 °F (36.7 °C)  Pulse:  [] 77  Resp:  [27-45] 37  SpO2:  [82 %-99 %] 90 %  BP: (108-166)/() 108/65     Weight: 132.3 kg (291 lb 10.7 oz)  Body mass index is 45.68  kg/m².      Intake/Output Summary (Last 24 hours) at 7/16/2020 1256  Last data filed at 7/15/2020 2300  Gross per 24 hour   Intake 350 ml   Output 1150 ml   Net -800 ml      heparin (porcine) in D5W         Physical Exam  Vitals signs and nursing note reviewed.   Constitutional:       Appearance: He is obese. He is ill-appearing. He is not toxic-appearing or diaphoretic.      Interventions: Face mask in place.   HENT:      Head: Atraumatic.      Mouth/Throat:      Mouth: Mucous membranes are dry.   Eyes:      Conjunctiva/sclera: Conjunctivae normal.   Neck:      Vascular: No JVD.   Cardiovascular:      Rate and Rhythm: Normal rate and regular rhythm.      Pulses:           Radial pulses are 2+ on the right side and 2+ on the left side.   Pulmonary:      Effort: Tachypnea present. No retractions.      Breath sounds: Decreased breath sounds and rhonchi present.   Abdominal:      General: Abdomen is protuberant. There is no distension.      Palpations: Abdomen is soft.   Musculoskeletal:      Right lower leg: No edema.      Left lower leg: No edema.   Skin:     General: Skin is warm and dry.      Capillary Refill: Capillary refill takes 2 to 3 seconds.      Comments: bilat axillary skin darkening consistent with acanthosis   Neurological:      General: No focal deficit present.      Mental Status: He is alert and oriented to person, place, and time.   Psychiatric:         Attention and Perception: Attention normal.         Mood and Affect: Mood normal.         Speech: Speech normal.         Behavior: Behavior normal.         Vents:  Oxygen Concentration (%): 75 (07/16/20 1032)    Lines/Drains/Airways     Peripheral Intravenous Line                 Peripheral IV - Single Lumen 07/11/20 1003 20 G Right Forearm 5 days                Significant Labs:    CBC/Anemia Profile:  Recent Labs   Lab 07/15/20  1105 07/16/20  0748   WBC 8.29 11.60   HGB 14.8 14.6   HCT 43.4 43.5    299   MCV 91 93   RDW 13.1 13.0   FERRITIN  9,825*  --         Chemistries:  Recent Labs   Lab 07/15/20  0336 07/16/20  0025 07/16/20  0353   *  --  135*   K 4.1  --  4.5     --  100   CO2 21*  --  26   BUN 18  --  18   CREATININE 0.8  --  0.8   CALCIUM 8.7  --  8.8   ALBUMIN 2.9*  --  2.9*   PROT 6.2  --  6.5   BILITOT 0.7  --  0.6   ALKPHOS 42*  --  45*   ALT 80*  --  74*   AST 91*  --  67*   MG  --  2.2  --    PHOS  --  5.2*  --        All pertinent labs within the past 24 hours have been reviewed.    Significant Imaging:  I have reviewed all pertinent imaging results/findings within the past 24 hours.      ABG  No results for input(s): PH, PO2, PCO2, HCO3, BE in the last 168 hours.  Assessment/Plan:     Pulmonary  * Acute respiratory failure with hypoxia  Cont NPPV, trial alternation with vapotherm to allow oral intake once oxygenation stable  Low threshold for intubation  Continue bronchodilators  Close pulmonary monitoring    Cardiac/Vascular  Essential hypertension  Cont Lisinopril    Endocrine  Morbid obesity with BMI of 45.0-49.9, adult  Recommend diet and lifestyle modification for goal wt loss once medically stable    Other  Pneumonia due to COVID-19 virus  Respiratory/Contact/Droplet Isolation   Fluid Sparing Resuscitation, strict I/O  Empiric Antibiotics - completed 5 days Rocephin and Zithromax  LDH and D-dimer increased; will start heparin anticoagulation to prevent microthrombosis  Continue Zinc, melatonin, and Vit C  Remdisivir Day #3  Decadron Day #6       Critical Care Daily Checklist:    A: Awake: RASS Goal/Actual Goal:    Actual: [place] Agitation Sedation Scale (RASS): Alert and calm   B: Spontaneous Breathing Trial Performed?     C: SAT & SBT Coordinated?  n/a                      D: Delirium: CAM-ICU Overall CAM-ICU: Negative   E: Early Mobility Performed? Yes   F: Feeding Goal:    Status:     Current Diet Order   Procedures    Diet Low Sodium, 2gm      AS: Analgesia/Sedation Prn anxiolytic   T: Thromboembolic Prophylaxis  Heparin infusion   H: HOB > 300 Yes   U: Stress Ulcer Prophylaxis (if needed) pepcid   G: Glucose Control monitoring   B: Bowel Function Stool Occurrence: 0   I: Indwelling Catheter (Lines & Bagley) Necessity reviewed   D: De-escalation of Antimicrobials/Pharmacotherapies reviewed    Plan for the day/ETD As above    Code Status:  Family/Goals of Care: Full Code  Spouse, Joan, updated via phone ([phone])   I have discussed case and plan of care in detail with Dr Vaca and Dr Hu; Status and plan of care were discussed with team on multidisciplinary rounds.    Critical Care Time: 75 minutes  Critical secondary to hypoxemic respiratory failure s/t covid pneumonia; Critical care was time spent personally by me on the following activities: development of treatment plan with patient or surrogate and bedside caregivers, discussions with consultants, evaluation of patient's response to treatment, examination of patient, ordering and performing treatments and interventions, ordering and review of laboratory studies, ordering and review of radiographic studies, pulse oximetry, re-evaluation of patient's condition. This critical care time did not overlap with that of any other provider or involve time for any procedures.     LOC Chowdhury-BC  Critical Care Medicine  [facility] [place]

## 2020-07-16 NOTE — SUBJECTIVE & OBJECTIVE
Interval History:  Transferred to ICU overnight for worsening respiratory distress.  Remained hemodynamically stable and on continuous BiPAP.  Over the day able to tolerate BiPAP when needed and Vapotherm otherwise.    Review of Systems   Constitutional: Positive for activity change, appetite change, chills, fatigue and fever.   HENT: Negative for trouble swallowing.    Eyes: Negative for visual disturbance.   Respiratory: Positive for cough and shortness of breath. Negative for apnea, choking, chest tightness, wheezing and stridor.    Cardiovascular: Negative for chest pain, palpitations and leg swelling.   Gastrointestinal: Positive for nausea. Negative for abdominal pain, constipation, diarrhea and vomiting.   Genitourinary: Negative for decreased urine volume, difficulty urinating, dysuria, frequency and urgency.   Musculoskeletal: Negative for arthralgias, back pain, gait problem, joint swelling, myalgias, neck pain and neck stiffness.   Skin: Negative for color change.   Neurological: Positive for weakness. Negative for dizziness, tremors, seizures, syncope, facial asymmetry, speech difficulty, light-headedness, numbness and headaches.   Psychiatric/Behavioral: Negative for agitation, behavioral problems and confusion.     Objective:     Vital Signs (Most Recent):  Temp: 97.4 °F (36.3 °C) (07/15/20 1905)  Pulse: 79 (07/15/20 2040)  Resp: (!) 37 (07/15/20 2040)  BP: 132/75 (07/15/20 2000)  SpO2: (!) 94 % (07/15/20 2040) Vital Signs (24h Range):  Temp:  [97.4 °F (36.3 °C)-99.4 °F (37.4 °C)] 97.4 °F (36.3 °C)  Pulse:  [] 79  Resp:  [15-40] 37  SpO2:  [80 %-98 %] 94 %  BP: (109-162)/(38-87) 132/75     Weight: 132.3 kg (291 lb 10.7 oz)  Body mass index is 45.68 kg/m².    Intake/Output Summary (Last 24 hours) at 7/15/2020 2135  Last data filed at 7/15/2020 1600  Gross per 24 hour   Intake 770 ml   Output 1150 ml   Net -380 ml      Physical Exam  Vitals signs and nursing note reviewed.   Constitutional:        General: He is awake. He is not in acute distress.     Appearance: Normal appearance. He is not ill-appearing, toxic-appearing or diaphoretic.      Interventions: Nasal cannula in place.   HENT:      Head: Normocephalic and atraumatic.      Mouth/Throat:      Mouth: Mucous membranes are moist.   Eyes:      Extraocular Movements: Extraocular movements intact.      Pupils: Pupils are equal, round, and reactive to light.   Neck:      Musculoskeletal: Normal range of motion and neck supple. No neck rigidity or muscular tenderness.   Cardiovascular:      Rate and Rhythm: Normal rate and regular rhythm.      Heart sounds: Normal heart sounds.   Pulmonary:      Effort: Pulmonary effort is normal. No tachypnea, accessory muscle usage or respiratory distress.      Breath sounds: Examination of the right-middle field reveals decreased breath sounds. Examination of the right-lower field reveals decreased breath sounds. Decreased breath sounds, wheezing and rales present.   Abdominal:      General: Bowel sounds are normal. There is no distension.      Palpations: Abdomen is soft.      Tenderness: There is no abdominal tenderness. There is no guarding or rebound.   Genitourinary:     Comments: Deferred  Musculoskeletal:      Right lower leg: No edema.      Left lower leg: No edema.   Skin:     General: Skin is warm and dry.      Capillary Refill: Capillary refill takes less than 2 seconds.   Neurological:      General: No focal deficit present.      Mental Status: He is alert, oriented to person, place, and time and easily aroused. Mental status is at baseline.   Psychiatric:         Mood and Affect: Mood normal.         Behavior: Behavior normal. Behavior is cooperative.         Thought Content: Thought content normal.         Significant Labs:   CBC:   Recent Labs   Lab 07/15/20  1105   WBC 8.29   HGB 14.8   HCT 43.4        CMP:   Recent Labs   Lab 07/14/20  0543 07/15/20  0336   * 133*   K 4.3 4.1   CL 98 100    CO2 25 21*   * 115*   BUN 16 18   CREATININE 0.9 0.8   CALCIUM 8.4* 8.7   PROT 6.4 6.2   ALBUMIN 3.0* 2.9*   BILITOT 0.7 0.7   ALKPHOS 40* 42*   AST 61* 91*   ALT 55* 80*   ANIONGAP 11 12   EGFRNONAA >60 >60     All pertinent labs within the past 24 hours have been reviewed.    Significant Imaging: I have reviewed all pertinent imaging results/findings within the past 24 hours.

## 2020-07-16 NOTE — PROCEDURES
"Jonas Abdalla is a 35 y.o. male patient.    Temp: 98.1 °F (36.7 °C) (07/16/20 0305)  Pulse: 77 (07/16/20 0944)  Resp: (!) 37 (07/16/20 0944)  BP: 108/65 (07/16/20 0800)  SpO2: (!) 90 % (07/16/20 0944)  Weight: 132.3 kg (291 lb 10.7 oz) (07/15/20 0039)  Height: 5' 7" (170.2 cm) (07/11/20 0950)       Central Line    Date/Time: 7/16/2020 11:20 AM  Location procedure was performed: [facility] INTENSIVE CARE UNIT  Performed by: ROSEMARIE Chowdhury  Pre-operative Diagnosis: COVID Pneumonia  Post-operative diagnosis: COVID Pneumonia  Consent Done: Yes  Time out: Immediately prior to procedure a "time out" was called to verify the correct patient, procedure, equipment, support staff and site/side marked as required.  Indications: med administration  Anesthesia: local infiltration    Anesthesia:  Local Anesthetic: lidocaine 1% without epinephrine  Anesthetic total: 3 mL  Preparation: skin prepped with ChloraPrep  Skin prep agent dried: skin prep agent completely dried prior to procedure  Sterile barriers: all five maximum sterile barriers used - cap, mask, sterile gown, sterile gloves, and large sterile sheet  Hand hygiene: hand hygiene performed prior to central venous catheter insertion  Location details: right brachial  Catheter type: double lumen  Catheter size: 5 Fr  Catheter Length: 43cm    Ultrasound guidance: yes  Vessel Caliber: medium, compressibility normal  Needle advanced into vessel with real time Ultrasound guidance.  Guidewire confirmed in vessel.  Sterile sheath used.  Manometry: No   Number of attempts: 1  Assessment: placement verified by x-ray,  successful placement and no pneumothorax on x-ray  Complications: none  Estimated blood loss (mL): 2  Specimens: No  Implants: No  Post-procedure: chlorhexidine patch,  sterile dressing applied and blood return through all ports (secured with stat lock device)  Complications: No          Sara Trevino  7/16/2020  "

## 2020-07-16 NOTE — PROGRESS NOTES
Patient now currently resting quietly on Bipap with no distress noted. Patient respiratory rate now 28. Patient oxygen saturation is 98%. Patient tolerating well with no anxiety. Will continue to monitor.

## 2020-07-16 NOTE — PROGRESS NOTES
[facility] Hospital Medicine  Progress Note    Patient Name: Jonas Abdalla  MRN: [id number]  Patient Class: IP- Inpatient   Admission Date: 7/11/2020  Length of Stay: 2 days  Attending Physician: Enmanuel Hu MD  Primary Care Provider: Carmel Tabares MD        Subjective:     Principal Problem:Acute respiratory failure with hypoxia        HPI:  Jonas Abdalla is a 35 year old male with a pMHx of HTN who presented to the Emergency Department with c/o SOB. Associated symptoms include: generalized weakness, fatigue, and fever. Pt reports symptoms started Monday, 07/06. He reports he thought he had COVID and went to get tested taht day but did not get test results. Went to local urgent care on Tuesday, 07/07 -- diagnosed with PNA and given Levaquin 500 mg po daily. Symptoms continued despite abx and wife took pt to COVID testing site which he was negative for COVID. Wife reports the COVID test he took on Monday came back negative, notified yesterday of results. ED workup showed: no leukocytosis/leukopenia, stable Hgb/Hct, mild hyponatremia. CXR showed focal right infrahilar/medial lung base consolidation concerning for pneumonia. Febrile upon arrival to . Pt received 30mL/kg fluid resuscitation in ED along with one time doses of IV rocephin/azithromycin. Pt placed on observation for Fever believed to be secondary to RML PNA. COVID rapid screening pending upon assessment, now POSITIVE.     Overview/Hospital Course:  Pt presented to the ED due to weakness, nausea/vomiting with concerns for dehydration. COVID ++. Running temps 103, 102.4, 101.8. He denied any SOB and DRAKE. He describes an occasional dry cough. On 2 L nasal cannula. On azithromycin, Rocephin, decadron and scheduled albuterol.   7/13/2020 - pt has continued to be febrile and now reporting SOB and productive cough. Provided remdesivir information and patient has consented. Oxygen escalated to 4 L.  7/14/2020 -  Sudden increase to oxygen 10 L, oxygen saturation 85 %. Temp 101.6 at 4 AM this AM. Current plan of care continued.     Interval History:  Transferred to ICU overnight for worsening respiratory distress.  Remained hemodynamically stable and on continuous BiPAP.  Over the day able to tolerate BiPAP when needed and Vapotherm otherwise.    Review of Systems   Constitutional: Positive for activity change, appetite change, chills, fatigue and fever.   HENT: Negative for trouble swallowing.    Eyes: Negative for visual disturbance.   Respiratory: Positive for cough and shortness of breath. Negative for apnea, choking, chest tightness, wheezing and stridor.    Cardiovascular: Negative for chest pain, palpitations and leg swelling.   Gastrointestinal: Positive for nausea. Negative for abdominal pain, constipation, diarrhea and vomiting.   Genitourinary: Negative for decreased urine volume, difficulty urinating, dysuria, frequency and urgency.   Musculoskeletal: Negative for arthralgias, back pain, gait problem, joint swelling, myalgias, neck pain and neck stiffness.   Skin: Negative for color change.   Neurological: Positive for weakness. Negative for dizziness, tremors, seizures, syncope, facial asymmetry, speech difficulty, light-headedness, numbness and headaches.   Psychiatric/Behavioral: Negative for agitation, behavioral problems and confusion.     Objective:     Vital Signs (Most Recent):  Temp: 97.4 °F (36.3 °C) (07/15/20 1905)  Pulse: 79 (07/15/20 2040)  Resp: (!) 37 (07/15/20 2040)  BP: 132/75 (07/15/20 2000)  SpO2: (!) 94 % (07/15/20 2040) Vital Signs (24h Range):  Temp:  [97.4 °F (36.3 °C)-99.4 °F (37.4 °C)] 97.4 °F (36.3 °C)  Pulse:  [] 79  Resp:  [15-40] 37  SpO2:  [80 %-98 %] 94 %  BP: (109-162)/(38-87) 132/75     Weight: 132.3 kg (291 lb 10.7 oz)  Body mass index is 45.68 kg/m².    Intake/Output Summary (Last 24 hours) at 7/15/2020 2135  Last data filed at 7/15/2020 1600  Gross per 24 hour   Intake 770  ml   Output 1150 ml   Net -380 ml      Physical Exam  Vitals signs and nursing note reviewed.   Constitutional:       General: He is awake. He is not in acute distress.     Appearance: Normal appearance. He is not ill-appearing, toxic-appearing or diaphoretic.      Interventions: Nasal cannula in place.   HENT:      Head: Normocephalic and atraumatic.      Mouth/Throat:      Mouth: Mucous membranes are moist.   Eyes:      Extraocular Movements: Extraocular movements intact.      Pupils: Pupils are equal, round, and reactive to light.   Neck:      Musculoskeletal: Normal range of motion and neck supple. No neck rigidity or muscular tenderness.   Cardiovascular:      Rate and Rhythm: Normal rate and regular rhythm.      Heart sounds: Normal heart sounds.   Pulmonary:      Effort: Pulmonary effort is normal. No tachypnea, accessory muscle usage or respiratory distress.      Breath sounds: Examination of the right-middle field reveals decreased breath sounds. Examination of the right-lower field reveals decreased breath sounds. Decreased breath sounds, wheezing and rales present.   Abdominal:      General: Bowel sounds are normal. There is no distension.      Palpations: Abdomen is soft.      Tenderness: There is no abdominal tenderness. There is no guarding or rebound.   Genitourinary:     Comments: Deferred  Musculoskeletal:      Right lower leg: No edema.      Left lower leg: No edema.   Skin:     General: Skin is warm and dry.      Capillary Refill: Capillary refill takes less than 2 seconds.   Neurological:      General: No focal deficit present.      Mental Status: He is alert, oriented to person, place, and time and easily aroused. Mental status is at baseline.   Psychiatric:         Mood and Affect: Mood normal.         Behavior: Behavior normal. Behavior is cooperative.         Thought Content: Thought content normal.         Significant Labs:   CBC:   Recent Labs   Lab 07/15/20  1105   WBC 8.29   HGB 14.8    HCT 43.4        CMP:   Recent Labs   Lab 07/14/20  0543 07/15/20  0336   * 133*   K 4.3 4.1   CL 98 100   CO2 25 21*   * 115*   BUN 16 18   CREATININE 0.9 0.8   CALCIUM 8.4* 8.7   PROT 6.4 6.2   ALBUMIN 3.0* 2.9*   BILITOT 0.7 0.7   ALKPHOS 40* 42*   AST 61* 91*   ALT 55* 80*   ANIONGAP 11 12   EGFRNONAA >60 >60     All pertinent labs within the past 24 hours have been reviewed.    Significant Imaging: I have reviewed all pertinent imaging results/findings within the past 24 hours.      Assessment/Plan:      * Acute respiratory failure with hypoxia  Albuterol  Encouraged deep breathing and coughing  Transferred to ICU 14 July for respiratory distress.  Continuous BiPAP and Vapotherm as needed.      Pneumonia of right lower lobe due to infectious organism  IV Rocephin/Azithromycin  - empiric treatment  Supplemental oxygen  Sputum culture    Pneumonia due to COVID-19 virus  - COVID-19 testing Collection Date: 7/11/2020 Collection Time:   11:55 AM  - Infection Control notified     - Isolation:   - Airborne, Contact and Droplet Precautions  - Cohort patients into COVID units  - N95 mask, wear eye protection  - 20 second hand hygiene              - Limit visitors per hospital policy              - Consolidating lab draws, nursing care, provider visits, and interventions    - Diagnostics: (leukopenia, hyponatremia, hyperferritinemia, elevated troponin, elevated d-dimer, age, and comorbidities are significant predictors of poor clinical outcome)  CBC, CMP, Procalcitonin, Ferritin, CRP, LDH, BNP, Troponin, Rapid Flu, Blood Culture x2 and Portable CXR    - Management:  Supplemental O2 to maintain SpO2 >92%  Telemetry  Continuous/intermittent Pulse Ox  Albuterol treatment PRN  IV Rocephin/PO Azithromycin  Dexamethasone   Vit C, MVI  Scheduled albuterol  7/13/2020 - pt agreeable to receive Remdesivir - information received               Sepsis  - Sepsis criteria: Current temp of 101.3, RR 21, source PNA  due to COVID-19 virus  - Blood cultures obtained >>>>> NGTD  - Recent COVID test outpt on 07/06/ 20 negative. Repeat COVID today in ED (+)  - Will treat underlying cause with abx, supplemental oxygen, MDI, oral steroids per COVID-19 protocol      Essential hypertension  - Pt normally takes lisinopril-hctz 20-12.5 po daily  - Will hold hctz and continue lisinopril at current dose and frequency        VTE Risk Mitigation (From admission, onward)         Ordered     enoxaparin injection 40 mg  Every 24 hours      07/15/20 0723     IP VTE HIGH RISK PATIENT  Once      07/11/20 1301     Place sequential compression device  Until discontinued      07/11/20 1301                Critical care time spent on the evaluation and treatment of severe organ dysfunction, review of pertinent labs and imaging studies, discussions with consulting providers and discussions with patient/family: 30 minutes.      Enmanuel Hu MD  Department of Hospital Medicine   [facility]

## 2020-07-16 NOTE — ASSESSMENT & PLAN NOTE
Respiratory/Contact/Droplet Isolation   Fluid Sparing Resuscitation, strict I/O  Empiric Antibiotics - completed 5 days Rocephin and Zithromax  LDH and D-dimer increased; will start heparin anticoagulation to prevent microthrombosis  Continue Zinc, melatonin, and Vit C  Remdisivir Day #3  Decadron Day #6

## 2020-07-16 NOTE — SUBJECTIVE & OBJECTIVE
Remains dependent on NIPPV with tachypnea; ongoing desat with exertion    Objective:     Vital Signs (Most Recent):  Temp: 98.1 °F (36.7 °C) (07/16/20 0305)  Pulse: 77 (07/16/20 0944)  Resp: (!) 37 (07/16/20 0944)  BP: 108/65 (07/16/20 0800)  SpO2: (!) 90 % (07/16/20 0944) Vital Signs (24h Range):  Temp:  [97.4 °F (36.3 °C)-98.5 °F (36.9 °C)] 98.1 °F (36.7 °C)  Pulse:  [] 77  Resp:  [27-45] 37  SpO2:  [82 %-99 %] 90 %  BP: (108-166)/() 108/65     Weight: 132.3 kg (291 lb 10.7 oz)  Body mass index is 45.68 kg/m².      Intake/Output Summary (Last 24 hours) at 7/16/2020 1256  Last data filed at 7/15/2020 2300  Gross per 24 hour   Intake 350 ml   Output 1150 ml   Net -800 ml      heparin (porcine) in D5W         Physical Exam  Vitals signs and nursing note reviewed.   Constitutional:       Appearance: He is obese. He is ill-appearing. He is not toxic-appearing or diaphoretic.      Interventions: Face mask in place.   HENT:      Head: Atraumatic.      Mouth/Throat:      Mouth: Mucous membranes are dry.   Eyes:      Conjunctiva/sclera: Conjunctivae normal.   Neck:      Vascular: No JVD.   Cardiovascular:      Rate and Rhythm: Normal rate and regular rhythm.      Pulses:           Radial pulses are 2+ on the right side and 2+ on the left side.   Pulmonary:      Effort: Tachypnea present. No retractions.      Breath sounds: Decreased breath sounds and rhonchi present.   Abdominal:      General: Abdomen is protuberant. There is no distension.      Palpations: Abdomen is soft.   Musculoskeletal:      Right lower leg: No edema.      Left lower leg: No edema.   Skin:     General: Skin is warm and dry.      Capillary Refill: Capillary refill takes 2 to 3 seconds.      Comments: bilat axillary skin darkening consistent with acanthosis   Neurological:      General: No focal deficit present.      Mental Status: He is alert and oriented to person, place, and time.   Psychiatric:         Attention and Perception:  Attention normal.         Mood and Affect: Mood normal.         Speech: Speech normal.         Behavior: Behavior normal.         Vents:  Oxygen Concentration (%): 75 (07/16/20 1032)    Lines/Drains/Airways     Peripheral Intravenous Line                 Peripheral IV - Single Lumen 07/11/20 1003 20 G Right Forearm 5 days                Significant Labs:    CBC/Anemia Profile:  Recent Labs   Lab 07/15/20  1105 07/16/20  0748   WBC 8.29 11.60   HGB 14.8 14.6   HCT 43.4 43.5    299   MCV 91 93   RDW 13.1 13.0   FERRITIN 9,635*  --         Chemistries:  Recent Labs   Lab 07/15/20  0336 07/16/20  0025 07/16/20  0353   *  --  135*   K 4.1  --  4.5     --  100   CO2 21*  --  26   BUN 18  --  18   CREATININE 0.8  --  0.8   CALCIUM 8.7  --  8.8   ALBUMIN 2.9*  --  2.9*   PROT 6.2  --  6.5   BILITOT 0.7  --  0.6   ALKPHOS 42*  --  45*   ALT 80*  --  74*   AST 91*  --  67*   MG  --  2.2  --    PHOS  --  5.2*  --        All pertinent labs within the past 24 hours have been reviewed.    Significant Imaging:  I have reviewed all pertinent imaging results/findings within the past 24 hours.

## 2020-07-16 NOTE — ASSESSMENT & PLAN NOTE
Cont NPPV, trial alternation with vapotherm to allow oral intake once oxygenation stable  Low threshold for intubation  Continue bronchodilators  Close pulmonary monitoring

## 2020-07-16 NOTE — ASSESSMENT & PLAN NOTE
Albuterol  Encouraged deep breathing and coughing  Transferred to ICU 14 July for respiratory distress.  Continuous BiPAP and Vapotherm as needed.

## 2020-07-16 NOTE — PLAN OF CARE
Patient currently resting quietly in bed. VS currently stable. Patient NSR on monitor at this time. Patient currently receiving oxygen via Bipap and tolerating well. No complaints of shortness of breathe at this time. Patient encouraged to turn in bed every 2 hours to avoid skin breakdown to back side and prevent wound development. Patient turns independently in bed. No sign of wound development or skin breakdown noted. Patient educated on fall prevention and encouraged to use call light for all needs. Patient bed alarm set. Call light within reach. Patient free of falls. Patient given prn ativan earlier in shift for anxiety. Patient has no signs of anxiety at this time. Plan of care updated with patient and family. There are no further questions after updated on plan of care at this time. No distress noted. Will continue to monitor.

## 2020-07-16 NOTE — PROGRESS NOTES
Patient complaining of being very anxious at this time. Patient respiratory rate currently 35 with an oxygen saturation of 96% on Bipap. Dr. Bryan notified via EICU. MD to order ativan once. Will continue to monitor.

## 2020-07-16 NOTE — EICU
e-ICU    Notified for pt being very anxious on BIPAP    TVs are ok at 600s and sat 95%, RR 30    Will try 0.25 mg Lorazepam IV

## 2020-07-17 NOTE — PROGRESS NOTES
Patient currently resting quietly on Bipap in bed. No complaints of anxiety at this time. Patient current oxygen saturation is 93%. Will continue to monitor.

## 2020-07-17 NOTE — PLAN OF CARE
Pt reamined on BiPAP most of the day. Sats low 90's to upper 80s. NSR on the monitor. Placed on Vapotherm this afternoon, sats currently 93% on 40 lpm and 100% FiO2.  for shift. Afebrile. POC reviewed with pt and spouse. Pt appears in no distress, will continue to monitor.

## 2020-07-17 NOTE — ASSESSMENT & PLAN NOTE
Cont NPPV, trial alternation with vapotherm to allow oral intake intermittently  Low threshold for intubation  Continue bronchodilators  Close pulmonary monitoring

## 2020-07-17 NOTE — PROGRESS NOTES
[facility] Hospital Medicine  Progress Note    Patient Name: Jonas Abdalla  MRN: [id number]  Patient Class: IP- Inpatient   Admission Date: 7/11/2020  Length of Stay: 3 days  Attending Physician: Enmanuel Hu MD  Primary Care Provider: Carmel Tabares MD        Subjective:     Principal Problem:Acute respiratory failure with hypoxia    HPI:  Jonas Abdalla is a 35 year old male with a pMHx of HTN who presented to the Emergency Department with c/o SOB. Associated symptoms include: generalized weakness, fatigue, and fever. Pt reports symptoms started Monday, 07/06. He reports he thought he had COVID and went to get tested taht day but did not get test results. Went to local urgent care on Tuesday, 07/07 -- diagnosed with PNA and given Levaquin 500 mg po daily. Symptoms continued despite abx and wife took pt to COVID testing site which he was negative for COVID. Wife reports the COVID test he took on Monday came back negative, notified yesterday of results. ED workup showed: no leukocytosis/leukopenia, stable Hgb/Hct, mild hyponatremia. CXR showed focal right infrahilar/medial lung base consolidation concerning for pneumonia. Febrile upon arrival to . Pt received 30mL/kg fluid resuscitation in ED along with one time doses of IV rocephin/azithromycin. Pt placed on observation for Fever believed to be secondary to RML PNA. COVID rapid screening pending upon assessment, now POSITIVE.     Overview/Hospital Course:  Pt presented to the ED due to weakness, nausea/vomiting with concerns for dehydration. COVID ++. Running temps 103, 102.4, 101.8. He denied any SOB and DRAKE. He describes an occasional dry cough. On 2 L nasal cannula. On azithromycin, Rocephin, decadron and scheduled albuterol.   7/13/2020 - pt has continued to be febrile and now reporting SOB and productive cough. Provided remdesivir information and patient has consented. Oxygen escalated to 4 L.  7/14/2020 - Sudden  increase to oxygen 10 L, oxygen saturation 85 %. Temp 101.6 at 4 AM this AM. Current plan of care continued.     Interval History:  Remained hemodynamically stable and on continuous BiPAP.  Attempting to wean.    Review of Systems   Constitutional: Positive for chills and fever. Negative for activity change, appetite change and fatigue.   HENT: Negative for trouble swallowing.    Eyes: Negative for visual disturbance.   Respiratory: Positive for cough and shortness of breath. Negative for apnea, choking, chest tightness, wheezing and stridor.    Cardiovascular: Negative for chest pain, palpitations and leg swelling.   Gastrointestinal: Positive for nausea. Negative for abdominal pain, constipation, diarrhea and vomiting.   Genitourinary: Negative for decreased urine volume, difficulty urinating, dysuria, frequency and urgency.   Musculoskeletal: Negative for arthralgias, back pain, gait problem, joint swelling, myalgias, neck pain and neck stiffness.   Skin: Negative for color change.   Neurological: Positive for weakness. Negative for dizziness, tremors, seizures, syncope, facial asymmetry, speech difficulty, light-headedness, numbness and headaches.   Psychiatric/Behavioral: Negative for agitation, behavioral problems and confusion.     Objective:     Vital Signs (Most Recent):  Temp: 97.3 °F (36.3 °C) (07/16/20 1905)  Pulse: 83 (07/16/20 2000)  Resp: (!) 38 (07/16/20 2000)  BP: (!) 114/54 (07/16/20 2000)  SpO2: (!) 88 % (07/16/20 2000) Vital Signs (24h Range):  Temp:  [97.3 °F (36.3 °C)-98.5 °F (36.9 °C)] 97.3 °F (36.3 °C)  Pulse:  [75-91] 83  Resp:  [27-45] 38  SpO2:  [88 %-99 %] 88 %  BP: (102-166)/() 114/54     Weight: 132.3 kg (291 lb 10.7 oz)  Body mass index is 45.68 kg/m².    Intake/Output Summary (Last 24 hours) at 7/16/2020 2105  Last data filed at 7/16/2020 1900  Gross per 24 hour   Intake 476.98 ml   Output 1300 ml   Net -823.02 ml      Physical Exam  Vitals signs and nursing note reviewed.    Constitutional:       General: He is awake. He is not in acute distress.     Appearance: Normal appearance. He is not ill-appearing, toxic-appearing or diaphoretic.      Interventions: Nasal cannula in place.   HENT:      Head: Normocephalic and atraumatic.      Mouth/Throat:      Mouth: Mucous membranes are moist.   Eyes:      Extraocular Movements: Extraocular movements intact.      Pupils: Pupils are equal, round, and reactive to light.   Neck:      Musculoskeletal: Normal range of motion and neck supple. No neck rigidity or muscular tenderness.   Cardiovascular:      Rate and Rhythm: Normal rate and regular rhythm.      Heart sounds: Normal heart sounds.   Pulmonary:      Effort: Pulmonary effort is normal. No tachypnea, accessory muscle usage or respiratory distress.      Breath sounds: Examination of the right-middle field reveals decreased breath sounds. Examination of the right-lower field reveals decreased breath sounds. Decreased breath sounds, wheezing and rales present.   Abdominal:      General: Bowel sounds are normal. There is no distension.      Palpations: Abdomen is soft.      Tenderness: There is no abdominal tenderness. There is no guarding or rebound.   Genitourinary:     Comments: Deferred  Musculoskeletal:      Right lower leg: No edema.      Left lower leg: No edema.   Skin:     General: Skin is warm and dry.      Capillary Refill: Capillary refill takes less than 2 seconds.   Neurological:      General: No focal deficit present.      Mental Status: He is alert, oriented to person, place, and time and easily aroused. Mental status is at baseline.   Psychiatric:         Mood and Affect: Mood normal.         Behavior: Behavior normal. Behavior is cooperative.         Thought Content: Thought content normal.         Significant Labs:   CBC:   Recent Labs   Lab 07/15/20  1105 07/16/20  0748   WBC 8.29 11.60   HGB 14.8 14.6   HCT 43.4 43.5    299     CMP:   Recent Labs   Lab  07/15/20  0336 07/16/20  0353   * 135*   K 4.1 4.5    100   CO2 21* 26   * 112*   BUN 18 18   CREATININE 0.8 0.8   CALCIUM 8.7 8.8   PROT 6.2 6.5   ALBUMIN 2.9* 2.9*   BILITOT 0.7 0.6   ALKPHOS 42* 45*   AST 91* 67*   ALT 80* 74*   ANIONGAP 12 9   EGFRNONAA >60 >60     All pertinent labs within the past 24 hours have been reviewed.    Significant Imaging: I have reviewed all pertinent imaging results/findings within the past 24 hours.      Assessment/Plan:      * Acute respiratory failure with hypoxia  Encouraged deep breathing and coughing.  Transferred to ICU 14 July for respiratory distress.  Continuous BiPAP and Vapotherm as needed.    Pneumonia of right lower lobe due to infectious organism  IV Rocephin/Azithromycin  - empiric treatment  Supplemental oxygen  Sputum culture    Pneumonia due to COVID-19 virus  - COVID-19 testing Collection Date: 7/11/2020 Collection Time:   11:55 AM  - Infection Control notified     - Isolation:   - Airborne, Contact and Droplet Precautions  - Cohort patients into COVID units  - N95 mask, wear eye protection  - 20 second hand hygiene              - Limit visitors per hospital policy              - Consolidating lab draws, nursing care, provider visits, and interventions    - Diagnostics: (leukopenia, hyponatremia, hyperferritinemia, elevated troponin, elevated d-dimer, age, and comorbidities are significant predictors of poor clinical outcome)  CBC, CMP, Procalcitonin, Ferritin, CRP, LDH, BNP, Troponin, Rapid Flu, Blood Culture x2 and Portable CXR    - Management:  Supplemental O2 to maintain SpO2 >92%  Telemetry  Continuous/intermittent Pulse Ox  Albuterol treatment PRN  IV Rocephin/PO Azithromycin  Dexamethasone   Vit C, MVI  Scheduled albuterol  7/13/2020 - pt agreeable to receive Remdesivir - information received               Sepsis  - Sepsis criteria: Current temp of 101.3, RR 21, source PNA due to COVID-19 virus  - Blood cultures obtained >>>>> NGTD  -  Recent COVID test outpt on 07/06/ 20 negative. Repeat COVID today in ED (+)  - Will treat underlying cause with abx, supplemental oxygen, MDI, oral steroids per COVID-19 protocol      Essential hypertension  - Pt normally takes lisinopril-hctz 20-12.5 po daily  - Will hold hctz and continue lisinopril at current dose and frequency        VTE Risk Mitigation (From admission, onward)         Ordered     heparin 25,000 units in dextrose 5% 250 mL (100 units/mL) infusion HIGH INTENSITY nomogram - [facility]  Continuous     Question:  Heparin Infusion Adjustment (DO NOT MODIFY ANSWER)  Answer:  [email or web address]\Images\Pharmacy\HeparinInfusions\heparin HIGH INTENSITY nomogram for [facility] [id number]    07/16/20 0727     heparin 25,000 units in dextrose 5% (100 units/ml) IV bolus from bag - ADDITIONAL PRN BOLUS - 60 units/kg  As needed (PRN)     Question:  Heparin Infusion Adjustment (DO NOT MODIFY ANSWER)  Answer:  [email or web address]\Images\Pharmacy\HeparinInfusions\heparin HIGH INTENSITY nomogram for [facility] [id number]    07/16/20 0727     heparin 25,000 units in dextrose 5% (100 units/ml) IV bolus from bag - ADDITIONAL PRN BOLUS - 30 units/kg  As needed (PRN)     Question:  Heparin Infusion Adjustment (DO NOT MODIFY ANSWER)  Answer:  [email or web address]\Images\Pharmacy\HeparinInfusions\heparin HIGH INTENSITY nomogram for [facility] [id number]    07/16/20 0727     IP VTE HIGH RISK PATIENT  Once      07/11/20 1301     Place sequential compression device  Until discontinued      07/11/20 1301                Critical care time spent on the evaluation and treatment of severe organ dysfunction, review of pertinent labs and imaging studies, discussions with consulting providers and discussions with patient/family: 30 minutes.      Enmanuel Hu MD  Department of Hospital Medicine   [facility]

## 2020-07-17 NOTE — PLAN OF CARE
Patient currently resting quietly in bed. VS currently stable. Patient NSR on monitor at this time. Patient currently receiving oxygen via Bipap and tolerating well. Patient started on precedex drip earlier in shift for agitation. Patient also on heparin drip at this time. Patient turned in bed every 2 hours to avoid skin breakdown to back side and prevent wound development. Patient turns and repositions independently. No sign of wound development or skin breakdown noted. Patient educated on fall prevention and encouraged to use call light for all needs. Patient bed alarm set. Call light within reach. Patient free of falls. Plan of care updated with patient and family. There are no further questions after updated on plan of care at this time. No distress noted. Will continue to monitor.

## 2020-07-17 NOTE — PLAN OF CARE
07/17/20 1512   Discharge Reassessment   Assessment Type Discharge Planning Reassessment   Discharge Plan A Home with family   Discharge Plan B Home Health   Patient choice form signed by patient/caregiver No   Can the patient/caregiver answer the patient profile reliably? Yes, cognitively intact   How does the patient rate their overall health at the present time? Poor   Describe the patient's ability to walk at the present time. Major restrictions/daily assistance from another person   How often would a person be available to care for the patient? Whenever needed   Number of comorbid conditions (as recorded on the chart) Three   Post-Acute Status   Discharge Delays None known at this time     Ramesh Bates LMSW 7/17/2020 3:13 PM

## 2020-07-17 NOTE — SUBJECTIVE & OBJECTIVE
Interval History:  Remained hemodynamically stable.  On continuous BiPAP.  Shortness of breath improved.  Mild hypoxia persists.  Continue weaning ventilatory support.    Review of Systems   Constitutional: Positive for chills and fever. Negative for activity change, appetite change and fatigue.   HENT: Negative for trouble swallowing.    Eyes: Negative for visual disturbance.   Respiratory: Positive for cough and shortness of breath. Negative for apnea, choking, chest tightness, wheezing and stridor.    Cardiovascular: Negative for chest pain, palpitations and leg swelling.   Gastrointestinal: Positive for nausea. Negative for abdominal pain, constipation, diarrhea and vomiting.   Genitourinary: Negative for decreased urine volume, difficulty urinating, dysuria, frequency and urgency.   Musculoskeletal: Negative for arthralgias, back pain, gait problem, joint swelling, myalgias, neck pain and neck stiffness.   Skin: Negative for color change.   Neurological: Positive for weakness. Negative for dizziness, tremors, seizures, syncope, facial asymmetry, speech difficulty, light-headedness, numbness and headaches.   Psychiatric/Behavioral: Negative for agitation, behavioral problems and confusion.     Objective:     Vital Signs (Most Recent):  Temp: 98.4 °F (36.9 °C) (07/17/20 1515)  Pulse: 86 (07/17/20 1645)  Resp: (!) 38 (07/17/20 1645)  BP: 113/65 (07/17/20 1645)  SpO2: (!) 93 % (07/17/20 1645) Vital Signs (24h Range):  Temp:  [97.3 °F (36.3 °C)-98.8 °F (37.1 °C)] 98.4 °F (36.9 °C)  Pulse:  [] 86  Resp:  [29-48] 38  SpO2:  [80 %-100 %] 93 %  BP: ()/(40-69) 113/65     Weight: 132.3 kg (291 lb 10.7 oz)  Body mass index is 45.68 kg/m².    Intake/Output Summary (Last 24 hours) at 7/17/2020 7773  Last data filed at 7/17/2020 1600  Gross per 24 hour   Intake 404.41 ml   Output 1225 ml   Net -820.59 ml      Physical Exam  Vitals signs and nursing note reviewed.   Constitutional:       General: He is awake. He is  not in acute distress.     Appearance: Normal appearance. He is not ill-appearing, toxic-appearing or diaphoretic.      Interventions: Nasal cannula in place.   HENT:      Head: Normocephalic and atraumatic.      Mouth/Throat:      Mouth: Mucous membranes are moist.   Eyes:      Extraocular Movements: Extraocular movements intact.      Pupils: Pupils are equal, round, and reactive to light.   Neck:      Musculoskeletal: Normal range of motion and neck supple. No neck rigidity or muscular tenderness.   Cardiovascular:      Rate and Rhythm: Normal rate and regular rhythm.      Heart sounds: Normal heart sounds.   Pulmonary:      Effort: Pulmonary effort is normal. No tachypnea, accessory muscle usage or respiratory distress.      Breath sounds: Examination of the right-middle field reveals decreased breath sounds. Examination of the right-lower field reveals decreased breath sounds. Decreased breath sounds, wheezing and rales present.   Abdominal:      General: Bowel sounds are normal. There is no distension.      Palpations: Abdomen is soft.      Tenderness: There is no abdominal tenderness. There is no guarding or rebound.   Genitourinary:     Comments: Deferred  Musculoskeletal:      Right lower leg: No edema.      Left lower leg: No edema.   Skin:     General: Skin is warm and dry.      Capillary Refill: Capillary refill takes less than 2 seconds.   Neurological:      General: No focal deficit present.      Mental Status: He is alert, oriented to person, place, and time and easily aroused. Mental status is at baseline.   Psychiatric:         Mood and Affect: Mood normal.         Behavior: Behavior normal. Behavior is cooperative.         Thought Content: Thought content normal.         Significant Labs:   CBC:   Recent Labs   Lab 07/16/20  0748 07/17/20  0330   WBC 11.60 14.43*   HGB 14.6 13.8*   HCT 43.5 39.9*    352*     CMP:   Recent Labs   Lab 07/16/20  0353 07/17/20  0330   * 138   K 4.5 4.1   CL  100 102   CO2 26 25   * 118*   BUN 18 25*   CREATININE 0.8 0.8   CALCIUM 8.8 8.3*   PROT 6.5 5.8*   ALBUMIN 2.9* 2.7*   BILITOT 0.6 0.6   ALKPHOS 45* 44*   AST 67* 45*   ALT 74* 60*   ANIONGAP 9 11   EGFRNONAA >60 >60     All pertinent labs within the past 24 hours have been reviewed.    Significant Imaging: I have reviewed all pertinent imaging results/findings within the past 24 hours.

## 2020-07-17 NOTE — ASSESSMENT & PLAN NOTE
Encouraged deep breathing and coughing.  Transferred to ICU 14 July for respiratory distress.  Continuous BiPAP and Vapotherm as needed.

## 2020-07-17 NOTE — PROGRESS NOTES
Patient complaining of anxiety at this time. Dr. Loyd notified via EICU. MD made aware of ativan given last night. Patient last night was relieved of anxiety after ativan injection. Dr. Loyd does not want to do ativan and orders precedex drip at this time. Precedex to be started. Patient currently resting on Bipap. Will continue to monitor.

## 2020-07-17 NOTE — SUBJECTIVE & OBJECTIVE
Interval History:  Remained hemodynamically stable and on continuous BiPAP.  Attempting to wean.    Review of Systems   Constitutional: Positive for chills and fever. Negative for activity change, appetite change and fatigue.   HENT: Negative for trouble swallowing.    Eyes: Negative for visual disturbance.   Respiratory: Positive for cough and shortness of breath. Negative for apnea, choking, chest tightness, wheezing and stridor.    Cardiovascular: Negative for chest pain, palpitations and leg swelling.   Gastrointestinal: Positive for nausea. Negative for abdominal pain, constipation, diarrhea and vomiting.   Genitourinary: Negative for decreased urine volume, difficulty urinating, dysuria, frequency and urgency.   Musculoskeletal: Negative for arthralgias, back pain, gait problem, joint swelling, myalgias, neck pain and neck stiffness.   Skin: Negative for color change.   Neurological: Positive for weakness. Negative for dizziness, tremors, seizures, syncope, facial asymmetry, speech difficulty, light-headedness, numbness and headaches.   Psychiatric/Behavioral: Negative for agitation, behavioral problems and confusion.     Objective:     Vital Signs (Most Recent):  Temp: 97.3 °F (36.3 °C) (07/16/20 1905)  Pulse: 83 (07/16/20 2000)  Resp: (!) 38 (07/16/20 2000)  BP: (!) 114/54 (07/16/20 2000)  SpO2: (!) 88 % (07/16/20 2000) Vital Signs (24h Range):  Temp:  [97.3 °F (36.3 °C)-98.5 °F (36.9 °C)] 97.3 °F (36.3 °C)  Pulse:  [75-91] 83  Resp:  [27-45] 38  SpO2:  [88 %-99 %] 88 %  BP: (102-166)/() 114/54     Weight: 132.3 kg (291 lb 10.7 oz)  Body mass index is 45.68 kg/m².    Intake/Output Summary (Last 24 hours) at 7/16/2020 2105  Last data filed at 7/16/2020 1900  Gross per 24 hour   Intake 476.98 ml   Output 1300 ml   Net -823.02 ml      Physical Exam  Vitals signs and nursing note reviewed.   Constitutional:       General: He is awake. He is not in acute distress.     Appearance: Normal appearance. He is  not ill-appearing, toxic-appearing or diaphoretic.      Interventions: Nasal cannula in place.   HENT:      Head: Normocephalic and atraumatic.      Mouth/Throat:      Mouth: Mucous membranes are moist.   Eyes:      Extraocular Movements: Extraocular movements intact.      Pupils: Pupils are equal, round, and reactive to light.   Neck:      Musculoskeletal: Normal range of motion and neck supple. No neck rigidity or muscular tenderness.   Cardiovascular:      Rate and Rhythm: Normal rate and regular rhythm.      Heart sounds: Normal heart sounds.   Pulmonary:      Effort: Pulmonary effort is normal. No tachypnea, accessory muscle usage or respiratory distress.      Breath sounds: Examination of the right-middle field reveals decreased breath sounds. Examination of the right-lower field reveals decreased breath sounds. Decreased breath sounds, wheezing and rales present.   Abdominal:      General: Bowel sounds are normal. There is no distension.      Palpations: Abdomen is soft.      Tenderness: There is no abdominal tenderness. There is no guarding or rebound.   Genitourinary:     Comments: Deferred  Musculoskeletal:      Right lower leg: No edema.      Left lower leg: No edema.   Skin:     General: Skin is warm and dry.      Capillary Refill: Capillary refill takes less than 2 seconds.   Neurological:      General: No focal deficit present.      Mental Status: He is alert, oriented to person, place, and time and easily aroused. Mental status is at baseline.   Psychiatric:         Mood and Affect: Mood normal.         Behavior: Behavior normal. Behavior is cooperative.         Thought Content: Thought content normal.         Significant Labs:   CBC:   Recent Labs   Lab 07/15/20  1105 07/16/20  0748   WBC 8.29 11.60   HGB 14.8 14.6   HCT 43.4 43.5    299     CMP:   Recent Labs   Lab 07/15/20  0336 07/16/20  0353   * 135*   K 4.1 4.5    100   CO2 21* 26   * 112*   BUN 18 18   CREATININE 0.8  0.8   CALCIUM 8.7 8.8   PROT 6.2 6.5   ALBUMIN 2.9* 2.9*   BILITOT 0.7 0.6   ALKPHOS 42* 45*   AST 91* 67*   ALT 80* 74*   ANIONGAP 12 9   EGFRNONAA >60 >60     All pertinent labs within the past 24 hours have been reviewed.    Significant Imaging: I have reviewed all pertinent imaging results/findings within the past 24 hours.

## 2020-07-17 NOTE — PROGRESS NOTES
[facility] -   Critical Care Medicine  Progress Note    Patient Name: Jonas Abdalla  MRN: [id number]  Admission Date: 7/11/2020  Hospital Length of Stay: 4 days  Code Status: Full Code  Attending Provider: Enmanuel Hu MD  Primary Care Provider: Carmel Tabares MD   Principal Problem: Acute respiratory failure with hypoxia    Subjective:     HPI:  Mr Abdalla is a morbidly obese WM with a PMH of cholelithiasis and HTN who was admitted 7/12 with Sepsis and COVID PNA after presentation with weakness, persistent fever, chills and N/V/D for 6 days progressive.  Had 102.4 temp in ED.  Admitted to [facility] started on emperic IVAB, Decadron and NC low flow.  Oxygenation worsened over next few days.  He consented and was started on Remdesivir 7/14.  On evening of 7/14.  At [place] last night patient became more hypoxic despite being maxed out on VapoTherm.  He was placed in prone position, which improved O2 sat to 90-93% but patient remained in notable respiratory distress.  He was transferred to ICU and initiated on BiPAP.      Hospital/ICU Course:  7/15 - This AM upright in bed fully awake, alert and responsive not in distress but has tachypnea and mild work of breathing while on NPPV and FiO2 at .95.    7/16 - remains on NIPPV with some decline in oxygen demand with FiO2 0.75 but continued tachypnea; ongoing desaturation with exertion or momentary removal of NIPPV for fluid intake  7/17 - tolerated short time on vapotherm support last evening, returned to NIPPV for sleep and has remained today s/t tachypnea, hypoxia; afebrile but wbc slight trend up 14.4k     Remains dependent on NIPPV with tachypnea; ongoing desat with exertion    Objective:     Vital Signs (Most Recent):  Temp: 98.4 °F (36.9 °C) (07/17/20 0715)  Pulse: 91 (07/17/20 1215)  Resp: (!) 46 (07/17/20 1215)  BP: 126/61 (07/17/20 1200)  SpO2: (!) 90 % (07/17/20 1215) Vital Signs (24h Range):  Temp:  [97.3 °F (36.3 °C)-98.6 °F (37 °C)]  98.4 °F (36.9 °C)  Pulse:  [63-96] 91  Resp:  [27-48] 46  SpO2:  [80 %-97 %] 90 %  BP: ()/(40-69) 126/61     Weight: 132.3 kg (291 lb 10.7 oz)  Body mass index is 45.68 kg/m².      Intake/Output Summary (Last 24 hours) at 7/17/2020 1243  Last data filed at 7/17/2020 0900  Gross per 24 hour   Intake 637.41 ml   Output 1575 ml   Net -937.59 ml      heparin (porcine) in D5W 14.039 Units/kg/hr (07/17/20 0900)       Physical Exam  Vitals signs and nursing note reviewed.   Constitutional:       Appearance: He is obese. He is ill-appearing. He is not toxic-appearing or diaphoretic.      Interventions: Face mask in place.   HENT:      Head: Atraumatic.      Mouth/Throat:      Mouth: Mucous membranes are dry.   Eyes:      Conjunctiva/sclera: Conjunctivae normal.   Neck:      Vascular: No JVD.   Cardiovascular:      Rate and Rhythm: Normal rate and regular rhythm.      Pulses:           Radial pulses are 2+ on the right side and 2+ on the left side.   Pulmonary:      Effort: Tachypnea present. No retractions.      Breath sounds: Decreased breath sounds and rhonchi present.   Abdominal:      General: Abdomen is protuberant. There is no distension.      Palpations: Abdomen is soft.   Musculoskeletal:      Right lower leg: No edema.      Left lower leg: No edema.   Skin:     General: Skin is warm and dry.      Capillary Refill: Capillary refill takes 2 to 3 seconds.   Neurological:      General: No focal deficit present.      Mental Status: He is alert and oriented to person, place, and time.   Psychiatric:         Attention and Perception: Attention normal.         Mood and Affect: Mood normal.         Speech: Speech normal.         Behavior: Behavior normal.         Vents:  Oxygen Concentration (%): (S) 80 (07/17/20 0747)    Lines/Drains/Airways     Peripherally Inserted Central Catheter Line            PICC Double Lumen 07/16/20 1130 right brachial 1 day                Significant Labs:    CBC/Anemia Profile:  Recent  Labs   Lab 07/16/20  0748 07/17/20  0330   WBC 11.60 14.43*   HGB 14.6 13.8*   HCT 43.5 39.9*    352*   MCV 93 91   RDW 13.0 12.8        Chemistries:  Recent Labs   Lab 07/16/20  0025 07/16/20  0353 07/17/20  0330   NA  --  135* 138   K  --  4.5 4.1   CL  --  100 102   CO2  --  26 25   BUN  --  18 25*   CREATININE  --  0.8 0.8   CALCIUM  --  8.8 8.3*   ALBUMIN  --  2.9* 2.7*   PROT  --  6.5 5.8*   BILITOT  --  0.6 0.6   ALKPHOS  --  45* 44*   ALT  --  74* 60*   AST  --  67* 45*   MG 2.2  --   --    PHOS 5.2*  --   --        All pertinent labs within the past 24 hours have been reviewed.    Significant Imaging:  I have reviewed all pertinent imaging results/findings within the past 24 hours.      ABG  No results for input(s): PH, PO2, PCO2, HCO3, BE in the last 168 hours.  Assessment/Plan:     Pulmonary  * Acute respiratory failure with hypoxia  Cont NPPV, trial alternation with vapotherm to allow oral intake intermittently  Low threshold for intubation  Continue bronchodilators  Close pulmonary monitoring    Cardiac/Vascular  Essential hypertension  Cont Lisinopril    Endocrine  Morbid obesity with BMI of 45.0-49.9, adult  Recommend diet and lifestyle modification for goal wt loss once medically stable    Other  Pneumonia due to COVID-19 virus  Respiratory/Contact/Droplet Isolation   Fluid Sparing Resuscitation, strict I/O  Empiric Antibiotics - completed 5 days Rocephin and Zithromax  LDH and D-dimer increased; will start heparin anticoagulation to prevent microthrombosis  Continue Zinc, melatonin, and Vit C  Remdisivir Day #4  Decadron Day #7     Critical Care Daily Checklist:    A: Awake: RASS Goal/Actual Goal: RASS Goal: -1-->drowsy  Actual: [place] Agitation Sedation Scale (RASS): Drowsy   B: Spontaneous Breathing Trial Performed?     C: SAT & SBT Coordinated?  n/a                      D: Delirium: CAM-ICU Overall CAM-ICU: Negative   E: Early Mobility Performed? Yes   F: Feeding Goal:    Status:      Current Diet Order   Procedures    Diet Low Sodium, 2gm      AS: Analgesia/Sedation Prn anxiolytic   T: Thromboembolic Prophylaxis Heparin infusion   H: HOB > 300 Yes   U: Stress Ulcer Prophylaxis (if needed) pepcid   G: Glucose Control monitoring   B: Bowel Function Stool Occurrence: 0   I: Indwelling Catheter (Lines & Bagley) Necessity reviewed   D: De-escalation of Antimicrobials/Pharmacotherapies reviewed    Plan for the day/ETD As above    Code Status:  Family/Goals of Care: Full Code  Spouse, Joan, updated via phone ([phone])   I have discussed case and plan of care in detail with Dr Vaca and Dr Hu; Status and plan of care were discussed with team on multidisciplinary rounds.    Critical Care Time: 65 minutes  Critical secondary to hypoxemic respiratory failure s/t covid pneumonia; Critical care was time spent personally by me on the following activities: development of treatment plan with patient or surrogate and bedside caregivers, discussions with consultants, evaluation of patient's response to treatment, examination of patient, ordering and performing treatments and interventions, ordering and review of laboratory studies, ordering and review of radiographic studies, pulse oximetry, re-evaluation of patient's condition. This critical care time did not overlap with that of any other provider or involve time for any procedures.     LOC Chowdhury-BC  Critical Care Medicine  [facility]

## 2020-07-17 NOTE — ASSESSMENT & PLAN NOTE
Respiratory/Contact/Droplet Isolation   Fluid Sparing Resuscitation, strict I/O  Empiric Antibiotics - completed 5 days Rocephin and Zithromax  LDH and D-dimer increased; will start heparin anticoagulation to prevent microthrombosis  Continue Zinc, melatonin, and Vit C  Remdisivir Day #4  Decadron Day #7

## 2020-07-17 NOTE — NURSING
Precedex drip stopped, 4 mg Ativan q 4 hours to replace for anxiety. Pt denies desire for drug at present.

## 2020-07-17 NOTE — PROGRESS NOTES
[facility] Hospital Medicine  Progress Note    Patient Name: Jonas Abdalla  MRN: [id number]  Patient Class: IP- Inpatient   Admission Date: 7/11/2020  Length of Stay: 4 days  Attending Physician: Enmanuel Hu MD  Primary Care Provider: Carmel Tabares MD        Subjective:     Principal Problem:Acute respiratory failure with hypoxia        HPI:  Jonas Abdalla is a 35 year old male with a pMHx of HTN who presented to the Emergency Department with c/o SOB. Associated symptoms include: generalized weakness, fatigue, and fever. Pt reports symptoms started Monday, 07/06. He reports he thought he had COVID and went to get tested taht day but did not get test results. Went to local urgent care on Tuesday, 07/07 -- diagnosed with PNA and given Levaquin 500 mg po daily. Symptoms continued despite abx and wife took pt to COVID testing site which he was negative for COVID. Wife reports the COVID test he took on Monday came back negative, notified yesterday of results. ED workup showed: no leukocytosis/leukopenia, stable Hgb/Hct, mild hyponatremia. CXR showed focal right infrahilar/medial lung base consolidation concerning for pneumonia. Febrile upon arrival to . Pt received 30mL/kg fluid resuscitation in ED along with one time doses of IV rocephin/azithromycin. Pt placed on observation for Fever believed to be secondary to RML PNA. COVID rapid screening pending upon assessment, now POSITIVE.     Overview/Hospital Course:  Pt presented to the ED due to weakness, nausea/vomiting with concerns for dehydration. COVID ++. Running temps 103, 102.4, 101.8. He denied any SOB and DRAKE. He describes an occasional dry cough. On 2 L nasal cannula. On azithromycin, Rocephin, decadron and scheduled albuterol.   7/13/2020 - pt has continued to be febrile and now reporting SOB and productive cough. Provided remdesivir information and patient has consented. Oxygen escalated to 4 L.  7/14/2020 -  Sudden increase to oxygen 10 L, oxygen saturation 85 %. Temp 101.6 at 4 AM this AM. Current plan of care continued.     Interval History:  Remained hemodynamically stable.  On continuous BiPAP.  Shortness of breath improved.  Mild hypoxia persists.  Continue weaning ventilatory support.    Review of Systems   Constitutional: Positive for chills and fever. Negative for activity change, appetite change and fatigue.   HENT: Negative for trouble swallowing.    Eyes: Negative for visual disturbance.   Respiratory: Positive for cough and shortness of breath. Negative for apnea, choking, chest tightness, wheezing and stridor.    Cardiovascular: Negative for chest pain, palpitations and leg swelling.   Gastrointestinal: Positive for nausea. Negative for abdominal pain, constipation, diarrhea and vomiting.   Genitourinary: Negative for decreased urine volume, difficulty urinating, dysuria, frequency and urgency.   Musculoskeletal: Negative for arthralgias, back pain, gait problem, joint swelling, myalgias, neck pain and neck stiffness.   Skin: Negative for color change.   Neurological: Positive for weakness. Negative for dizziness, tremors, seizures, syncope, facial asymmetry, speech difficulty, light-headedness, numbness and headaches.   Psychiatric/Behavioral: Negative for agitation, behavioral problems and confusion.     Objective:     Vital Signs (Most Recent):  Temp: 98.4 °F (36.9 °C) (07/17/20 1515)  Pulse: 86 (07/17/20 1645)  Resp: (!) 38 (07/17/20 1645)  BP: 113/65 (07/17/20 1645)  SpO2: (!) 93 % (07/17/20 1645) Vital Signs (24h Range):  Temp:  [97.3 °F (36.3 °C)-98.8 °F (37.1 °C)] 98.4 °F (36.9 °C)  Pulse:  [] 86  Resp:  [29-48] 38  SpO2:  [80 %-100 %] 93 %  BP: ()/(40-69) 113/65     Weight: 132.3 kg (291 lb 10.7 oz)  Body mass index is 45.68 kg/m².    Intake/Output Summary (Last 24 hours) at 7/17/2020 9667  Last data filed at 7/17/2020 1600  Gross per 24 hour   Intake 404.41 ml   Output 1225 ml   Net  -820.59 ml      Physical Exam  Vitals signs and nursing note reviewed.   Constitutional:       General: He is awake. He is not in acute distress.     Appearance: Normal appearance. He is not ill-appearing, toxic-appearing or diaphoretic.      Interventions: Nasal cannula in place.   HENT:      Head: Normocephalic and atraumatic.      Mouth/Throat:      Mouth: Mucous membranes are moist.   Eyes:      Extraocular Movements: Extraocular movements intact.      Pupils: Pupils are equal, round, and reactive to light.   Neck:      Musculoskeletal: Normal range of motion and neck supple. No neck rigidity or muscular tenderness.   Cardiovascular:      Rate and Rhythm: Normal rate and regular rhythm.      Heart sounds: Normal heart sounds.   Pulmonary:      Effort: Pulmonary effort is normal. No tachypnea, accessory muscle usage or respiratory distress.      Breath sounds: Examination of the right-middle field reveals decreased breath sounds. Examination of the right-lower field reveals decreased breath sounds. Decreased breath sounds, wheezing and rales present.   Abdominal:      General: Bowel sounds are normal. There is no distension.      Palpations: Abdomen is soft.      Tenderness: There is no abdominal tenderness. There is no guarding or rebound.   Genitourinary:     Comments: Deferred  Musculoskeletal:      Right lower leg: No edema.      Left lower leg: No edema.   Skin:     General: Skin is warm and dry.      Capillary Refill: Capillary refill takes less than 2 seconds.   Neurological:      General: No focal deficit present.      Mental Status: He is alert, oriented to person, place, and time and easily aroused. Mental status is at baseline.   Psychiatric:         Mood and Affect: Mood normal.         Behavior: Behavior normal. Behavior is cooperative.         Thought Content: Thought content normal.         Significant Labs:   CBC:   Recent Labs   Lab 07/16/20  0748 07/17/20  0330   WBC 11.60 14.43*   HGB 14.6  13.8*   HCT 43.5 39.9*    352*     CMP:   Recent Labs   Lab 07/16/20  0353 07/17/20  0330   * 138   K 4.5 4.1    102   CO2 26 25   * 118*   BUN 18 25*   CREATININE 0.8 0.8   CALCIUM 8.8 8.3*   PROT 6.5 5.8*   ALBUMIN 2.9* 2.7*   BILITOT 0.6 0.6   ALKPHOS 45* 44*   AST 67* 45*   ALT 74* 60*   ANIONGAP 9 11   EGFRNONAA >60 >60     All pertinent labs within the past 24 hours have been reviewed.    Significant Imaging: I have reviewed all pertinent imaging results/findings within the past 24 hours.      Assessment/Plan:      * Acute respiratory failure with hypoxia  Encouraged deep breathing and coughing.  Transferred to ICU 14 July for respiratory distress.  Continuous BiPAP and Vapotherm as needed.    Pneumonia of right lower lobe due to infectious organism  IV Rocephin/Azithromycin  - empiric treatment  Supplemental oxygen  Sputum culture    Pneumonia due to COVID-19 virus  - COVID-19 testing Collection Date: 7/11/2020 Collection Time:   11:55 AM  - Infection Control notified     - Isolation:   - Airborne, Contact and Droplet Precautions  - Cohort patients into COVID units  - N95 mask, wear eye protection  - 20 second hand hygiene              - Limit visitors per hospital policy              - Consolidating lab draws, nursing care, provider visits, and interventions    - Diagnostics: (leukopenia, hyponatremia, hyperferritinemia, elevated troponin, elevated d-dimer, age, and comorbidities are significant predictors of poor clinical outcome)  CBC, CMP, Procalcitonin, Ferritin, CRP, LDH, BNP, Troponin, Rapid Flu, Blood Culture x2 and Portable CXR    - Management:  Supplemental O2 to maintain SpO2 >92%  Telemetry  Continuous/intermittent Pulse Ox  Albuterol treatment PRN  IV Rocephin/PO Azithromycin  Dexamethasone   Vit C, MVI  Scheduled albuterol  7/13/2020 - pt agreeable to receive Remdesivir - information received               Sepsis  - Sepsis criteria: Current temp of 101.3, RR 21, source  PNA due to COVID-19 virus  - Blood cultures obtained >>>>> NGTD  - Recent COVID test outpt on 07/06/ 20 negative. Repeat COVID today in ED (+)  - Will treat underlying cause with abx, supplemental oxygen, MDI, oral steroids per COVID-19 protocol      Essential hypertension  - Pt normally takes lisinopril-hctz 20-12.5 po daily  - Will hold hctz and continue lisinopril at current dose and frequency        VTE Risk Mitigation (From admission, onward)         Ordered     heparin 25,000 units in dextrose 5% 250 mL (100 units/mL) infusion HIGH INTENSITY nomogram - [facility]  Continuous     Question:  Heparin Infusion Adjustment (DO NOT MODIFY ANSWER)  Answer:  [email or web address]\epic\Images\Pharmacy\HeparinInfusions\heparin HIGH INTENSITY nomogram for [facility] [id number]    07/16/20 0727     heparin 25,000 units in dextrose 5% (100 units/ml) IV bolus from bag - ADDITIONAL PRN BOLUS - 60 units/kg  As needed (PRN)     Question:  Heparin Infusion Adjustment (DO NOT MODIFY ANSWER)  Answer:  [email or web address]\epic\Images\Pharmacy\HeparinInfusions\heparin HIGH INTENSITY nomogram for [facility] [id number]    07/16/20 0727     heparin 25,000 units in dextrose 5% (100 units/ml) IV bolus from bag - ADDITIONAL PRN BOLUS - 30 units/kg  As needed (PRN)     Question:  Heparin Infusion Adjustment (DO NOT MODIFY ANSWER)  Answer:  [email or web address]\epic\Images\Pharmacy\HeparinInfusions\heparin HIGH INTENSITY nomogram for [facility] [id number]    07/16/20 0727     IP VTE HIGH RISK PATIENT  Once      07/11/20 1301     Place sequential compression device  Until discontinued      07/11/20 1301                Critical care time spent on the evaluation and treatment of severe organ dysfunction, review of pertinent labs and imaging studies, discussions with consulting providers and discussions with patient/family: 30 minutes.      Enmanuel Hu MD  Department of Hospital Medicine   [facility]

## 2020-07-17 NOTE — SUBJECTIVE & OBJECTIVE
Remains dependent on NIPPV with tachypnea; ongoing desat with exertion    Objective:     Vital Signs (Most Recent):  Temp: 98.4 °F (36.9 °C) (07/17/20 0715)  Pulse: 91 (07/17/20 1215)  Resp: (!) 46 (07/17/20 1215)  BP: 126/61 (07/17/20 1200)  SpO2: (!) 90 % (07/17/20 1215) Vital Signs (24h Range):  Temp:  [97.3 °F (36.3 °C)-98.6 °F (37 °C)] 98.4 °F (36.9 °C)  Pulse:  [63-96] 91  Resp:  [27-48] 46  SpO2:  [80 %-97 %] 90 %  BP: ()/(40-69) 126/61     Weight: 132.3 kg (291 lb 10.7 oz)  Body mass index is 45.68 kg/m².      Intake/Output Summary (Last 24 hours) at 7/17/2020 1243  Last data filed at 7/17/2020 0900  Gross per 24 hour   Intake 637.41 ml   Output 1575 ml   Net -937.59 ml      heparin (porcine) in D5W 14.039 Units/kg/hr (07/17/20 0900)       Physical Exam  Vitals signs and nursing note reviewed.   Constitutional:       Appearance: He is obese. He is ill-appearing. He is not toxic-appearing or diaphoretic.      Interventions: Face mask in place.   HENT:      Head: Atraumatic.      Mouth/Throat:      Mouth: Mucous membranes are dry.   Eyes:      Conjunctiva/sclera: Conjunctivae normal.   Neck:      Vascular: No JVD.   Cardiovascular:      Rate and Rhythm: Normal rate and regular rhythm.      Pulses:           Radial pulses are 2+ on the right side and 2+ on the left side.   Pulmonary:      Effort: Tachypnea present. No retractions.      Breath sounds: Decreased breath sounds and rhonchi present.   Abdominal:      General: Abdomen is protuberant. There is no distension.      Palpations: Abdomen is soft.   Musculoskeletal:      Right lower leg: No edema.      Left lower leg: No edema.   Skin:     General: Skin is warm and dry.      Capillary Refill: Capillary refill takes 2 to 3 seconds.   Neurological:      General: No focal deficit present.      Mental Status: He is alert and oriented to person, place, and time.   Psychiatric:         Attention and Perception: Attention normal.         Mood and Affect:  Mood normal.         Speech: Speech normal.         Behavior: Behavior normal.         Vents:  Oxygen Concentration (%): (S) 80 (07/17/20 0747)    Lines/Drains/Airways     Peripherally Inserted Central Catheter Line            PICC Double Lumen 07/16/20 1130 right brachial 1 day                Significant Labs:    CBC/Anemia Profile:  Recent Labs   Lab 07/16/20  0748 07/17/20  0330   WBC 11.60 14.43*   HGB 14.6 13.8*   HCT 43.5 39.9*    352*   MCV 93 91   RDW 13.0 12.8        Chemistries:  Recent Labs   Lab 07/16/20  0025 07/16/20  0353 07/17/20  0330   NA  --  135* 138   K  --  4.5 4.1   CL  --  100 102   CO2  --  26 25   BUN  --  18 25*   CREATININE  --  0.8 0.8   CALCIUM  --  8.8 8.3*   ALBUMIN  --  2.9* 2.7*   PROT  --  6.5 5.8*   BILITOT  --  0.6 0.6   ALKPHOS  --  45* 44*   ALT  --  74* 60*   AST  --  67* 45*   MG 2.2  --   --    PHOS 5.2*  --   --        All pertinent labs within the past 24 hours have been reviewed.    Significant Imaging:  I have reviewed all pertinent imaging results/findings within the past 24 hours.

## 2020-07-18 NOTE — ASSESSMENT & PLAN NOTE
Respiratory/Contact/Droplet Isolation   Fluid Sparing Resuscitation, strict I/O  Empiric Antibiotics - completed 5 days Rocephin and Zithromax  LDH and D-dimer increased; will start heparin anticoagulation to prevent microthrombosis  Continue Zinc, melatonin, and Vit C  Remdisivir Day #5  Decadron Day #8  7/18 - overall status holding steady with significant hypoxia but not requiring intubation/invasive ventilation; plan continue current course with ICU pulmonary monitoring

## 2020-07-18 NOTE — PROGRESS NOTES
[facility] -   Critical Care Medicine  Progress Note    Patient Name: Jonas Abdalla  MRN: [id number]  Admission Date: 7/11/2020  Hospital Length of Stay: 5 days  Code Status: Full Code  Attending Provider: Leonel Ceballos MD  Primary Care Provider: Carmel Tabares MD   Principal Problem: Acute respiratory failure with hypoxia    Subjective:     HPI:  Mr Abdalla is a morbidly obese WM with a PMH of cholelithiasis and HTN who was admitted 7/12 with Sepsis and COVID PNA after presentation with weakness, persistent fever, chills and N/V/D for 6 days progressive.  Had 102.4 temp in ED.  Admitted to [facility] started on emperic IVAB, Decadron and NC low flow.  Oxygenation worsened over next few days.  He consented and was started on Remdesivir 7/14.  On evening of 7/14.  At [place] last night patient became more hypoxic despite being maxed out on VapoTherm.  He was placed in prone position, which improved O2 sat to 90-93% but patient remained in notable respiratory distress.  He was transferred to ICU and initiated on BiPAP.      Hospital/ICU Course:  7/15 - This AM upright in bed fully awake, alert and responsive not in distress but has tachypnea and mild work of breathing while on NPPV and FiO2 at .95.    7/16 - remains on NIPPV with some decline in oxygen demand with FiO2 0.75 but continued tachypnea; ongoing desaturation with exertion or momentary removal of NIPPV for fluid intake  7/17 - tolerated short time on vapotherm support last evening, returned to NIPPV for sleep and has remained today s/t tachypnea, hypoxia; afebrile but wbc slight trend up 14.4k   7/18 - awake, alert,oriented; continues to alternate max support vapotherm with NIPPV support, tachypnea at baseline and worsens along with drop in pulse ox sat with exertion/movement that slowly recovers with rest    Remains dependent on NIPPV with tachypnea; ongoing desat with exertion    Objective:     Vital Signs (Most Recent):  Temp: 99.8 °F  (37.7 °C) (07/18/20 0305)  Pulse: (!) 111 (07/18/20 0758)  Resp: (!) 49 (07/18/20 0758)  BP: (!) 118/33 (07/18/20 0700)  SpO2: (!) 90 % (07/18/20 0758) Vital Signs (24h Range):  Temp:  [98.4 °F (36.9 °C)-99.8 °F (37.7 °C)] 99.8 °F (37.7 °C)  Pulse:  [] 111  Resp:  [18-54] 49  SpO2:  [80 %-100 %] 90 %  BP: ()/(33-75) 118/33     Weight: 132.3 kg (291 lb 10.7 oz)  Body mass index is 45.68 kg/m².      Intake/Output Summary (Last 24 hours) at 7/18/2020 0845  Last data filed at 7/18/2020 0500  Gross per 24 hour   Intake 346 ml   Output 1650 ml   Net -1304 ml      heparin (porcine) in D5W 16 Units/kg/hr (07/18/20 0530)       Physical Exam  Vitals signs and nursing note reviewed.   Constitutional:       Appearance: He is obese. He is ill-appearing. He is not toxic-appearing or diaphoretic.      Interventions: Face mask in place.   HENT:      Head: Atraumatic.      Mouth/Throat:      Mouth: Mucous membranes are dry.   Eyes:      Conjunctiva/sclera: Conjunctivae normal.   Neck:      Vascular: No JVD.   Cardiovascular:      Rate and Rhythm: Normal rate and regular rhythm.      Pulses:           Radial pulses are 2+ on the right side and 2+ on the left side.   Pulmonary:      Effort: Tachypnea present. No retractions.      Breath sounds: Decreased breath sounds and rhonchi present.   Abdominal:      General: Abdomen is protuberant. There is no distension.      Palpations: Abdomen is soft.   Musculoskeletal:      Right lower leg: No edema.      Left lower leg: No edema.   Skin:     General: Skin is warm and dry.      Capillary Refill: Capillary refill takes 2 to 3 seconds.   Neurological:      General: No focal deficit present.      Mental Status: He is alert and oriented to person, place, and time.   Psychiatric:         Attention and Perception: Attention normal.         Mood and Affect: Mood normal.         Speech: Speech normal.         Behavior: Behavior normal.         Vents:  Oxygen Concentration (%): 90  (07/18/20 0758)    Lines/Drains/Airways     Peripherally Inserted Central Catheter Line            PICC Double Lumen 07/16/20 1130 right brachial 1 day                Significant Labs:    CBC/Anemia Profile:  Recent Labs   Lab 07/17/20  0330 07/18/20  0415   WBC 14.43* 18.90*   HGB 13.8* 13.9*   HCT 39.9* 41.9   * 386*   MCV 91 92   RDW 12.8 12.9   FERRITIN 8,084*  --         Chemistries:  Recent Labs   Lab 07/17/20  0330 07/18/20  0415    138   K 4.1 4.2    105   CO2 25 25   BUN 25* 19   CREATININE 0.8 0.8   CALCIUM 8.3* 8.7   ALBUMIN 2.7* 2.7*   PROT 5.8* 6.1   BILITOT 0.6 0.8   ALKPHOS 44* 57   ALT 60* 55*   AST 45* 43*       All pertinent labs within the past 24 hours have been reviewed.    Significant Imaging:  I have reviewed all pertinent imaging results/findings within the past 24 hours.      ABG  No results for input(s): PH, PO2, PCO2, HCO3, BE in the last 168 hours.  Assessment/Plan:     Pulmonary  * Acute respiratory failure with hypoxia  Cont NPPV alternating with vapotherm to allow oral intake intermittently  Continue bronchodilators  ICU pulmonary monitoring    Cardiac/Vascular  Essential hypertension  Cont Lisinopril    Endocrine  Morbid obesity with BMI of 45.0-49.9, adult  Recommend diet and lifestyle modification for goal wt loss once medically stable    Other  Pneumonia due to COVID-19 virus  Respiratory/Contact/Droplet Isolation   Fluid Sparing Resuscitation, strict I/O  Empiric Antibiotics - completed 5 days Rocephin and Zithromax  LDH and D-dimer increased; will start heparin anticoagulation to prevent microthrombosis  Continue Zinc, melatonin, and Vit C  Remdisivir Day #5  Decadron Day #8  7/18 - overall status holding steady with significant hypoxia but not requiring intubation/invasive ventilation; plan continue current course with ICU pulmonary monitoring     Critical Care Daily Checklist:    A: Awake: RASS Goal/Actual Goal: RASS Goal: -1-->drowsy  Actual: Kane  Agitation Sedation Scale (RASS): Drowsy   B: Spontaneous Breathing Trial Performed?     C: SAT & SBT Coordinated?  n/a                      D: Delirium: CAM-ICU Overall CAM-ICU: Negative   E: Early Mobility Performed? Yes   F: Feeding Goal:    Status:     Current Diet Order   Procedures    Diet Low Sodium, 2gm      AS: Analgesia/Sedation Prn anxiolytic   T: Thromboembolic Prophylaxis Heparin infusion   H: HOB > 300 Yes   U: Stress Ulcer Prophylaxis (if needed) pepcid   G: Glucose Control monitoring   B: Bowel Function Stool Occurrence: 0   I: Indwelling Catheter (Lines & Bagley) Necessity reviewed   D: De-escalation of Antimicrobials/Pharmacotherapies reviewed    Plan for the day/ETD As above    Code Status:  Family/Goals of Care: Full Code  Spouse, Joan, updated [phone]) at least daily and able to face time with Jonas when on vapotherm   I have discussed case and plan of care in detail with Dr Vaca; Status and plan of care were discussed with team on multidisciplinary rounds.    Critical Care Time: 70 minutes  Critical secondary to hypoxemic respiratory failure s/t covid pneumonia; Critical care was time spent personally by me on the following activities: development of treatment plan with patient or surrogate and bedside caregivers, discussions with consultants, evaluation of patient's response to treatment, examination of patient, ordering and performing treatments and interventions, ordering and review of laboratory studies, ordering and review of radiographic studies, pulse oximetry, re-evaluation of patient's condition. This critical care time did not overlap with that of any other provider or involve time for any procedures.     LOC Chowdhury-BC  Critical Care Medicine  [facility]

## 2020-07-18 NOTE — PLAN OF CARE
VSS. Still with adequate O2 level on BiPAP. Unable to keep sats up with vapotherm today with two tries.

## 2020-07-18 NOTE — PROGRESS NOTES
[facility] Hospital Medicine  Progress Note    Patient Name: Jonas Abdalla  MRN: [id number]  Patient Class: IP- Inpatient   Admission Date: 7/11/2020  Length of Stay: 5 days  Attending Physician: Leonel Ceballos MD  Primary Care Provider: Carmel Tabares MD        Subjective:     Principal Problem:Acute respiratory failure with hypoxia        HPI:  Jonas Abdalla is a 35 year old male with a pMHx of HTN who presented to the Emergency Department with c/o SOB. Associated symptoms include: generalized weakness, fatigue, and fever. Pt reports symptoms started Monday, 07/06. He reports he thought he had COVID and went to get tested taht day but did not get test results. Went to local urgent care on Tuesday, 07/07 -- diagnosed with PNA and given Levaquin 500 mg po daily. Symptoms continued despite abx and wife took pt to COVID testing site which he was negative for COVID. Wife reports the COVID test he took on Monday came back negative, notified yesterday of results. ED workup showed: no leukocytosis/leukopenia, stable Hgb/Hct, mild hyponatremia. CXR showed focal right infrahilar/medial lung base consolidation concerning for pneumonia. Febrile upon arrival to . Pt received 30mL/kg fluid resuscitation in ED along with one time doses of IV rocephin/azithromycin. Pt placed on observation for Fever believed to be secondary to RML PNA. COVID rapid screening pending upon assessment, now POSITIVE.     Overview/Hospital Course:  Pt presented to the ED due to weakness, nausea/vomiting with concerns for dehydration. COVID ++. Running temps 103, 102.4, 101.8. He denied any SOB and DRAKE. He describes an occasional dry cough. On 2 L nasal cannula. On azithromycin, Rocephin, decadron and scheduled albuterol.   7/13/2020 - pt has continued to be febrile and now reporting SOB and productive cough. Provided remdesivir information and patient has consented. Oxygen escalated to 4 L.  7/14/2020 - Sudden  increase to oxygen 10 L, oxygen saturation 85 %. Temp 101.6 at 4 AM this AM. Current plan of care continued.   7/18- Pt was transferred to ICU on 7/15 with worsening respiratory status requiring NIPPV . Pt is awake , alert and oriented today .Mexed out on  vapotherm with hypoxia and placed on  NIPPV / BiPAP support. Remains  Tachypneic, easily desats with exertion or movement . Continue to monitor closely respiratory status  in ICU setting .     Interval History:   Mexed out on  vapotherm with hypoxia and placed on  NIPPV / BiPAP support. Remains  Tachypneic, easily desats with exertion or movement . Continue to monitor closely respiratory status  in ICU setting .       Review of Systems   Constitutional: Positive for activity change and appetite change. Negative for fever.   HENT: Negative for sore throat.    Eyes: Negative for visual disturbance.   Respiratory: Positive for cough (dry ) and shortness of breath. Negative for chest tightness.    Cardiovascular: Negative for chest pain, palpitations and leg swelling.   Gastrointestinal: Negative for abdominal distention, abdominal pain, constipation, diarrhea, nausea and vomiting.   Endocrine: Negative for polyuria.   Genitourinary: Negative for decreased urine volume, dysuria, flank pain, frequency and hematuria.   Musculoskeletal: Negative for back pain and gait problem.   Skin: Negative for rash.   Neurological: Negative for syncope, speech difficulty, weakness, light-headedness and headaches.   Psychiatric/Behavioral: Negative for confusion, hallucinations and sleep disturbance.     Objective:     Vital Signs (Most Recent):  Temp: 98 °F (36.7 °C) (07/18/20 1600)  Pulse: 106 (07/18/20 1700)  Resp: (!) 33 (07/18/20 1700)  BP: 102/83 (07/18/20 1700)  SpO2: (!) 88 % (07/18/20 1800) Vital Signs (24h Range):  Temp:  [97.8 °F (36.6 °C)-99.8 °F (37.7 °C)] 98 °F (36.7 °C)  Pulse:  [] 106  Resp:  [18-54] 33  SpO2:  [41 %-99 %] 88 %  BP: ()/(33-83) 102/83      Weight: 132.3 kg (291 lb 10.7 oz)  Body mass index is 45.68 kg/m².    Intake/Output Summary (Last 24 hours) at 7/18/2020 1828  Last data filed at 7/18/2020 1800  Gross per 24 hour   Intake 522.28 ml   Output 1300 ml   Net -777.72 ml      Physical Exam  Constitutional:       General: He is not in acute distress.     Appearance: He is well-developed. He is not diaphoretic.   HENT:      Head: Normocephalic and atraumatic.      Mouth/Throat:      Pharynx: No oropharyngeal exudate.   Eyes:      Conjunctiva/sclera: Conjunctivae normal.      Pupils: Pupils are equal, round, and reactive to light.   Neck:      Musculoskeletal: Normal range of motion and neck supple.      Thyroid: No thyromegaly.      Vascular: No JVD.   Cardiovascular:      Rate and Rhythm: Normal rate and regular rhythm.      Heart sounds: Normal heart sounds. No murmur.   Pulmonary:      Effort: Pulmonary effort is normal. No respiratory distress.      Breath sounds: No wheezing or rales.      Comments: Bronchial breath sounds kallie   Chest:      Chest wall: No tenderness.   Abdominal:      General: Bowel sounds are normal. There is no distension.      Palpations: Abdomen is soft.      Tenderness: There is no abdominal tenderness. There is no guarding or rebound.   Musculoskeletal: Normal range of motion.   Lymphadenopathy:      Cervical: No cervical adenopathy.   Skin:     General: Skin is warm and dry.      Findings: No rash.   Neurological:      Mental Status: He is alert and oriented to person, place, and time.      Cranial Nerves: No cranial nerve deficit.      Sensory: No sensory deficit.      Deep Tendon Reflexes: Reflexes normal.         Significant Labs:   CBC:   Recent Labs   Lab 07/17/20  0330 07/18/20  0415   WBC 14.43* 18.90*   HGB 13.8* 13.9*   HCT 39.9* 41.9   * 386*     CMP:   Recent Labs   Lab 07/17/20  0330 07/18/20  0415    138   K 4.1 4.2    105   CO2 25 25   * 120*   BUN 25* 19   CREATININE 0.8 0.8    CALCIUM 8.3* 8.7   PROT 5.8* 6.1   ALBUMIN 2.7* 2.7*   BILITOT 0.6 0.8   ALKPHOS 44* 57   AST 45* 43*   ALT 60* 55*   ANIONGAP 11 8   EGFRNONAA >60 >60       Significant Imaging:   CXR- Diffuse bilateral interstitial infiltrates.  No significant change      Assessment/Plan:      * Acute respiratory failure with hypoxia  Encouraged deep breathing and coughing.  Transferred to ICU 14 July for respiratory distress.  Continuous BiPAP and Vapotherm as needed.  7/18 -  Mexed out on  vapotherm with hypoxia and placed on  NIPPV / BiPAP support. Remains  Tachypneic, easily desats with exertion or movement . Continue to monitor closely respiratory status  in ICU setting .       Pneumonia due to COVID-19 virus  - COVID-19 testing Collection Date: 7/11/2020 Collection Time:   11:55 AM  - Infection Control notified     - Isolation:   - Airborne, Contact and Droplet Precautions  - Cohort patients into COVID units  - N95 mask, wear eye protection  - 20 second hand hygiene              - Limit visitors per hospital policy              - Consolidating lab draws, nursing care, provider visits, and interventions    - Diagnostics: (leukopenia, hyponatremia, hyperferritinemia, elevated troponin, elevated d-dimer, age, and comorbidities are significant predictors of poor clinical outcome)  CBC, CMP, Procalcitonin, Ferritin, CRP, LDH, BNP, Troponin, Rapid Flu, Blood Culture x2 and Portable CXR    - Management:  Supplemental O2 to maintain SpO2 >92%  Telemetry  Continuous/intermittent Pulse Ox  Albuterol treatment PRN  IV Rocephin/PO Azithromycin  Dexamethasone   Vit C, MVI  Scheduled albuterol  7/13/2020 - pt agreeable to receive Remdesivir - information received               Pneumonia of right lower lobe due to infectious organism  IV Rocephin/Azithromycin  - empiric treatment  Supplemental oxygen  Sputum culture    Sepsis  - Sepsis criteria: Current temp of 101.3, RR 21, source PNA due to COVID-19 virus  - Blood cultures obtained  >>>>> NGTD  - Recent COVID test outpt on 07/06/ 20 negative. Repeat COVID today in ED (+)  - Will treat underlying cause with abx, supplemental oxygen, MDI, oral steroids per COVID-19 protocol      Essential hypertension  - Pt normally takes lisinopril-hctz 20-12.5 po daily  - Will hold hctz and continue lisinopril at current dose and frequency        VTE Risk Mitigation (From admission, onward)         Ordered     heparin 25,000 units in dextrose 5% 250 mL (100 units/mL) infusion HIGH INTENSITY nomogram - [facility]  Continuous     Question:  Heparin Infusion Adjustment (DO NOT MODIFY ANSWER)  Answer:  [email or web address]\Images\Pharmacy\HeparinInfusions\heparin HIGH INTENSITY nomogram for [facility] [id number]    07/16/20 0727     heparin 25,000 units in dextrose 5% (100 units/ml) IV bolus from bag - ADDITIONAL PRN BOLUS - 60 units/kg  As needed (PRN)     Question:  Heparin Infusion Adjustment (DO NOT MODIFY ANSWER)  Answer:  [email or web address]\Images\Pharmacy\HeparinInfusions\heparin HIGH INTENSITY nomogram for [facility] [id number]    07/16/20 0727     heparin 25,000 units in dextrose 5% (100 units/ml) IV bolus from bag - ADDITIONAL PRN BOLUS - 30 units/kg  As needed (PRN)     Question:  Heparin Infusion Adjustment (DO NOT MODIFY ANSWER)  Answer:  [email or web address]\epic\Images\Pharmacy\HeparinInfusions\heparin HIGH INTENSITY nomogram for [facility] [id number]    07/16/20 0727     IP VTE HIGH RISK PATIENT  Once      07/11/20 1301     Place sequential compression device  Until discontinued      07/11/20 1301                Critical care time spent on the evaluation and treatment of severe organ dysfunction, review of pertinent labs and imaging studies, discussions with consulting providers and discussions with patient/family: 30  minutes.      Leonel Ceballos MD  Department of Hospital Medicine   [facility]

## 2020-07-18 NOTE — ASSESSMENT & PLAN NOTE
Cont NPPV alternating with vapotherm to allow oral intake intermittently  Continue bronchodilators  ICU pulmonary monitoring

## 2020-07-18 NOTE — SUBJECTIVE & OBJECTIVE
Interval History:   Mexed out on  vapotherm with hypoxia and placed on  NIPPV / BiPAP support. Remains  Tachypneic, easily desats with exertion or movement . Continue to monitor closely respiratory status  in ICU setting .       Review of Systems   Constitutional: Positive for activity change and appetite change. Negative for fever.   HENT: Negative for sore throat.    Eyes: Negative for visual disturbance.   Respiratory: Positive for cough (dry ) and shortness of breath. Negative for chest tightness.    Cardiovascular: Negative for chest pain, palpitations and leg swelling.   Gastrointestinal: Negative for abdominal distention, abdominal pain, constipation, diarrhea, nausea and vomiting.   Endocrine: Negative for polyuria.   Genitourinary: Negative for decreased urine volume, dysuria, flank pain, frequency and hematuria.   Musculoskeletal: Negative for back pain and gait problem.   Skin: Negative for rash.   Neurological: Negative for syncope, speech difficulty, weakness, light-headedness and headaches.   Psychiatric/Behavioral: Negative for confusion, hallucinations and sleep disturbance.     Objective:     Vital Signs (Most Recent):  Temp: 98 °F (36.7 °C) (07/18/20 1600)  Pulse: 106 (07/18/20 1700)  Resp: (!) 33 (07/18/20 1700)  BP: 102/83 (07/18/20 1700)  SpO2: (!) 88 % (07/18/20 1800) Vital Signs (24h Range):  Temp:  [97.8 °F (36.6 °C)-99.8 °F (37.7 °C)] 98 °F (36.7 °C)  Pulse:  [] 106  Resp:  [18-54] 33  SpO2:  [41 %-99 %] 88 %  BP: ()/(33-83) 102/83     Weight: 132.3 kg (291 lb 10.7 oz)  Body mass index is 45.68 kg/m².    Intake/Output Summary (Last 24 hours) at 7/18/2020 1828  Last data filed at 7/18/2020 1800  Gross per 24 hour   Intake 522.28 ml   Output 1300 ml   Net -777.72 ml      Physical Exam  Constitutional:       General: He is not in acute distress.     Appearance: He is well-developed. He is not diaphoretic.   HENT:      Head: Normocephalic and atraumatic.      Mouth/Throat:       Pharynx: No oropharyngeal exudate.   Eyes:      Conjunctiva/sclera: Conjunctivae normal.      Pupils: Pupils are equal, round, and reactive to light.   Neck:      Musculoskeletal: Normal range of motion and neck supple.      Thyroid: No thyromegaly.      Vascular: No JVD.   Cardiovascular:      Rate and Rhythm: Normal rate and regular rhythm.      Heart sounds: Normal heart sounds. No murmur.   Pulmonary:      Effort: Pulmonary effort is normal. No respiratory distress.      Breath sounds: No wheezing or rales.      Comments: Bronchial breath sounds kallie   Chest:      Chest wall: No tenderness.   Abdominal:      General: Bowel sounds are normal. There is no distension.      Palpations: Abdomen is soft.      Tenderness: There is no abdominal tenderness. There is no guarding or rebound.   Musculoskeletal: Normal range of motion.   Lymphadenopathy:      Cervical: No cervical adenopathy.   Skin:     General: Skin is warm and dry.      Findings: No rash.   Neurological:      Mental Status: He is alert and oriented to person, place, and time.      Cranial Nerves: No cranial nerve deficit.      Sensory: No sensory deficit.      Deep Tendon Reflexes: Reflexes normal.         Significant Labs:   CBC:   Recent Labs   Lab 07/17/20  0330 07/18/20  0415   WBC 14.43* 18.90*   HGB 13.8* 13.9*   HCT 39.9* 41.9   * 386*     CMP:   Recent Labs   Lab 07/17/20  0330 07/18/20  0415    138   K 4.1 4.2    105   CO2 25 25   * 120*   BUN 25* 19   CREATININE 0.8 0.8   CALCIUM 8.3* 8.7   PROT 5.8* 6.1   ALBUMIN 2.7* 2.7*   BILITOT 0.6 0.8   ALKPHOS 44* 57   AST 45* 43*   ALT 60* 55*   ANIONGAP 11 8   EGFRNONAA >60 >60       Significant Imaging:   CXR- Diffuse bilateral interstitial infiltrates.  No significant change

## 2020-07-18 NOTE — PROGRESS NOTES
Patient placed on Vapotherm to attempt to be taken off of Bipap Per Sara Trevino NP.  Patient placed on vapotherm and saturation was 55-65% Sara Trevino Aware.   Patient eventually got down to saturations of 39-40% and was placed back on the Bipap for patient safety.  Once Placed back on Bipap Patient saturation returned to mid 80's.   Will continue to monitor patient. Nurse Robert and Sara Trevino NP aware.

## 2020-07-18 NOTE — ASSESSMENT & PLAN NOTE
Encouraged deep breathing and coughing.  Transferred to ICU 14 July for respiratory distress.  Continuous BiPAP and Vapotherm as needed.  7/18 -  Mexed out on  vapotherm with hypoxia and placed on  NIPPV / BiPAP support. Remains  Tachypneic, easily desats with exertion or movement . Continue to monitor closely respiratory status  in ICU setting .

## 2020-07-18 NOTE — SUBJECTIVE & OBJECTIVE
Remains dependent on NIPPV with tachypnea; ongoing desat with exertion    Objective:     Vital Signs (Most Recent):  Temp: 99.8 °F (37.7 °C) (07/18/20 0305)  Pulse: (!) 111 (07/18/20 0758)  Resp: (!) 49 (07/18/20 0758)  BP: (!) 118/33 (07/18/20 0700)  SpO2: (!) 90 % (07/18/20 0758) Vital Signs (24h Range):  Temp:  [98.4 °F (36.9 °C)-99.8 °F (37.7 °C)] 99.8 °F (37.7 °C)  Pulse:  [] 111  Resp:  [18-54] 49  SpO2:  [80 %-100 %] 90 %  BP: ()/(33-75) 118/33     Weight: 132.3 kg (291 lb 10.7 oz)  Body mass index is 45.68 kg/m².      Intake/Output Summary (Last 24 hours) at 7/18/2020 0845  Last data filed at 7/18/2020 0500  Gross per 24 hour   Intake 346 ml   Output 1650 ml   Net -1304 ml      heparin (porcine) in D5W 16 Units/kg/hr (07/18/20 0530)       Physical Exam  Vitals signs and nursing note reviewed.   Constitutional:       Appearance: He is obese. He is ill-appearing. He is not toxic-appearing or diaphoretic.      Interventions: Face mask in place.   HENT:      Head: Atraumatic.      Mouth/Throat:      Mouth: Mucous membranes are dry.   Eyes:      Conjunctiva/sclera: Conjunctivae normal.   Neck:      Vascular: No JVD.   Cardiovascular:      Rate and Rhythm: Normal rate and regular rhythm.      Pulses:           Radial pulses are 2+ on the right side and 2+ on the left side.   Pulmonary:      Effort: Tachypnea present. No retractions.      Breath sounds: Decreased breath sounds and rhonchi present.   Abdominal:      General: Abdomen is protuberant. There is no distension.      Palpations: Abdomen is soft.   Musculoskeletal:      Right lower leg: No edema.      Left lower leg: No edema.   Skin:     General: Skin is warm and dry.      Capillary Refill: Capillary refill takes 2 to 3 seconds.   Neurological:      General: No focal deficit present.      Mental Status: He is alert and oriented to person, place, and time.   Psychiatric:         Attention and Perception: Attention normal.         Mood and  Affect: Mood normal.         Speech: Speech normal.         Behavior: Behavior normal.         Vents:  Oxygen Concentration (%): 90 (07/18/20 0758)    Lines/Drains/Airways     Peripherally Inserted Central Catheter Line            PICC Double Lumen 07/16/20 1130 right brachial 1 day                Significant Labs:    CBC/Anemia Profile:  Recent Labs   Lab 07/17/20  0330 07/18/20  0415   WBC 14.43* 18.90*   HGB 13.8* 13.9*   HCT 39.9* 41.9   * 386*   MCV 91 92   RDW 12.8 12.9   FERRITIN 8,084*  --         Chemistries:  Recent Labs   Lab 07/17/20  0330 07/18/20  0415    138   K 4.1 4.2    105   CO2 25 25   BUN 25* 19   CREATININE 0.8 0.8   CALCIUM 8.3* 8.7   ALBUMIN 2.7* 2.7*   PROT 5.8* 6.1   BILITOT 0.6 0.8   ALKPHOS 44* 57   ALT 60* 55*   AST 45* 43*       All pertinent labs within the past 24 hours have been reviewed.    Significant Imaging:  I have reviewed all pertinent imaging results/findings within the past 24 hours.

## 2020-07-18 NOTE — PLAN OF CARE
Pt increasingly more tachypneic as night progressed, RR 30s-40s, was on vapotherm 40L/100% at beginning of shift with SpO2 85-90, placed on bipap around 23:00, SpO2 increased to>92% however increased RR 40s-50 on bipap. PRN ativan given with mild relief. SR/ST low 100s on monitor. Tmax 99.8. Voiding per urinal - approx 650ml UOP overnight. Blood glucose monitored per order. Turns/repositions independently. PTT subtherapeutic this AM, heparin gtt adjusted per nomogram. POC reviewed with pt, verbalized understanding but needs reinforcement to severity of situation.

## 2020-07-19 PROBLEM — D72.829 LEUKOCYTOSIS: Status: ACTIVE | Noted: 2020-01-01

## 2020-07-19 NOTE — PROGRESS NOTES
Pt with increased WOB over last hour, now with extensive accessory and abdominal muscle use, now with conversational dyspnea, RR 45-60, HR maintaining 110s-120s, SpO2 86-90% on Bipap at 100%. eICU called into room, Dr. Loyd order CXR, ABGs, 1x dose of lasix, precedex gtt, and switch to AVAPS. Will assess interventions and if no improvement, will need intubation

## 2020-07-19 NOTE — ASSESSMENT & PLAN NOTE
7/19- Marked leukocytosis and thrombocytosis are noted. Likely reactive . Procalcitonin is neg. Blood cultures - NGTD. Endotracheal aspirate obtained for gram stain and culture.

## 2020-07-19 NOTE — PROVIDER PROGRESS NOTES - EMERGENCY DEPT.
Encounter Date: 7/11/2020    ED Physician Progress Notes       SCRIBE NOTE: Dacia LONDON, am scribing for, and in the presence of,  Preeti Narvaez MD.  Physician Statement: Preeti LONDON MD, personally performed the services described in this documentation as scribed by Dacia Cole in my presence, and it is both accurate and complete.     Physician Note:   Called to ICU for intubation.             Intubation    Date/Time: 7/19/2020 4:22 AM  Performed by: Preeti Narvaez MD  Authorized by: Preeti Narvaez MD   Consent Done: Emergent Situation  Indications: respiratory failure  Intubation method: direct  Patient status: paralyzed (RSI)  Preoxygenation: mask and BVM  Sedatives: etomidate  Paralytic: succinylcholine  Laryngoscope size: Mac 4  Tube size: 7.0 mm  Tube type: cuffed  Number of attempts: 4  Ventilation between attempts: BVM  Cricoid pressure: yes  Cords visualized: yes  Post-procedure assessment: chest rise,  ETCO2 monitor and CO2 detector  Breath sounds: equal  Cuff inflated: yes  ETT to lip: 25 cm  Tube secured with: adhesive tape, bite block, ETT ceron, oral airway and umbilical tape  Chest x-ray interpreted by radiologist.  Chest x-ray findings: endotracheal tube in appropriate position  Patient tolerance: Patient tolerated the procedure well with no immediate complications  Complications: No  Estimated blood loss (mL): 0  Specimens: No  Implants: No

## 2020-07-19 NOTE — PROGRESS NOTES
[facility] Hospital Medicine  Progress Note    Patient Name: Jonas Abdalla  MRN: [id number]  Patient Class: IP- Inpatient   Admission Date: 7/11/2020  Length of Stay: 6 days  Attending Physician: Leonel Ceballos MD  Primary Care Provider: Carmel Tabares MD        Subjective:     Principal Problem:Acute respiratory failure with hypoxia        HPI:  Jonas Abdalla is a 35 year old male with a pMHx of HTN who presented to the Emergency Department with c/o SOB. Associated symptoms include: generalized weakness, fatigue, and fever. Pt reports symptoms started Monday, 07/06. He reports he thought he had COVID and went to get tested taht day but did not get test results. Went to local urgent care on Tuesday, 07/07 -- diagnosed with PNA and given Levaquin 500 mg po daily. Symptoms continued despite abx and wife took pt to COVID testing site which he was negative for COVID. Wife reports the COVID test he took on Monday came back negative, notified yesterday of results. ED workup showed: no leukocytosis/leukopenia, stable Hgb/Hct, mild hyponatremia. CXR showed focal right infrahilar/medial lung base consolidation concerning for pneumonia. Febrile upon arrival to . Pt received 30mL/kg fluid resuscitation in ED along with one time doses of IV rocephin/azithromycin. Pt placed on observation for Fever believed to be secondary to RML PNA. COVID rapid screening pending upon assessment, now POSITIVE.     Overview/Hospital Course:  Pt presented to the ED due to weakness, nausea/vomiting with concerns for dehydration. COVID ++. Running temps 103, 102.4, 101.8. He denied any SOB and DRAKE. He describes an occasional dry cough. On 2 L nasal cannula. On azithromycin, Rocephin, decadron and scheduled albuterol.   7/13/2020 - pt has continued to be febrile and now reporting SOB and productive cough. Provided remdesivir information and patient has consented. Oxygen escalated to 4 L.  7/14/2020 - Sudden  increase to oxygen 10 L, oxygen saturation 85 %. Temp 101.6 at 4 AM this AM. Current plan of care continued.   7/18- Pt was transferred to ICU on 7/15 with worsening respiratory status requiring NIPPV . Pt is awake , alert and oriented today .Mexed out on  vapotherm with hypoxia and placed on  NIPPV / BiPAP support. Remains  Tachypneic, easily desats with exertion or movement . Continue to monitor closely respiratory status  in ICU setting .   7/19 - Intubated electively  overnight due to increased WOB on bipap . Sedated on propofol, fentanyl. Levophed gtt added to maintain MAP>65. Heparin high intensity nomogram continues. Pt completed 5 days Remdesivir. On Decadron x 10 days . WBC count is markedly elevated . Likely reactive . Procalcitinin is normal. Creatinine bumped to 1.3 from 0.8 overnight.     Interval History:      Intubated electively  overnight due to increased WOB on bipap    Review of Systems   Unable to perform ROS: Intubated     Objective:     Vital Signs (Most Recent):  Temp: 99.4 °F (37.4 °C) (07/19/20 0305)  Pulse: (!) 121 (07/19/20 0820)  Resp: (!) 35 (07/19/20 0820)  BP: (!) 118/59 (07/19/20 0820)  SpO2: (!) 88 % (07/19/20 0820) Vital Signs (24h Range):  Temp:  [98 °F (36.7 °C)-99.7 °F (37.6 °C)] 99.4 °F (37.4 °C)  Pulse:  [] 121  Resp:  [25-56] 35  SpO2:  [29 %-97 %] 88 %  BP: ()/() 118/59     Weight: 132.3 kg (291 lb 10.7 oz)  Body mass index is 45.68 kg/m².    Intake/Output Summary (Last 24 hours) at 7/19/2020 1304  Last data filed at 7/19/2020 0600  Gross per 24 hour   Intake 600.24 ml   Output 1955 ml   Net -1354.76 ml      Physical Exam  Constitutional:       General: He is not in acute distress.     Appearance: He is well-developed. He is not diaphoretic.      Comments: Sedated on vent    HENT:      Head: Normocephalic and atraumatic.      Mouth/Throat:      Pharynx: No oropharyngeal exudate.   Eyes:      Conjunctiva/sclera: Conjunctivae normal.      Pupils: Pupils are  equal, round, and reactive to light.   Neck:      Musculoskeletal: Normal range of motion and neck supple.      Thyroid: No thyromegaly.      Vascular: No JVD.   Cardiovascular:      Rate and Rhythm: Normal rate and regular rhythm.      Heart sounds: Normal heart sounds. No murmur.   Pulmonary:      Effort: No respiratory distress.      Breath sounds: No wheezing or rales.      Comments: On mechanical ventilator . Breath sounds are bronchial   Chest:      Chest wall: No tenderness.   Abdominal:      General: Bowel sounds are normal. There is no distension.      Palpations: Abdomen is soft.      Tenderness: There is no abdominal tenderness. There is no guarding or rebound.   Lymphadenopathy:      Cervical: No cervical adenopathy.   Skin:     General: Skin is warm and dry.      Findings: No rash.   Neurological:      Cranial Nerves: No cranial nerve deficit.      Sensory: No sensory deficit.      Deep Tendon Reflexes: Reflexes normal.      Comments: Sedated on vent          Significant Labs:   CBC:   Recent Labs   Lab 07/18/20  0415 07/19/20  0647   WBC 18.90* 46.34*   HGB 13.9* 15.4   HCT 41.9 48.3   * 637*     CMP:   Recent Labs   Lab 07/18/20  0415 07/19/20  0647    139   K 4.2 5.0    102   CO2 25 24   * 181*   BUN 19 24*   CREATININE 0.8 1.3   CALCIUM 8.7 9.1   PROT 6.1 7.0   ALBUMIN 2.7* 2.9*   BILITOT 0.8 1.1*   ALKPHOS 57 83   AST 43* 231*   ALT 55* 213*   ANIONGAP 8 13   EGFRNONAA >60 >60       Significant Imaging:       Assessment/Plan:      * Acute respiratory failure with hypoxia  Encouraged deep breathing and coughing.  Transferred to ICU 14 July for respiratory distress.  Continuous BiPAP and Vapotherm as needed.  7/18 -  Mexed out on  vapotherm with hypoxia and placed on  NIPPV / BiPAP support. Remains  Tachypneic, easily desats with exertion or movement . Continue to monitor closely respiratory status  in ICU setting .   7/19-    Intubated electively  overnight due to increased  WOB on bipap. Vent setting per ICU team     Pneumonia due to COVID-19 virus  - COVID-19 testing Collection Date: 7/11/2020 Collection Time:   11:55 AM  - Infection Control notified     - Isolation:   - Airborne, Contact and Droplet Precautions  - Cohort patients into COVID units  - N95 mask, wear eye protection  - 20 second hand hygiene              - Limit visitors per hospital policy              - Consolidating lab draws, nursing care, provider visits, and interventions    - Diagnostics: (leukopenia, hyponatremia, hyperferritinemia, elevated troponin, elevated d-dimer, age, and comorbidities are significant predictors of poor clinical outcome)  CBC, CMP, Procalcitonin, Ferritin, CRP, LDH, BNP, Troponin, Rapid Flu, Blood Culture x2 and Portable CXR    - Management:  Supplemental O2 to maintain SpO2 >92%  Telemetry  Continuous/intermittent Pulse Ox  Albuterol treatment PRN  IV Rocephin/PO Azithromycin  Dexamethasone   Vit C, MVI  Scheduled albuterol  7/13/2020 - pt agreeable to receive Remdesivir - information received  7/19- Completed 5 days Remdesivir . On Decadron for 10 days                Leukocytosis and thrombocytosis   7/19- Marked leukocytosis and thrombocytosis are noted. Likely reactive . Procalcitonin is neg. Blood cultures - NGTD. Endotracheal aspirate obtained for gram stain and culture.       Pneumonia of right lower lobe due to infectious organism  IV Rocephin/Azithromycin  - empiric treatment  Supplemental oxygen  Sputum culture    Sepsis  - Sepsis criteria: Current temp of 101.3, RR 21, source PNA due to COVID-19 virus  - Blood cultures obtained >>>>> NGTD  - Recent COVID test outpt on 07/06/ 20 negative. Repeat COVID today in ED (+)  - Will treat underlying cause with abx, supplemental oxygen, MDI, oral steroids per COVID-19 protocol      Essential hypertension  - Pt normally takes lisinopril-hctz 20-12.5 po daily  - Will hold hctz and continue lisinopril at current dose and frequency        VTE  Risk Mitigation (From admission, onward)         Ordered     IP VTE HIGH RISK PATIENT  Once      07/19/20 0337     Place sequential compression device  Until discontinued      07/19/20 0337     heparin 25,000 units in dextrose 5% 250 mL (100 units/mL) infusion HIGH INTENSITY nomogram - [facility]  Continuous     Question:  Heparin Infusion Adjustment (DO NOT MODIFY ANSWER)  Answer:  \\[email or web address]\epic\Images\Pharmacy\HeparinInfusions\heparin HIGH INTENSITY nomogram for [facility] [id number].pdf    07/16/20 0727     heparin 25,000 units in dextrose 5% (100 units/ml) IV bolus from bag - ADDITIONAL PRN BOLUS - 60 units/kg  As needed (PRN)     Question:  Heparin Infusion Adjustment (DO NOT MODIFY ANSWER)  Answer:  \\[email or web address]\epic\Images\Pharmacy\HeparinInfusions\heparin HIGH INTENSITY nomogram for [facility] [id number].pdf    07/16/20 0727     heparin 25,000 units in dextrose 5% (100 units/ml) IV bolus from bag - ADDITIONAL PRN BOLUS - 30 units/kg  As needed (PRN)     Question:  Heparin Infusion Adjustment (DO NOT MODIFY ANSWER)  Answer:  \\[email or web address]\epic\Images\Pharmacy\HeparinInfusions\heparin HIGH INTENSITY nomogram for [facility] [id number].pdf    07/16/20 0727                Critical care time spent on the evaluation and treatment of severe organ dysfunction, review of pertinent labs and imaging studies, discussions with consulting providers and discussions with patient/family: 30  minutes.      Leonel Ceballos MD  Department of Hospital Medicine   [facility]

## 2020-07-19 NOTE — SUBJECTIVE & OBJECTIVE
Interval History:      Intubated electively  overnight due to increased WOB on bipap    Review of Systems   Unable to perform ROS: Intubated     Objective:     Vital Signs (Most Recent):  Temp: 99.4 °F (37.4 °C) (07/19/20 0305)  Pulse: (!) 121 (07/19/20 0820)  Resp: (!) 35 (07/19/20 0820)  BP: (!) 118/59 (07/19/20 0820)  SpO2: (!) 88 % (07/19/20 0820) Vital Signs (24h Range):  Temp:  [98 °F (36.7 °C)-99.7 °F (37.6 °C)] 99.4 °F (37.4 °C)  Pulse:  [] 121  Resp:  [25-56] 35  SpO2:  [29 %-97 %] 88 %  BP: ()/() 118/59     Weight: 132.3 kg (291 lb 10.7 oz)  Body mass index is 45.68 kg/m².    Intake/Output Summary (Last 24 hours) at 7/19/2020 1304  Last data filed at 7/19/2020 0600  Gross per 24 hour   Intake 600.24 ml   Output 1955 ml   Net -1354.76 ml      Physical Exam  Constitutional:       General: He is not in acute distress.     Appearance: He is well-developed. He is not diaphoretic.      Comments: Sedated on vent    HENT:      Head: Normocephalic and atraumatic.      Mouth/Throat:      Pharynx: No oropharyngeal exudate.   Eyes:      Conjunctiva/sclera: Conjunctivae normal.      Pupils: Pupils are equal, round, and reactive to light.   Neck:      Musculoskeletal: Normal range of motion and neck supple.      Thyroid: No thyromegaly.      Vascular: No JVD.   Cardiovascular:      Rate and Rhythm: Normal rate and regular rhythm.      Heart sounds: Normal heart sounds. No murmur.   Pulmonary:      Effort: No respiratory distress.      Breath sounds: No wheezing or rales.      Comments: On mechanical ventilator . Breath sounds are bronchial   Chest:      Chest wall: No tenderness.   Abdominal:      General: Bowel sounds are normal. There is no distension.      Palpations: Abdomen is soft.      Tenderness: There is no abdominal tenderness. There is no guarding or rebound.   Lymphadenopathy:      Cervical: No cervical adenopathy.   Skin:     General: Skin is warm and dry.      Findings: No rash.    Neurological:      Cranial Nerves: No cranial nerve deficit.      Sensory: No sensory deficit.      Deep Tendon Reflexes: Reflexes normal.      Comments: Sedated on vent          Significant Labs:   CBC:   Recent Labs   Lab 07/18/20  0415 07/19/20  0647   WBC 18.90* 46.34*   HGB 13.9* 15.4   HCT 41.9 48.3   * 637*     CMP:   Recent Labs   Lab 07/18/20 0415 07/19/20  0647    139   K 4.2 5.0    102   CO2 25 24   * 181*   BUN 19 24*   CREATININE 0.8 1.3   CALCIUM 8.7 9.1   PROT 6.1 7.0   ALBUMIN 2.7* 2.9*   BILITOT 0.8 1.1*   ALKPHOS 57 83   AST 43* 231*   ALT 55* 213*   ANIONGAP 8 13   EGFRNONAA >60 >60       Significant Imaging:

## 2020-07-19 NOTE — ANESTHESIA PROCEDURE NOTES
Arterial    Diagnosis: Covid 19 pneumonia with resp failure    Patient location during procedure: ICU    Staffing  Authorizing Provider: Bren De La Rosa MD  Performing Provider: Bren De La Rosa MD    Anesthesiologist was present at the time of the procedure.  Arterial  Skin Prep: chlorhexidine gluconate  Local Infiltration: lidocaine  Orientation: right  Location: radial  Catheter Size: 20 G  Catheter placement by Ultrasound guidance and Anatomical landmarks. Heme positive aspiration all ports.  Vessel Caliber: patent  Needle advanced into vessel with real time Ultrasound guidance.Insertion Attempts: 1  Assessment  Dressing: sutured in place and taped and tegaderm  Patient: Tolerated well

## 2020-07-19 NOTE — ASSESSMENT & PLAN NOTE
Respiratory/Contact/Droplet Isolation   Fluid Sparing Resuscitation, strict I/O  Empiric Antibiotics - completed 5 days Rocephin and Zithromax  LDH and D-dimer increased; will start heparin anticoagulation to prevent microthrombosis  Continue Zinc, melatonin, and Vit C  Remdisivir completed  Decadron Day #9  7/18 - overall status holding steady with significant hypoxia but not requiring intubation/invasive ventilation; plan continue current course with ICU pulmonary monitoring  7/19 - decompensated overnight; aggressive supportive care with mech ventilation and pressor support; keep MAP > 65 to maintain organ perfusion; consider extension of remdisivir along with eligibility for convalescent plasma transfusion

## 2020-07-19 NOTE — ASSESSMENT & PLAN NOTE
- COVID-19 testing Collection Date: 7/11/2020 Collection Time:   11:55 AM  - Infection Control notified     - Isolation:   - Airborne, Contact and Droplet Precautions  - Cohort patients into COVID units  - N95 mask, wear eye protection  - 20 second hand hygiene              - Limit visitors per hospital policy              - Consolidating lab draws, nursing care, provider visits, and interventions    - Diagnostics: (leukopenia, hyponatremia, hyperferritinemia, elevated troponin, elevated d-dimer, age, and comorbidities are significant predictors of poor clinical outcome)  CBC, CMP, Procalcitonin, Ferritin, CRP, LDH, BNP, Troponin, Rapid Flu, Blood Culture x2 and Portable CXR    - Management:  Supplemental O2 to maintain SpO2 >92%  Telemetry  Continuous/intermittent Pulse Ox  Albuterol treatment PRN  IV Rocephin/PO Azithromycin  Dexamethasone   Vit C, MVI  Scheduled albuterol  7/13/2020 - pt agreeable to receive Remdesivir - information received  7/19- Completed 5 days Remdesivir . On Decadron for 10 days

## 2020-07-19 NOTE — RESEARCH
"Medical History Resize font:    Survey Queue    Thank you Dr. GARY for enrolling EROS DIEHL (: 1985) in the  Expanded Access Program for Convalescent Plasma for the Treatment of Patients with COVID-19.  The assigned patient code is [id number]  Unique Dashboard Link: [email or web address]  Please retain this unique patient code and dashboard link for each patient in a secure location within your records.  We will send you an email confirmation (to the email addresses that you supplied on the enrollment form) with the patient code, dashboard/survey queue link, and authorization to order convalescent plasma.  Please complete this abbreviated version of the medical history form as close to the time of transfusion as possible.  Note: If you are waiting for plasma or not ready to transfuse, you may leave this section blank and return to complete this section later.  Please do NOT select the "Back" or "Return" option in your browser to return to the patient enrollment form, as that may cause issues with your data collection.      Thank you for submitting the Medical History Form.  Please return to your survey queue via the link below and select "Edit Response" to add any additional information or make any changes to the Medical History form as necessary.  The assigned patient code is [id number]  Unique Dashboard Link: [email or web address]  Please retain this unique patient code and dashboard link for each patient in a secure location within your records.          Patient had ARDS sec COVID DEEPTHI on BILEVEL FiO2 100% and High PEEP  Meets criteria for Plasma  2 units  Requested    Byron Gary MD    "

## 2020-07-19 NOTE — ASSESSMENT & PLAN NOTE
Encouraged deep breathing and coughing.  Transferred to ICU 14 July for respiratory distress.  Continuous BiPAP and Vapotherm as needed.  7/18 -  Mexed out on  vapotherm with hypoxia and placed on  NIPPV / BiPAP support. Remains  Tachypneic, easily desats with exertion or movement . Continue to monitor closely respiratory status  in ICU setting .   7/19-    Intubated electively  overnight due to increased WOB on bipap. Vent setting per ICU team

## 2020-07-19 NOTE — PROGRESS NOTES
[facility] -   Critical Care Medicine  Progress Note    Patient Name: Jonas Abdalla  MRN: [id number]  Admission Date: 7/11/2020  Hospital Length of Stay: 6 days  Code Status: Full Code  Attending Provider: Leonel Ceballos MD  Primary Care Provider: Carmel Tabares MD   Principal Problem: Acute respiratory failure with hypoxia    Subjective:     HPI:  Mr Abdalla is a morbidly obese WM with a PMH of cholelithiasis and HTN who was admitted 7/12 with Sepsis and COVID PNA after presentation with weakness, persistent fever, chills and N/V/D for 6 days progressive.  Had 102.4 temp in ED.  Admitted to [facility] started on emperic IVAB, Decadron and NC low flow.  Oxygenation worsened over next few days.  He consented and was started on Remdesivir 7/14.  On evening of 7/14.  At [place] last night patient became more hypoxic despite being maxed out on VapoTherm.  He was placed in prone position, which improved O2 sat to 90-93% but patient remained in notable respiratory distress.  He was transferred to ICU and initiated on BiPAP.      Hospital/ICU Course:  7/15 - This AM upright in bed fully awake, alert and responsive not in distress but has tachypnea and mild work of breathing while on NPPV and FiO2 at .95.    7/16 - remains on NIPPV with some decline in oxygen demand with FiO2 0.75 but continued tachypnea; ongoing desaturation with exertion or momentary removal of NIPPV for fluid intake  7/17 - tolerated short time on vapotherm support last evening, returned to NIPPV for sleep and has remained today s/t tachypnea, hypoxia; afebrile but wbc slight trend up 14.4k   7/18 - awake, alert,oriented; continues to alternate max support vapotherm with NIPPV support, tachypnea at baseline and worsens along with drop in pulse ox sat with exertion/movement that slowly recovers with rest  7/19 - respiratory distress overnight; required intubation, heavy sedation, mechanical ventilation, low dose pressor support; post  intubation abg shows continued worsening acidosis along with now worsening leukocytosis, ddimer, LD, and CRP        Objective:     Vital Signs (Most Recent):  Temp: 99.4 °F (37.4 °C) (07/19/20 0305)  Pulse: (!) 121 (07/19/20 0820)  Resp: (!) 35 (07/19/20 0820)  BP: (!) 118/59 (07/19/20 0820)  SpO2: (!) 88 % (07/19/20 0820) Vital Signs (24h Range):  Temp:  [98 °F (36.7 °C)-99.7 °F (37.6 °C)] 99.4 °F (37.4 °C)  Pulse:  [] 121  Resp:  [25-56] 35  SpO2:  [29 %-97 %] 88 %  BP: ()/() 118/59     Weight: 132.3 kg (291 lb 10.7 oz)  Body mass index is 45.68 kg/m².      Intake/Output Summary (Last 24 hours) at 7/19/2020 1311  Last data filed at 7/19/2020 0600  Gross per 24 hour   Intake 600.24 ml   Output 1955 ml   Net -1354.76 ml      fentanyl 200 mcg/hr (07/19/20 0705)    heparin (porcine) in D5W 16 Units/kg/hr (07/19/20 0947)    midazolam 10 mg/hr (07/19/20 1100)    norepinephrine bitartrate-D5W 0.34 mcg/kg/min (07/19/20 1205)    propofoL 50 mcg/kg/min (07/19/20 1215)       Physical Exam  Vitals signs and nursing note reviewed.   Constitutional:       Appearance: He is obese. He is ill-appearing. He is not toxic-appearing or diaphoretic.      Interventions: He is sedated, intubated and restrained.   HENT:      Head: Atraumatic.      Mouth/Throat:      Mouth: Mucous membranes are dry.   Eyes:      Conjunctiva/sclera: Conjunctivae normal.   Neck:      Vascular: No JVD.   Cardiovascular:      Rate and Rhythm: Regular rhythm. Tachycardia present.      Pulses:           Radial pulses are 1+ on the right side and 1+ on the left side.        Dorsalis pedis pulses are detected w/ Doppler on the right side and detected w/ Doppler on the left side.   Pulmonary:      Effort: Tachypnea present. No retractions. He is intubated.      Breath sounds: Decreased breath sounds and rhonchi present.   Abdominal:      General: Abdomen is protuberant. There is no distension.      Palpations: Abdomen is soft.    Musculoskeletal:      Right lower leg: No edema.      Left lower leg: No edema.   Skin:     General: Skin is warm and dry.      Capillary Refill: Capillary refill takes 2 to 3 seconds.         Vents:  Vent Mode: BILEVL (07/19/20 0820)  Ventilator Initiated: Yes (07/19/20 0415)  Set Rate: 20 BPM (07/19/20 0820)  Vt Set: 450 mL (07/19/20 0514)  Pressure Support: 10 cmH20 (07/19/20 0820)  PEEP/CPAP: 20 cmH20 (07/19/20 0514)  Oxygen Concentration (%): 100 (07/19/20 0820)  Peak Airway Pressure: 39 cmH2O (07/19/20 0820)  Total Ve: 19.1 mL (07/19/20 0820)  F/VT Ratio<105 (RSBI): (!) 38.63 (07/19/20 0820)    Lines/Drains/Airways     Peripherally Inserted Central Catheter Line            PICC Double Lumen 07/16/20 1130 right brachial 3 days          Drain                 NG/OG Tube 07/19/20 0530 orogastric 16 Fr. less than 1 day         Urethral Catheter 07/19/20 0330 less than 1 day          Airway                 Airway - Non-Surgical 07/19/20 0420 Endotracheal Tube less than 1 day          Arterial Line                 Arterial Line 07/19/20 1144 Right Radial less than 1 day          Peripheral Intravenous Line                 Peripheral IV - Single Lumen 07/19/20 0500 20 G Right Hand less than 1 day                Significant Labs:    CBC/Anemia Profile:  Recent Labs   Lab 07/18/20  0415 07/19/20  0647   WBC 18.90* 46.34*   HGB 13.9* 15.4   HCT 41.9 48.3   * 637*   MCV 92 97   RDW 12.9 13.4   FERRITIN 5,594*  --         Chemistries:  Recent Labs   Lab 07/18/20  0415 07/19/20  0647    139   K 4.2 5.0    102   CO2 25 24   BUN 19 24*   CREATININE 0.8 1.3   CALCIUM 8.7 9.1   ALBUMIN 2.7* 2.9*   PROT 6.1 7.0   BILITOT 0.8 1.1*   ALKPHOS 57 83   ALT 55* 213*   AST 43* 231*       All pertinent labs within the past 24 hours have been reviewed.    Significant Imaging:  I have reviewed all pertinent imaging results/findings within the past 24 hours.      ABG  Recent Labs   Lab 07/19/20  0818   PH 7.165*   PO2  70*   PCO2 70.8*   HCO3 25.5   BE -3     Assessment/Plan:     Pulmonary  * Acute respiratory failure with hypoxia  Mechanical ventilation; settings adjusted per abg throughout day to optimize gas exchange  Keep sedated for RASS goal -4  Continue bronchodilators  ICU pulmonary monitoring    Cardiac/Vascular  Essential hypertension  Hold Lisinopril    Endocrine  Morbid obesity with BMI of 45.0-49.9, adult  Recommend diet and lifestyle modification for goal wt loss once medically stable    Other  Pneumonia due to COVID-19 virus  Respiratory/Contact/Droplet Isolation   Fluid Sparing Resuscitation, strict I/O  Empiric Antibiotics - completed 5 days Rocephin and Zithromax  LDH and D-dimer increased; will start heparin anticoagulation to prevent microthrombosis  Continue Zinc, melatonin, and Vit C  Remdisivir completed  Decadron Day #9  7/18 - overall status holding steady with significant hypoxia but not requiring intubation/invasive ventilation; plan continue current course with ICU pulmonary monitoring  7/19 - decompensated overnight; aggressive supportive care with mech ventilation and pressor support; keep MAP > 65 to maintain organ perfusion; consider extension of remdisivir along with eligibility for convalescent plasma transfusion     Critical Care Daily Checklist:    A: Awake: RASS Goal/Actual Goal: RASS Goal: -5-->unarousable(Paralytic)  Actual: [place] Agitation Sedation Scale (RASS): Drowsy   B: Spontaneous Breathing Trial Performed?     C: SAT & SBT Coordinated?  n/a                      D: Delirium: CAM-ICU Overall CAM-ICU: Negative   E: Early Mobility Performed? Yes   F: Feeding Goal:    Status:     Current Diet Order   Procedures    Diet NPO      AS: Analgesia/Sedation Fentanyl, propofol, versed   T: Thromboembolic Prophylaxis Heparin infusion   H: HOB > 300 Yes   U: Stress Ulcer Prophylaxis (if needed) pepcid   G: Glucose Control monitoring   B: Bowel Function Stool Occurrence: 0   I: Indwelling Catheter  (Lines & Bagley) Necessity reviewed   D: De-escalation of Antimicrobials/Pharmacotherapies reviewed    Plan for the day/ETD As above    Code Status:  Family/Goals of Care: Full Code  Spouse, Joan, updated ([phone])    I have discussed case and plan of care in detail with Dr Vaca; Status and plan of care were discussed with team on multidisciplinary rounds.    Critical Care Time: 92 minutes  Critical secondary to severe hypoxemic respiratory failure s/t covid pneumonia; Critical care was time spent personally by me on the following activities: development of treatment plan with patient or surrogate and bedside caregivers, discussions with consultants, evaluation of patient's response to treatment, examination of patient, ordering and performing treatments and interventions, ordering and review of laboratory studies, ordering and review of radiographic studies, pulse oximetry, re-evaluation of patient's condition. This critical care time did not overlap with that of any other provider or involve time for any procedures.     LOC Chowdhury-BC  Critical Care Medicine  [facility]

## 2020-07-19 NOTE — PLAN OF CARE
Pt with increased WOB on bipap overnight, eventually leading to distress around 0300 ultimately electively intubated per eICU orders - difficult intubation with sats 50s-70s s/p intubation until adequately sedated/paralyzed on propofol, fentanyl and nimbex. Levophed gtt added to maintain MAP>65. Bagley placed with adequate UOP. OGT placed to LIWS. ST on monitor all night. Tmax 99.7. POC reviewed with pt and wife prior to intubation, wife updated via phone s/p intubation and stabilization.

## 2020-07-19 NOTE — PLAN OF CARE
Pt on Bilevel settings on vent. Nimbex gtt off. Fent, propofol, versed gtt infusing. Pt resting comfortably. 1L NS given for tidaling of art line waveform and decreased urine output. Levo gtt weaned down. Tmax 101.6. ice packs and fan applied. Ibuprofen contraindicated and pt allergic to tylenol. Temp responded appropriately. Turned Q2 hr with wedge. Puneet heels elevated on pillows. Wife updated via phone and at bedside. Plasma exchange consent signed per spouse. Type and screen drawn. Awaiting results. Will continue to monitor.

## 2020-07-19 NOTE — PROGRESS NOTES
SOB, tachypneic in 40s on BiPAP , TV in 480s   - switsh to AVAPs setting with , RR 20   - CXR  - ABG   - trial of Lasix 40 IV once   - low dose precedex drip   - low threshold for intubation       3:37 AM   No improvement after Lasix, Precedex drip   CXR s/o ARDS   ABG shows P/F 58   - will intubate   - PRVC 25/400/100/14+, ABG in 45 minutes   - Propofol + Fentanyl drips  - low threshold for paralysis & proning       4:59 AM   Hypotensive, Sats mid 80s   - will start Levophed drip (has PICC line)   - will paralyze & prone him       6:31 AM   ABG reviewed   - will increase RR 25 --> 30   - ABG after 2 hours

## 2020-07-19 NOTE — ASSESSMENT & PLAN NOTE
Mechanical ventilation; settings adjusted per abg throughout day to optimize gas exchange  Keep sedated for RASS goal -4  Continue bronchodilators  ICU pulmonary monitoring

## 2020-07-19 NOTE — PROGRESS NOTES
After lasix, precedex gtt, and AVAPs, pt showing no signs of improvement. Barely able to speak more than 2 words at a time. Decision made to electively intubate. Pt made aware of situation, verbalizes understanding, ED called to send MD for intubation

## 2020-07-20 PROBLEM — Z99.11 ON MECHANICALLY ASSISTED VENTILATION: Status: ACTIVE | Noted: 2020-01-01

## 2020-07-20 NOTE — ASSESSMENT & PLAN NOTE
Mechanical ventilation; settings adjusted per abg - will repeat gas to evaluate change  Keep sedated for RASS goal -4  Continue bronchodilators  ICU pulmonary monitoring

## 2020-07-20 NOTE — PLAN OF CARE
Recommendations    Recommendation:   1. Increase Peptamen VHP to 45mL/hr (1080mL/day) + 2 packets beneprotein TID. Flushes of 50mLq2 or per NP.  With 628kcal from propofol, provides 1858kcal, 135g protein, and 907mL free water.   2. RD to f/u    Goals: Meet >50% EEN/EPN by RD f/u  Nutrition Goal Status: new  Communication of RD Recs: (POC, sticky note, rounds)

## 2020-07-20 NOTE — PLAN OF CARE
Pt remains intubated and sedated on fentanyl, propofol and versed gtts to maintain RASS-4. Minimally responsive to pain. Levophed gtt weaned as tolerated to maintain MAP>65. SR/ST on monitor. Afebrile. 1st unit of convalescent plasma given overnight, no reactions noted. UOP > 1L overnight. Blood glucose monitored per order. OGT to LIWS overnight, elva TF initiated this AM at 10ml/hr. Turned/repositioned with pillows and wedge q2h, heels floated on pillows. POC reviewed with mother via phone

## 2020-07-20 NOTE — PROGRESS NOTES
[facility] Hospital Medicine  Progress Note    Patient Name: Jonas Abdalla  MRN: [id number]  Patient Class: IP- Inpatient   Admission Date: 7/11/2020  Length of Stay: 7 days  Attending Physician: Leonel Ceballos MD  Primary Care Provider: Carmel Tabares MD        Subjective:     Principal Problem:Acute respiratory failure with hypoxia        HPI:  Jonas Abdalla is a 35 year old male with a pMHx of HTN who presented to the Emergency Department with c/o SOB. Associated symptoms include: generalized weakness, fatigue, and fever. Pt reports symptoms started Monday, 07/06. He reports he thought he had COVID and went to get tested taht day but did not get test results. Went to local urgent care on Tuesday, 07/07 -- diagnosed with PNA and given Levaquin 500 mg po daily. Symptoms continued despite abx and wife took pt to COVID testing site which he was negative for COVID. Wife reports the COVID test he took on Monday came back negative, notified yesterday of results. ED workup showed: no leukocytosis/leukopenia, stable Hgb/Hct, mild hyponatremia. CXR showed focal right infrahilar/medial lung base consolidation concerning for pneumonia. Febrile upon arrival to . Pt received 30mL/kg fluid resuscitation in ED along with one time doses of IV rocephin/azithromycin. Pt placed on observation for Fever believed to be secondary to RML PNA. COVID rapid screening pending upon assessment, now POSITIVE.     Overview/Hospital Course:  Pt presented to the ED due to weakness, nausea/vomiting with concerns for dehydration. COVID ++. Running temps 103, 102.4, 101.8. He denied any SOB and DRAKE. He describes an occasional dry cough. On 2 L nasal cannula. On azithromycin, Rocephin, decadron and scheduled albuterol.   7/13/2020 - pt has continued to be febrile and now reporting SOB and productive cough. Provided remdesivir information and patient has consented. Oxygen escalated to 4 L.  7/14/2020 - Sudden  increase to oxygen 10 L, oxygen saturation 85 %. Temp 101.6 at 4 AM this AM. Current plan of care continued.   7/18- Pt was transferred to ICU on 7/15 with worsening respiratory status requiring NIPPV . Pt is awake , alert and oriented today .Mexed out on  vapotherm with hypoxia and placed on  NIPPV / BiPAP support. Remains  Tachypneic, easily desats with exertion or movement . Continue to monitor closely respiratory status  in ICU setting .   7/19 - Intubated electively  overnight due to increased WOB on bipap . Sedated on propofol, fentanyl. Levophed gtt added to maintain MAP>65. Heparin high intensity nomogram continues. Pt completed 5 days Remdesivir. On Decadron x 10 days . WBC count is markedly elevated . Likely reactive . Procalcitinin is normal. Creatinine bumped to 1.3 from 0.8 overnight.   7/2- Remains intubated . Tmax 100. Tachycardia and hypotension noted . On Levo to maintain MAP >65. Vent setting and weaning per ICU. 1st unit of convalescent plasma transfused  Overnight with no adverse effect. WBC trended down to 25.3 from 46.3, Pl down to 265 from 637, D d-rosa 3.7, CRP trended up 218>109.    Interval History:    Remains intubated . Tmax 100. Tachycardia and hypotension noted . On Levo to maintain MAP >65. Vent setting and weaning per ICU. 1st unit of convalescent plasma transfused  Overnight with no adverse effect. WBC trended down to 25.3 from 46.3, Pl down to 265 from 637, D d-rosa 3.7, CRP trended up 218>109.        Review of Systems   Unable to perform ROS: Intubated     Objective:     Vital Signs (Most Recent):  Temp: (!) 100.4 °F (38 °C) (07/20/20 1600)  Pulse: (!) 123 (07/20/20 1700)  Resp: 20 (07/20/20 1700)  BP: (!) 73/23 (07/20/20 1700)  SpO2: (!) 91 % (07/20/20 1700) Vital Signs (24h Range):  Temp:  [97.4 °F (36.3 °C)-100.4 °F (38 °C)] 100.4 °F (38 °C)  Pulse:  [] 123  Resp:  [9-29] 20  SpO2:  [83 %-100 %] 91 %  BP: ()/(23-69) 73/23     Weight: 132.3 kg (291 lb 10.7 oz)  Body  mass index is 45.68 kg/m².    Intake/Output Summary (Last 24 hours) at 7/20/2020 1826  Last data filed at 7/20/2020 1800  Gross per 24 hour   Intake 2320.89 ml   Output 1550 ml   Net 770.89 ml      Physical Exam  Constitutional:       General: He is not in acute distress.     Appearance: He is well-developed. He is not diaphoretic.      Comments: Sedated on vent    HENT:      Head: Normocephalic and atraumatic.      Mouth/Throat:      Pharynx: No oropharyngeal exudate.   Eyes:      Conjunctiva/sclera: Conjunctivae normal.      Pupils: Pupils are equal, round, and reactive to light.   Neck:      Musculoskeletal: Normal range of motion and neck supple.      Thyroid: No thyromegaly.      Vascular: No JVD.   Cardiovascular:      Rate and Rhythm: Normal rate and regular rhythm.      Heart sounds: Normal heart sounds. No murmur.   Pulmonary:      Effort: Pulmonary effort is normal. No respiratory distress.      Breath sounds: No wheezing or rales.      Comments: Bronchial breath sounds   Chest:      Chest wall: No tenderness.   Abdominal:      General: Bowel sounds are normal. There is no distension.      Palpations: Abdomen is soft.      Tenderness: There is no abdominal tenderness. There is no guarding or rebound.   Musculoskeletal: Normal range of motion.   Lymphadenopathy:      Cervical: No cervical adenopathy.   Skin:     General: Skin is warm and dry.      Findings: No rash.   Neurological:      Cranial Nerves: No cranial nerve deficit.      Sensory: No sensory deficit.      Deep Tendon Reflexes: Reflexes normal.      Comments: Sedated on vent          Significant Labs:   CBC:   Recent Labs   Lab 07/19/20  0647 07/19/20  1620 07/20/20  0415   WBC 46.34*  --  25.31*   HGB 15.4  --  12.5*   HCT 48.3 39 38.3*   *  --  265     CMP:   Recent Labs   Lab 07/19/20  0647 07/20/20  0415    138   K 5.0 4.0    103   CO2 24 24   * 167*   BUN 24* 23*   CREATININE 1.3 1.0   CALCIUM 9.1 8.3*   PROT 7.0 6.2    ALBUMIN 2.9* 2.4*   BILITOT 1.1* 0.7   ALKPHOS 83 65   * 48*   * 127*   ANIONGAP 13 11   EGFRNONAA >60 >60       Significant Imaging:       Assessment/Plan:      * Acute respiratory failure with hypoxia  Encouraged deep breathing and coughing.  Transferred to ICU 14 July for respiratory distress.  Continuous BiPAP and Vapotherm as needed.  7/18 -  Mexed out on  vapotherm with hypoxia and placed on  NIPPV / BiPAP support. Remains  Tachypneic, easily desats with exertion or movement . Continue to monitor closely respiratory status  in ICU setting .   7/19-    Intubated electively  overnight due to increased WOB on bipap. Vent setting per ICU team   7/20-  Remains intubated     Pneumonia due to COVID-19 virus  - COVID-19 testing Collection Date: 7/11/2020 Collection Time:   11:55 AM  - Infection Control notified     - Isolation:   - Airborne, Contact and Droplet Precautions  - Cohort patients into COVID units  - N95 mask, wear eye protection  - 20 second hand hygiene              - Limit visitors per hospital policy              - Consolidating lab draws, nursing care, provider visits, and interventions    - Diagnostics: (leukopenia, hyponatremia, hyperferritinemia, elevated troponin, elevated d-dimer, age, and comorbidities are significant predictors of poor clinical outcome)  CBC, CMP, Procalcitonin, Ferritin, CRP, LDH, BNP, Troponin, Rapid Flu, Blood Culture x2 and Portable CXR    - Management:  Supplemental O2 to maintain SpO2 >92%  Telemetry  Continuous/intermittent Pulse Ox  Albuterol treatment PRN  IV Rocephin/PO Azithromycin  Dexamethasone   Vit C, MVI  Scheduled albuterol  7/13/2020 - pt agreeable to receive Remdesivir - information received  7/19- Completed 5 days Remdesivir . On Decadron for 10 days   7/20- 1st unit of convalescent plasma transfused  with no adverse effect.               Leukocytosis and thrombocytosis   7/19- Marked leukocytosis and thrombocytosis are noted. Likely reactive  . Procalcitonin is neg. Blood cultures - NGTD. Endotracheal aspirate obtained for gram stain and culture.       Pneumonia of right lower lobe due to infectious organism  IV Rocephin/Azithromycin  - empiric treatment  Supplemental oxygen  Sputum culture    Sepsis  - Sepsis criteria: Current temp of 101.3, RR 21, source PNA due to COVID-19 virus  - Blood cultures obtained >>>>> NGTD  - Recent COVID test outpt on 07/06/ 20 negative. Repeat COVID today in ED (+)  - Will treat underlying cause with abx, supplemental oxygen, MDI, oral steroids per COVID-19 protocol      Essential hypertension  - Pt normally takes lisinopril-hctz 20-12.5 po daily  - Will hold hctz and continue lisinopril at current dose and frequency        VTE Risk Mitigation (From admission, onward)         Ordered     IP VTE HIGH RISK PATIENT  Once      07/19/20 0337     Place sequential compression device  Until discontinued      07/19/20 0337     heparin 25,000 units in dextrose 5% 250 mL (100 units/mL) infusion HIGH INTENSITY nomogram - [facility]  Continuous     Question:  Heparin Infusion Adjustment (DO NOT MODIFY ANSWER)  Answer:  \\[email or web address]\epic\Images\Pharmacy\HeparinInfusions\heparin HIGH INTENSITY nomogram for [facility] [id number]    07/16/20 0727     heparin 25,000 units in dextrose 5% (100 units/ml) IV bolus from bag - ADDITIONAL PRN BOLUS - 60 units/kg  As needed (PRN)     Question:  Heparin Infusion Adjustment (DO NOT MODIFY ANSWER)  Answer:  \\[email or web address]\epic\Images\Pharmacy\HeparinInfusions\heparin HIGH INTENSITY nomogram for [facility] [id number]    07/16/20 0727     heparin 25,000 units in dextrose 5% (100 units/ml) IV bolus from bag - ADDITIONAL PRN BOLUS - 30 units/kg  As needed (PRN)     Question:  Heparin Infusion Adjustment (DO NOT MODIFY ANSWER)  Answer:  \\[email or web address]\epic\Images\Pharmacy\HeparinInfusions\heparin HIGH INTENSITY nomogram for [facility] [id number]    07/16/20 0727                Critical care time spent on the evaluation and treatment  of severe organ dysfunction, review of pertinent labs and imaging studies, discussions with consulting providers and discussions with patient/family: 30  minutes.      Leonel Ceballos MD  Department of Hospital Medicine   [facility]

## 2020-07-20 NOTE — ASSESSMENT & PLAN NOTE
Encouraged deep breathing and coughing.  Transferred to ICU 14 July for respiratory distress.  Continuous BiPAP and Vapotherm as needed.  7/18 -  Mexed out on  vapotherm with hypoxia and placed on  NIPPV / BiPAP support. Remains  Tachypneic, easily desats with exertion or movement . Continue to monitor closely respiratory status  in ICU setting .   7/19-    Intubated electively  overnight due to increased WOB on bipap. Vent setting per ICU team   7/20-  Remains intubated

## 2020-07-20 NOTE — SUBJECTIVE & OBJECTIVE
Interval History:    Remains intubated . Tmax 100. Tachycardia and hypotension noted . On Levo to maintain MAP >65. Vent setting and weaning per ICU. 1st unit of convalescent plasma transfused  Overnight with no adverse effect. WBC trended down to 25.3 from 46.3, Pl down to 265 from 637, D d-rosa 3.7, CRP trended up 218>109.        Review of Systems   Unable to perform ROS: Intubated     Objective:     Vital Signs (Most Recent):  Temp: (!) 100.4 °F (38 °C) (07/20/20 1600)  Pulse: (!) 123 (07/20/20 1700)  Resp: 20 (07/20/20 1700)  BP: (!) 73/23 (07/20/20 1700)  SpO2: (!) 91 % (07/20/20 1700) Vital Signs (24h Range):  Temp:  [97.4 °F (36.3 °C)-100.4 °F (38 °C)] 100.4 °F (38 °C)  Pulse:  [] 123  Resp:  [9-29] 20  SpO2:  [83 %-100 %] 91 %  BP: ()/(23-69) 73/23     Weight: 132.3 kg (291 lb 10.7 oz)  Body mass index is 45.68 kg/m².    Intake/Output Summary (Last 24 hours) at 7/20/2020 1826  Last data filed at 7/20/2020 1800  Gross per 24 hour   Intake 2320.89 ml   Output 1550 ml   Net 770.89 ml      Physical Exam  Constitutional:       General: He is not in acute distress.     Appearance: He is well-developed. He is not diaphoretic.      Comments: Sedated on vent    HENT:      Head: Normocephalic and atraumatic.      Mouth/Throat:      Pharynx: No oropharyngeal exudate.   Eyes:      Conjunctiva/sclera: Conjunctivae normal.      Pupils: Pupils are equal, round, and reactive to light.   Neck:      Musculoskeletal: Normal range of motion and neck supple.      Thyroid: No thyromegaly.      Vascular: No JVD.   Cardiovascular:      Rate and Rhythm: Normal rate and regular rhythm.      Heart sounds: Normal heart sounds. No murmur.   Pulmonary:      Effort: Pulmonary effort is normal. No respiratory distress.      Breath sounds: No wheezing or rales.      Comments: Bronchial breath sounds   Chest:      Chest wall: No tenderness.   Abdominal:      General: Bowel sounds are normal. There is no distension.       Palpations: Abdomen is soft.      Tenderness: There is no abdominal tenderness. There is no guarding or rebound.   Musculoskeletal: Normal range of motion.   Lymphadenopathy:      Cervical: No cervical adenopathy.   Skin:     General: Skin is warm and dry.      Findings: No rash.   Neurological:      Cranial Nerves: No cranial nerve deficit.      Sensory: No sensory deficit.      Deep Tendon Reflexes: Reflexes normal.      Comments: Sedated on vent          Significant Labs:   CBC:   Recent Labs   Lab 07/19/20  0647 07/19/20  1620 07/20/20  0415   WBC 46.34*  --  25.31*   HGB 15.4  --  12.5*   HCT 48.3 39 38.3*   *  --  265     CMP:   Recent Labs   Lab 07/19/20  0647 07/20/20  0415    138   K 5.0 4.0    103   CO2 24 24   * 167*   BUN 24* 23*   CREATININE 1.3 1.0   CALCIUM 9.1 8.3*   PROT 7.0 6.2   ALBUMIN 2.9* 2.4*   BILITOT 1.1* 0.7   ALKPHOS 83 65   * 48*   * 127*   ANIONGAP 13 11   EGFRNONAA >60 >60       Significant Imaging:

## 2020-07-20 NOTE — ASSESSMENT & PLAN NOTE
- COVID-19 testing Collection Date: 7/11/2020 Collection Time:   11:55 AM  - Infection Control notified     - Isolation:   - Airborne, Contact and Droplet Precautions  - Cohort patients into COVID units  - N95 mask, wear eye protection  - 20 second hand hygiene              - Limit visitors per hospital policy              - Consolidating lab draws, nursing care, provider visits, and interventions    - Diagnostics: (leukopenia, hyponatremia, hyperferritinemia, elevated troponin, elevated d-dimer, age, and comorbidities are significant predictors of poor clinical outcome)  CBC, CMP, Procalcitonin, Ferritin, CRP, LDH, BNP, Troponin, Rapid Flu, Blood Culture x2 and Portable CXR    - Management:  Supplemental O2 to maintain SpO2 >92%  Telemetry  Continuous/intermittent Pulse Ox  Albuterol treatment PRN  IV Rocephin/PO Azithromycin  Dexamethasone   Vit C, MVI  Scheduled albuterol  7/13/2020 - pt agreeable to receive Remdesivir - information received  7/19- Completed 5 days Remdesivir . On Decadron for 10 days   7/20- 1st unit of convalescent plasma transfused  with no adverse effect.

## 2020-07-20 NOTE — ASSESSMENT & PLAN NOTE
Respiratory/Contact/Droplet Isolation   Fluid Sparing Resuscitation, strict I/O  Empiric Antibiotics - completed 5 days Rocephin and Zithromax  LDH and D-dimer increased; will start heparin anticoagulation to prevent microthrombosis  Continue Zinc, melatonin, and Vit C  Remdisivir completed  Decadron Day #9  7/18 - overall status holding steady with significant hypoxia but not requiring intubation/invasive ventilation; plan continue current course with ICU pulmonary monitoring  7/19 - decompensated overnight; [facility]; keep MAP > 65 to maintain organ perfusion; consider extension of remdisivir along with eligibility for convalescent plasma transfusion  7/20 - continue aggressive support care measures; received one unit convalescent plasma overnight, 2nd unit pending

## 2020-07-20 NOTE — PROGRESS NOTES
[facility] -   Critical Care Medicine  Progress Note    Patient Name: Jonas Abdalla  MRN: [id number]  Admission Date: 7/11/2020  Hospital Length of Stay: 7 days  Code Status: Full Code  Attending Provider: Leonel Ceballos MD  Primary Care Provider: Carmel Tabares MD   Principal Problem: Acute respiratory failure with hypoxia    Subjective:     HPI:  Mr Abdalla is a morbidly obese WM with a PMH of cholelithiasis and HTN who was admitted 7/12 with Sepsis and COVID PNA after presentation with weakness, persistent fever, chills and N/V/D for 6 days progressive.  Had 102.4 temp in ED.  Admitted to [facility] started on emperic IVAB, Decadron and NC low flow.  Oxygenation worsened over next few days.  He consented and was started on Remdesivir 7/14.  On evening of 7/14.  At [place] last night patient became more hypoxic despite being maxed out on VapoTherm.  He was placed in prone position, which improved O2 sat to 90-93% but patient remained in notable respiratory distress.  He was transferred to ICU and initiated on BiPAP.      Hospital/ICU Course:  7/15 - This AM upright in bed fully awake, alert and responsive not in distress but has tachypnea and mild work of breathing while on NPPV and FiO2 at .95.    7/16 - remains on NIPPV with some decline in oxygen demand with FiO2 0.75 but continued tachypnea; ongoing desaturation with exertion or momentary removal of NIPPV for fluid intake  7/17 - tolerated short time on vapotherm support last evening, returned to NIPPV for sleep and has remained today s/t tachypnea, hypoxia; afebrile but wbc slight trend up 14.4k   7/18 - awake, alert,oriented; continues to alternate max support vapotherm with NIPPV support, tachypnea at baseline and worsens along with drop in pulse ox sat with exertion/movement that slowly recovers with rest  7/19 - respiratory distress overnight; required intubation, heavy sedation, mechanical ventilation, low dose pressor support; post  intubation abg shows continued worsening acidosis along with now worsening leukocytosis, ddimer, LD, and CRP  7/20 - remains intubated and sedated on mechanical ventilation with inverse ratio ventilation, some decrease in oxygen demand today; tmax yesterday 101.6; requiring low dose pressor support with sedation        Objective:     Vital Signs (Most Recent):  Temp: 97.4 °F (36.3 °C) (07/20/20 0405)  Pulse: 96 (07/20/20 0708)  Resp: 20 (07/20/20 0708)  BP: (!) 80/38 (07/20/20 0605)  SpO2: 99 % (07/20/20 0708) Vital Signs (24h Range):  Temp:  [97.4 °F (36.3 °C)-101.6 °F (38.7 °C)] 97.4 °F (36.3 °C)  Pulse:  [] 96  Resp:  [11-39] 20  SpO2:  [80 %-100 %] 99 %  BP: ()/(15-57) 80/38     Weight: 132.3 kg (291 lb 10.7 oz)  Body mass index is 45.68 kg/m².      Intake/Output Summary (Last 24 hours) at 7/20/2020 1041  Last data filed at 7/20/2020 0630  Gross per 24 hour   Intake 3735.55 ml   Output 1545 ml   Net 2190.55 ml      fentanyl 200 mcg/hr (07/20/20 0600)    heparin (porcine) in D5W 14 Units/kg/hr (07/20/20 0651)    midazolam 6 mg/hr (07/20/20 0600)    norepinephrine bitartrate-D5W 0.06 mcg/kg/min (07/20/20 0648)    propofoL 30 mcg/kg/min (07/20/20 0630)       Physical Exam  Vitals signs and nursing note reviewed.   Constitutional:       Appearance: He is obese. He is ill-appearing. He is not toxic-appearing or diaphoretic.      Interventions: He is sedated, intubated and restrained.   HENT:      Head: Atraumatic.      Mouth/Throat:      Mouth: Mucous membranes are dry.   Eyes:      Conjunctiva/sclera: Conjunctivae normal.   Neck:      Vascular: No JVD.   Cardiovascular:      Rate and Rhythm: Regular rhythm. Tachycardia present.      Pulses:           Radial pulses are 1+ on the right side and 1+ on the left side.        Dorsalis pedis pulses are detected w/ Doppler on the right side and detected w/ Doppler on the left side.   Pulmonary:      Effort: Tachypnea present. No retractions. He is  intubated.      Breath sounds: Decreased breath sounds and rhonchi present.   Abdominal:      General: Abdomen is protuberant. There is no distension.      Palpations: Abdomen is soft.   Musculoskeletal:      Right lower leg: No edema.      Left lower leg: No edema.   Skin:     General: Skin is warm and dry.      Capillary Refill: Capillary refill takes 2 to 3 seconds.         Vents:  Vent Mode: BILEVL (07/20/20 0708)  Ventilator Initiated: Yes (07/19/20 0415)  Set Rate: 20 BPM (07/20/20 0708)  Vt Set: 450 mL (07/19/20 0514)  Pressure Support: 10 cmH20 (07/20/20 0708)  PEEP/CPAP: 20 cmH20 (07/19/20 0514)  Oxygen Concentration (%): 80 (07/20/20 0708)  Peak Airway Pressure: 38 cmH2O (07/20/20 0708)  Plateau Pressure: 56 cmH20 (07/19/20 1551)  Total Ve: 12.8 mL (07/20/20 0358)  F/VT Ratio<105 (RSBI): (!) 29.54 (07/20/20 0708)    Lines/Drains/Airways     Peripherally Inserted Central Catheter Line            PICC Double Lumen 07/16/20 1130 right brachial 3 days          Drain                 NG/OG Tube 07/19/20 0530 orogastric 16 Fr. 1 day         Urethral Catheter 07/19/20 0330 1 day          Airway                 Airway - Non-Surgical 07/19/20 0420 Endotracheal Tube 1 day          Arterial Line                 Arterial Line 07/19/20 1144 Right Radial less than 1 day          Peripheral Intravenous Line                 Peripheral IV - Single Lumen 07/19/20 0500 20 G Right Hand 1 day                Significant Labs:    CBC/Anemia Profile:  Recent Labs   Lab 07/19/20  0647 07/19/20  1620 07/20/20  0415   WBC 46.34*  --  25.31*   HGB 15.4  --  12.5*   HCT 48.3 39 38.3*   *  --  265   MCV 97  --  93   RDW 13.4  --  13.3        Chemistries:  Recent Labs   Lab 07/19/20  0647 07/20/20  0415    138   K 5.0 4.0    103   CO2 24 24   BUN 24* 23*   CREATININE 1.3 1.0   CALCIUM 9.1 8.3*   ALBUMIN 2.9* 2.4*   PROT 7.0 6.2   BILITOT 1.1* 0.7   ALKPHOS 83 65   * 127*   * 48*   MG  --  2.6   PHOS  --   3.7       All pertinent labs within the past 24 hours have been reviewed.    Significant Imaging:  I have reviewed all pertinent imaging results/findings within the past 24 hours.      ABG  Recent Labs   Lab 07/20/20  0420   PH 7.366   PO2 128*   PCO2 42.5   HCO3 24.3   BE -1     Assessment/Plan:     Pulmonary  * Acute respiratory failure with hypoxia  Mechanical ventilation; settings adjusted per abg - will repeat gas to evaluate change  Keep sedated for RASS goal -4  Continue bronchodilators  ICU pulmonary monitoring    On mechanically assisted ventilation  See resp failure plan    Cardiac/Vascular  Essential hypertension  Hold Lisinopril    Endocrine  Morbid obesity with BMI of 45.0-49.9, adult  Recommend diet and lifestyle modification for goal wt loss once medically stable    Other  Pneumonia due to COVID-19 virus  Respiratory/Contact/Droplet Isolation   Fluid Sparing Resuscitation, strict I/O  Empiric Antibiotics - completed 5 days Rocephin and Zithromax  LDH and D-dimer increased; will start heparin anticoagulation to prevent microthrombosis  Continue Zinc, melatonin, and Vit C  Remdisivir completed  Decadron Day #9  7/18 - overall status holding steady with significant hypoxia but not requiring intubation/invasive ventilation; plan continue current course with ICU pulmonary monitoring  7/19 - decompensated overnight; [facility]; keep MAP > 65 to maintain organ perfusion; consider extension of remdisivir along with eligibility for convalescent plasma transfusion  7/20 - continue aggressive support care measures; received one unit convalescent plasma overnight, 2nd unit pending     Critical Care Daily Checklist:    A: Awake: RASS Goal/Actual Goal: RASS Goal: (P) -4-->deep sedation  Actual: [place] Agitation Sedation Scale (RASS): Drowsy   B: Spontaneous Breathing Trial Performed?     C: SAT & SBT Coordinated?  n/a                      D: Delirium:  CAM-ICU Overall CAM-ICU: Negative   E: Early Mobility Performed? Yes   F: Feeding Goal: Goals: Meet >50% EEN/EPN by RD f/u  Status: Nutrition Goal Status: new   Current Diet Order   Procedures    Diet NPO      AS: Analgesia/Sedation Fentanyl, propofol, versed   T: Thromboembolic Prophylaxis Heparin infusion   H: HOB > 300 Yes   U: Stress Ulcer Prophylaxis (if needed) pepcid   G: Glucose Control monitoring   B: Bowel Function Stool Occurrence: 0   I: Indwelling Catheter (Lines & Bagley) Necessity reviewed   D: De-escalation of Antimicrobials/Pharmacotherapies reviewed    Plan for the day/ETD As above    Code Status:  Family/Goals of Care: Full Code  Spouse, Joan, updated ([phone])    I have discussed case and plan of care in detail with Dr Hernandez; Status and plan of care were discussed with team on multidisciplinary rounds.    Critical Care Time: 86 minutes  Critical secondary to severe hypoxemic respiratory failure s/t covid pneumonia; Critical care was time spent personally by me on the following activities: development of treatment plan with patient or surrogate and bedside caregivers, discussions with consultants, evaluation of patient's response to treatment, examination of patient, ordering and performing treatments and interventions, ordering and review of laboratory studies, ordering and review of radiographic studies, pulse oximetry, re-evaluation of patient's condition. This critical care time did not overlap with that of any other provider or involve time for any procedures.     LOC Chowdhury-BC  Critical Care Medicine  [facility] [place]

## 2020-07-20 NOTE — PROGRESS NOTES
Care assumed. Pt supine. Sedated/Vent settings confirmed. Tube feedings . VSS. Skin assessment done . Safety maintained.

## 2020-07-20 NOTE — SUBJECTIVE & OBJECTIVE
Objective:     Vital Signs (Most Recent):  Temp: 97.4 °F (36.3 °C) (07/20/20 0405)  Pulse: 96 (07/20/20 0708)  Resp: 20 (07/20/20 0708)  BP: (!) 80/38 (07/20/20 0605)  SpO2: 99 % (07/20/20 0708) Vital Signs (24h Range):  Temp:  [97.4 °F (36.3 °C)-101.6 °F (38.7 °C)] 97.4 °F (36.3 °C)  Pulse:  [] 96  Resp:  [11-39] 20  SpO2:  [80 %-100 %] 99 %  BP: ()/(15-57) 80/38     Weight: 132.3 kg (291 lb 10.7 oz)  Body mass index is 45.68 kg/m².      Intake/Output Summary (Last 24 hours) at 7/20/2020 1041  Last data filed at 7/20/2020 0630  Gross per 24 hour   Intake 3735.55 ml   Output 1545 ml   Net 2190.55 ml      fentanyl 200 mcg/hr (07/20/20 0600)    heparin (porcine) in D5W 14 Units/kg/hr (07/20/20 0651)    midazolam 6 mg/hr (07/20/20 0600)    norepinephrine bitartrate-D5W 0.06 mcg/kg/min (07/20/20 0648)    propofoL 30 mcg/kg/min (07/20/20 0630)       Physical Exam  Vitals signs and nursing note reviewed.   Constitutional:       Appearance: He is obese. He is ill-appearing. He is not toxic-appearing or diaphoretic.      Interventions: He is sedated, intubated and restrained.   HENT:      Head: Atraumatic.      Mouth/Throat:      Mouth: Mucous membranes are dry.   Eyes:      Conjunctiva/sclera: Conjunctivae normal.   Neck:      Vascular: No JVD.   Cardiovascular:      Rate and Rhythm: Regular rhythm. Tachycardia present.      Pulses:           Radial pulses are 1+ on the right side and 1+ on the left side.        Dorsalis pedis pulses are detected w/ Doppler on the right side and detected w/ Doppler on the left side.   Pulmonary:      Effort: Tachypnea present. No retractions. He is intubated.      Breath sounds: Decreased breath sounds and rhonchi present.   Abdominal:      General: Abdomen is protuberant. There is no distension.      Palpations: Abdomen is soft.   Musculoskeletal:      Right lower leg: No edema.      Left lower leg: No edema.   Skin:     General: Skin is warm and dry.      Capillary  Refill: Capillary refill takes 2 to 3 seconds.         Vents:  Vent Mode: BILEVL (07/20/20 0708)  Ventilator Initiated: Yes (07/19/20 0415)  Set Rate: 20 BPM (07/20/20 0708)  Vt Set: 450 mL (07/19/20 0514)  Pressure Support: 10 cmH20 (07/20/20 0708)  PEEP/CPAP: 20 cmH20 (07/19/20 0514)  Oxygen Concentration (%): 80 (07/20/20 0708)  Peak Airway Pressure: 38 cmH2O (07/20/20 0708)  Plateau Pressure: 56 cmH20 (07/19/20 1551)  Total Ve: 12.8 mL (07/20/20 0358)  F/VT Ratio<105 (RSBI): (!) 29.54 (07/20/20 0708)    Lines/Drains/Airways     Peripherally Inserted Central Catheter Line            PICC Double Lumen 07/16/20 1130 right brachial 3 days          Drain                 NG/OG Tube 07/19/20 0530 orogastric 16 Fr. 1 day         Urethral Catheter 07/19/20 0330 1 day          Airway                 Airway - Non-Surgical 07/19/20 0420 Endotracheal Tube 1 day          Arterial Line                 Arterial Line 07/19/20 1144 Right Radial less than 1 day          Peripheral Intravenous Line                 Peripheral IV - Single Lumen 07/19/20 0500 20 G Right Hand 1 day                Significant Labs:    CBC/Anemia Profile:  Recent Labs   Lab 07/19/20  0647 07/19/20  1620 07/20/20  0415   WBC 46.34*  --  25.31*   HGB 15.4  --  12.5*   HCT 48.3 39 38.3*   *  --  265   MCV 97  --  93   RDW 13.4  --  13.3        Chemistries:  Recent Labs   Lab 07/19/20  0647 07/20/20  0415    138   K 5.0 4.0    103   CO2 24 24   BUN 24* 23*   CREATININE 1.3 1.0   CALCIUM 9.1 8.3*   ALBUMIN 2.9* 2.4*   PROT 7.0 6.2   BILITOT 1.1* 0.7   ALKPHOS 83 65   * 127*   * 48*   MG  --  2.6   PHOS  --  3.7       All pertinent labs within the past 24 hours have been reviewed.    Significant Imaging:  I have reviewed all pertinent imaging results/findings within the past 24 hours.

## 2020-07-21 PROBLEM — N17.9 AKI (ACUTE KIDNEY INJURY): Status: ACTIVE | Noted: 2020-01-01

## 2020-07-21 NOTE — SUBJECTIVE & OBJECTIVE
Interval History:   Completed 2 units of convalescent plasma infusion without adverse effects.Some improvement with decreased oxygen demand. fiO2 down to 60%.      Review of Systems   Unable to perform ROS: Intubated     Objective:     Vital Signs (Most Recent):  Temp: 97 °F (36.1 °C) (07/21/20 1200)  Pulse: (!) 120 (07/21/20 1546)  Resp: 12 (07/21/20 1546)  BP: (!) 113/59 (07/21/20 1500)  SpO2: (!) 91 % (07/21/20 1546) Vital Signs (24h Range):  Temp:  [97 °F (36.1 °C)-99.8 °F (37.7 °C)] 97 °F (36.1 °C)  Pulse:  [] 120  Resp:  [10-26] 12  SpO2:  [84 %-100 %] 91 %  BP: ()/(17-83) 113/59     Weight: 132.3 kg (291 lb 10.7 oz)  Body mass index is 45.68 kg/m².    Intake/Output Summary (Last 24 hours) at 7/21/2020 1718  Last data filed at 7/21/2020 1501  Gross per 24 hour   Intake 1846.46 ml   Output 1355 ml   Net 491.46 ml      Physical Exam  Constitutional:       General: He is not in acute distress.     Appearance: He is well-developed. He is not diaphoretic.      Comments: Sedated on vent    HENT:      Head: Normocephalic and atraumatic.      Mouth/Throat:      Pharynx: No oropharyngeal exudate.   Eyes:      Conjunctiva/sclera: Conjunctivae normal.      Pupils: Pupils are equal, round, and reactive to light.   Neck:      Musculoskeletal: Normal range of motion and neck supple.      Thyroid: No thyromegaly.      Vascular: No JVD.   Cardiovascular:      Rate and Rhythm: Normal rate and regular rhythm.      Heart sounds: Normal heart sounds. No murmur.   Pulmonary:      Effort: Pulmonary effort is normal. No respiratory distress.      Breath sounds: No wheezing or rales.      Comments: Bronchial breath sounds kallie  Chest:      Chest wall: No tenderness.   Abdominal:      General: Bowel sounds are normal. There is no distension.      Palpations: Abdomen is soft.      Tenderness: There is no abdominal tenderness. There is no guarding or rebound.   Musculoskeletal: Normal range of motion.   Lymphadenopathy:       Cervical: No cervical adenopathy.   Skin:     General: Skin is warm and dry.      Findings: No rash.   Neurological:      Cranial Nerves: No cranial nerve deficit.      Sensory: No sensory deficit.      Deep Tendon Reflexes: Reflexes normal.      Comments: Sedated on vent          Significant Labs:   CBC:   Recent Labs   Lab 07/20/20 0415 07/21/20  0402   WBC 25.31* 22.19*   HGB 12.5* 11.6*   HCT 38.3* 36.0*    287     CMP:   Recent Labs   Lab 07/20/20  0415 07/21/20  0402    135*   K 4.0 4.6    101   CO2 24 25   * 163*   BUN 23* 28*   CREATININE 1.0 1.5*   CALCIUM 8.3* 8.5*   PROT 6.2 6.5   ALBUMIN 2.4* 2.2*   BILITOT 0.7 0.6   ALKPHOS 65 69   AST 48* 34   * 88*   ANIONGAP 11 9   EGFRNONAA >60 >60       Significant Imaging:

## 2020-07-21 NOTE — ASSESSMENT & PLAN NOTE
7/21- Serum creatinine increased to 1.5 from 1.0 overnight . Monitor renal indices . Monitor volume status . Maintain MAP>65.

## 2020-07-21 NOTE — ASSESSMENT & PLAN NOTE
- COVID-19 testing Collection Date: 7/11/2020 Collection Time:   11:55 AM  - Infection Control notified     - Isolation:   - Airborne, Contact and Droplet Precautions  - Cohort patients into COVID units  - N95 mask, wear eye protection  - 20 second hand hygiene              - Limit visitors per hospital policy              - Consolidating lab draws, nursing care, provider visits, and interventions    - Diagnostics: (leukopenia, hyponatremia, hyperferritinemia, elevated troponin, elevated d-dimer, age, and comorbidities are significant predictors of poor clinical outcome)  CBC, CMP, Procalcitonin, Ferritin, CRP, LDH, BNP, Troponin, Rapid Flu, Blood Culture x2 and Portable CXR    - Management:  Supplemental O2 to maintain SpO2 >92%  Telemetry  Continuous/intermittent Pulse Ox  Albuterol treatment PRN  IV Rocephin/PO Azithromycin  Dexamethasone   Vit C, MVI  Scheduled albuterol  7/13/2020 - pt agreeable to receive Remdesivir - information received  7/19- Completed 5 days Remdesivir . On Decadron for 10 days   7/20- 1st unit of convalescent plasma transfused  with no adverse effect.  7/21-Completed 2 units of convalescent plasma infusion without adverse effects.Some improvement with decreased oxygen demand. fiO2 down to 60%.

## 2020-07-21 NOTE — PROGRESS NOTES
[facility] -   Critical Care Medicine  Progress Note    Patient Name: Jonas Abdalla  MRN: [id number]  Admission Date: 7/11/2020  Hospital Length of Stay: 8 days  Code Status: Full Code  Attending Provider: Leonel Ceballos MD  Primary Care Provider: Carmel Tabares MD   Principal Problem: Acute respiratory failure with hypoxia    Subjective:     HPI:  Mr Abdalla is a morbidly obese WM with a PMH of cholelithiasis and HTN who was admitted 7/12 with Sepsis and COVID PNA after presentation with weakness, persistent fever, chills and N/V/D for 6 days progressive.  Had 102.4 temp in ED.  Admitted to [facility] started on emperic IVAB, Decadron and NC low flow.  Oxygenation worsened over next few days.  He consented and was started on Remdesivir 7/14.  On evening of 7/14.  At [place] last night patient became more hypoxic despite being maxed out on VapoTherm.  He was placed in prone position, which improved O2 sat to 90-93% but patient remained in notable respiratory distress.  He was transferred to ICU and initiated on BiPAP.      Hospital/ICU Course:  7/15 - This AM upright in bed fully awake, alert and responsive not in distress but has tachypnea and mild work of breathing while on NPPV and FiO2 at .95.    7/16 - remains on NIPPV with some decline in oxygen demand with FiO2 0.75 but continued tachypnea; ongoing desaturation with exertion or momentary removal of NIPPV for fluid intake  7/17 - tolerated short time on vapotherm support last evening, returned to NIPPV for sleep and has remained today s/t tachypnea, hypoxia; afebrile but wbc slight trend up 14.4k   7/18 - awake, alert,oriented; continues to alternate max support vapotherm with NIPPV support, tachypnea at baseline and worsens along with drop in pulse ox sat with exertion/movement that slowly recovers with rest  7/19 - respiratory distress overnight; required intubation, heavy sedation, mechanical ventilation, low dose pressor support; post  intubation abg shows continued worsening acidosis along with now worsening leukocytosis, ddimer, LD, and CRP  7/20 - remains intubated and sedated on mechanical ventilation with inverse ratio ventilation, some decrease in oxygen demand today; tmax yesterday 101.6; requiring low dose pressor support with sedation  7/21 - remains intubated and sedated on mechanical inverse ratio ventilation, oxygen demand down to 60%; t max 100.4; tolerating TF        Objective:     Vital Signs (Most Recent):  Temp: 97.5 °F (36.4 °C) (07/21/20 0701)  Pulse: 105 (07/21/20 0904)  Resp: 20 (07/21/20 0904)  BP: (!) 100/58 (07/21/20 0900)  SpO2: 96 % (07/21/20 0904) Vital Signs (24h Range):  Temp:  [97.5 °F (36.4 °C)-100.4 °F (38 °C)] 97.5 °F (36.4 °C)  Pulse:  [] 105  Resp:  [9-26] 20  SpO2:  [83 %-100 %] 96 %  BP: ()/(17-83) 100/58     Weight: 132.3 kg (291 lb 10.7 oz)  Body mass index is 45.68 kg/m².      Intake/Output Summary (Last 24 hours) at 7/21/2020 1028  Last data filed at 7/21/2020 0900  Gross per 24 hour   Intake 2122.46 ml   Output 915 ml   Net 1207.46 ml      fentanyl 200 mcg/hr (07/21/20 0900)    heparin (porcine) in D5W 12.959 Units/kg/hr (07/21/20 0900)    norepinephrine bitartrate-D5W 0.06 mcg/kg/min (07/21/20 0900)    propofoL 29.982 mcg/kg/min (07/21/20 0900)       Physical Exam  Vitals signs and nursing note reviewed.   Constitutional:       Appearance: He is obese. He is ill-appearing. He is not toxic-appearing or diaphoretic.      Interventions: He is sedated, intubated and restrained.   HENT:      Head: Atraumatic.      Mouth/Throat:      Mouth: Mucous membranes are dry.   Eyes:      Conjunctiva/sclera: Conjunctivae normal.   Neck:      Vascular: No JVD.   Cardiovascular:      Rate and Rhythm: Regular rhythm. Tachycardia present.      Pulses:           Radial pulses are 1+ on the right side and 1+ on the left side.        Dorsalis pedis pulses are detected w/ Doppler on the right side and detected w/  Doppler on the left side.   Pulmonary:      Effort: Tachypnea present. No retractions. He is intubated.      Breath sounds: Decreased breath sounds and rhonchi present.   Abdominal:      General: Abdomen is protuberant. There is no distension.      Palpations: Abdomen is soft.   Musculoskeletal:      Right lower leg: No edema.      Left lower leg: No edema.   Skin:     General: Skin is warm and dry.      Capillary Refill: Capillary refill takes 2 to 3 seconds.         Vents:  Vent Mode: BiLevel (07/21/20 0904)  Ventilator Initiated: Yes (07/19/20 0415)  Set Rate: 20 BPM (07/21/20 0904)  Vt Set: 0 mL (07/21/20 0805)  Pressure Support: 10 cmH20 (07/21/20 0904)  PEEP/CPAP: 0 cmH20 (07/21/20 0805)  Oxygen Concentration (%): (S) 60 (07/21/20 0904)  Peak Airway Pressure: 38 cmH2O (07/21/20 0805)  Plateau Pressure: 35 cmH20 (07/21/20 0805)  Total Ve: 13.5 mL (07/21/20 0805)  F/VT Ratio<105 (RSBI): (!) 29.63 (07/21/20 0805)    Lines/Drains/Airways     Peripherally Inserted Central Catheter Line            PICC Double Lumen 07/16/20 1130 right brachial 4 days          Drain                 NG/OG Tube 07/19/20 0530 orogastric 16 Fr. 2 days         Urethral Catheter 07/19/20 0330 2 days          Airway                 Airway - Non-Surgical 07/19/20 0420 Endotracheal Tube 2 days          Arterial Line                 Arterial Line 07/19/20 1144 Right Radial 1 day                Significant Labs:    CBC/Anemia Profile:  Recent Labs   Lab 07/19/20  1620 07/20/20  0415 07/21/20  0402   WBC  --  25.31* 22.19*   HGB  --  12.5* 11.6*   HCT 39 38.3* 36.0*   PLT  --  265 287   MCV  --  93 95   RDW  --  13.3 13.5        Chemistries:  Recent Labs   Lab 07/20/20  0415 07/21/20  0402    135*   K 4.0 4.6    101   CO2 24 25   BUN 23* 28*   CREATININE 1.0 1.5*   CALCIUM 8.3* 8.5*   ALBUMIN 2.4* 2.2*   PROT 6.2 6.5   BILITOT 0.7 0.6   ALKPHOS 65 69   * 88*   AST 48* 34   MG 2.6 3.2*   PHOS 3.7 4.6*       All pertinent labs  within the past 24 hours have been reviewed.    Significant Imaging:  I have reviewed all pertinent imaging results/findings within the past 24 hours.      ABG  Recent Labs   Lab 07/21/20  0427   PH 7.368   PO2 147*   PCO2 44.6   HCO3 25.7   BE 0     Assessment/Plan:     Pulmonary  * Acute respiratory failure with hypoxia  Mechanical ventilation; settings adjusted per abg - will repeat gas to evaluate change  Keep sedated for RASS goal -4  Continue bronchodilators  ICU pulmonary monitoring  7/21 - wean FiO2 and stop versed infusion; monitor oxygenation/ventilation response to changes    On mechanically assisted ventilation  See resp failure plan    Cardiac/Vascular  Essential hypertension  Hold Lisinopril while requiring low dose pressor    Endocrine  Morbid obesity with BMI of 45.0-49.9, adult  Recommend diet and lifestyle modification for goal wt loss once medically stable    GI  RD recs noted  Advance TF    Other  Pneumonia due to COVID-19 virus  Respiratory/Contact/Droplet Isolation   Fluid Sparing Resuscitation, strict I/O  Empiric Antibiotics - completed 5 days Rocephin and Zithromax  LDH and D-dimer increased; will start heparin anticoagulation to prevent microthrombosis  Continue Zinc, melatonin, and Vit C  Remdisivir completed  Decadron Day #9  7/18 - overall status holding steady with significant hypoxia but not requiring intubation/invasive ventilation; plan continue current course with ICU pulmonary monitoring  7/19 - decompensated overnight; [facility]; keep MAP > 65 to maintain organ perfusion; consider extension of remdisivir along with eligibility for convalescent plasma transfusion  7/20 - continue aggressive support care measures; received one unit convalescent plasma overnight, 2nd unit pending  7/21 - completed convalescent plasma; supportive care; ICU monitoring     Critical Care Daily Checklist:    A: Awake: RASS Goal/Actual Goal: RASS  Goal: -4-->deep sedation  Actual: [place] Agitation Sedation Scale (RASS): Deep sedation   B: Spontaneous Breathing Trial Performed?     C: SAT & SBT Coordinated?  n/a                      D: Delirium: CAM-ICU Overall CAM-ICU: Negative   E: Early Mobility Performed? Yes   F: Feeding Goal: Goals: Meet >50% EEN/EPN by RD f/u  Status: Nutrition Goal Status: new   Current Diet Order   Procedures    Diet NPO      AS: Analgesia/Sedation Fentanyl, propofol, versed   T: Thromboembolic Prophylaxis Heparin infusion   H: HOB > 300 Yes   U: Stress Ulcer Prophylaxis (if needed) pepcid   G: Glucose Control monitoring   B: Bowel Function Stool Occurrence: 0   I: Indwelling Catheter (Lines & Bagley) Necessity reviewed   D: De-escalation of Antimicrobials/Pharmacotherapies reviewed    Plan for the day/ETD As above    Code Status:  Family/Goals of Care: Full Code  Spouse, Joan, updated ([phone])    I have discussed case and plan of care in detail with Dr Hernandez; Status and plan of care were discussed with team on multidisciplinary rounds.    Critical Care Time: 66 minutes  Critical secondary to severe hypoxemic respiratory failure s/t covid pneumonia; Critical care was time spent personally by me on the following activities: development of treatment plan with patient or surrogate and bedside caregivers, discussions with consultants, evaluation of patient's response to treatment, examination of patient, ordering and performing treatments and interventions, ordering and review of laboratory studies, ordering and review of radiographic studies, pulse oximetry, re-evaluation of patient's condition. This critical care time did not overlap with that of any other provider or involve time for any procedures.     LOC Chowdhury-BC  Critical Care Medicine  [facility] [place]

## 2020-07-21 NOTE — PROGRESS NOTES
Versed off and tolerating well. Tube Feeds increased. Fio2 to 60% and tolerating well. Care Rounds done with updates/suggestions made.

## 2020-07-21 NOTE — SUBJECTIVE & OBJECTIVE
Objective:     Vital Signs (Most Recent):  Temp: 97.5 °F (36.4 °C) (07/21/20 0701)  Pulse: 105 (07/21/20 0904)  Resp: 20 (07/21/20 0904)  BP: (!) 100/58 (07/21/20 0900)  SpO2: 96 % (07/21/20 0904) Vital Signs (24h Range):  Temp:  [97.5 °F (36.4 °C)-100.4 °F (38 °C)] 97.5 °F (36.4 °C)  Pulse:  [] 105  Resp:  [9-26] 20  SpO2:  [83 %-100 %] 96 %  BP: ()/(17-83) 100/58     Weight: 132.3 kg (291 lb 10.7 oz)  Body mass index is 45.68 kg/m².      Intake/Output Summary (Last 24 hours) at 7/21/2020 1028  Last data filed at 7/21/2020 0900  Gross per 24 hour   Intake 2122.46 ml   Output 915 ml   Net 1207.46 ml      fentanyl 200 mcg/hr (07/21/20 0900)    heparin (porcine) in D5W 12.959 Units/kg/hr (07/21/20 0900)    norepinephrine bitartrate-D5W 0.06 mcg/kg/min (07/21/20 0900)    propofoL 29.982 mcg/kg/min (07/21/20 0900)       Physical Exam  Vitals signs and nursing note reviewed.   Constitutional:       Appearance: He is obese. He is ill-appearing. He is not toxic-appearing or diaphoretic.      Interventions: He is sedated, intubated and restrained.   HENT:      Head: Atraumatic.      Mouth/Throat:      Mouth: Mucous membranes are dry.   Eyes:      Conjunctiva/sclera: Conjunctivae normal.   Neck:      Vascular: No JVD.   Cardiovascular:      Rate and Rhythm: Regular rhythm. Tachycardia present.      Pulses:           Radial pulses are 1+ on the right side and 1+ on the left side.        Dorsalis pedis pulses are detected w/ Doppler on the right side and detected w/ Doppler on the left side.   Pulmonary:      Effort: Tachypnea present. No retractions. He is intubated.      Breath sounds: Decreased breath sounds and rhonchi present.   Abdominal:      General: Abdomen is protuberant. There is no distension.      Palpations: Abdomen is soft.   Musculoskeletal:      Right lower leg: No edema.      Left lower leg: No edema.   Skin:     General: Skin is warm and dry.      Capillary Refill: Capillary refill takes  2 to 3 seconds.         Vents:  Vent Mode: BiLevel (07/21/20 0904)  Ventilator Initiated: Yes (07/19/20 0415)  Set Rate: 20 BPM (07/21/20 0904)  Vt Set: 0 mL (07/21/20 0805)  Pressure Support: 10 cmH20 (07/21/20 0904)  PEEP/CPAP: 0 cmH20 (07/21/20 0805)  Oxygen Concentration (%): (S) 60 (07/21/20 0904)  Peak Airway Pressure: 38 cmH2O (07/21/20 0805)  Plateau Pressure: 35 cmH20 (07/21/20 0805)  Total Ve: 13.5 mL (07/21/20 0805)  F/VT Ratio<105 (RSBI): (!) 29.63 (07/21/20 0805)    Lines/Drains/Airways     Peripherally Inserted Central Catheter Line            PICC Double Lumen 07/16/20 1130 right brachial 4 days          Drain                 NG/OG Tube 07/19/20 0530 orogastric 16 Fr. 2 days         Urethral Catheter 07/19/20 0330 2 days          Airway                 Airway - Non-Surgical 07/19/20 0420 Endotracheal Tube 2 days          Arterial Line                 Arterial Line 07/19/20 1144 Right Radial 1 day                Significant Labs:    CBC/Anemia Profile:  Recent Labs   Lab 07/19/20  1620 07/20/20  0415 07/21/20  0402   WBC  --  25.31* 22.19*   HGB  --  12.5* 11.6*   HCT 39 38.3* 36.0*   PLT  --  265 287   MCV  --  93 95   RDW  --  13.3 13.5        Chemistries:  Recent Labs   Lab 07/20/20  0415 07/21/20  0402    135*   K 4.0 4.6    101   CO2 24 25   BUN 23* 28*   CREATININE 1.0 1.5*   CALCIUM 8.3* 8.5*   ALBUMIN 2.4* 2.2*   PROT 6.2 6.5   BILITOT 0.7 0.6   ALKPHOS 65 69   * 88*   AST 48* 34   MG 2.6 3.2*   PHOS 3.7 4.6*       All pertinent labs within the past 24 hours have been reviewed.    Significant Imaging:  I have reviewed all pertinent imaging results/findings within the past 24 hours.

## 2020-07-21 NOTE — ASSESSMENT & PLAN NOTE
Encouraged deep breathing and coughing.  Transferred to ICU 14 July for respiratory distress.  Continuous BiPAP and Vapotherm as needed.  7/18 -  Mexed out on  vapotherm with hypoxia and placed on  NIPPV / BiPAP support. Remains  Tachypneic, easily desats with exertion or movement . Continue to monitor closely respiratory status  in ICU setting .   7/19-    Intubated electively  overnight due to increased WOB on bipap. Vent setting per ICU team   7/20-  Remains intubated   7/21- Completed 2 units of convalescent plasma infusion without adverse effects.Some improvement with decreased oxygen demand. fiO2 down to 60%.

## 2020-07-21 NOTE — ASSESSMENT & PLAN NOTE
7/19- Marked leukocytosis and thrombocytosis are noted. Likely reactive . Procalcitonin is neg. Blood cultures - NGTD. Endotracheal aspirate obtained for gram stain and culture.   7/21- Improving

## 2020-07-21 NOTE — EICU
eICU Note :    Called by the [facility]:    Problem:  -ST. According to RN, this has been happening...but doesnt have any PRN lopressor. Please review.  Patient has fever 100°F cannot get Tylenol because of allergy, received breathing treatment in our ago    Pertinent History and labs reviewed : 34 y/o    Acute respiratory failure with hypoxia    Morbid obesity with BMI of 45.0-49.9, adult    Essential hypertension    Sepsis    Pneumonia due to COVID-19 virus    Pneumonia of right lower lobe due to infectious organism    Leukocytosis and thrombocytosis     On mechanically assisted ventilation        Treatment /Intervention given:  Fentanyl PRN        Olamide Fenton M.D  eICU Physician

## 2020-07-21 NOTE — ASSESSMENT & PLAN NOTE
Mechanical ventilation; settings adjusted per abg - will repeat gas to evaluate change  Keep sedated for RASS goal -4  Continue bronchodilators  ICU pulmonary monitoring  7/21 - wean FiO2 and stop versed infusion; monitor oxygenation/ventilation response to changes

## 2020-07-21 NOTE — PLAN OF CARE
Patient remains intubated and sedated. Tube feeding at 10cc/hr, tolerating well. Afebrile and diaphoretic, treating with ice-packs. TMAX 99.8. Overnight, patient became tachycardic (HR in 130s) s/p turn and breathing treatment, tachycardia has resolved with repositioning. Updated wife and mother of POC. Patient remains in bilateral wrist restraints. Bed in locked in lowest position, side rails up x2.

## 2020-07-21 NOTE — PROGRESS NOTES
[facility] Hospital Medicine  Progress Note    Patient Name: Jonas Abdalla  MRN: [id number]  Patient Class: IP- Inpatient   Admission Date: 7/11/2020  Length of Stay: 8 days  Attending Physician: Leonel Ceballos MD  Primary Care Provider: Carmel Tabares MD        Subjective:     Principal Problem:Acute respiratory failure with hypoxia        HPI:  Jonas Abdalla is a 35 year old male with a pMHx of HTN who presented to the Emergency Department with c/o SOB. Associated symptoms include: generalized weakness, fatigue, and fever. Pt reports symptoms started Monday, 07/06. He reports he thought he had COVID and went to get tested taht day but did not get test results. Went to local urgent care on Tuesday, 07/07 -- diagnosed with PNA and given Levaquin 500 mg po daily. Symptoms continued despite abx and wife took pt to COVID testing site which he was negative for COVID. Wife reports the COVID test he took on Monday came back negative, notified yesterday of results. ED workup showed: no leukocytosis/leukopenia, stable Hgb/Hct, mild hyponatremia. CXR showed focal right infrahilar/medial lung base consolidation concerning for pneumonia. Febrile upon arrival to . Pt received 30mL/kg fluid resuscitation in ED along with one time doses of IV rocephin/azithromycin. Pt placed on observation for Fever believed to be secondary to RML PNA. COVID rapid screening pending upon assessment, now POSITIVE.     Overview/Hospital Course:  Pt presented to the ED due to weakness, nausea/vomiting with concerns for dehydration. COVID ++. Running temps 103, 102.4, 101.8. He denied any SOB and DRAKE. He describes an occasional dry cough. On 2 L nasal cannula. On azithromycin, Rocephin, decadron and scheduled albuterol.   7/13/2020 - pt has continued to be febrile and now reporting SOB and productive cough. Provided remdesivir information and patient has consented. Oxygen escalated to 4 L.  7/14/2020 - Sudden  increase to oxygen 10 L, oxygen saturation 85 %. Temp 101.6 at 4 AM this AM. Current plan of care continued.   7/18- Pt was transferred to ICU on 7/15 with worsening respiratory status requiring NIPPV . Pt is awake , alert and oriented today .Mexed out on  vapotherm with hypoxia and placed on  NIPPV / BiPAP support. Remains  Tachypneic, easily desats with exertion or movement . Continue to monitor closely respiratory status  in ICU setting .   7/19 - Intubated electively  overnight due to increased WOB on bipap . Sedated on propofol, fentanyl. Levophed gtt added to maintain MAP>65. Heparin high intensity nomogram continues. Pt completed 5 days Remdesivir. On Decadron x 10 days . WBC count is markedly elevated . Likely reactive . Procalcitinin is normal. Creatinine bumped to 1.3 from 0.8 overnight.   7/2- Remains intubated . Tmax 100. Tachycardia and hypotension noted . On Levo to maintain MAP >65. Vent setting and weaning per ICU. 1st unit of convalescent plasma transfused  Overnight with no adverse effect. WBC trended down to 25.3 from 46.3, Pl down to 265 from 637, D d-rosa 3.7, CRP trended up 218>109.  7/21- Tmax 100.4 last evening and none since . Remains intubated . Some improvement with decreased oxygen demand. fiO2 down to 60%. Completed 2 units of convalescent plasma infusion without adverse effects. WBC slowly trending down 22.1, Hgb 11.6, Pl normal today . Ferritin uptrend 5752, D-dimer 3.57, Creatinine bumps to 1.5 from 1.0 yesterday . LDL trending down 886    Interval History:   Completed 2 units of convalescent plasma infusion without adverse effects.Some improvement with decreased oxygen demand. fiO2 down to 60%.      Review of Systems   Unable to perform ROS: Intubated     Objective:     Vital Signs (Most Recent):  Temp: 97 °F (36.1 °C) (07/21/20 1200)  Pulse: (!) 120 (07/21/20 1546)  Resp: 12 (07/21/20 1546)  BP: (!) 113/59 (07/21/20 1500)  SpO2: (!) 91 % (07/21/20 1546) Vital Signs (24h  Range):  Temp:  [97 °F (36.1 °C)-99.8 °F (37.7 °C)] 97 °F (36.1 °C)  Pulse:  [] 120  Resp:  [10-26] 12  SpO2:  [84 %-100 %] 91 %  BP: ()/(17-83) 113/59     Weight: 132.3 kg (291 lb 10.7 oz)  Body mass index is 45.68 kg/m².    Intake/Output Summary (Last 24 hours) at 7/21/2020 1718  Last data filed at 7/21/2020 1501  Gross per 24 hour   Intake 1846.46 ml   Output 1355 ml   Net 491.46 ml      Physical Exam  Constitutional:       General: He is not in acute distress.     Appearance: He is well-developed. He is not diaphoretic.      Comments: Sedated on vent    HENT:      Head: Normocephalic and atraumatic.      Mouth/Throat:      Pharynx: No oropharyngeal exudate.   Eyes:      Conjunctiva/sclera: Conjunctivae normal.      Pupils: Pupils are equal, round, and reactive to light.   Neck:      Musculoskeletal: Normal range of motion and neck supple.      Thyroid: No thyromegaly.      Vascular: No JVD.   Cardiovascular:      Rate and Rhythm: Normal rate and regular rhythm.      Heart sounds: Normal heart sounds. No murmur.   Pulmonary:      Effort: Pulmonary effort is normal. No respiratory distress.      Breath sounds: No wheezing or rales.      Comments: Bronchial breath sounds kallie  Chest:      Chest wall: No tenderness.   Abdominal:      General: Bowel sounds are normal. There is no distension.      Palpations: Abdomen is soft.      Tenderness: There is no abdominal tenderness. There is no guarding or rebound.   Musculoskeletal: Normal range of motion.   Lymphadenopathy:      Cervical: No cervical adenopathy.   Skin:     General: Skin is warm and dry.      Findings: No rash.   Neurological:      Cranial Nerves: No cranial nerve deficit.      Sensory: No sensory deficit.      Deep Tendon Reflexes: Reflexes normal.      Comments: Sedated on vent          Significant Labs:   CBC:   Recent Labs   Lab 07/20/20  0415 07/21/20  0402   WBC 25.31* 22.19*   HGB 12.5* 11.6*   HCT 38.3* 36.0*    287     CMP:    Recent Labs   Lab 07/20/20  0415 07/21/20  0402    135*   K 4.0 4.6    101   CO2 24 25   * 163*   BUN 23* 28*   CREATININE 1.0 1.5*   CALCIUM 8.3* 8.5*   PROT 6.2 6.5   ALBUMIN 2.4* 2.2*   BILITOT 0.7 0.6   ALKPHOS 65 69   AST 48* 34   * 88*   ANIONGAP 11 9   EGFRNONAA >60 >60       Significant Imaging:       Assessment/Plan:      * Acute respiratory failure with hypoxia  Encouraged deep breathing and coughing.  Transferred to ICU 14 July for respiratory distress.  Continuous BiPAP and Vapotherm as needed.  7/18 -  Mexed out on  vapotherm with hypoxia and placed on  NIPPV / BiPAP support. Remains  Tachypneic, easily desats with exertion or movement . Continue to monitor closely respiratory status  in ICU setting .   7/19-    Intubated electively  overnight due to increased WOB on bipap. Vent setting per ICU team   7/20-  Remains intubated   7/21- Completed 2 units of convalescent plasma infusion without adverse effects.Some improvement with decreased oxygen demand. fiO2 down to 60%.    Pneumonia due to COVID-19 virus  - COVID-19 testing Collection Date: 7/11/2020 Collection Time:   11:55 AM  - Infection Control notified     - Isolation:   - Airborne, Contact and Droplet Precautions  - Cohort patients into COVID units  - N95 mask, wear eye protection  - 20 second hand hygiene              - Limit visitors per hospital policy              - Consolidating lab draws, nursing care, provider visits, and interventions    - Diagnostics: (leukopenia, hyponatremia, hyperferritinemia, elevated troponin, elevated d-dimer, age, and comorbidities are significant predictors of poor clinical outcome)  CBC, CMP, Procalcitonin, Ferritin, CRP, LDH, BNP, Troponin, Rapid Flu, Blood Culture x2 and Portable CXR    - Management:  Supplemental O2 to maintain SpO2 >92%  Telemetry  Continuous/intermittent Pulse Ox  Albuterol treatment PRN  IV Rocephin/PO Azithromycin  Dexamethasone   Vit C, MVI  Scheduled  albuterol  7/13/2020 - pt agreeable to receive Remdesivir - information received  7/19- Completed 5 days Remdesivir . On Decadron for 10 days   7/20- 1st unit of convalescent plasma transfused  with no adverse effect.  7/21-Completed 2 units of convalescent plasma infusion without adverse effects.Some improvement with decreased oxygen demand. fiO2 down to 60%.               BRANDAN (acute kidney injury)  7/21- Serum creatinine increased to 1.5 from 1.0 overnight . Monitor renal indices . Monitor volume status . Maintain MAP>65.       Leukocytosis and thrombocytosis   7/19- Marked leukocytosis and thrombocytosis are noted. Likely reactive . Procalcitonin is neg. Blood cultures - NGTD. Endotracheal aspirate obtained for gram stain and culture.   7/21- Improving       Pneumonia of right lower lobe due to infectious organism  IV Rocephin/Azithromycin  - empiric treatment  Supplemental oxygen  Sputum culture    Sepsis  - Sepsis criteria: Current temp of 101.3, RR 21, source PNA due to COVID-19 virus  - Blood cultures obtained >>>>> NGTD  - Recent COVID test outpt on 07/06/ 20 negative. Repeat COVID today in ED (+)  - Will treat underlying cause with abx, supplemental oxygen, MDI, oral steroids per COVID-19 protocol      Essential hypertension  - Pt normally takes lisinopril-hctz 20-12.5 po daily  - Will hold hctz and continue lisinopril at current dose and frequency        VTE Risk Mitigation (From admission, onward)         Ordered     IP VTE HIGH RISK PATIENT  Once      07/19/20 0337     Place sequential compression device  Until discontinued      07/19/20 0337     heparin 25,000 units in dextrose 5% 250 mL (100 units/mL) infusion HIGH INTENSITY nomogram - [facility]  Continuous     Question:  Heparin Infusion Adjustment (DO NOT MODIFY ANSWER)  Answer:  [email or web address]\Images\Pharmacy\HeparinInfusions\heparin HIGH INTENSITY nomogram for [facility] [id number]    07/16/20 0727     heparin 25,000 units in dextrose 5% (100 units/ml) IV  bolus from bag - ADDITIONAL PRN BOLUS - 60 units/kg  As needed (PRN)     Question:  Heparin Infusion Adjustment (DO NOT MODIFY ANSWER)  Answer:  [email or web address]\epic\Images\Pharmacy\HeparinInfusions\heparin HIGH INTENSITY nomogram for [facility] [id number].pdf    07/16/20 0727     heparin 25,000 units in dextrose 5% (100 units/ml) IV bolus from bag - ADDITIONAL PRN BOLUS - 30 units/kg  As needed (PRN)     Question:  Heparin Infusion Adjustment (DO NOT MODIFY ANSWER)  Answer:  [email or web address]\epic\Images\Pharmacy\HeparinInfusions\heparin HIGH INTENSITY nomogram for [facility] [id number].pdf    07/16/20 0727                Critical care time spent on the evaluation and treatment of severe organ dysfunction, review of pertinent labs and imaging studies, discussions with consulting providers and discussions with patient/family: 30  minutes.      Leonel Ceballos MD  Department of Hospital Medicine   [facility]

## 2020-07-21 NOTE — PROGRESS NOTES
Care assumed. Pt turned to side and ext elevated. VSS. Vent settings confirmed. POC with ICU team today. Family updated.

## 2020-07-22 NOTE — ASSESSMENT & PLAN NOTE
- COVID-19 testing Collection Date: 7/11/2020 Collection Time:   11:55 AM  - Infection Control notified     - Isolation:   - Airborne, Contact and Droplet Precautions  - Cohort patients into COVID units  - N95 mask, wear eye protection  - 20 second hand hygiene              - Limit visitors per hospital policy              - Consolidating lab draws, nursing care, provider visits, and interventions    - Diagnostics: (leukopenia, hyponatremia, hyperferritinemia, elevated troponin, elevated d-dimer, age, and comorbidities are significant predictors of poor clinical outcome)  CBC, CMP, Procalcitonin, Ferritin, CRP, LDH, BNP, Troponin, Rapid Flu, Blood Culture x2 and Portable CXR    - Management:  Supplemental O2 to maintain SpO2 >92%  Telemetry  Continuous/intermittent Pulse Ox  Albuterol treatment PRN  IV Rocephin/PO Azithromycin  Dexamethasone   Vit C, MVI  Scheduled albuterol  7/13/2020 - pt agreeable to receive Remdesivir - information received  7/19- Completed 5 days Remdesivir . On Decadron for 10 days   7/20- 1st unit of convalescent plasma transfused  with no adverse effect.  7/21-Completed 2 units of convalescent plasma infusion without adverse effects.Some improvement with decreased oxygen demand. fiO2 down to 60%.  7/22- Remains intubated

## 2020-07-22 NOTE — PROGRESS NOTES
[facility]   Critical Care Medicine  Progress Note    Patient Name: Jonas Abdalla  MRN: [id number]  Admission Date: 7/11/2020  Hospital Length of Stay: 9 days  Code Status: Full Code  Attending Provider: Leonel Ceballos MD  Primary Care Provider: Carmel Tabares MD   Principal Problem: Acute respiratory failure with hypoxia    Subjective:     HPI:  Mr Abdalla is a morbidly obese WM with a PMH of cholelithiasis and HTN who was admitted 7/12 with Sepsis and COVID PNA after presentation with weakness, persistent fever, chills and N/V/D for 6 days progressive.  Had 102.4 temp in ED.  Admitted to [facility] started on emperic IVAB, Decadron and NC low flow.  Oxygenation worsened over next few days.  He consented and was started on Remdesivir 7/14.  On evening of 7/14.  At [place] last night patient became more hypoxic despite being maxed out on VapoTherm.  He was placed in prone position, which improved O2 sat to 90-93% but patient remained in notable respiratory distress.  He was transferred to ICU and initiated on BiPAP.      Hospital/ICU Course:  7/15 - This AM upright in bed fully awake, alert and responsive not in distress but has tachypnea and mild work of breathing while on NPPV and FiO2 at .95.    7/16 - remains on NIPPV with some decline in oxygen demand with FiO2 0.75 but continued tachypnea; ongoing desaturation with exertion or momentary removal of NIPPV for fluid intake  7/17 - tolerated short time on vapotherm support last evening, returned to NIPPV for sleep and has remained today s/t tachypnea, hypoxia; afebrile but wbc slight trend up 14.4k   7/18 - awake, alert,oriented; continues to alternate max support vapotherm with NIPPV support, tachypnea at baseline and worsens along with drop in pulse ox sat with exertion/movement that slowly recovers with rest  7/19 - respiratory distress overnight; required intubation, heavy sedation, mechanical ventilation, low dose pressor support; post  intubation abg shows continued worsening acidosis along with now worsening leukocytosis, ddimer, LD, and CRP  7/20 - remains intubated and sedated on mechanical ventilation with inverse ratio ventilation, some decrease in oxygen demand today; tmax yesterday 101.6; requiring low dose pressor support with sedation  7/21 - remains intubated and sedated on mechanical inverse ratio ventilation, oxygen demand down to 60%; t max 100.4; tolerating TF  7/22 - remains intubated and sedated, mode of ventilation adjusted to PCV today with high PEEP, moderate oxygen demand; tmax today 101.8 with wbc down to 14.7k and LD trend down        Objective:     Vital Signs (Most Recent):  Temp: (!) 100.5 °F (38.1 °C) (07/22/20 1605)  Pulse: (!) 123 (07/22/20 1705)  Resp: 20 (07/22/20 1705)  BP: (!) 99/35 (07/22/20 1705)  SpO2: 96 % (07/22/20 1705) Vital Signs (24h Range):  Temp:  [97.1 °F (36.2 °C)-101.8 °F (38.8 °C)] 100.5 °F (38.1 °C)  Pulse:  [] 123  Resp:  [8-34] 20  SpO2:  [87 %-97 %] 96 %  BP: ()/(35-69) 99/35     Weight: 132.3 kg (291 lb 10.7 oz)  Body mass index is 45.68 kg/m².      Intake/Output Summary (Last 24 hours) at 7/22/2020 1741  Last data filed at 7/22/2020 1700  Gross per 24 hour   Intake 2275.36 ml   Output 1345 ml   Net 930.36 ml      fentanyl 200 mcg/hr (07/22/20 1515)    heparin (porcine) in D5W 17 Units/kg/hr (07/22/20 1505)    lactated ringers 50 mL/hr at 07/22/20 1105    norepinephrine bitartrate-D5W 0.04 mcg/kg/min (07/22/20 1505)    propofoL 45 mcg/kg/min (07/22/20 1528)       Physical Exam  Vitals signs and nursing note reviewed.   Constitutional:       Appearance: He is obese. He is ill-appearing. He is not toxic-appearing or diaphoretic.      Interventions: He is sedated, intubated and restrained.   HENT:      Head: Atraumatic.      Mouth/Throat:      Mouth: Mucous membranes are dry.   Eyes:      Conjunctiva/sclera: Conjunctivae normal.   Neck:      Vascular: No JVD.   Cardiovascular:       Rate and Rhythm: Regular rhythm. Tachycardia present.      Pulses:           Radial pulses are 1+ on the right side and 1+ on the left side.        Dorsalis pedis pulses are detected w/ Doppler on the right side and detected w/ Doppler on the left side.   Pulmonary:      Effort: Tachypnea present. No retractions. He is intubated.      Breath sounds: Decreased breath sounds and rhonchi present.   Abdominal:      General: Abdomen is protuberant. There is no distension.      Palpations: Abdomen is soft.   Musculoskeletal:      Right lower leg: No edema.      Left lower leg: No edema.   Skin:     General: Skin is warm and dry.      Capillary Refill: Capillary refill takes 2 to 3 seconds.         Vents:  Vent Mode: A/C (07/22/20 1456)  Ventilator Initiated: Yes (07/19/20 0415)  Set Rate: 28 BPM (07/22/20 1456)  Vt Set: 0 mL (07/22/20 1456)  Pressure Support: 0 cmH20 (07/22/20 1456)  PEEP/CPAP: 14 cmH20 (07/22/20 1456)  Oxygen Concentration (%): 70 (07/22/20 1600)  Peak Airway Pressure: 37 cmH2O (07/22/20 1456)  Plateau Pressure: 24 cmH20 (07/22/20 1456)  Total Ve: 14.3 mL (07/22/20 1456)  F/VT Ratio<105 (RSBI): (!) 24.29 (07/22/20 1321)    Lines/Drains/Airways     Peripherally Inserted Central Catheter Line            PICC Double Lumen 07/16/20 1130 right brachial 6 days          Drain                 NG/OG Tube 07/19/20 0530 orogastric 16 Fr. 3 days         Urethral Catheter 07/19/20 0330 3 days          Airway                 Airway - Non-Surgical 07/19/20 0420 Endotracheal Tube 3 days          Arterial Line                 Arterial Line 07/19/20 1144 Right Radial 3 days                Significant Labs:    CBC/Anemia Profile:  Recent Labs   Lab 07/21/20  0402 07/22/20  0345   WBC 22.19* 14.68*   HGB 11.6* 11.4*   HCT 36.0* 36.3*    246   MCV 95 96   RDW 13.5 13.6   FERRITIN 5,752* 5,961*        Chemistries:  Recent Labs   Lab 07/21/20  0402 07/22/20  0345   * 136   K 4.6 4.7    98   CO2 25 26   BUN  28* 27*   CREATININE 1.5* 1.0   CALCIUM 8.5* 8.8   ALBUMIN 2.2* 2.2*   PROT 6.5 6.5   BILITOT 0.6 0.6   ALKPHOS 69 62   ALT 88* 68*   AST 34 29   MG 3.2* 3.6*   PHOS 4.6* 3.2       All pertinent labs within the past 24 hours have been reviewed.    Significant Imaging:  I have reviewed all pertinent imaging results/findings within the past 24 hours.      ABG  Recent Labs   Lab 07/22/20  0938   PH 7.438   PO2 96   PCO2 39.5   HCO3 26.7   BE 3     Assessment/Plan:     Pulmonary  * Acute respiratory failure with hypoxia  Mechanical ventilation; settings adjusted per abg - will repeat gas to evaluate change  Keep sedated for RASS goal -4  Continue bronchodilators  ICU pulmonary monitoring  7/21 - wean FiO2 and stop versed infusion; monitor oxygenation/ventilation response to changes  7/22 - transitioned to lung protective PCV, wean PEEP and FiO2 as tolerated    On mechanically assisted ventilation  See resp failure plan    Cardiac/Vascular  Essential hypertension  Hold Lisinopril while requiring low dose pressor    Endocrine  Morbid obesity with BMI of 45.0-49.9, adult  Recommend diet and lifestyle modification for goal wt loss once medically stable    GI  RD recs noted  Advance TF    Other  Pneumonia due to COVID-19 virus  Respiratory/Contact/Droplet Isolation   Fluid Sparing Resuscitation, strict I/O  Empiric Antibiotics - completed 5 days Rocephin and Zithromax  LDH and D-dimer increased; will start heparin anticoagulation to prevent microthrombosis  Continue Zinc, melatonin, and Vit C  Remdisivir completed  Decadron Day #9  7/18 - overall status holding steady with significant hypoxia but not requiring intubation/invasive ventilation; plan continue current course with ICU pulmonary monitoring  7/19 - decompensated overnight; aggressive supportive care with [facility] ventilation and pressor support; keep MAP > 65 to maintain organ perfusion; consider extension of remdisivir along with eligibility for convalescent plasma  transfusion  7/20 - continue aggressive support care measures; received one unit convalescent plasma overnight, 2nd unit pending  7/21 - completed convalescent plasma; supportive care; ICU monitoring  7/22 - wbc curve down, still febrile; doubt secondary infection; cultures sent by attending, monitor  Continue supportive care with [facility] ventilation, close pulmonary and hemodynamic monitoring     Critical Care Daily Checklist:    A: Awake: RASS Goal/Actual Goal: RASS Goal: -4-->deep sedation  Actual: [place] Agitation Sedation Scale (RASS): Deep sedation   B: Spontaneous Breathing Trial Performed?     C: SAT & SBT Coordinated?  n/a                      D: Delirium: CAM-ICU Overall CAM-ICU: Negative   E: Early Mobility Performed? Yes   F: Feeding Goal: Goals: Meet >50% EEN/EPN by RD f/u  Status: Nutrition Goal Status: new   Current Diet Order   Procedures    Diet NPO      AS: Analgesia/Sedation Fentanyl, propofol   T: Thromboembolic Prophylaxis Heparin infusion   H: HOB > 300 Yes   U: Stress Ulcer Prophylaxis (if needed) pepcid   G: Glucose Control monitoring   B: Bowel Function Stool Occurrence: 0   I: Indwelling Catheter (Lines & Bagley) Necessity reviewed   D: De-escalation of Antimicrobials/Pharmacotherapies reviewed    Plan for the day/ETD As above    Code Status:  Family/Goals of Care: Full Code  Spouse, Joan, updated ([phone])    I have discussed case and plan of care in detail with Dr Hernandez; Status and plan of care were discussed with team on multidisciplinary rounds.    Critical Care Time: 62 minutes  Critical secondary to severe hypoxemic respiratory failure s/t covid pneumonia; Critical care was time spent personally by me on the following activities: development of treatment plan with patient or surrogate and bedside caregivers, discussions with consultants, evaluation of patient's response to treatment, examination of patient, ordering and performing treatments and interventions, ordering and review  of laboratory studies, ordering and review of radiographic studies, pulse oximetry, re-evaluation of patient's condition. This critical care time did not overlap with that of any other provider or involve time for any procedures.     LOC Chowdhury-BC  Critical Care Medicine  [facility]

## 2020-07-22 NOTE — ASSESSMENT & PLAN NOTE
Respiratory/Contact/Droplet Isolation   Fluid Sparing Resuscitation, strict I/O  Empiric Antibiotics - completed 5 days Rocephin and Zithromax  LDH and D-dimer increased; will start heparin anticoagulation to prevent microthrombosis  Continue Zinc, melatonin, and Vit C  Remdisivir completed  Decadron Day #9  7/18 - overall status holding steady with significant hypoxia but not requiring intubation/invasive ventilation; plan continue current course with ICU pulmonary monitoring  7/19 - decompensated overnight; aggressive supportive care with mech ventilation and pressor support; keep MAP > 65 to maintain organ perfusion; consider extension of remdisivir along with eligibility for convalescent plasma transfusion  7/20 - continue aggressive support care measures; received one unit convalescent plasma overnight, 2nd unit pending  7/21 - completed convalescent plasma; supportive care; ICU monitoring  7/22 - wbc curve down, still febrile; doubt secondary infection; cultures sent by attending, monitor  Continue supportive care with mech ventilation, close pulmonary and hemodynamic monitoring

## 2020-07-22 NOTE — PLAN OF CARE
Patient remains intubated and sedated. On low dose levophed for blood pressure compliance. Tube feed was turned off prior to shift due to high residuals. Restarted TF at 10cc/hr, residuals 200cc returned to patient and TF turned back off at 0400. Patient afebrile, turned q2h to prevent skin breakdown. Updated patient's mother on POC.

## 2020-07-22 NOTE — PLAN OF CARE
Recommendations    Recommendation:   1. Increase Peptamen VHP to 35mL/hr (840mL/day) + 2 packets beneprotein TID. Flushes of 50mLq2 or per NP.  With 940kcal from propofol, provides 1930kcal, 113g protein, and 907mL free water.   2. RD to f/u    Goals: Meet >50% EEN/EPN by RD f/u  Nutrition Goal Status: goal not met- TF held   Communication of RD Recs: (POC, sticky note, rounds)

## 2020-07-22 NOTE — SUBJECTIVE & OBJECTIVE
Objective:     Vital Signs (Most Recent):  Temp: (!) 100.5 °F (38.1 °C) (07/22/20 1605)  Pulse: (!) 123 (07/22/20 1705)  Resp: 20 (07/22/20 1705)  BP: (!) 99/35 (07/22/20 1705)  SpO2: 96 % (07/22/20 1705) Vital Signs (24h Range):  Temp:  [97.1 °F (36.2 °C)-101.8 °F (38.8 °C)] 100.5 °F (38.1 °C)  Pulse:  [] 123  Resp:  [8-34] 20  SpO2:  [87 %-97 %] 96 %  BP: ()/(35-69) 99/35     Weight: 132.3 kg (291 lb 10.7 oz)  Body mass index is 45.68 kg/m².      Intake/Output Summary (Last 24 hours) at 7/22/2020 1741  Last data filed at 7/22/2020 1700  Gross per 24 hour   Intake 2275.36 ml   Output 1345 ml   Net 930.36 ml      fentanyl 200 mcg/hr (07/22/20 1515)    heparin (porcine) in D5W 17 Units/kg/hr (07/22/20 1505)    lactated ringers 50 mL/hr at 07/22/20 1105    norepinephrine bitartrate-D5W 0.04 mcg/kg/min (07/22/20 1505)    propofoL 45 mcg/kg/min (07/22/20 1528)       Physical Exam  Vitals signs and nursing note reviewed.   Constitutional:       Appearance: He is obese. He is ill-appearing. He is not toxic-appearing or diaphoretic.      Interventions: He is sedated, intubated and restrained.   HENT:      Head: Atraumatic.      Mouth/Throat:      Mouth: Mucous membranes are dry.   Eyes:      Conjunctiva/sclera: Conjunctivae normal.   Neck:      Vascular: No JVD.   Cardiovascular:      Rate and Rhythm: Regular rhythm. Tachycardia present.      Pulses:           Radial pulses are 1+ on the right side and 1+ on the left side.        Dorsalis pedis pulses are detected w/ Doppler on the right side and detected w/ Doppler on the left side.   Pulmonary:      Effort: Tachypnea present. No retractions. He is intubated.      Breath sounds: Decreased breath sounds and rhonchi present.   Abdominal:      General: Abdomen is protuberant. There is no distension.      Palpations: Abdomen is soft.   Musculoskeletal:      Right lower leg: No edema.      Left lower leg: No edema.   Skin:     General: Skin is warm and  dry.      Capillary Refill: Capillary refill takes 2 to 3 seconds.         Vents:  Vent Mode: A/C (07/22/20 1456)  Ventilator Initiated: Yes (07/19/20 0415)  Set Rate: 28 BPM (07/22/20 1456)  Vt Set: 0 mL (07/22/20 1456)  Pressure Support: 0 cmH20 (07/22/20 1456)  PEEP/CPAP: 14 cmH20 (07/22/20 1456)  Oxygen Concentration (%): 70 (07/22/20 1600)  Peak Airway Pressure: 37 cmH2O (07/22/20 1456)  Plateau Pressure: 24 cmH20 (07/22/20 1456)  Total Ve: 14.3 mL (07/22/20 1456)  F/VT Ratio<105 (RSBI): (!) 24.29 (07/22/20 1321)    Lines/Drains/Airways     Peripherally Inserted Central Catheter Line            PICC Double Lumen 07/16/20 1130 right brachial 6 days          Drain                 NG/OG Tube 07/19/20 0530 orogastric 16 Fr. 3 days         Urethral Catheter 07/19/20 0330 3 days          Airway                 Airway - Non-Surgical 07/19/20 0420 Endotracheal Tube 3 days          Arterial Line                 Arterial Line 07/19/20 1144 Right Radial 3 days                Significant Labs:    CBC/Anemia Profile:  Recent Labs   Lab 07/21/20  0402 07/22/20  0345   WBC 22.19* 14.68*   HGB 11.6* 11.4*   HCT 36.0* 36.3*    246   MCV 95 96   RDW 13.5 13.6   FERRITIN 5,752* 5,961*        Chemistries:  Recent Labs   Lab 07/21/20  0402 07/22/20  0345   * 136   K 4.6 4.7    98   CO2 25 26   BUN 28* 27*   CREATININE 1.5* 1.0   CALCIUM 8.5* 8.8   ALBUMIN 2.2* 2.2*   PROT 6.5 6.5   BILITOT 0.6 0.6   ALKPHOS 69 62   ALT 88* 68*   AST 34 29   MG 3.2* 3.6*   PHOS 4.6* 3.2       All pertinent labs within the past 24 hours have been reviewed.    Significant Imaging:  I have reviewed all pertinent imaging results/findings within the past 24 hours.

## 2020-07-22 NOTE — ASSESSMENT & PLAN NOTE
Mechanical ventilation; settings adjusted per abg - will repeat gas to evaluate change  Keep sedated for RASS goal -4  Continue bronchodilators  ICU pulmonary monitoring  7/21 - wean FiO2 and stop versed infusion; monitor oxygenation/ventilation response to changes  7/22 - transitioned to lung protective PCV, wean PEEP and FiO2 as tolerated

## 2020-07-22 NOTE — SUBJECTIVE & OBJECTIVE
Interval History:   Fever noted again. tmax 101.3. Noted pt be tachycardic. Remains intubated and sedated . Remains on Levo for to maintain MAP. Noted bolus fluid given. FiO2 bumped to 70%. Current SpO2 is satisfactory . Received 2 unit of convalescent plasma. WBC trending down. Hgb 11.4 , Pl count stable . Serum creatinine improved to 1.0 today       Review of Systems   Unable to perform ROS: Intubated     Objective:     Vital Signs (Most Recent):  Temp: (!) 100.5 °F (38.1 °C) (07/22/20 1605)  Pulse: (!) 115 (07/22/20 1845)  Resp: 20 (07/22/20 1845)  BP: (!) 91/43 (07/22/20 1805)  SpO2: 96 % (07/22/20 1845) Vital Signs (24h Range):  Temp:  [97.1 °F (36.2 °C)-101.8 °F (38.8 °C)] 100.5 °F (38.1 °C)  Pulse:  [] 115  Resp:  [8-34] 20  SpO2:  [87 %-97 %] 96 %  BP: ()/(35-69) 91/43     Weight: 132.3 kg (291 lb 10.7 oz)  Body mass index is 45.68 kg/m².    Intake/Output Summary (Last 24 hours) at 7/22/2020 1856  Last data filed at 7/22/2020 1805  Gross per 24 hour   Intake 3877.06 ml   Output 1405 ml   Net 2472.06 ml      Physical Exam  Constitutional:       General: He is not in acute distress.     Appearance: He is well-developed. He is not diaphoretic.      Comments: Sedated on vent    HENT:      Head: Normocephalic and atraumatic.      Mouth/Throat:      Pharynx: No oropharyngeal exudate.   Eyes:      Conjunctiva/sclera: Conjunctivae normal.      Pupils: Pupils are equal, round, and reactive to light.   Neck:      Musculoskeletal: Normal range of motion and neck supple.      Thyroid: No thyromegaly.      Vascular: No JVD.   Cardiovascular:      Rate and Rhythm: Normal rate and regular rhythm.      Heart sounds: Normal heart sounds. No murmur.   Pulmonary:      Effort: Pulmonary effort is normal. No respiratory distress.      Breath sounds: Normal breath sounds. No wheezing or rales.   Chest:      Chest wall: No tenderness.   Abdominal:      General: Bowel sounds are normal. There is no distension.       Palpations: Abdomen is soft.      Tenderness: There is no abdominal tenderness. There is no guarding or rebound.   Musculoskeletal: Normal range of motion.   Lymphadenopathy:      Cervical: No cervical adenopathy.   Skin:     General: Skin is warm and dry.      Findings: No rash.   Neurological:      Cranial Nerves: No cranial nerve deficit.      Sensory: No sensory deficit.      Deep Tendon Reflexes: Reflexes normal.      Comments: Sedated on vent          Significant Labs:   CBC:   Recent Labs   Lab 07/21/20  0402 07/22/20  0345   WBC 22.19* 14.68*   HGB 11.6* 11.4*   HCT 36.0* 36.3*    246     CMP:   Recent Labs   Lab 07/21/20  0402 07/22/20  0345   * 136   K 4.6 4.7    98   CO2 25 26   * 126*   BUN 28* 27*   CREATININE 1.5* 1.0   CALCIUM 8.5* 8.8   PROT 6.5 6.5   ALBUMIN 2.2* 2.2*   BILITOT 0.6 0.6   ALKPHOS 69 62   AST 34 29   ALT 88* 68*   ANIONGAP 9 12   EGFRNONAA >60 >60       Significant Imaging:

## 2020-07-22 NOTE — PROGRESS NOTES
"  [facility]  Adult Nutrition  Progress Note    SUMMARY     Recommendations    Recommendation:   1. Increase Peptamen VHP to 35mL/hr (840mL/day) + 2 packets beneprotein TID. Flushes of 50mLq2 or per NP.  With 940kcal from propofol, provides 1930kcal, 113g protein, and 907mL free water.   2. RD to f/u    Goals: Meet >50% EEN/EPN by RD f/u  Nutrition Goal Status: goal not met- TF held   Communication of RD Recs: (POC, sticky note, rounds)    Reason for Assessment    Reason For Assessment: follow up   Diagnosis: (COVID-19)  Relevant Medical History: HTN, gallstones  Interdisciplinary Rounds: attended    General Information Comments:   7/20/20 RD consulted after pt in resp failure. He was in ICU since the 15th, but resp status worsened on the 19th, requiring intubatation. He is sedated with propofol and has trickle feeds of Peptamen VHP initiated this am via NG.  NFPE not performed due to COVID, but weight trends appear steady per epic.   7/22/20 TF were initiated but then were held overnight due to ~200mL residuals. Discussed in rounds, RN to re-initiate and progress towards goal. Pt hypotensive this am, with a fever.     Nutrition Discharge Planning: pending medical course    Nutrition Risk Screen    Nutrition Risk Screen: no indicators present    Nutrition/Diet History    Food Allergies: NKFA  Factors Affecting Nutritional Intake: NPO, on mechanical ventilation    Anthropometrics    Temp: (!) 101.3 °F (38.5 °C)  Height Method: Stated  Height: 5' 7" (170.2 cm)  Height (inches): 67 in  Weight Method: Bed Scale  Weight: 132.3 kg (291 lb 10.7 oz)  Weight (lb): 291.67 lb  Ideal Body Weight (IBW), Male: 148 lb  % Ideal Body Weight, Male (lb): 197.97 %  BMI (Calculated): 45.7       Lab/Procedures/Meds  Pertinent Labs: reviewed  BUN 27, Albumin 2.2  Pertinent Medications: reviewed  Vitamin C, lasix, multivitamin, heparin, propofol, lactated ringers     Estimated/Assessed Needs    Weight Used For Calorie " Calculations: 132.2 kg (291 lb 7.2 oz)  Energy Calorie Requirements (kcal): 1450- 1850  Energy Need Method: Kcal/kg(11-14 permissive underfeeding)  Protein Requirements: 170g(2.5g/kg ideal)  Weight Used For Protein Calculations: 67.1 kg (148 lb)(IBW)     Estimated Fluid Requirement Method: RDA Method  RDA Method (mL): 1450  CHO Requirement: 200g    Nutrition Prescription Ordered    Current Diet Order: NPO  Current Nutrition Support Formula Ordered: Peptamen Intense VHP  Current Nutrition Support Rate Ordered: (Held due to residuals)  Current Nutrition Support Frequency Ordered: continuous    Evaluation of Received Nutrient/Fluid Intake    Enteral Calories (kcal): 0  Enteral Protein (gm): 0  Enteral (Free Water) Fluid (mL): 202  Other Calories (kcal): 940(Propofol @ 35.7)    Intake/Output Summary (Last 24 hours) at 7/22/2020 1227  Last data filed at 7/22/2020 1100  Gross per 24 hour   Intake 1319.35 ml   Output 1595 ml   Net -275.65 ml     TF being held.   % Intake of Estimated Energy Needs: 50 - 75 %  % Meal Intake: NPO    Nutrition Risk  2x week    Assessment and Plan    Nutrition Problem  Inadequate energy intake    Related to (etiology):   Decreased ability to consume sufficient energy    Signs and Symptoms (as evidenced by):   Estimated enteral nutrition intake insufficient to meet needs based on estimated metabolic rate- (trickle feeds)    Interventions:  Enteral nutrition  Collaboration with other providers    Nutrition Diagnosis Status:   Continues    Monitor and Evaluation    Food and Nutrient Intake: energy intake  Food and Nutrient Adminstration: enteral and parenteral nutrition administration  Anthropometric Measurements: weight  Biochemical Data, Medical Tests and Procedures: electrolyte and renal panel, glucose/endocrine profile  Nutrition-Focused Physical Findings: overall appearance     Malnutrition Assessment         NFPE not performed, patient is noted as being positive for COVID-19.    Nutrition  Follow-Up    RD Follow-up?: Yes    Thanks!  Yennifer Torre MPH RD LDN

## 2020-07-22 NOTE — ASSESSMENT & PLAN NOTE
Encouraged deep breathing and coughing.  Transferred to ICU 14 July for respiratory distress.  Continuous BiPAP and Vapotherm as needed.  7/18 -  Mexed out on  vapotherm with hypoxia and placed on  NIPPV / BiPAP support. Remains  Tachypneic, easily desats with exertion or movement . Continue to monitor closely respiratory status  in ICU setting .   7/19-    Intubated electively  overnight due to increased WOB on bipap. Vent setting per ICU team   7/20-  Remains intubated   7/21- Completed 2 units of convalescent plasma infusion without adverse effects.Some improvement with decreased oxygen demand. fiO2 down to 60%.  7/22- Remains intubated. FiO2 bumped to 70% today . Remains on Levophed to maintain MAP >65.

## 2020-07-23 PROBLEM — R65.21 SEPTIC SHOCK: Status: ACTIVE | Noted: 2020-01-01

## 2020-07-23 PROBLEM — I48.92 ATRIAL FLUTTER: Status: ACTIVE | Noted: 2020-01-01

## 2020-07-23 PROBLEM — U07.1 ACUTE RESPIRATORY DISTRESS SYNDROME (ARDS) DUE TO COVID-19 VIRUS: Status: ACTIVE | Noted: 2020-01-01

## 2020-07-23 PROBLEM — J80 ACUTE RESPIRATORY DISTRESS SYNDROME (ARDS) DUE TO COVID-19 VIRUS: Status: ACTIVE | Noted: 2020-01-01

## 2020-07-23 PROBLEM — A41.9 SEPTIC SHOCK: Status: ACTIVE | Noted: 2020-01-01

## 2020-07-23 PROBLEM — I48.92 ATRIAL FLUTTER: Status: RESOLVED | Noted: 2020-01-01 | Resolved: 2020-01-01

## 2020-07-23 NOTE — PT/OT/SLP EVAL
Physical Therapy Evaluation and Discharge Note    Patient Name:  Jonas Abdalla   MRN:  [id number]    Recommendations:     Discharge Recommendations:  (TBD)   Discharge Equipment Recommendations: (TDB)   Barriers to discharge: UNKNOWN    Assessment:     Jonas Abdalla is a 35 y.o. male admitted with a medical diagnosis of Acute respiratory failure with hypoxia. .  At this time, patient is functioning at their prior level of function and does not require further acute PT services.     Recent Surgery: * No surgery found *      Plan:     At this time patient does not require further acute PT services secondary to being sedated and on mechanical vent. Pt is discharged to [facility] for PROM to (B) UE and LE.  Please re-consult if situation changes.      Subjective     Chief Complaint: none  Patient/Family Comments/goals: unable to verbalize  Pain/Comfort:  · Pain Rating 1: 0/10(Pt on vent and sedated therefore not able to verbilaze at this time)    Patients cultural, spiritual, Zoroastrian conflicts given the current situation:      Living Environment:  Per Nurse pt lives at home with his wife and was (I) with ADLs and Ambulation.  Equipment used at home: none.  DME owned (not currently used): none.  Upon discharge, patient will have assistance from UNKNOWN.    Objective:     Communicated with Susan Piper and epic chart review prior to session.  Patient found supine with bed alarm, blood pressure cuff, central line, oxygen, NG tube, pressure relief boots, peripheral IV, pulse ox (continuous), restraints, telemetry, ventilator upon PT entry to room.    General Precautions: Standard, airborne, contact, droplet, fall   Orthopedic Precautions:N/A   Braces: N/A     Exams:  · RUE ROM: PROM WFL, NO AROM  · RUE Strength: nable to assess  · LUE ROM: PROM WFL, NO AROM  · LUE Strength: unable to assess  · RLE ROM: PROM WFL, NO AROM  · RLE Strength: unable to assess  · LLE ROM: PROM WFL, NO AROM  · LLE  Strength: unable to assess    Functional Mobility:  · Pt is sedated and on mechanical vent at this time therefore making him dependent     AM-PAC 6 CLICK MOBILITY  Total Score:6       Therapeutic Activities and Exercises:   PT Eval completed for ROM. Pt is discharged from skilled PT services to [facility] for PROM (B) UE and LE.     AM-PAC 6 CLICK MOBILITY  Total Score:6     Patient left supine with all lines intact, call button in reach and Nurse Marj notified.    GOALS:   Multidisciplinary Problems     Physical Therapy Goals        Problem: Physical Therapy Goal    Goal Priority Disciplines Outcome Goal Variances Interventions   Physical Therapy Goal     PT, PT/OT      Description: Pt on vent at this time. PT Eval performed for PROM. Pt is discharged to [facility] for PROM to (B) LE and UE.                   History:     Past Medical History:   Diagnosis Date    Carpal tunnel syndrome     Gallstone     Hypertension        Past Surgical History:   Procedure Laterality Date    arm surgery Left     CHOLECYSTECTOMY      FRACTURE SURGERY      (L) arm       Time Tracking:     PT Received On: 07/23/20  PT Start Time: 1155     PT Stop Time: 1210  PT Total Time (min): 15 min     Billable Minutes: Evaluation 15 min      Emily Pulido PT/OT  07/23/2020

## 2020-07-23 NOTE — ASSESSMENT & PLAN NOTE
Respiratory/Contact/Droplet Isolation with appropriate PPE -N95 mask, gown, goggles and gloves  Fluid Sparing Resuscitation  Completed course of empiric Antb  No Visitors  Consolidation of tests, nursing care and provider visits  Completed completed 5 day course of Rendisivir and 10 day course of Decadron  Zinc, Melatonin, and Vit C

## 2020-07-23 NOTE — PLAN OF CARE
07/23/20 1520   Discharge Reassessment   Assessment Type Discharge Planning Reassessment   Do you have any problems affording any of your prescribed medications? No   Discharge Plan A Long-term acute care facility (LTAC)   Discharge Plan B Inpatient Hospice   Patient choice form signed by patient/caregiver No   Anticipated Discharge Disposition LTAC   Can the patient/caregiver answer the patient profile reliably? No, cognitively impaired   How does the patient rate their overall health at the present time? Poor   Describe the patient's ability to walk at the present time. Does not walk or unable to take any steps at all   How often would a person be available to care for the patient? Whenever needed   Post-Acute Status   Post-Acute Authorization Placement   Post-Acute Placement Status Awaiting Orders for LTAC     Pt intubated currently at 100%.  Ramesh Bates LMSW 7/23/2020 3:21 PM

## 2020-07-23 NOTE — ASSESSMENT & PLAN NOTE
Lung Protective Mechanical Ventilation w/ ARDSnet protocol  High PEEP and Low Vt  Attempt to maintain PIP < 30 cm H2O  Fluid Sparing Resuscitation  Permissive Hypercapnea

## 2020-07-23 NOTE — SUBJECTIVE & OBJECTIVE
Review of Systems   Unable to perform ROS: Intubated         Objective:     Vital Signs (Most Recent):  Temp: 100.3 °F (37.9 °C) (07/23/20 0805)  Pulse: (!) 117 (07/23/20 0945)  Resp: (!) 35 (07/23/20 0945)  BP: (!) 136/55 (07/23/20 0905)  SpO2: (!) 94 % (07/23/20 0945) Vital Signs (24h Range):  Temp:  [99.1 °F (37.3 °C)-101.3 °F (38.5 °C)] 100.3 °F (37.9 °C)  Pulse:  [] 117  Resp:  [16-46] 35  SpO2:  [66 %-100 %] 94 %  BP: ()/(23-66) 136/55     Weight: 132.3 kg (291 lb 10.7 oz)  Body mass index is 45.68 kg/m².      Intake/Output Summary (Last 24 hours) at 7/23/2020 1033  Last data filed at 7/23/2020 1000  Gross per 24 hour   Intake 5015.17 ml   Output 1270 ml   Net 3745.17 ml       Physical Exam  Vitals signs and nursing note reviewed.   Constitutional:       Appearance: He is obese. He is ill-appearing, toxic-appearing and diaphoretic.      Interventions: He is sedated, intubated and restrained.   HENT:      Head: Normocephalic and atraumatic.      Mouth/Throat:      Mouth: Mucous membranes are moist.   Eyes:      Conjunctiva/sclera: Conjunctivae normal.      Pupils: Pupils are equal, round, and reactive to light.   Neck:      Musculoskeletal: Neck supple.   Cardiovascular:      Rate and Rhythm: Regular rhythm. Tachycardia present.      Pulses:           Radial pulses are 1+ on the right side and 1+ on the left side.        Dorsalis pedis pulses are detected w/ Doppler on the right side and detected w/ Doppler on the left side.      Heart sounds: Heart sounds are distant.   Pulmonary:      Effort: Tachypnea, accessory muscle usage and respiratory distress (mild) present. No retractions. He is intubated.      Breath sounds: Decreased breath sounds and rales present.   Abdominal:      General: Abdomen is protuberant. Bowel sounds are decreased. There is no distension.      Palpations: Abdomen is soft.      Comments: Obese   Genitourinary:     Penis: Normal.       Comments: Yudi in  place  Musculoskeletal:      Right lower le+ Pitting Edema present.      Left lower le+ Pitting Edema present.   Lymphadenopathy:      Cervical: No cervical adenopathy.   Skin:     General: Skin is warm and moist.      Capillary Refill: Capillary refill takes 2 to 3 seconds.      Coloration: Skin is not cyanotic.          Neurological:      Comments: Heavily sedated         Vents:  Vent Mode: A/C (20 075)  Ventilator Initiated: Yes (20 0415)  Set Rate: 28 BPM (20)  Vt Set: 0 mL (20 032)  Pressure Support: 18 cmH20 (20 2326)  PEEP/CPAP: 16 cmH20 (20)  Oxygen Concentration (%): 100 (20 0845)  Peak Airway Pressure: 34 cmH2O (20)  Plateau Pressure: 47 cmH20 (20)  Total Ve: 26.7 mL (20)  F/VT Ratio<105 (RSBI): (!) 44.44 (20)    Lines/Drains/Airways     Peripherally Inserted Central Catheter Line            PICC Double Lumen 20 1130 right brachial 6 days          Drain                 NG/OG Tube 20 0530 orogastric 16 Fr. 4 days         Urethral Catheter 20 0330 4 days          Airway                 Airway - Non-Surgical 20 0420 Endotracheal Tube 4 days          Arterial Line                 Arterial Line 20 1144 Right Radial 3 days                Significant Labs:    CBC/Anemia Profile:  Recent Labs   Lab 20  03420  0330   WBC 14.68* 15.56*   HGB 11.4* 10.4*   HCT 36.3* 32.8*    211   MCV 96 97   RDW 13.6 14.2   FERRITIN 5,961*  --         Chemistries:  Recent Labs   Lab 20  03420  19520  0330    131* 132*   K 4.7 4.8 4.7   CL 98 100 99   CO2 26 24 22*   BUN 27* 41* 48*   CREATININE 1.0 1.4 1.4   CALCIUM 8.8 8.5* 8.5*   ALBUMIN 2.2*  --  2.0*   PROT 6.5  --  6.3   BILITOT 0.6  --  1.0   ALKPHOS 62  --  64   ALT 68*  --  95*   AST 29  --  60*   MG 3.6*  --  2.6   PHOS 3.2  --  2.9       ABGs:   Recent Labs   Lab 20  0417   PH 7.377    PCO2 41.2   HCO3 24.2   POCSATURATED 95   BE -1     Coagulation:   Recent Labs   Lab 07/23/20  0330   APTT 40.9*     POCT Glucose:   Recent Labs   Lab 07/22/20  2329 07/23/20  0342 07/23/20  0822   POCTGLUCOSE 124* 128* 109     All pertinent labs within the past 24 hours have been reviewed.    Significant Imaging:  CXR: I have reviewed all pertinent results/findings within the past 24 hours and my personal findings are:  no change from prior film

## 2020-07-23 NOTE — PROGRESS NOTES
D/w Dr Hernandez   Paralyze if Ppk gets above 40 - now at 36  Plan to change ET tube to larger one this AM   Updated bedside RN

## 2020-07-23 NOTE — PROGRESS NOTES
[facility] Hospital Medicine  Progress Note    Patient Name: Jonas Abdalla  MRN: [id number]  Patient Class: IP- Inpatient   Admission Date: 7/11/2020  Length of Stay: 10 days  Attending Physician: Leonel Ceballos MD  Primary Care Provider: Carmel Tabares MD        Subjective:     Principal Problem:Acute respiratory failure with hypoxia        HPI:  Jonas Abdalla is a 35 year old male with a pMHx of HTN who presented to the Emergency Department with c/o SOB. Associated symptoms include: generalized weakness, fatigue, and fever. Pt reports symptoms started Monday, 07/06. He reports he thought he had COVID and went to get tested taht day but did not get test results. Went to local urgent care on Tuesday, 07/07 -- diagnosed with PNA and given Levaquin 500 mg po daily. Symptoms continued despite abx and wife took pt to COVID testing site which he was negative for COVID. Wife reports the COVID test he took on Monday came back negative, notified yesterday of results. ED workup showed: no leukocytosis/leukopenia, stable Hgb/Hct, mild hyponatremia. CXR showed focal right infrahilar/medial lung base consolidation concerning for pneumonia. Febrile upon arrival to . Pt received 30mL/kg fluid resuscitation in ED along with one time doses of IV rocephin/azithromycin. Pt placed on observation for Fever believed to be secondary to RML PNA. COVID rapid screening pending upon assessment, now POSITIVE.     Overview/Hospital Course:  Pt presented to the ED due to weakness, nausea/vomiting with concerns for dehydration. COVID ++. Running temps 103, 102.4, 101.8. He denied any SOB and DRAKE. He describes an occasional dry cough. On 2 L nasal cannula. On azithromycin, Rocephin, decadron and scheduled albuterol.   7/13/2020 - pt has continued to be febrile and now reporting SOB and productive cough. Provided remdesivir information and patient has consented. Oxygen escalated to 4 L.  7/14/2020 - Sudden  increase to oxygen 10 L, oxygen saturation 85 %. Temp 101.6 at 4 AM this AM. Current plan of care continued.   7/18- Pt was transferred to ICU on 7/15 with worsening respiratory status requiring NIPPV . Pt is awake , alert and oriented today .Mexed out on  vapotherm with hypoxia and placed on  NIPPV / BiPAP support. Remains  Tachypneic, easily desats with exertion or movement . Continue to monitor closely respiratory status  in ICU setting .   7/19 - Intubated electively  overnight due to increased WOB on bipap . Sedated on propofol, fentanyl. Levophed gtt added to maintain MAP>65. Heparin high intensity nomogram continues. Pt completed 5 days Remdesivir. On Decadron x 10 days . WBC count is markedly elevated . Likely reactive . Procalcitinin is normal. Creatinine bumped to 1.3 from 0.8 overnight.   7/2- Remains intubated . Tmax 100. Tachycardia and hypotension noted . On Levo to maintain MAP >65. Vent setting and weaning per ICU. 1st unit of convalescent plasma transfused  Overnight with no adverse effect. WBC trended down to 25.3 from 46.3, Pl down to 265 from 637, D d-rosa 3.7, CRP trended up 218>109.  7/21- Tmax 100.4 last evening and none since . Remains intubated . Some improvement with decreased oxygen demand. fiO2 down to 60%. Completed 2 units of convalescent plasma infusion without adverse effects. WBC slowly trending down 22.1, Hgb 11.6, Pl normal today . Ferritin uptrend 5752, D-dimer 3.57, Creatinine bumps to 1.5 from 1.0 yesterday . LDL trending down 886  7/22- Fever noted again. tmax 101.3. Noted pt be tachycardic. Remains intubated and sedated . Remains on Levo for to maintain MAP. Noted bolus fluid given. FiO2 bumped to 70%. Current SpO2 is satisfactory . Received 2 unit of convalescent plasma. WBC trending down. Hgb 11.4 , Pl count stable . Serum creatinine improved to 1.0 today     7/23- Tmax 100.3. Overnight developed decompensation with tachycardia , irregular rhythm , vent dyssynchrony ,  increased oxygen demand required paralytics Nimbex . FiO2 now 100% yet hypoxia. . Labs are fairly stable.     Interval History:   Tmax 100.3. Overnight developed decompensation with tachycardia , irregular rhythm , vent dyssynchrony , increased oxygen demand required paralytics Nimbex . FiO2 now 100% yet hypoxia. . Labs are fairly stable.       Review of Systems   Unable to perform ROS: Intubated     Objective:     Vital Signs (Most Recent):  Temp: 99.9 °F (37.7 °C) (07/23/20 1505)  Pulse: (!) 123 (07/23/20 1805)  Resp: (!) 36 (07/23/20 1805)  BP: (!) 133/53 (07/23/20 1805)  SpO2: (!) 86 % (07/23/20 1805) Vital Signs (24h Range):  Temp:  [99.1 °F (37.3 °C)-100.5 °F (38.1 °C)] 99.9 °F (37.7 °C)  Pulse:  [106-149] 123  Resp:  [20-46] 36  SpO2:  [66 %-100 %] 86 %  BP: ()/(23-81) 133/53     Weight: 132.3 kg (291 lb 10.7 oz)  Body mass index is 45.68 kg/m².    Intake/Output Summary (Last 24 hours) at 7/23/2020 1820  Last data filed at 7/23/2020 1805  Gross per 24 hour   Intake 4291.17 ml   Output 1545 ml   Net 2746.17 ml      Physical Exam  Constitutional:       General: He is not in acute distress.     Appearance: He is well-developed. He is not diaphoretic.      Comments: Heavily sedated on vent    HENT:      Head: Normocephalic and atraumatic.      Mouth/Throat:      Pharynx: No oropharyngeal exudate.   Eyes:      Conjunctiva/sclera: Conjunctivae normal.      Pupils: Pupils are equal, round, and reactive to light.   Neck:      Musculoskeletal: Neck supple.      Thyroid: No thyromegaly.      Vascular: No JVD.   Cardiovascular:      Rate and Rhythm: Regular rhythm. Tachycardia present.      Heart sounds: Normal heart sounds. No murmur.   Pulmonary:      Effort: Respiratory distress present.      Breath sounds: No wheezing or rales.      Comments: Bronchial breath sounds   Chest:      Chest wall: No tenderness.   Abdominal:      General: Bowel sounds are normal. There is no distension.      Palpations: Abdomen is  soft.      Tenderness: There is no abdominal tenderness. There is no guarding or rebound.   Lymphadenopathy:      Cervical: No cervical adenopathy.   Skin:     General: Skin is warm and dry.      Findings: No rash.   Neurological:      Cranial Nerves: No cranial nerve deficit.      Sensory: No sensory deficit.      Deep Tendon Reflexes: Reflexes normal.      Comments: Sedated on vent          Significant Labs:   CBC:   Recent Labs   Lab 07/22/20  0345 07/23/20  0330 07/23/20  1052   WBC 14.68* 15.56*  --    HGB 11.4* 10.4*  --    HCT 36.3* 32.8* 32*    211  --      CMP:   Recent Labs   Lab 07/22/20  0345 07/22/20  1956 07/23/20  0330    131* 132*   K 4.7 4.8 4.7   CL 98 100 99   CO2 26 24 22*   * 116* 124*   BUN 27* 41* 48*   CREATININE 1.0 1.4 1.4   CALCIUM 8.8 8.5* 8.5*   PROT 6.5  --  6.3   ALBUMIN 2.2*  --  2.0*   BILITOT 0.6  --  1.0   ALKPHOS 62  --  64   AST 29  --  60*   ALT 68*  --  95*   ANIONGAP 12 7* 11   EGFRNONAA >60 >60 >60       Significant Imaging:       Assessment/Plan:      * Acute respiratory failure with hypoxia  Encouraged deep breathing and coughing.  Transferred to ICU 14 July for respiratory distress.  Continuous BiPAP and Vapotherm as needed.  7/18 -  Mexed out on  vapotherm with hypoxia and placed on  NIPPV / BiPAP support. Remains  Tachypneic, easily desats with exertion or movement . Continue to monitor closely respiratory status  in ICU setting .   7/19-    Intubated electively  overnight due to increased WOB on bipap. Vent setting per ICU team   7/20-  Remains intubated   7/21- Completed 2 units of convalescent plasma infusion without adverse effects.Some improvement with decreased oxygen demand. fiO2 down to 60%.  7/22- Remains intubated. FiO2 bumped to 70% today . Remains on Levophed to maintain MAP >65.   7/23- Remains intubated with decompensation overnight with tachycardia , vent dyssynchrony required paralytic.FiO2 bumped back to 100%    Pneumonia due to  COVID-19 virus  - COVID-19 testing Collection Date: 7/11/2020 Collection Time:   11:55 AM  - Infection Control notified     - Isolation:   - Airborne, Contact and Droplet Precautions  - Cohort patients into COVID units  - N95 mask, wear eye protection  - 20 second hand hygiene              - Limit visitors per hospital policy              - Consolidating lab draws, nursing care, provider visits, and interventions    - Diagnostics: (leukopenia, hyponatremia, hyperferritinemia, elevated troponin, elevated d-dimer, age, and comorbidities are significant predictors of poor clinical outcome)  CBC, CMP, Procalcitonin, Ferritin, CRP, LDH, BNP, Troponin, Rapid Flu, Blood Culture x2 and Portable CXR    - Management:  Supplemental O2 to maintain SpO2 >92%  Telemetry  Continuous/intermittent Pulse Ox  Albuterol treatment PRN  IV Rocephin/PO Azithromycin  Dexamethasone   Vit C, MVI  Scheduled albuterol  7/13/2020 - pt agreeable to receive Remdesivir - information received  7/19- Completed 5 days Remdesivir . On Decadron for 10 days   7/20- 1st unit of convalescent plasma transfused  with no adverse effect.  7/21-Completed 2 units of convalescent plasma infusion without adverse effects.Some improvement with decreased oxygen demand. fiO2 down to 60%.  7/22, 7/23 - Remains intubated                Acute respiratory distress syndrome (ARDS) due to COVID-19 virus        BRANDAN (acute kidney injury)  7/21- Serum creatinine increased to 1.5 from 1.0 overnight . Monitor renal indices . Monitor volume status . Maintain MAP>65.   7/22- Improved . Continue to monitor       Leukocytosis and thrombocytosis   7/19- Marked leukocytosis and thrombocytosis are noted. Likely reactive . Procalcitonin is neg. Blood cultures - NGTD. Endotracheal aspirate obtained for gram stain and culture.   7/21- Improving       Pneumonia of right lower lobe due to infectious organism  IV Rocephin/Azithromycin  - empiric treatment completed   Supplemental  oxygen  Sputum culture    Sepsis  - Sepsis criteria: Current temp of 101.3, RR 21, source PNA due to COVID-19 virus  - Blood cultures obtained >>>>> NGTD  - Recent COVID test outpt on 07/06/ 20 negative. Repeat COVID today in ED (+)  - Will treat underlying cause with abx, supplemental oxygen, MDI, oral steroids per COVID-19 protocol      Essential hypertension  - Pt normally takes lisinopril-hctz 20-12.5 po daily  - Will hold hctz and continue lisinopril at current dose and frequency        VTE Risk Mitigation (From admission, onward)         Ordered     IP VTE HIGH RISK PATIENT  Once      07/19/20 0337     Place sequential compression device  Until discontinued      07/19/20 0337     heparin 25,000 units in dextrose 5% 250 mL (100 units/mL) infusion HIGH INTENSITY nomogram - [facility]  Continuous     Question:  Heparin Infusion Adjustment (DO NOT MODIFY ANSWER)  Answer:  \\[email or web address]\epic\Images\Pharmacy\HeparinInfusions\heparin HIGH INTENSITY nomogram for [facility] [id number]    07/16/20 0727     heparin 25,000 units in dextrose 5% (100 units/ml) IV bolus from bag - ADDITIONAL PRN BOLUS - 60 units/kg  As needed (PRN)     Question:  Heparin Infusion Adjustment (DO NOT MODIFY ANSWER)  Answer:  \\[email or web address]\epic\Images\Pharmacy\HeparinInfusions\heparin HIGH INTENSITY nomogram for [facility] [id number]    07/16/20 0727     heparin 25,000 units in dextrose 5% (100 units/ml) IV bolus from bag - ADDITIONAL PRN BOLUS - 30 units/kg  As needed (PRN)     Question:  Heparin Infusion Adjustment (DO NOT MODIFY ANSWER)  Answer:  \\[email or web address]\epic\Images\Pharmacy\HeparinInfusions\heparin HIGH INTENSITY nomogram for [facility] [id number]    07/16/20 0727                Critical care time spent on the evaluation and treatment of severe organ dysfunction, review of pertinent labs and imaging studies, discussions with consulting providers and discussions with patient/family: 30  minutes.      Leonel Ceballos MD  Department of Hospital Medicine   [facility] -

## 2020-07-23 NOTE — ASSESSMENT & PLAN NOTE
Encouraged deep breathing and coughing.  Transferred to ICU 14 July for respiratory distress.  Continuous BiPAP and Vapotherm as needed.  7/18 -  Mexed out on  vapotherm with hypoxia and placed on  NIPPV / BiPAP support. Remains  Tachypneic, easily desats with exertion or movement . Continue to monitor closely respiratory status  in ICU setting .   7/19-    Intubated electively  overnight due to increased WOB on bipap. Vent setting per ICU team   7/20-  Remains intubated   7/21- Completed 2 units of convalescent plasma infusion without adverse effects.Some improvement with decreased oxygen demand. fiO2 down to 60%.  7/22- Remains intubated. FiO2 bumped to 70% today . Remains on Levophed to maintain MAP >65.   7/23- Remains intubated with decompensation overnight with tachycardia , vent dyssynchrony required paralytic.FiO2 bumped back to 100%

## 2020-07-23 NOTE — SUBJECTIVE & OBJECTIVE
Interval History:   Tmax 100.3. Overnight developed decompensation with tachycardia , irregular rhythm , vent dyssynchrony , increased oxygen demand required paralytics Nimbex . FiO2 now 100% yet hypoxia. . Labs are fairly stable.       Review of Systems   Unable to perform ROS: Intubated     Objective:     Vital Signs (Most Recent):  Temp: 99.9 °F (37.7 °C) (07/23/20 1505)  Pulse: (!) 123 (07/23/20 1805)  Resp: (!) 36 (07/23/20 1805)  BP: (!) 133/53 (07/23/20 1805)  SpO2: (!) 86 % (07/23/20 1805) Vital Signs (24h Range):  Temp:  [99.1 °F (37.3 °C)-100.5 °F (38.1 °C)] 99.9 °F (37.7 °C)  Pulse:  [106-149] 123  Resp:  [20-46] 36  SpO2:  [66 %-100 %] 86 %  BP: ()/(23-81) 133/53     Weight: 132.3 kg (291 lb 10.7 oz)  Body mass index is 45.68 kg/m².    Intake/Output Summary (Last 24 hours) at 7/23/2020 1820  Last data filed at 7/23/2020 1805  Gross per 24 hour   Intake 4291.17 ml   Output 1545 ml   Net 2746.17 ml      Physical Exam  Constitutional:       General: He is not in acute distress.     Appearance: He is well-developed. He is not diaphoretic.      Comments: Heavily sedated on vent    HENT:      Head: Normocephalic and atraumatic.      Mouth/Throat:      Pharynx: No oropharyngeal exudate.   Eyes:      Conjunctiva/sclera: Conjunctivae normal.      Pupils: Pupils are equal, round, and reactive to light.   Neck:      Musculoskeletal: Neck supple.      Thyroid: No thyromegaly.      Vascular: No JVD.   Cardiovascular:      Rate and Rhythm: Regular rhythm. Tachycardia present.      Heart sounds: Normal heart sounds. No murmur.   Pulmonary:      Effort: Respiratory distress present.      Breath sounds: No wheezing or rales.      Comments: Bronchial breath sounds   Chest:      Chest wall: No tenderness.   Abdominal:      General: Bowel sounds are normal. There is no distension.      Palpations: Abdomen is soft.      Tenderness: There is no abdominal tenderness. There is no guarding or rebound.   Lymphadenopathy:       Cervical: No cervical adenopathy.   Skin:     General: Skin is warm and dry.      Findings: No rash.   Neurological:      Cranial Nerves: No cranial nerve deficit.      Sensory: No sensory deficit.      Deep Tendon Reflexes: Reflexes normal.      Comments: Sedated on vent          Significant Labs:   CBC:   Recent Labs   Lab 07/22/20 0345 07/23/20  0330 07/23/20  1052   WBC 14.68* 15.56*  --    HGB 11.4* 10.4*  --    HCT 36.3* 32.8* 32*    211  --      CMP:   Recent Labs   Lab 07/22/20 0345 07/22/20  1956 07/23/20  0330    131* 132*   K 4.7 4.8 4.7   CL 98 100 99   CO2 26 24 22*   * 116* 124*   BUN 27* 41* 48*   CREATININE 1.0 1.4 1.4   CALCIUM 8.8 8.5* 8.5*   PROT 6.5  --  6.3   ALBUMIN 2.2*  --  2.0*   BILITOT 0.6  --  1.0   ALKPHOS 62  --  64   AST 29  --  60*   ALT 68*  --  95*   ANIONGAP 12 7* 11   EGFRNONAA >60 >60 >60       Significant Imaging:

## 2020-07-23 NOTE — PLAN OF CARE
Pt on vent at this time. PT Eval performed for PROM. Pt is [facility]OM to (B) LE and UE.

## 2020-07-23 NOTE — PROGRESS NOTES
[facility] Hospital Medicine  Progress Note    Patient Name: Jonas Abdalla  MRN: [id number]  Patient Class: IP- Inpatient   Admission Date: 7/11/2020  Length of Stay: 9 days  Attending Physician: Leonel Ceballos MD  Primary Care Provider: Carmel Tabares MD        Subjective:     Principal Problem:Acute respiratory failure with hypoxia        HPI:  Jonas Abdalla is a 35 year old male with a pMHx of HTN who presented to the Emergency Department with c/o SOB. Associated symptoms include: generalized weakness, fatigue, and fever. Pt reports symptoms started Monday, 07/06. He reports he thought he had COVID and went to get tested taht day but did not get test results. Went to local urgent care on Tuesday, 07/07 -- diagnosed with PNA and given Levaquin 500 mg po daily. Symptoms continued despite abx and wife took pt to COVID testing site which he was negative for COVID. Wife reports the COVID test he took on Monday came back negative, notified yesterday of results. ED workup showed: no leukocytosis/leukopenia, stable Hgb/Hct, mild hyponatremia. CXR showed focal right infrahilar/medial lung base consolidation concerning for pneumonia. Febrile upon arrival to . Pt received 30mL/kg fluid resuscitation in ED along with one time doses of IV rocephin/azithromycin. Pt placed on observation for Fever believed to be secondary to RML PNA. COVID rapid screening pending upon assessment, now POSITIVE.     Overview/Hospital Course:  Pt presented to the ED due to weakness, nausea/vomiting with concerns for dehydration. COVID ++. Running temps 103, 102.4, 101.8. He denied any SOB and DRAKE. He describes an occasional dry cough. On 2 L nasal cannula. On azithromycin, Rocephin, decadron and scheduled albuterol.   7/13/2020 - pt has continued to be febrile and now reporting SOB and productive cough. Provided remdesivir information and patient has consented. Oxygen escalated to 4 L.  7/14/2020 - Sudden  increase to oxygen 10 L, oxygen saturation 85 %. Temp 101.6 at 4 AM this AM. Current plan of care continued.   7/18- Pt was transferred to ICU on 7/15 with worsening respiratory status requiring NIPPV . Pt is awake , alert and oriented today .Mexed out on  vapotherm with hypoxia and placed on  NIPPV / BiPAP support. Remains  Tachypneic, easily desats with exertion or movement . Continue to monitor closely respiratory status  in ICU setting .   7/19 - Intubated electively  overnight due to increased WOB on bipap . Sedated on propofol, fentanyl. Levophed gtt added to maintain MAP>65. Heparin high intensity nomogram continues. Pt completed 5 days Remdesivir. On Decadron x 10 days . WBC count is markedly elevated . Likely reactive . Procalcitinin is normal. Creatinine bumped to 1.3 from 0.8 overnight.   7/2- Remains intubated . Tmax 100. Tachycardia and hypotension noted . On Levo to maintain MAP >65. Vent setting and weaning per ICU. 1st unit of convalescent plasma transfused  Overnight with no adverse effect. WBC trended down to 25.3 from 46.3, Pl down to 265 from 637, D d-rosa 3.7, CRP trended up 218>109.  7/21- Tmax 100.4 last evening and none since . Remains intubated . Some improvement with decreased oxygen demand. fiO2 down to 60%. Completed 2 units of convalescent plasma infusion without adverse effects. WBC slowly trending down 22.1, Hgb 11.6, Pl normal today . Ferritin uptrend 5752, D-dimer 3.57, Creatinine bumps to 1.5 from 1.0 yesterday . LDL trending down 886  7/22- Fever noted again. tmax 101.3. Noted pt be tachycardic. Remains intubated and sedated . Remains on Levo for to maintain MAP. Noted bolus fluid given. FiO2 bumped to 70%. Current SpO2 is satisfactory . Received 2 unit of convalescent plasma. WBC trending down. Hgb 11.4 , Pl count stable . Serum creatinine improved to 1.0 today     Interval History:   Fever noted again. tmax 101.3. Noted pt be tachycardic. Remains intubated and sedated . Remains on  Levo for to maintain MAP. Noted bolus fluid given. FiO2 bumped to 70%. Current SpO2 is satisfactory . Received 2 unit of convalescent plasma. WBC trending down. Hgb 11.4 , Pl count stable . Serum creatinine improved to 1.0 today       Review of Systems   Unable to perform ROS: Intubated     Objective:     Vital Signs (Most Recent):  Temp: (!) 100.5 °F (38.1 °C) (07/22/20 1605)  Pulse: (!) 115 (07/22/20 1845)  Resp: 20 (07/22/20 1845)  BP: (!) 91/43 (07/22/20 1805)  SpO2: 96 % (07/22/20 1845) Vital Signs (24h Range):  Temp:  [97.1 °F (36.2 °C)-101.8 °F (38.8 °C)] 100.5 °F (38.1 °C)  Pulse:  [] 115  Resp:  [8-34] 20  SpO2:  [87 %-97 %] 96 %  BP: ()/(35-69) 91/43     Weight: 132.3 kg (291 lb 10.7 oz)  Body mass index is 45.68 kg/m².    Intake/Output Summary (Last 24 hours) at 7/22/2020 1856  Last data filed at 7/22/2020 1805  Gross per 24 hour   Intake 3877.06 ml   Output 1405 ml   Net 2472.06 ml      Physical Exam  Constitutional:       General: He is not in acute distress.     Appearance: He is well-developed. He is not diaphoretic.      Comments: Sedated on vent    HENT:      Head: Normocephalic and atraumatic.      Mouth/Throat:      Pharynx: No oropharyngeal exudate.   Eyes:      Conjunctiva/sclera: Conjunctivae normal.      Pupils: Pupils are equal, round, and reactive to light.   Neck:      Musculoskeletal: Normal range of motion and neck supple.      Thyroid: No thyromegaly.      Vascular: No JVD.   Cardiovascular:      Rate and Rhythm: Normal rate and regular rhythm.      Heart sounds: Normal heart sounds. No murmur.   Pulmonary:      Effort: Pulmonary effort is normal. No respiratory distress.      Breath sounds: Normal breath sounds. No wheezing or rales.   Chest:      Chest wall: No tenderness.   Abdominal:      General: Bowel sounds are normal. There is no distension.      Palpations: Abdomen is soft.      Tenderness: There is no abdominal tenderness. There is no guarding or rebound.    Musculoskeletal: Normal range of motion.   Lymphadenopathy:      Cervical: No cervical adenopathy.   Skin:     General: Skin is warm and dry.      Findings: No rash.   Neurological:      Cranial Nerves: No cranial nerve deficit.      Sensory: No sensory deficit.      Deep Tendon Reflexes: Reflexes normal.      Comments: Sedated on vent          Significant Labs:   CBC:   Recent Labs   Lab 07/21/20  0402 07/22/20  0345   WBC 22.19* 14.68*   HGB 11.6* 11.4*   HCT 36.0* 36.3*    246     CMP:   Recent Labs   Lab 07/21/20  0402 07/22/20  0345   * 136   K 4.6 4.7    98   CO2 25 26   * 126*   BUN 28* 27*   CREATININE 1.5* 1.0   CALCIUM 8.5* 8.8   PROT 6.5 6.5   ALBUMIN 2.2* 2.2*   BILITOT 0.6 0.6   ALKPHOS 69 62   AST 34 29   ALT 88* 68*   ANIONGAP 9 12   EGFRNONAA >60 >60       Significant Imaging:       Assessment/Plan:      * Acute respiratory failure with hypoxia  Encouraged deep breathing and coughing.  Transferred to ICU 14 July for respiratory distress.  Continuous BiPAP and Vapotherm as needed.  7/18 -  Mexed out on  vapotherm with hypoxia and placed on  NIPPV / BiPAP support. Remains  Tachypneic, easily desats with exertion or movement . Continue to monitor closely respiratory status  in ICU setting .   7/19-    Intubated electively  overnight due to increased WOB on bipap. Vent setting per ICU team   7/20-  Remains intubated   7/21- Completed 2 units of convalescent plasma infusion without adverse effects.Some improvement with decreased oxygen demand. fiO2 down to 60%.  7/22- Remains intubated. FiO2 bumped to 70% today . Remains on Levophed to maintain MAP >65.     Pneumonia due to COVID-19 virus  - COVID-19 testing Collection Date: 7/11/2020 Collection Time:   11:55 AM  - Infection Control notified     - Isolation:   - Airborne, Contact and Droplet Precautions  - Cohort patients into COVID units  - N95 mask, wear eye protection  - 20 second hand hygiene              - Limit visitors  per hospital policy              - Consolidating lab draws, nursing care, provider visits, and interventions    - Diagnostics: (leukopenia, hyponatremia, hyperferritinemia, elevated troponin, elevated d-dimer, age, and comorbidities are significant predictors of poor clinical outcome)  CBC, CMP, Procalcitonin, Ferritin, CRP, LDH, BNP, Troponin, Rapid Flu, Blood Culture x2 and Portable CXR    - Management:  Supplemental O2 to maintain SpO2 >92%  Telemetry  Continuous/intermittent Pulse Ox  Albuterol treatment PRN  IV Rocephin/PO Azithromycin  Dexamethasone   Vit C, MVI  Scheduled albuterol  7/13/2020 - pt agreeable to receive Remdesivir - information received  7/19- Completed 5 days Remdesivir . On Decadron for 10 days   7/20- 1st unit of convalescent plasma transfused  with no adverse effect.  7/21-Completed 2 units of convalescent plasma infusion without adverse effects.Some improvement with decreased oxygen demand. fiO2 down to 60%.  7/22- Remains intubated                BRANDAN (acute kidney injury)  7/21- Serum creatinine increased to 1.5 from 1.0 overnight . Monitor renal indices . Monitor volume status . Maintain MAP>65.   7/22- Improved . Continue to monitor       Leukocytosis and thrombocytosis   7/19- Marked leukocytosis and thrombocytosis are noted. Likely reactive . Procalcitonin is neg. Blood cultures - NGTD. Endotracheal aspirate obtained for gram stain and culture.   7/21- Improving       Pneumonia of right lower lobe due to infectious organism  IV Rocephin/Azithromycin  - empiric treatment completed   Supplemental oxygen  Sputum culture    Sepsis  - Sepsis criteria: Current temp of 101.3, RR 21, source PNA due to COVID-19 virus  - Blood cultures obtained >>>>> NGTD  - Recent COVID test outpt on 07/06/ 20 negative. Repeat COVID today in ED (+)  - Will treat underlying cause with abx, supplemental oxygen, MDI, oral steroids per COVID-19 protocol      Essential hypertension  - Pt normally takes  lisinopril-hctz 20-12.5 po daily  - Will hold hctz and continue lisinopril at current dose and frequency        VTE Risk Mitigation (From admission, onward)         Ordered     IP VTE HIGH RISK PATIENT  Once      07/19/20 0337     Place sequential compression device  Until discontinued      07/19/20 0337     heparin 25,000 units in dextrose 5% 250 mL (100 units/mL) infusion HIGH INTENSITY nomogram - [facility]  Continuous     Question:  Heparin Infusion Adjustment (DO NOT MODIFY ANSWER)  Answer:  \\[email or web address]\epic\Images\Pharmacy\HeparinInfusions\heparin HIGH INTENSITY nomogram for [facility] [id number].pdf    07/16/20 0727     heparin 25,000 units in dextrose 5% (100 units/ml) IV bolus from bag - ADDITIONAL PRN BOLUS - 60 units/kg  As needed (PRN)     Question:  Heparin Infusion Adjustment (DO NOT MODIFY ANSWER)  Answer:  \[email or web address]\epic\Images\Pharmacy\HeparinInfusions\heparin HIGH INTENSITY nomogram for [facility] [id number].pdf    07/16/20 0727     heparin 25,000 units in dextrose 5% (100 units/ml) IV bolus from bag - ADDITIONAL PRN BOLUS - 30 units/kg  As needed (PRN)     Question:  Heparin Infusion Adjustment (DO NOT MODIFY ANSWER)  Answer:  \\[email or web address]\epic\Images\Pharmacy\HeparinInfusions\heparin HIGH INTENSITY nomogram for [facility] [id number]    07/16/20 0727                Critical care time spent on the evaluation and treatment of severe organ dysfunction, review of pertinent labs and imaging studies, discussions with consulting providers and discussions with patient/family: 30  minutes.      Leonel Ceballos MD  Department of Hospital Medicine   [facility]

## 2020-07-23 NOTE — ASSESSMENT & PLAN NOTE
Likely complicated from sedation  Wean Levophed infusion for MAP > 65  ICU hemodynamic monitoring  Blood and Sputum cultures done recently NGTD  Diaphoretic this AM with Tmax 101.8 - will start Cefepime and Flagyl

## 2020-07-23 NOTE — NURSING
HR increased to 130s, patient in a-flutter. Spoke with Dr. Elizabeth. MD had concerns about patient's ventilator settings and abdominal breathing. Orders received for versed PRN to help with vent compliance. EKG and ABG obtained and reported to Dr. Elizabeth.

## 2020-07-23 NOTE — PLAN OF CARE
Patient remains intubated and sedated. TF at goal rate, tolerating well. Tmax 100.5. Patient desated to 75 multiple times. Spoken with Dr. Elizabeth, who is concerned patient needs a tube exchange due to high auto PEEP and tidal volumes. Probable pneumo, chest xray completed, awaiting Dr. Elizabeth's response. Updated wife and mother of POC.

## 2020-07-23 NOTE — PROGRESS NOTES
Irregular heart beat  Check BMP, EKG  Dyssynchrony  Possible autoPEEP - RT to assess  On heparin IV  D/w RN    2109  D/w RT on video  ET is small size probably causing high Ppk and possible air trapping  Sedate further and see if it helps abdominal b]paradox  Increase PEEP to 16  On minimal levophed  Get ABG  If not better with synchrony plan paralytic- tried VC, lower PC pressure , lower RR but pt does not seem comfortable  Versed prn added  EKG reads Aflutter but seems to be sinus tach    0342 Dyssynchrony persists - try paralytic cisatracurium

## 2020-07-23 NOTE — PROGRESS NOTES
Pharmacist Renal Dose Adjustment Note    Jonas Abdalla is a 35 y.o. male being treated with the medication cefepime    Patient Data:    Vital Signs (Most Recent):  Temp: 100.3 °F (37.9 °C) (07/23/20 0805)  Pulse: (!) 117 (07/23/20 0945)  Resp: (!) 35 (07/23/20 0945)  BP: (!) 136/55 (07/23/20 0905)  SpO2: (!) 94 % (07/23/20 0945)   Vital Signs (72h Range):  Temp:  [97 °F (36.1 °C)-101.8 °F (38.8 °C)]   Pulse:  []   Resp:  [8-46]   BP: ()/(17-83)   SpO2:  [66 %-100 %]      Recent Labs   Lab 07/22/20  0345 07/22/20  1956 07/23/20  0330   CREATININE 1.0 1.4 1.4     Serum creatinine: 1.4 mg/dL 07/23/20 0330  Estimated creatinine clearance: 98.3 mL/min    Medication:Cefepime dose: 2g frequency q12h will be changed to medication:Cefepime dose:2g frequency:q8h    Pharmacist's Name: PAIGE BAILEY  Pharmacist's Extension: [phone]

## 2020-07-23 NOTE — ASSESSMENT & PLAN NOTE
Cont full vent support  Cont PCV  Vent settings reviewed and adjusted  VAP prophylaxis  [email or web address] protocol  Daily ABGs  Too unstable for SAT/SBT

## 2020-07-23 NOTE — PROGRESS NOTES
Call placed to EICU MD at primary RN's request regarding orders for paralytics.  Morning ABG's reflect improvement, patient does not appear to be bucking ventilator.  Spoke with RRT prior to call and RRT states he does not believe paralytics are needed at this time either.  When speaking to MD, MD states ETT is to small for patient and with the auto peep, there is to much pressure on patient's lungs.  Requested to speak with RRT.  RRT spoke with MD on phone; RRT to have video chat in patient's room with MD and then MD will inform nursing if he desires to continue his order for paralytics.

## 2020-07-23 NOTE — ASSESSMENT & PLAN NOTE
7/21- Serum creatinine increased to 1.5 from 1.0 overnight . Monitor renal indices . Monitor volume status . Maintain MAP>65.   7/22- Improved . Continue to monitor

## 2020-07-23 NOTE — PROGRESS NOTES
[facility] -   Critical Care Medicine  Progress Note    Patient Name: Jonas Abdalal  MRN: [id number]  Admission Date: 7/11/2020  Hospital Length of Stay: 10 days  Code Status: Full Code  Attending Provider: Leonel Ceballos MD  Primary Care Provider: Carmel Tabares MD   Principal Problem: Acute respiratory failure with hypoxia    Subjective:     HPI:  Mr Abdalla is a morbidly obese WM with a PMH of cholelithiasis and HTN who was admitted 7/12 with Sepsis and COVID PNA after presentation with weakness, persistent fever, chills and N/V/D for 6 days progressive.  Had 102.4 temp in ED.  Admitted to [facility] started on emperic IVAB, Decadron and NC low flow.  Oxygenation worsened over next few days.  He consented and was started on Remdesivir 7/14.  On evening of 7/14.  At [place] last night patient became more hypoxic despite being maxed out on VapoTherm.  He was placed in prone position, which improved O2 sat to 90-93% but patient remained in notable respiratory distress.  He was transferred to ICU and initiated on BiPAP.      Hospital/ICU Course:  7/15 - This AM upright in bed fully awake, alert and responsive not in distress but has tachypnea and mild work of breathing while on NPPV and FiO2 at .95.    7/16 - remains on NIPPV with some decline in oxygen demand with FiO2 0.75 but continued tachypnea; ongoing desaturation with exertion or momentary removal of NIPPV for fluid intake  7/17 - tolerated short time on vapotherm support last evening, returned to NIPPV for sleep and has remained today s/t tachypnea, hypoxia; afebrile but wbc slight trend up 14.4k   7/18 - awake, alert,oriented; continues to alternate max support vapotherm with NIPPV support, tachypnea at baseline and worsens along with drop in pulse ox sat with exertion/movement that slowly recovers with rest  7/19 - respiratory distress overnight; required intubation, heavy sedation, mechanical ventilation, low dose pressor support; post  intubation abg shows continued worsening acidosis along with now worsening leukocytosis, ddimer, LD, and CRP  7/20 - remains intubated and sedated on mechanical ventilation with inverse ratio ventilation, some decrease in oxygen demand today; tmax yesterday 101.6; requiring low dose pressor support with sedation  7/21 - remains intubated and sedated on mechanical inverse ratio ventilation, oxygen demand down to 60%; t max 100.4; tolerating TF  7/22 - remains intubated and sedated, mode of ventilation adjusted to PCV today with high PEEP, moderate oxygen demand; tmax today 101.8 with wbc down to 14.7k and LD trend down  7/23 - Semi-erect in bed intubated on [facility] ventilation and sedated heavily on Fentanyl and Propofol infusions.  Requiring some Levophed infusion.  Tolerating TF.  Restless on vent overnight and this AM tachypnic and without vent synchrony.  EKG done overnight read as A-Flutter but appears to be ST      Review of Systems   Unable to perform ROS: Intubated         Objective:     Vital Signs (Most Recent):  Temp: 100.3 °F (37.9 °C) (07/23/20 0805)  Pulse: (!) 117 (07/23/20 0945)  Resp: (!) 35 (07/23/20 0945)  BP: (!) 136/55 (07/23/20 0905)  SpO2: (!) 94 % (07/23/20 0945) Vital Signs (24h Range):  Temp:  [99.1 °F (37.3 °C)-101.3 °F (38.5 °C)] 100.3 °F (37.9 °C)  Pulse:  [] 117  Resp:  [16-46] 35  SpO2:  [66 %-100 %] 94 %  BP: ()/(23-66) 136/55     Weight: 132.3 kg (291 lb 10.7 oz)  Body mass index is 45.68 kg/m².      Intake/Output Summary (Last 24 hours) at 7/23/2020 1033  Last data filed at 7/23/2020 1000  Gross per 24 hour   Intake 5015.17 ml   Output 1270 ml   Net 3745.17 ml       Physical Exam  Vitals signs and nursing note reviewed.   Constitutional:       Appearance: He is obese. He is ill-appearing, toxic-appearing and diaphoretic.      Interventions: He is sedated, intubated and restrained.   HENT:      Head: Normocephalic and atraumatic.      Mouth/Throat:      Mouth: Mucous  membranes are moist.   Eyes:      Conjunctiva/sclera: Conjunctivae normal.      Pupils: Pupils are equal, round, and reactive to light.   Neck:      Musculoskeletal: Neck supple.   Cardiovascular:      Rate and Rhythm: Regular rhythm. Tachycardia present.      Pulses:           Radial pulses are 1+ on the right side and 1+ on the left side.        Dorsalis pedis pulses are detected w/ Doppler on the right side and detected w/ Doppler on the left side.      Heart sounds: Heart sounds are distant.   Pulmonary:      Effort: Tachypnea, accessory muscle usage and respiratory distress (mild) present. No retractions. He is intubated.      Breath sounds: Decreased breath sounds and rales present.   Abdominal:      General: Abdomen is protuberant. Bowel sounds are decreased. There is no distension.      Palpations: Abdomen is soft.      Comments: Obese   Genitourinary:     Penis: Normal.       Comments: Bagley in place  Musculoskeletal:      Right lower le+ Pitting Edema present.      Left lower le+ Pitting Edema present.   Lymphadenopathy:      Cervical: No cervical adenopathy.   Skin:     General: Skin is warm and moist.      Capillary Refill: Capillary refill takes 2 to 3 seconds.      Coloration: Skin is not cyanotic.          Neurological:      Comments: Heavily sedated         Vents:  Vent Mode: A/C (20)  Ventilator Initiated: Yes (20 0415)  Set Rate: 28 BPM (20)  Vt Set: 0 mL (20 032)  Pressure Support: 18 cmH20 (20 2326)  PEEP/CPAP: 16 cmH20 (20)  Oxygen Concentration (%): 100 (20 0845)  Peak Airway Pressure: 34 cmH2O (20 075)  Plateau Pressure: 47 cmH20 (20 0322)  Total Ve: 26.7 mL (20 075)  F/VT Ratio<105 (RSBI): (!) 44.44 (20)    Lines/Drains/Airways     Peripherally Inserted Central Catheter Line            PICC Double Lumen 20 1130 right brachial 6 days          Drain                 NG/OG Tube 20 3952  orogastric 16 Fr. 4 days         Urethral Catheter 07/19/20 0330 4 days          Airway                 Airway - Non-Surgical 07/19/20 0420 Endotracheal Tube 4 days          Arterial Line                 Arterial Line 07/19/20 1144 Right Radial 3 days                Significant Labs:    CBC/Anemia Profile:  Recent Labs   Lab 07/22/20  0345 07/23/20  0330   WBC 14.68* 15.56*   HGB 11.4* 10.4*   HCT 36.3* 32.8*    211   MCV 96 97   RDW 13.6 14.2   FERRITIN 5,961*  --         Chemistries:  Recent Labs   Lab 07/22/20 0345 07/22/20  1956 07/23/20  0330    131* 132*   K 4.7 4.8 4.7   CL 98 100 99   CO2 26 24 22*   BUN 27* 41* 48*   CREATININE 1.0 1.4 1.4   CALCIUM 8.8 8.5* 8.5*   ALBUMIN 2.2*  --  2.0*   PROT 6.5  --  6.3   BILITOT 0.6  --  1.0   ALKPHOS 62  --  64   ALT 68*  --  95*   AST 29  --  60*   MG 3.6*  --  2.6   PHOS 3.2  --  2.9       ABGs:   Recent Labs   Lab 07/23/20 0417   PH 7.377   PCO2 41.2   HCO3 24.2   POCSATURATED 95   BE -1     Coagulation:   Recent Labs   Lab 07/23/20  0330   APTT 40.9*     POCT Glucose:   Recent Labs   Lab 07/22/20  2329 07/23/20  0342 07/23/20  0822   POCTGLUCOSE 124* 128* 109     All pertinent labs within the past 24 hours have been reviewed.    Significant Imaging:  CXR: I have reviewed all pertinent results/findings within the past 24 hours and my personal findings are:  no change from prior film      ABG  Recent Labs   Lab 07/23/20 0417   PH 7.377   PO2 76*   PCO2 41.2   HCO3 24.2   BE -1     Assessment/Plan:     Pulmonary  * Acute respiratory failure with hypoxia  Cont full vent support  Cont PCV  Vent settings reviewed and adjusted  VAP prophylaxis  [email or web address] protocol  Daily ABGs  Too unstable for SAT/SBT    ID  Septic shock  Likely complicated from sedation  Wean Levophed infusion for MAP > 65  ICU hemodynamic monitoring  Blood and Sputum cultures done recently NGTD  Diaphoretic this AM with Tmax 101.8 - will start Cefepime and Flagyl    Endocrine  Morbid  obesity with BMI of 45.0-49.9, adult  Recommend diet and lifestyle modification for goal wt loss once medically stable    Other  Pneumonia due to COVID-19 virus  Respiratory/Contact/Droplet Isolation with appropriate PPE -N95 mask, gown, goggles and gloves  Fluid Sparing Resuscitation  Completed course of empiric Antb  No Visitors  Consolidation of tests, nursing care and provider visits  Completed completed 5 day course of Rendisivir and 10 day course of Decadron  Zinc, Melatonin, and Vit C    Acute respiratory distress syndrome (ARDS) due to COVID-19 virus  Lung Protective Mechanical Ventilation w/ ARDSnet protocol  High PEEP and Low Vt  Attempt to maintain PIP < 30 cm H2O  Fluid Sparing Resuscitation  Permissive Hypercapnea          Preventive Measures and Monitoring:   Stress Ulcer: Pepcid  Nutrition: TF  Glucose control: SSI  Bowel prophylaxis: Miralax  DVT prophylaxis: Heparin infusion  Hx CAD on B-Blocker: no hx CAD  Head of Bed/Reposition: Elevate HOB and turn Q1-2 hours   Early Mobility: bed rest  SAT/SBT: too unstable  RASS goal:-4  Vent Day: #5  OG Day: #5  Central Line RUE PICC Day: #8  Bagley Day: # 5  IVAB Day: #1  Code Status: Full    Counseling/Consultation:I have discussed the care of this patient in detail with the bedside nursing staff and Dr. Hernandez and Dr. Ceballos    Patient assessed.  Soft bilat wrist restraints renewed due to risk of pulling lines, tubes and/or climbing OOB.    Called spouse, Joan, and gave full report and all questions answered.     Critical Care Time: 88 minutes  Critical secondary to Patient has a condition that poses threat to life and bodily function: Acute Resp Failure intubated on mech ventilation in ARDS with COVID PNA  Patient is currently on drug therapy requiring intensive monitoring for toxicity: Levophed infusion  Patient is currently receiving parenteral controlled substances: Versed and Fentanyl and Propofol infusions      Critical care was time spent personally  by me on the following activities: development of treatment plan with patient or surrogate and bedside caregivers, discussions with consultants, evaluation of patient's response to treatment, examination of patient, ordering and performing treatments and interventions, ordering and review of laboratory studies, ordering and review of radiographic studies, pulse oximetry, re-evaluation of patient's condition. This critical care time did not overlap with that of any other provider or involve time for any procedures.     Yemi Koroma NP  Critical Care Medicine  [facility] [place]

## 2020-07-23 NOTE — PROGRESS NOTES
abg results shown to NP Jesús Koroma. Jesús said he was going to go in room and make changes on vent

## 2020-07-23 NOTE — ASSESSMENT & PLAN NOTE
- COVID-19 testing Collection Date: 7/11/2020 Collection Time:   11:55 AM  - Infection Control notified     - Isolation:   - Airborne, Contact and Droplet Precautions  - Cohort patients into COVID units  - N95 mask, wear eye protection  - 20 second hand hygiene              - Limit visitors per hospital policy              - Consolidating lab draws, nursing care, provider visits, and interventions    - Diagnostics: (leukopenia, hyponatremia, hyperferritinemia, elevated troponin, elevated d-dimer, age, and comorbidities are significant predictors of poor clinical outcome)  CBC, CMP, Procalcitonin, Ferritin, CRP, LDH, BNP, Troponin, Rapid Flu, Blood Culture x2 and Portable CXR    - Management:  Supplemental O2 to maintain SpO2 >92%  Telemetry  Continuous/intermittent Pulse Ox  Albuterol treatment PRN  IV Rocephin/PO Azithromycin  Dexamethasone   Vit C, MVI  Scheduled albuterol  7/13/2020 - pt agreeable to receive Remdesivir - information received  7/19- Completed 5 days Remdesivir . On Decadron for 10 days   7/20- 1st unit of convalescent plasma transfused  with no adverse effect.  7/21-Completed 2 units of convalescent plasma infusion without adverse effects.Some improvement with decreased oxygen demand. fiO2 down to 60%.  7/22, 7/23 - Remains intubated

## 2020-07-24 PROBLEM — D64.9 ANEMIA, UNSPECIFIED: Status: ACTIVE | Noted: 2020-01-01

## 2020-07-24 PROBLEM — E87.5 HYPERKALEMIA: Status: ACTIVE | Noted: 2020-01-01

## 2020-07-24 NOTE — ASSESSMENT & PLAN NOTE
7/19- Marked leukocytosis and thrombocytosis are noted. Likely reactive . Procalcitonin is neg. Blood cultures - NGTD. Endotracheal aspirate obtained for gram stain and culture.   7/21- Improving   7/24- Monitor counts

## 2020-07-24 NOTE — ASSESSMENT & PLAN NOTE
Respiratory/Contact/Droplet Isolation with appropriate PPE -N95 mask, gown, goggles and gloves  Fluid Sparing Resuscitation  Completed course of empiric Antb earlier in admit  IVAB resumed 7/23 due to persistent fever and Leukocytosis  No Visitors  Consolidation of tests, nursing care and provider visits  Completed completed 5 day course of Rendisivir and 10 day course of Decadron  Zinc, Melatonin, and Vit C

## 2020-07-24 NOTE — PROGRESS NOTES
PT remains on vent . All alarms functioning. RT communicated With Jesús Koroma NP. He made vent changes. See flow sheet. ET tube secured and in place..  We will Prone at some time today!. Neb given via aerogen. Coby well

## 2020-07-24 NOTE — ASSESSMENT & PLAN NOTE
Cont full vent support  Cont PCV  Vent settings reviewed and adjusted increased PEEP due to severe persistent hypoxia  VAP prophylaxis  ARDS.net protocol  Daily ABGs  Too unstable for SAT/SBT

## 2020-07-24 NOTE — ASSESSMENT & PLAN NOTE
- Pt normally takes lisinopril-hctz 20-12.5 po daily  - Will hold hctz and continue lisinopril at current dose and frequency  7/24-  All antihypertensives are on hold.  Continue pressors to keep MAP >65

## 2020-07-24 NOTE — PLAN OF CARE
Pt remains intubated and sedated on Propofol, Fentanyl, and Versed with Heparin and Levo drips also continued. Vent settings per pt tolerance. Proned @ 1500, un-prone  tomorrow @ 0800. Sinus tach, sat 92 on the monitor. UO adequate, Lasix given x 1. K+ max 6.4, sodium Zirconium given as scheduled. D50, Insulin, and Calcium chloride given for last value of 6.3. Pressure points protected, pt swam appropriately. Labs monitored. POC reviewed with wife. Pt appears in no distress. Will continue to monitor.

## 2020-07-24 NOTE — PROGRESS NOTES
[facility] -   Critical Care Medicine  Progress Note    Patient Name: Jonas Abdalla  MRN: [id number]  Admission Date: 7/11/2020  Hospital Length of Stay: 11 days  Code Status: Full Code  Attending Provider: Leonel Ceballos MD  Primary Care Provider: Carmel Tabares MD   Principal Problem: Acute respiratory failure with hypoxia    Subjective:     HPI:  Mr Abdalla is a morbidly obese WM with a PMH of cholelithiasis and HTN who was admitted 7/12 with Sepsis and COVID PNA after presentation with weakness, persistent fever, chills and N/V/D for 6 days progressive.  Had 102.4 temp in ED.  Admitted to [facility] started on emperic IVAB, Decadron and NC low flow.  Oxygenation worsened over next few days.  He consented and was started on Remdesivir 7/14.  On evening of 7/14.  At [place] last night patient became more hypoxic despite being maxed out on VapoTherm.  He was placed in prone position, which improved O2 sat to 90-93% but patient remained in notable respiratory distress.  He was transferred to ICU and initiated on BiPAP.      Hospital/ICU Course:  7/15 - This AM upright in bed fully awake, alert and responsive not in distress but has tachypnea and mild work of breathing while on NPPV and FiO2 at .95.    7/16 - remains on NIPPV with some decline in oxygen demand with FiO2 0.75 but continued tachypnea; ongoing desaturation with exertion or momentary removal of NIPPV for fluid intake  7/17 - tolerated short time on vapotherm support last evening, returned to NIPPV for sleep and has remained today s/t tachypnea, hypoxia; afebrile but wbc slight trend up 14.4k   7/18 - awake, alert,oriented; continues to alternate max support vapotherm with NIPPV support, tachypnea at baseline and worsens along with drop in pulse ox sat with exertion/movement that slowly recovers with rest  7/19 - respiratory distress overnight; required intubation, heavy sedation, mechanical ventilation, low dose pressor support; post  intubation abg shows continued worsening acidosis along with now worsening leukocytosis, ddimer, LD, and CRP  7/20 - remains intubated and sedated on mechanical ventilation with inverse ratio ventilation, some decrease in oxygen demand today; tmax yesterday 101.6; requiring low dose pressor support with sedation  7/21 - remains intubated and sedated on mechanical inverse ratio ventilation, oxygen demand down to 60%; t max 100.4; tolerating TF  7/22 - remains intubated and sedated, mode of ventilation adjusted to PCV today with high PEEP, moderate oxygen demand; tmax today 101.8 with wbc down to 14.7k and LD trend down  7/23 - Semi-erect in bed intubated on mech ventilation and sedated heavily on Fentanyl and Propofol infusions.  Requiring some Levophed infusion.  Tolerating TF.  Restless on vent overnight and this AM tachypnic and without vent synchrony.  A-flutter yesterday.    7/24 - Supine in bed sedated on Fentanyl, Propofol and Versed infusions with intubation on mech ventilation.  On low dose Levophed infusion.  Tolerating TF.  Still severe hypoxemia requiring high PEEP and 100% FiO2.    Review of Systems   Unable to perform ROS: Intubated         Objective:     Vital Signs (Most Recent):  Temp: 98.9 °F (37.2 °C) (07/24/20 0715)  Pulse: (!) 115 (07/24/20 1045)  Resp: (!) 32 (07/24/20 1045)  BP: (!) 106/52 (07/24/20 1000)  SpO2: (!) 85 % (07/24/20 1045) Vital Signs (24h Range):  Temp:  [98.2 °F (36.8 °C)-100.2 °F (37.9 °C)] 98.9 °F (37.2 °C)  Pulse:  [106-126] 115  Resp:  [28-47] 32  SpO2:  [69 %-97 %] 85 %  BP: ()/(33-72) 106/52     Weight: 132.3 kg (291 lb 10.7 oz)  Body mass index is 45.68 kg/m².      Intake/Output Summary (Last 24 hours) at 7/24/2020 1122  Last data filed at 7/24/2020 1000  Gross per 24 hour   Intake 3567.03 ml   Output 2355 ml   Net 1212.03 ml       Physical Exam  Vitals signs and nursing note reviewed.   Constitutional:       Appearance: He is obese. He is ill-appearing,  toxic-appearing and diaphoretic.      Interventions: He is sedated, intubated and restrained.   HENT:      Head: Normocephalic and atraumatic.      Mouth/Throat:      Mouth: Mucous membranes are moist.   Eyes:      Conjunctiva/sclera: Conjunctivae normal.      Pupils: Pupils are equal, round, and reactive to light.   Neck:      Musculoskeletal: Neck supple.   Cardiovascular:      Rate and Rhythm: Regular rhythm. Tachycardia present.      Pulses:           Radial pulses are 1+ on the right side and 1+ on the left side.        Dorsalis pedis pulses are detected w/ Doppler on the right side and detected w/ Doppler on the left side.      Heart sounds: Heart sounds are distant.   Pulmonary:      Effort: Tachypnea, accessory muscle usage and respiratory distress (mild) present. No retractions. He is intubated.      Breath sounds: Decreased breath sounds and rales present.   Abdominal:      General: Abdomen is protuberant. Bowel sounds are decreased. There is no distension.      Palpations: Abdomen is soft.      Comments: Obese   Genitourinary:     Penis: Normal.       Comments: Bagley in place  Musculoskeletal:      Right lower le+ Pitting Edema present.      Left lower le+ Pitting Edema present.   Lymphadenopathy:      Cervical: No cervical adenopathy.   Skin:     General: Skin is warm and moist.      Capillary Refill: Capillary refill takes 2 to 3 seconds.      Coloration: Skin is not cyanotic.          Neurological:      Comments: Heavily sedated         Vents:  Vent Mode: A/C (20)  Ventilator Initiated: Yes (20)  Set Rate: (S) 30 BPM (20)  Vt Set: 0 mL (20)  Pressure Support: 0 cmH20 (20)  PEEP/CPAP: (S) 20 cmH20 (20)  Oxygen Concentration (%): 100 (20 0945)  Peak Airway Pressure: 41 cmH2O (20)  Plateau Pressure: 22 cmH20 (20)  Total Ve: 19.7 mL (20)  F/VT Ratio<105 (RSBI): (!) 57.29 (20  0805)    Lines/Drains/Airways     Peripherally Inserted Central Catheter Line            PICC Double Lumen 07/16/20 1130 right brachial 7 days          Drain                 NG/OG Tube 07/19/20 0530 orogastric 16 Fr. 5 days         Urethral Catheter 07/19/20 0330 5 days          Airway                 Airway - Non-Surgical 07/19/20 0420 Endotracheal Tube 5 days          Arterial Line                 Arterial Line 07/19/20 1144 Right Radial 4 days                Significant Labs:    CBC/Anemia Profile:  Recent Labs   Lab 07/23/20  0330 07/23/20  1052 07/24/20  0355   WBC 15.56*  --  26.51*   HGB 10.4*  --  9.4*   HCT 32.8* 32* 29.0*     --  222   MCV 97  --  100*   RDW 14.2  --  14.5   FERRITIN 5,534*  --   --         Chemistries:  Recent Labs   Lab 07/23/20  0330 07/24/20  0355 07/24/20  0535   * 133* 133*   K 4.7 6.3* 6.4*   CL 99 99 99   CO2 22* 23 23   BUN 48* 55* 57*   CREATININE 1.4 1.5* 1.5*   CALCIUM 8.5* 8.3* 8.3*   ALBUMIN 2.0* 1.8*  --    PROT 6.3 6.3  --    BILITOT 1.0 0.7  --    ALKPHOS 64 60  --    ALT 95* 65*  --    AST 60* 37  --    MG 2.6 2.8*  --    PHOS 2.9 7.2*  --        ABGs:   Recent Labs   Lab 07/24/20  0357   PH 7.298*   PCO2 50.5*   HCO3 24.8   POCSATURATED 87*   BE -2     Coagulation:   Recent Labs   Lab 07/24/20  1021   APTT 39.5*     POCT Glucose:   Recent Labs   Lab 07/23/20  2259 07/24/20  0353 07/24/20  0849   POCTGLUCOSE 118* 121* 121*     All pertinent labs within the past 24 hours have been reviewed.        ABG  Recent Labs   Lab 07/24/20  0357   PH 7.298*   PO2 59*   PCO2 50.5*   HCO3 24.8   BE -2     Assessment/Plan:     Pulmonary  * Acute respiratory failure with hypoxia  Cont full vent support  Cont PCV  Vent settings reviewed and adjusted increased PEEP due to severe persistent hypoxia  VAP prophylaxis  [email or web address] protocol  Daily ABGs  Too unstable for SAT/SBT    On mechanically assisted ventilation  See resp failure plan    Cardiac/Vascular  Essential  hypertension  Hold Lisinopril while requiring low dose pressor    Renal/  BRANDAN (acute kidney injury)  I/O balance + 1.3 liters  Support renal perfusion  Accurate I/Os  Daily CMP    Hyperkalemia  Na Zirconium per OG  Follow up labs  Reviewed all meds with pharmacy for source of hyperkalemia and essentially unremarkable (UFH risk of hyperkalemia 7%)    ID  Septic shock  Likely complicated from sedation  Wean Levophed infusion for MAP > 65  ICU hemodynamic monitoring  Blood and Sputum cultures done recently still NGTD  Cont IVAB    Oncology  Anemia, unspecified  No active bleeding  Conservative transfusion protocol  Daily CBC     Endocrine  Morbid obesity with BMI of 45.0-49.9, adult  Recommend diet and lifestyle modification for goal wt loss once medically stable    GI  RD recs noted  Advance TF    Other  Pneumonia due to COVID-19 virus  Respiratory/Contact/Droplet Isolation with appropriate PPE -N95 mask, gown, goggles and gloves  Fluid Sparing Resuscitation  Completed course of empiric Antb earlier in admit  IVAB resumed 7/23 due to persistent fever and Leukocytosis  No Visitors  Consolidation of tests, nursing care and provider visits  Completed completed 5 day course of Rendisivir and 10 day course of Decadron  Zinc, Melatonin, and Vit C    Acute respiratory distress syndrome (ARDS) due to COVID-19 virus  Lung Protective Mechanical Ventilation w/ ARDSnet protocol  High PEEP and Low Vt  Attempt to maintain PIP < 30 cm H2O  Fluid Sparing Resuscitation  Permissive Hypercapnea        Preventive Measures and Monitoring:   Stress Ulcer: Pepcid  Nutrition: TF  Glucose control: SSI  Bowel prophylaxis: Miralax  DVT prophylaxis: Heparin infusion  Hx CAD on B-Blocker: no hx CAD  Head of Bed/Reposition: Elevate HOB and turn Q1-2 hours   Early Mobility: bed rest  SAT/SBT: too unstable  RASS goal:-4  Vent Day: #6  OG Day: #6  Central Line RUE PICC Day: #9  Bagley Day: #6  IVAB Day: #2  Code Status:  Full     Counseling/Consultation:I have discussed the care of this patient in detail with the bedside nursing staff and Dr. Hernandez and Dr. Ceballos     Patient assessed.  Soft bilat wrist restraints renewed due to risk of pulling lines, tubes and/or climbing OOB.     Called spouse today, Joan, and gave full report and all questions answered.      Critical Care Time: 78 minutes  Critical secondary to Patient has a condition that poses threat to life and bodily function: Acute Resp Failure intubated on mech ventilation in ARDS with COVID PNA  Patient is currently on drug therapy requiring intensive monitoring for toxicity: Levophed infusion  Patient is currently receiving parenteral controlled substances: Versed and Fentanyl and Propofol infusions      Critical care was time spent personally by me on the following activities: development of treatment plan with patient or surrogate and bedside caregivers, discussions with consultants, evaluation of patient's response to treatment, examination of patient, ordering and performing treatments and interventions, ordering and review of laboratory studies, ordering and review of radiographic studies, pulse oximetry, re-evaluation of patient's condition. This critical care time did not overlap with that of any other provider or involve time for any procedures.       Yemi Koroma, NP  Critical Care Medicine  [facility] -

## 2020-07-24 NOTE — ASSESSMENT & PLAN NOTE
Na Zirconium per OG  Follow up labs  Reviewed all meds with pharmacy for source of hyperkalemia and essentially unremarkable (UFH risk of hyperkalemia 7%)

## 2020-07-24 NOTE — ASSESSMENT & PLAN NOTE
Likely complicated from sedation  Wean Levophed infusion for MAP > 65  ICU hemodynamic monitoring  Blood and Sputum cultures done recently still NGTD  Cont IVAB

## 2020-07-24 NOTE — PROGRESS NOTES
[facility] Hospital Medicine  Progress Note    Patient Name: Jonas Abdalla  MRN: [id number]  Patient Class: IP- Inpatient   Admission Date: 7/11/2020  Length of Stay: 11 days  Attending Physician: Leonel Ceballos MD  Primary Care Provider: Carmel Tabares MD        Subjective:     Principal Problem:Acute respiratory failure with hypoxia        HPI:  Jonas Abdalla is a 35 year old male with a pMHx of HTN who presented to the Emergency Department with c/o SOB. Associated symptoms include: generalized weakness, fatigue, and fever. Pt reports symptoms started Monday, 07/06. He reports he thought he had COVID and went to get tested taht day but did not get test results. Went to local urgent care on Tuesday, 07/07 -- diagnosed with PNA and given Levaquin 500 mg po daily. Symptoms continued despite abx and wife took pt to COVID testing site which he was negative for COVID. Wife reports the COVID test he took on Monday came back negative, notified yesterday of results. ED workup showed: no leukocytosis/leukopenia, stable Hgb/Hct, mild hyponatremia. CXR showed focal right infrahilar/medial lung base consolidation concerning for pneumonia. Febrile upon arrival to . Pt received 30mL/kg fluid resuscitation in ED along with one time doses of IV rocephin/azithromycin. Pt placed on observation for Fever believed to be secondary to RML PNA. COVID rapid screening pending upon assessment, now POSITIVE.     Overview/Hospital Course:  Pt presented to the ED due to weakness, nausea/vomiting with concerns for dehydration. COVID ++. Running temps 103, 102.4, 101.8. He denied any SOB and DRAKE. He describes an occasional dry cough. On 2 L nasal cannula. On azithromycin, Rocephin, decadron and scheduled albuterol.   7/13/2020 - pt has continued to be febrile and now reporting SOB and productive cough. Provided remdesivir information and patient has consented. Oxygen escalated to 4 L.  7/14/2020 - Sudden  increase to oxygen 10 L, oxygen saturation 85 %. Temp 101.6 at 4 AM this AM. Current plan of care continued.   7/18- Pt was transferred to ICU on 7/15 with worsening respiratory status requiring NIPPV . Pt is awake , alert and oriented today .Mexed out on  vapotherm with hypoxia and placed on  NIPPV / BiPAP support. Remains  Tachypneic, easily desats with exertion or movement . Continue to monitor closely respiratory status  in ICU setting .   7/19 - Intubated electively  overnight due to increased WOB on bipap . Sedated on propofol, fentanyl. Levophed gtt added to maintain MAP>65. Heparin high intensity nomogram continues. Pt completed 5 days Remdesivir. On Decadron x 10 days . WBC count is markedly elevated . Likely reactive . Procalcitinin is normal. Creatinine bumped to 1.3 from 0.8 overnight.   7/2- Remains intubated . Tmax 100. Tachycardia and hypotension noted . On Levo to maintain MAP >65. Vent setting and weaning per ICU. 1st unit of convalescent plasma transfused  Overnight with no adverse effect. WBC trended down to 25.3 from 46.3, Pl down to 265 from 637, D d-rosa 3.7, CRP trended up 218>109.  7/21- Tmax 100.4 last evening and none since . Remains intubated . Some improvement with decreased oxygen demand. fiO2 down to 60%. Completed 2 units of convalescent plasma infusion without adverse effects. WBC slowly trending down 22.1, Hgb 11.6, Pl normal today . Ferritin uptrend 5752, D-dimer 3.57, Creatinine bumps to 1.5 from 1.0 yesterday . LDL trending down 886  7/22- Fever noted again. tmax 101.3. Noted pt be tachycardic. Remains intubated and sedated . Remains on Levo for to maintain MAP. Noted bolus fluid given. FiO2 bumped to 70%. Current SpO2 is satisfactory . Received 2 unit of convalescent plasma. WBC trending down. Hgb 11.4 , Pl count stable . Serum creatinine improved to 1.0 today     7/23- Tmax 100.3. Overnight developed decompensation with tachycardia , irregular rhythm , vent dyssynchrony ,  increased oxygen demand required paralytics Nimbex . FiO2 now 100% yet hypoxia. . Labs are fairly stable.     7/24- Tamx 100.2 last night. Remains intubated and sedated . Worsening hypoxia requiring high PEEP. fiO2 remains 100%. Unable to wean. ICU will try prone position today . WBC count bumped . H/H fairly stable. Hyperkalemia is noted in am lab.        Interval History:    Worsening hypoxia requiring high PEEP. fiO2 remains 100%. Unable to wean.       Review of Systems   Unable to perform ROS: Intubated     Objective:     Vital Signs (Most Recent):  Temp: 99.2 °F (37.3 °C) (07/24/20 1115)  Pulse: (!) 124 (07/24/20 1800)  Resp: (!) 33 (07/24/20 1800)  BP: 125/63 (07/24/20 1800)  SpO2: (!) 89 % (07/24/20 1800) Vital Signs (24h Range):  Temp:  [98.2 °F (36.8 °C)-100.2 °F (37.9 °C)] 99.2 °F (37.3 °C)  Pulse:  [] 124  Resp:  [30-47] 33  SpO2:  [82 %-95 %] 89 %  BP: ()/(33-74) 125/63     Weight: 132.3 kg (291 lb 10.7 oz)  Body mass index is 45.68 kg/m².    Intake/Output Summary (Last 24 hours) at 7/24/2020 1820  Last data filed at 7/24/2020 1800  Gross per 24 hour   Intake 3163.83 ml   Output 3255 ml   Net -91.17 ml      Physical Exam  Constitutional:       General: He is not in acute distress.     Appearance: He is well-developed. He is not diaphoretic.      Comments: Sedated on vent   HENT:      Head: Normocephalic and atraumatic.      Mouth/Throat:      Pharynx: No oropharyngeal exudate.   Eyes:      Conjunctiva/sclera: Conjunctivae normal.      Pupils: Pupils are equal, round, and reactive to light.   Neck:      Thyroid: No thyromegaly.      Vascular: No JVD.   Cardiovascular:      Rate and Rhythm: Normal rate and regular rhythm.      Heart sounds: Normal heart sounds. No murmur.   Pulmonary:      Effort: No respiratory distress.      Breath sounds: No wheezing or rales.      Comments: On vent   Chest:      Chest wall: No tenderness.   Abdominal:      General: Bowel sounds are normal. There is no  distension.      Palpations: Abdomen is soft.      Tenderness: There is no abdominal tenderness. There is no guarding or rebound.   Lymphadenopathy:      Cervical: No cervical adenopathy.   Skin:     General: Skin is warm and dry.      Findings: No rash.   Neurological:      Cranial Nerves: No cranial nerve deficit.      Sensory: No sensory deficit.      Deep Tendon Reflexes: Reflexes normal.      Comments: On vent , sedated         Significant Labs:   CBC:   Recent Labs   Lab 07/23/20  0330 07/23/20  1052 07/24/20  0355 07/24/20  1241   WBC 15.56*  --  26.51*  --    HGB 10.4*  --  9.4*  --    HCT 32.8* 32* 29.0* 30*     --  222  --      CMP:   Recent Labs   Lab 07/23/20  0330 07/24/20  0355 07/24/20  0535 07/24/20  1130 07/24/20  1541   * 133* 133* 133* 133*   K 4.7 6.3* 6.4* 6.1* 6.3*   CL 99 99 99 98 98   CO2 22* 23 23 23 24   * 115* 124* 123* 124*   BUN 48* 55* 57* 54* 52*   CREATININE 1.4 1.5* 1.5* 1.2 1.2   CALCIUM 8.5* 8.3* 8.3* 8.8 8.9   PROT 6.3 6.3  --   --   --    ALBUMIN 2.0* 1.8*  --   --   --    BILITOT 1.0 0.7  --   --   --    ALKPHOS 64 60  --   --   --    AST 60* 37  --   --   --    ALT 95* 65*  --   --   --    ANIONGAP 11 11 11 12 11   EGFRNONAA >60 >60 >60 >60 >60       Significant Imaging:       Assessment/Plan:      * Acute respiratory failure with hypoxia  Encouraged deep breathing and coughing.  Transferred to ICU 14 July for respiratory distress.  Continuous BiPAP and Vapotherm as needed.  7/18 -  Mexed out on  vapotherm with hypoxia and placed on  NIPPV / BiPAP support. Remains  Tachypneic, easily desats with exertion or movement . Continue to monitor closely respiratory status  in ICU setting .   7/19-    Intubated electively  overnight due to increased WOB on bipap. Vent setting per ICU team   7/20-  Remains intubated   7/21- Completed 2 units of convalescent plasma infusion without adverse effects.Some improvement with decreased oxygen demand. fiO2 down to 60%.  7/22-  Remains intubated. FiO2 bumped to 70% today . Remains on Levophed to maintain MAP >65.   7/23, 7/24- Remains intubated with decompensation overnight with tachycardia , vent dyssynchrony required paralytic.FiO2 bumped back to 100%    Pneumonia due to COVID-19 virus  - COVID-19 testing Collection Date: 7/11/2020 Collection Time:   11:55 AM  - Infection Control notified     - Isolation:   - Airborne, Contact and Droplet Precautions  - Cohort patients into COVID units  - N95 mask, wear eye protection  - 20 second hand hygiene              - Limit visitors per hospital policy              - Consolidating lab draws, nursing care, provider visits, and interventions    - Diagnostics: (leukopenia, hyponatremia, hyperferritinemia, elevated troponin, elevated d-dimer, age, and comorbidities are significant predictors of poor clinical outcome)  CBC, CMP, Procalcitonin, Ferritin, CRP, LDH, BNP, Troponin, Rapid Flu, Blood Culture x2 and Portable CXR    - Management:  Supplemental O2 to maintain SpO2 >92%  Telemetry  Continuous/intermittent Pulse Ox  Albuterol treatment PRN  IV Rocephin/PO Azithromycin  Dexamethasone   Vit C, MVI  Scheduled albuterol  7/13/2020 - pt agreeable to receive Remdesivir - information received  7/19- Completed 5 days Remdesivir . On Decadron for 10 days   7/20- 1st unit of convalescent plasma transfused  with no adverse effect.  7/21-Completed 2 units of convalescent plasma infusion without adverse effects.Some improvement with decreased oxygen demand. fiO2 down to 60%.  7/22, 7/23, 7/24 - Remains intubated                Acute respiratory distress syndrome (ARDS) due to COVID-19 virus        BRANDAN (acute kidney injury)  7/21- Serum creatinine increased to 1.5 from 1.0 overnight . Monitor renal indices . Monitor volume status . Maintain MAP>65.   7/22- Improved . Continue to monitor   7/24- Continue to monitor renal indices       Leukocytosis and thrombocytosis   7/19- Marked leukocytosis and  thrombocytosis are noted. Likely reactive . Procalcitonin is neg. Blood cultures - NGTD. Endotracheal aspirate obtained for gram stain and culture.   7/21- Improving   7/24- Monitor counts       Pneumonia of right lower lobe due to infectious organism  IV Rocephin/Azithromycin  - empiric treatment completed   Supplemental oxygen  Sputum culture    Sepsis  - Sepsis criteria: Current temp of 101.3, RR 21, source PNA due to COVID-19 virus  - Blood cultures obtained >>>>> NGTD  - Recent COVID test outpt on 07/06/ 20 negative. Repeat COVID today in ED (+)  - Will treat underlying cause with abx, supplemental oxygen, MDI, oral steroids per COVID-19 protocol      Essential hypertension  - Pt normally takes lisinopril-hctz 20-12.5 po daily  - Will hold hctz and continue lisinopril at current dose and frequency  7/24-  All antihypertensives are on hold.  Continue pressors to keep MAP >65        VTE Risk Mitigation (From admission, onward)         Ordered     IP VTE HIGH RISK PATIENT  Once      07/19/20 0337     Place sequential compression device  Until discontinued      07/19/20 0337     heparin 25,000 units in dextrose 5% 250 mL (100 units/mL) infusion HIGH INTENSITY nomogram - [facility]  Continuous     Question:  Heparin Infusion Adjustment (DO NOT MODIFY ANSWER)  Answer:  \\[email or web address]\epic\Images\Pharmacy\HeparinInfusions\heparin HIGH INTENSITY nomogram for [facility] [id number]    07/16/20 0727     heparin 25,000 units in dextrose 5% (100 units/ml) IV bolus from bag - ADDITIONAL PRN BOLUS - 60 units/kg  As needed (PRN)     Question:  Heparin Infusion Adjustment (DO NOT MODIFY ANSWER)  Answer:  \\[email or web address]\epic\Images\Pharmacy\HeparinInfusions\heparin HIGH INTENSITY nomogram for [facility] [id number]    07/16/20 0727     heparin 25,000 units in dextrose 5% (100 units/ml) IV bolus from bag - ADDITIONAL PRN BOLUS - 30 units/kg  As needed (PRN)     Question:  Heparin Infusion Adjustment (DO NOT MODIFY ANSWER)  Answer:   [email or web address]\epic\Images\Pharmacy\HeparinInfusions\heparin HIGH INTENSITY nomogram for [facility] [id number].pdf    07/16/20 0774                Critical care time spent on the evaluation and treatment of severe organ dysfunction, review of pertinent labs and imaging studies, discussions with consulting providers and discussions with patient/family: 30  minutes.      Leonel Ceballos MD  Department of Hospital Medicine   [facility] -

## 2020-07-24 NOTE — ASSESSMENT & PLAN NOTE
Encouraged deep breathing and coughing.  Transferred to ICU 14 July for respiratory distress.  Continuous BiPAP and Vapotherm as needed.  7/18 -  Mexed out on  vapotherm with hypoxia and placed on  NIPPV / BiPAP support. Remains  Tachypneic, easily desats with exertion or movement . Continue to monitor closely respiratory status  in ICU setting .   7/19-    Intubated electively  overnight due to increased WOB on bipap. Vent setting per ICU team   7/20-  Remains intubated   7/21- Completed 2 units of convalescent plasma infusion without adverse effects.Some improvement with decreased oxygen demand. fiO2 down to 60%.  7/22- Remains intubated. FiO2 bumped to 70% today . Remains on Levophed to maintain MAP >65.   7/23, 7/24- Remains intubated with decompensation overnight with tachycardia , vent dyssynchrony required paralytic.FiO2 bumped back to 100%

## 2020-07-24 NOTE — ASSESSMENT & PLAN NOTE
7/21- Serum creatinine increased to 1.5 from 1.0 overnight . Monitor renal indices . Monitor volume status . Maintain MAP>65.   7/22- Improved . Continue to monitor   7/24- Continue to monitor renal indices

## 2020-07-24 NOTE — PLAN OF CARE
Patient currently resting quietly in bed. VS currently stable. Patient sinus tachycardic on monitor at this time. Patient currently receiving oxygen via ventilator. Patient currently on propofol, fentanyl, and versed for sedation and to promote ventilator synchrony. Patient also on heparin at this time. Patient currently in bilateral soft wrist restraints. No restraint related injuries noted. Patient turned in bed every 2 hours to avoid skin breakdown to back side and prevent wound development. Patient turned with 1 nursing assist and positioned with pillows. No sign of wound development or skin breakdown noted. Plan of care updated with family. There are no further questions after updated on plan of care at this time. No distress noted. Will continue to monitor.

## 2020-07-24 NOTE — ASSESSMENT & PLAN NOTE
- COVID-19 testing Collection Date: 7/11/2020 Collection Time:   11:55 AM  - Infection Control notified     - Isolation:   - Airborne, Contact and Droplet Precautions  - Cohort patients into COVID units  - N95 mask, wear eye protection  - 20 second hand hygiene              - Limit visitors per hospital policy              - Consolidating lab draws, nursing care, provider visits, and interventions    - Diagnostics: (leukopenia, hyponatremia, hyperferritinemia, elevated troponin, elevated d-dimer, age, and comorbidities are significant predictors of poor clinical outcome)  CBC, CMP, Procalcitonin, Ferritin, CRP, LDH, BNP, Troponin, Rapid Flu, Blood Culture x2 and Portable CXR    - Management:  Supplemental O2 to maintain SpO2 >92%  Telemetry  Continuous/intermittent Pulse Ox  Albuterol treatment PRN  IV Rocephin/PO Azithromycin  Dexamethasone   Vit C, MVI  Scheduled albuterol  7/13/2020 - pt agreeable to receive Remdesivir - information received  7/19- Completed 5 days Remdesivir . On Decadron for 10 days   7/20- 1st unit of convalescent plasma transfused  with no adverse effect.  7/21-Completed 2 units of convalescent plasma infusion without adverse effects.Some improvement with decreased oxygen demand. fiO2 down to 60%.  7/22, 7/23, 7/24 - Remains intubated

## 2020-07-24 NOTE — EICU
eICU Note :    Called by the [facility]:    Problem:Previous K 4.7 --- K 6.3, Creat 1.5,     Pertinent History and labs reviewed : 34 y/o    Acute respiratory failure with hypoxia    Morbid obesity with BMI of 45.0-49.9, adult    Essential hypertension    Sepsis    Pneumonia due to COVID-19 virus    Pneumonia of right lower lobe due to infectious organism    Leukocytosis and thrombocytosis     On mechanically assisted ventilation    BRANDAN (acute kidney injury)    Acute respiratory distress syndrome (ARDS) due to COVID-19 virus    Septic shock  Labs: AST 37, ALT 65       Treatment /Intervention given: Potasium to be repeated         Olamide Fenton M.D  eICU Physician

## 2020-07-24 NOTE — SUBJECTIVE & OBJECTIVE
Interval History:    Worsening hypoxia requiring high PEEP. fiO2 remains 100%. Unable to wean.       Review of Systems   Unable to perform ROS: Intubated     Objective:     Vital Signs (Most Recent):  Temp: 99.2 °F (37.3 °C) (07/24/20 1115)  Pulse: (!) 124 (07/24/20 1800)  Resp: (!) 33 (07/24/20 1800)  BP: 125/63 (07/24/20 1800)  SpO2: (!) 89 % (07/24/20 1800) Vital Signs (24h Range):  Temp:  [98.2 °F (36.8 °C)-100.2 °F (37.9 °C)] 99.2 °F (37.3 °C)  Pulse:  [] 124  Resp:  [30-47] 33  SpO2:  [82 %-95 %] 89 %  BP: ()/(33-74) 125/63     Weight: 132.3 kg (291 lb 10.7 oz)  Body mass index is 45.68 kg/m².    Intake/Output Summary (Last 24 hours) at 7/24/2020 1820  Last data filed at 7/24/2020 1800  Gross per 24 hour   Intake 3163.83 ml   Output 3255 ml   Net -91.17 ml      Physical Exam  Constitutional:       General: He is not in acute distress.     Appearance: He is well-developed. He is not diaphoretic.      Comments: Sedated on vent   HENT:      Head: Normocephalic and atraumatic.      Mouth/Throat:      Pharynx: No oropharyngeal exudate.   Eyes:      Conjunctiva/sclera: Conjunctivae normal.      Pupils: Pupils are equal, round, and reactive to light.   Neck:      Thyroid: No thyromegaly.      Vascular: No JVD.   Cardiovascular:      Rate and Rhythm: Normal rate and regular rhythm.      Heart sounds: Normal heart sounds. No murmur.   Pulmonary:      Effort: No respiratory distress.      Breath sounds: No wheezing or rales.      Comments: On vent   Chest:      Chest wall: No tenderness.   Abdominal:      General: Bowel sounds are normal. There is no distension.      Palpations: Abdomen is soft.      Tenderness: There is no abdominal tenderness. There is no guarding or rebound.   Lymphadenopathy:      Cervical: No cervical adenopathy.   Skin:     General: Skin is warm and dry.      Findings: No rash.   Neurological:      Cranial Nerves: No cranial nerve deficit.      Sensory: No sensory deficit.      Deep  Tendon Reflexes: Reflexes normal.      Comments: On vent , sedated         Significant Labs:   CBC:   Recent Labs   Lab 07/23/20  0330 07/23/20  1052 07/24/20  0355 07/24/20  1241   WBC 15.56*  --  26.51*  --    HGB 10.4*  --  9.4*  --    HCT 32.8* 32* 29.0* 30*     --  222  --      CMP:   Recent Labs   Lab 07/23/20  0330 07/24/20  0355 07/24/20  0535 07/24/20  1130 07/24/20  1541   * 133* 133* 133* 133*   K 4.7 6.3* 6.4* 6.1* 6.3*   CL 99 99 99 98 98   CO2 22* 23 23 23 24   * 115* 124* 123* 124*   BUN 48* 55* 57* 54* 52*   CREATININE 1.4 1.5* 1.5* 1.2 1.2   CALCIUM 8.5* 8.3* 8.3* 8.8 8.9   PROT 6.3 6.3  --   --   --    ALBUMIN 2.0* 1.8*  --   --   --    BILITOT 1.0 0.7  --   --   --    ALKPHOS 64 60  --   --   --    AST 60* 37  --   --   --    ALT 95* 65*  --   --   --    ANIONGAP 11 11 11 12 11   EGFRNONAA >60 >60 >60 >60 >60       Significant Imaging:

## 2020-07-25 PROBLEM — E78.1 HIGH TRIGLYCERIDES: Status: ACTIVE | Noted: 2020-01-01

## 2020-07-25 PROBLEM — N17.9 AKI (ACUTE KIDNEY INJURY): Status: RESOLVED | Noted: 2020-01-01 | Resolved: 2020-01-01

## 2020-07-25 NOTE — PLAN OF CARE
Patient remained safe and stable for the duration of the shift. Please see assessment flowsheets for more details. Currently resting in bed with safety and fall prevention measures in place. Plan of care discussed with spouse. Verbalized understanding. All lines and tubes remained intact. Urine output greater than 30 cc per hour. Elevated K treated with 10 units iv insulin and dextrose 50 ivp. Repeat K level pending. Will monitor for results. Heparin gtt @23, fentanyl gtt @ 200, ketamine gtt @ 10, versed gtt @ 3. Ketamine gtt replaced propofol gtt per propofol. Patient unproned. Tolerated well. Will monitor for changes.

## 2020-07-25 NOTE — ASSESSMENT & PLAN NOTE
Encouraged deep breathing and coughing.  Transferred to ICU 14 July for respiratory distress.  Intubated 19 July.  Remains intubated persistent hypoxia.  PEEP 20 and fiO2 100%.

## 2020-07-25 NOTE — PROGRESS NOTES
[facility] -   Critical Care Medicine  Progress Note    Patient Name: Jonas Abdalla  MRN: [id number]  Admission Date: 7/11/2020  Hospital Length of Stay: 12 days  Code Status: Full Code  Attending Provider: Enmanuel Hu MD  Primary Care Provider: Carmel Tabares MD   Principal Problem: Acute respiratory failure with hypoxia    Subjective:     HPI:  Mr Abdalla is a morbidly obese WM with a PMH of cholelithiasis and HTN who was admitted 7/12 with Sepsis and COVID PNA after presentation with weakness, persistent fever, chills and N/V/D for 6 days progressive.  Had 102.4 temp in ED.  Admitted to [facility] started on emperic IVAB, Decadron and NC low flow.  Oxygenation worsened over next few days.  He consented and was started on Remdesivir 7/14.  On evening of 7/14.  At [place] last night patient became more hypoxic despite being maxed out on VapoTherm.  He was placed in prone position, which improved O2 sat to 90-93% but patient remained in notable respiratory distress.  He was transferred to ICU and initiated on BiPAP.      Hospital/ICU Course:  7/15 - This AM upright in bed fully awake, alert and responsive not in distress but has tachypnea and mild work of breathing while on NPPV and FiO2 at .95.    7/16 - remains on NIPPV with some decline in oxygen demand with FiO2 0.75 but continued tachypnea; ongoing desaturation with exertion or momentary removal of NIPPV for fluid intake  7/17 - tolerated short time on vapotherm support last evening, returned to NIPPV for sleep and has remained today s/t tachypnea, hypoxia; afebrile but wbc slight trend up 14.4k   7/18 - awake, alert,oriented; continues to alternate max support vapotherm with NIPPV support, tachypnea at baseline and worsens along with drop in pulse ox sat with exertion/movement that slowly recovers with rest  7/19 - respiratory distress overnight; required intubation, heavy sedation, mechanical ventilation, low dose pressor support;  post intubation abg shows continued worsening acidosis along with now worsening leukocytosis, ddimer, LD, and CRP  7/20 - remains intubated and sedated on mechanical ventilation with inverse ratio ventilation, some decrease in oxygen demand today; tmax yesterday 101.6; requiring low dose pressor support with sedation  7/21 - remains intubated and sedated on mechanical inverse ratio ventilation, oxygen demand down to 60%; t max 100.4; tolerating TF  7/22 - remains intubated and sedated, mode of ventilation adjusted to PCV today with high PEEP, moderate oxygen demand; tmax today 101.8 with wbc down to 14.7k and LD trend down  7/23 - Semi-erect in bed intubated on mech ventilation and sedated heavily on Fentanyl and Propofol infusions.  Requiring some Levophed infusion.  Tolerating TF.  Restless on vent overnight and this AM tachypnic and without vent synchrony.  A-flutter yesterday.    7/24 - Supine in bed sedated on Fentanyl, Propofol and Versed infusions with intubation on mech ventilation.  On low dose Levophed infusion.  Tolerating TF.  Still severe hypoxemia requiring high PEEP and 100% FiO2.  7/25 - Prone in bed since yesterday afternoon.  Intubated on mech ventilation and sedated on Versed, Propofol and Versed infusions.  Off TF while prone.  On Heparin infusion and low dose Levophed infusion    Review of Systems   Unable to perform ROS: Intubated         Objective:     Vital Signs (Most Recent):  Temp: 98.8 °F (37.1 °C) (07/25/20 0705)  Pulse: (!) 116 (07/25/20 1005)  Resp: (!) 31 (07/25/20 1005)  BP: (!) 84/44 (07/25/20 0735)  SpO2: 96 % (07/25/20 1005) Vital Signs (24h Range):  Temp:  [97.3 °F (36.3 °C)-98.8 °F (37.1 °C)] 98.8 °F (37.1 °C)  Pulse:  [] 116  Resp:  [30-34] 31  SpO2:  [83 %-96 %] 96 %  BP: ()/(40-84) 84/44     Weight: 132.3 kg (291 lb 10.7 oz)  Body mass index is 45.68 kg/m².      Intake/Output Summary (Last 24 hours) at 7/25/2020 1254  Last data filed at 7/25/2020 0613  Gross per  24 hour   Intake 2178.85 ml   Output 2515 ml   Net -336.15 ml       Physical Exam  Vitals signs and nursing note reviewed.   Constitutional:       Appearance: He is obese. He is ill-appearing and diaphoretic.      Interventions: He is sedated, intubated and restrained.   HENT:      Head: Normocephalic and atraumatic.      Mouth/Throat:      Mouth: Mucous membranes are moist.   Eyes:      Conjunctiva/sclera: Conjunctivae normal.      Pupils: Pupils are equal, round, and reactive to light.   Neck:      Musculoskeletal: Neck supple.   Cardiovascular:      Rate and Rhythm: Regular rhythm. Tachycardia present.      Pulses:           Radial pulses are 1+ on the right side and 1+ on the left side.        Dorsalis pedis pulses are detected w/ Doppler on the right side and detected w/ Doppler on the left side.      Heart sounds: Heart sounds are distant.   Pulmonary:      Effort: Tachypnea, accessory muscle usage and respiratory distress (mild) present. No retractions. He is intubated.      Breath sounds: Decreased breath sounds, rhonchi and rales present.   Abdominal:      General: Bowel sounds are decreased. There is no distension.      Palpations: Abdomen is soft.      Comments: Obese   Genitourinary:     Penis: Normal.       Comments: Bagley in place  Musculoskeletal:      Right lower le+ Pitting Edema present.      Left lower le+ Pitting Edema present.   Lymphadenopathy:      Cervical: No cervical adenopathy.   Skin:     General: Skin is warm and moist.      Capillary Refill: Capillary refill takes 2 to 3 seconds.      Coloration: Skin is not cyanotic.          Neurological:      Comments: Heavily sedated         Vents:  Vent Mode: A/C (20)  Ventilator Initiated: Yes (20 0415)  Set Rate: 30 BPM (20)  Vt Set: 0 mL (20)  Pressure Support: 0 cmH20 (20)  PEEP/CPAP: 20 cmH20 (20)  Oxygen Concentration (%): 100 (20)  Peak Airway Pressure: 44 cmH2O  (07/25/20 0742)  Plateau Pressure: 47 cmH20 (07/25/20 0406)  Total Ve: 17.2 mL (07/25/20 0531)  F/VT Ratio<105 (RSBI): (!) 80.29 (07/25/20 0742)    Lines/Drains/Airways     Peripherally Inserted Central Catheter Line            PICC Double Lumen 07/16/20 1130 right brachial 9 days          Drain                 NG/OG Tube 07/19/20 0530 orogastric 16 Fr. 6 days         Urethral Catheter 07/19/20 0330 6 days          Airway                 Airway - Non-Surgical 07/19/20 0420 Endotracheal Tube 6 days          Arterial Line                 Arterial Line 07/19/20 1144 Right Radial 6 days                Significant Labs:    CBC/Anemia Profile:  Recent Labs   Lab 07/24/20  0355 07/24/20  1241 07/25/20  0410   WBC 26.51*  --  20.40*   HGB 9.4*  --  9.1*   HCT 29.0* 30* 28.2*     --  218   *  --  99*   RDW 14.5  --  14.3   FERRITIN 4,983*  --  5,892*        Chemistries:  Recent Labs   Lab 07/24/20  0355  07/24/20  1130 07/24/20  1541 07/25/20  0410   *   < > 133* 133* 133*   K 6.3*   < > 6.1* 6.3* 6.5*   CL 99   < > 98 98 98   CO2 23   < > 23 24 28   BUN 55*   < > 54* 52* 42*   CREATININE 1.5*   < > 1.2 1.2 0.9   CALCIUM 8.3*   < > 8.8 8.9 9.2   ALBUMIN 1.8*  --   --   --  1.7*   PROT 6.3  --   --   --  6.5   BILITOT 0.7  --   --   --  0.5   ALKPHOS 60  --   --   --  74   ALT 65*  --   --   --  52*   AST 37  --   --   --  37   MG 2.8*  --   --   --  2.5   PHOS 7.2*  --   --   --  4.0    < > = values in this interval not displayed.       ABGs:   Recent Labs   Lab 07/25/20  0406   PH 7.301*   PCO2 59.4*   HCO3 29.3*   POCSATURATED 94*   BE 3     Coagulation:   Recent Labs   Lab 07/25/20  0410   APTT 35.2*     POCT Glucose:   Recent Labs   Lab 07/25/20  0937 07/25/20  1219 07/25/20  1225   POCTGLUCOSE 131* 69* 103     All pertinent labs within the past 24 hours have been reviewed.  Trig 421        ABG  Recent Labs   Lab 07/25/20  0406   PH 7.301*   PO2 79*   PCO2 59.4*   HCO3 29.3*   BE 3      Assessment/Plan:     Pulmonary  * Acute respiratory failure with hypoxia  Cont full vent support  Cont PCV  Vent settings reviewed   VAP prophylaxis  [email or web address] protocol  Daily ABGs  Too unstable for SAT/SBT    Cardiac/Vascular  High triglycerides  Stop Propofol infusion    Renal/  Hyperkalemia  Increase Na Zirconium per OG  Insulin IVP and D50 IVP  Follow up labs  Reviewed all meds with pharmacy for source of hyperkalemia and essentially unremarkable (UFH risk of hyperkalemia 7%)    ID  Septic shock  Likely complicated from sedation  Wean Levophed infusion for MAP > 65  ICU hemodynamic monitoring  Blood and Sputum cultures done recently still NGTD  Cont IVAB    Oncology  Anemia, unspecified  No active bleeding  Conservative transfusion protocol  Daily CBC     Endocrine  Morbid obesity with BMI of 45.0-49.9, adult  Recommend diet and lifestyle modification for goal wt loss once medically stable    Other  Pneumonia due to COVID-19 virus  Respiratory/Contact/Droplet Isolation with appropriate PPE -N95 mask, gown, goggles and gloves  Fluid Sparing Resuscitation  Completed course of empiric Antb earlier in admit  IVAB resumed 7/23 due to persistent fever and Leukocytosis  No Visitors  Consolidation of tests, nursing care and provider visits  Completed completed 5 day course of Rendisivir and 10 day course of Decadron  Zinc, Melatonin, and Vit C    Acute respiratory distress syndrome (ARDS) due to COVID-19 virus  Lung Protective Mechanical Ventilation w/ ARDSnet protocol  High PEEP and Low Vt  Attempt to maintain PIP < 30 cm H2O  Fluid Sparing Resuscitation  Permissive Hypercapnea  Prone position last 18 hours will return supine today        Preventive Measures and Monitoring:   Stress Ulcer: Pepcid  Nutrition: TF resume once supine  Glucose control: SSI  Bowel prophylaxis: Miralax  DVT prophylaxis: Heparin infusion  Hx CAD on B-Blocker: no hx CAD  Head of Bed/Reposition: Elevate HOB and turn Q1-2 hours   Early  Mobility: bed rest  SAT/SBT: too unstable  RASS goal:-4  Vent Day: #7  OG Day: #7  Central Line RUE PICC Day: #10  Bagley Day: #7  IVAB Day: #3  Code Status: Full     Counseling/Consultation:I have discussed the care of this patient in detail with the bedside nursing staff and Dr. Hernandez and Dr. Hu     Patient assessed.  Soft bilat wrist restraints renewed due to risk of pulling lines, tubes and/or climbing OOB.     Called spouse today, Joan, and gave full report and all questions answered.      Critical Care Time: 80 minutes  Critical secondary to Patient has a condition that poses threat to life and bodily function: Acute Resp Failure intubated on mech ventilation in ARDS with COVID PNA  Patient is currently on drug therapy requiring intensive monitoring for toxicity: Levophed infusion  Patient is currently receiving parenteral controlled substances: Versed and Fentanyl and Propofol infusions      Critical care was time spent personally by me on the following activities: development of treatment plan with patient or surrogate and bedside caregivers, discussions with consultants, evaluation of patient's response to treatment, examination of patient, ordering and performing treatments and interventions, ordering and review of laboratory studies, ordering and review of radiographic studies, pulse oximetry, re-evaluation of patient's condition. This critical care time did not overlap with that of any other provider or involve time for any procedures.     Yemi Koroma NP  Critical Care Medicine  [facility] -

## 2020-07-25 NOTE — PLAN OF CARE
Patient currently resting quietly in bed. VS currently stable. Patient sinus tachycardic on monitor at this time. Patient currently receiving oxygen via ventilator. Patient currently on propofol, fentanyl, and versed for sedation and to promote ventilator synchrony. Patient also on levophed and heparin drips at this time. Patient repositioned every 2 hours. Patient remains proned at this time. No sign of wound development or skin breakdown noted. Patient remains in bilateral soft wrist restrains at this time. No restraint related injuries noted. Plan of care updated with patient and family. There are no further questions after updated on plan of care at this time. No distress noted. Will continue to monitor.

## 2020-07-25 NOTE — ASSESSMENT & PLAN NOTE
Cont full vent support  Cont PCV  Vent settings reviewed   VAP prophylaxis  ARDS.net protocol  Daily ABGs  Too unstable for SAT/SBT

## 2020-07-25 NOTE — PROGRESS NOTES
PT remains on vent.  all alarms functioning. RT dressed to go in and assist with placing pt from prone over  to back.ET tube secured.

## 2020-07-25 NOTE — ASSESSMENT & PLAN NOTE
Lung Protective Mechanical Ventilation w/ ARDSnet protocol  High PEEP and Low Vt  Attempt to maintain PIP < 30 cm H2O  Fluid Sparing Resuscitation  Permissive Hypercapnea  Prone position last 18 hours will return supine today

## 2020-07-25 NOTE — SUBJECTIVE & OBJECTIVE
Interval History:  Persistent hypoxia requiring PEEP 20 and fiO2 100%.  Hemodynamically stable.    Review of Systems   Unable to perform ROS: Intubated     Objective:     Vital Signs (Most Recent):  Temp: 98.7 °F (37.1 °C) (07/25/20 1505)  Pulse: (!) 117 (07/25/20 1705)  Resp: (!) 32 (07/25/20 1705)  BP: 106/65 (07/25/20 1705)  SpO2: 98 % (07/25/20 1705) Vital Signs (24h Range):  Temp:  [97.3 °F (36.3 °C)-98.8 °F (37.1 °C)] 98.7 °F (37.1 °C)  Pulse:  [107-133] 117  Resp:  [29-35] 32  SpO2:  [87 %-98 %] 98 %  BP: ()/(35-84) 106/65     Weight: 132.3 kg (291 lb 10.7 oz)  Body mass index is 45.68 kg/m².    Intake/Output Summary (Last 24 hours) at 7/25/2020 1854  Last data filed at 7/25/2020 0613  Gross per 24 hour   Intake 1684.9 ml   Output 1475 ml   Net 209.9 ml      Physical Exam  Constitutional:       General: He is not in acute distress.     Appearance: He is well-developed. He is not diaphoretic.      Comments: Sedated on vent   HENT:      Head: Normocephalic and atraumatic.      Mouth/Throat:      Pharynx: No oropharyngeal exudate.   Eyes:      Conjunctiva/sclera: Conjunctivae normal.      Pupils: Pupils are equal, round, and reactive to light.   Neck:      Thyroid: No thyromegaly.      Vascular: No JVD.   Cardiovascular:      Rate and Rhythm: Normal rate and regular rhythm.      Heart sounds: Normal heart sounds. No murmur.   Pulmonary:      Effort: No respiratory distress.      Breath sounds: No wheezing or rales.      Comments: On vent   Chest:      Chest wall: No tenderness.   Abdominal:      General: Bowel sounds are normal. There is no distension.      Palpations: Abdomen is soft.      Tenderness: There is no abdominal tenderness. There is no guarding or rebound.   Lymphadenopathy:      Cervical: No cervical adenopathy.   Skin:     General: Skin is warm and dry.      Findings: No rash.   Neurological:      Cranial Nerves: No cranial nerve deficit.      Sensory: No sensory deficit.      Deep Tendon  Reflexes: Reflexes normal.      Comments: On vent , sedated         Significant Labs:   CBC:   Recent Labs   Lab 07/24/20  0355 07/24/20  1241 07/25/20  0410   WBC 26.51*  --  20.40*   HGB 9.4*  --  9.1*   HCT 29.0* 30* 28.2*     --  218     CMP:   Recent Labs   Lab 07/24/20  0355  07/24/20  1541 07/25/20  0410 07/25/20  1638   *   < > 133* 133* 134*   K 6.3*   < > 6.3* 6.5* 6.7*   CL 99   < > 98 98 97   CO2 23   < > 24 28 27   *   < > 124* 119* 117*   BUN 55*   < > 52* 42* 46*   CREATININE 1.5*   < > 1.2 0.9 0.9   CALCIUM 8.3*   < > 8.9 9.2 9.0   PROT 6.3  --   --  6.5  --    ALBUMIN 1.8*  --   --  1.7*  --    BILITOT 0.7  --   --  0.5  --    ALKPHOS 60  --   --  74  --    AST 37  --   --  37  --    ALT 65*  --   --  52*  --    ANIONGAP 11   < > 11 7* 10   EGFRNONAA >60   < > >60 >60 >60    < > = values in this interval not displayed.       Significant Imaging:

## 2020-07-25 NOTE — ASSESSMENT & PLAN NOTE
Increase Na Zirconium per OG  Insulin IVP and D50 IVP  Follow up labs  Reviewed all meds with pharmacy for source of hyperkalemia and essentially unremarkable (UFH risk of hyperkalemia 7%)

## 2020-07-25 NOTE — SUBJECTIVE & OBJECTIVE
Review of Systems   Unable to perform ROS: Intubated         Objective:     Vital Signs (Most Recent):  Temp: 98.8 °F (37.1 °C) (20 0705)  Pulse: (!) 116 (20 1005)  Resp: (!) 31 (20 1005)  BP: (!) 84/44 (20 0735)  SpO2: 96 % (20 1005) Vital Signs (24h Range):  Temp:  [97.3 °F (36.3 °C)-98.8 °F (37.1 °C)] 98.8 °F (37.1 °C)  Pulse:  [] 116  Resp:  [30-34] 31  SpO2:  [83 %-96 %] 96 %  BP: ()/(40-84) 84/44     Weight: 132.3 kg (291 lb 10.7 oz)  Body mass index is 45.68 kg/m².      Intake/Output Summary (Last 24 hours) at 2020 1254  Last data filed at 2020 0613  Gross per 24 hour   Intake 2178.85 ml   Output 2515 ml   Net -336.15 ml       Physical Exam  Vitals signs and nursing note reviewed.   Constitutional:       Appearance: He is obese. He is ill-appearing and diaphoretic.      Interventions: He is sedated, intubated and restrained.   HENT:      Head: Normocephalic and atraumatic.      Mouth/Throat:      Mouth: Mucous membranes are moist.   Eyes:      Conjunctiva/sclera: Conjunctivae normal.      Pupils: Pupils are equal, round, and reactive to light.   Neck:      Musculoskeletal: Neck supple.   Cardiovascular:      Rate and Rhythm: Regular rhythm. Tachycardia present.      Pulses:           Radial pulses are 1+ on the right side and 1+ on the left side.        Dorsalis pedis pulses are detected w/ Doppler on the right side and detected w/ Doppler on the left side.      Heart sounds: Heart sounds are distant.   Pulmonary:      Effort: Tachypnea, accessory muscle usage and respiratory distress (mild) present. No retractions. He is intubated.      Breath sounds: Decreased breath sounds, rhonchi and rales present.   Abdominal:      General: Bowel sounds are decreased. There is no distension.      Palpations: Abdomen is soft.      Comments: Obese   Genitourinary:     Penis: Normal.       Comments: Bagley in place  Musculoskeletal:      Right lower le+ Pitting Edema  present.      Left lower le+ Pitting Edema present.   Lymphadenopathy:      Cervical: No cervical adenopathy.   Skin:     General: Skin is warm and moist.      Capillary Refill: Capillary refill takes 2 to 3 seconds.      Coloration: Skin is not cyanotic.          Neurological:      Comments: Heavily sedated         Vents:  Vent Mode: A/C (20)  Ventilator Initiated: Yes (205)  Set Rate: 30 BPM (20)  Vt Set: 0 mL (20)  Pressure Support: 0 cmH20 (20)  PEEP/CPAP: 20 cmH20 (20)  Oxygen Concentration (%): 100 (20 0905)  Peak Airway Pressure: 44 cmH2O (20)  Plateau Pressure: 47 cmH20 (20)  Total Ve: 17.2 mL (20 0531)  F/VT Ratio<105 (RSBI): (!) 80.29 (20)    Lines/Drains/Airways     Peripherally Inserted Central Catheter Line            PICC Double Lumen 20 1130 right brachial 9 days          Drain                 NG/OG Tube 20 0530 orogastric 16 Fr. 6 days         Urethral Catheter 20 0330 6 days          Airway                 Airway - Non-Surgical 20 0420 Endotracheal Tube 6 days          Arterial Line                 Arterial Line 20 1144 Right Radial 6 days                Significant Labs:    CBC/Anemia Profile:  Recent Labs   Lab 20  0355 20  1241 20  0410   WBC 26.51*  --  20.40*   HGB 9.4*  --  9.1*   HCT 29.0* 30* 28.2*     --  218   *  --  99*   RDW 14.5  --  14.3   FERRITIN 4,983*  --  5,892*        Chemistries:  Recent Labs   Lab 20  0355  20  1130 20  1541 20  0410   *   < > 133* 133* 133*   K 6.3*   < > 6.1* 6.3* 6.5*   CL 99   < > 98 98 98   CO2 23   < > 23 24 28   BUN 55*   < > 54* 52* 42*   CREATININE 1.5*   < > 1.2 1.2 0.9   CALCIUM 8.3*   < > 8.8 8.9 9.2   ALBUMIN 1.8*  --   --   --  1.7*   PROT 6.3  --   --   --  6.5   BILITOT 0.7  --   --   --  0.5   ALKPHOS 60  --   --   --  74   ALT 65*  --   --    --  52*   AST 37  --   --   --  37   MG 2.8*  --   --   --  2.5   PHOS 7.2*  --   --   --  4.0    < > = values in this interval not displayed.       ABGs:   Recent Labs   Lab 07/25/20  0406   PH 7.301*   PCO2 59.4*   HCO3 29.3*   POCSATURATED 94*   BE 3     Coagulation:   Recent Labs   Lab 07/25/20  0410   APTT 35.2*     POCT Glucose:   Recent Labs   Lab 07/25/20  0937 07/25/20  1219 07/25/20  1225   POCTGLUCOSE 131* 69* 103     All pertinent labs within the past 24 hours have been reviewed.  Trig 421

## 2020-07-25 NOTE — EICU
eICU Note :    Called by the [facility]:    Problem: K 6.5 , creat 0.9    Pertinent History and labs reviewed : 34 y/o     Acute respiratory failure with hypoxia    Morbid obesity with BMI of 45.0-49.9, adult    Essential hypertension    Sepsis    Pneumonia due to COVID-19 virus    Pneumonia of right lower lobe due to infectious organism    Leukocytosis and thrombocytosis     On mechanically assisted ventilation    BRANDAN (acute kidney injury)    Acute respiratory distress syndrome (ARDS) due to COVID-19 virus    Septic shock    Hyperkalemia    Anemia, unspecified        Treatment /Intervention given:  Being addressed by primary team        Olamide Fenton M.D  eICU Physician

## 2020-07-26 PROBLEM — E43 SEVERE PROTEIN-CALORIE MALNUTRITION: Status: ACTIVE | Noted: 2020-01-01

## 2020-07-26 PROBLEM — R65.20 SEVERE SEPSIS WITH ACUTE ORGAN DYSFUNCTION: Status: ACTIVE | Noted: 2020-01-01

## 2020-07-26 NOTE — ASSESSMENT & PLAN NOTE
Lung Protective Mechanical Ventilation w/ ARDSnet protocol  High PEEP and Low Vt  Attempt to maintain PIP < 30 cm H2O  Fluid Sparing Resuscitation  Permissive Hypercapnea  Prone to supine yesterday

## 2020-07-26 NOTE — CONSULTS
[facility]  Nephrology  Consult Note      Patient Name: Jonas Abdalla  MRN: [id number]  Admission Date: 7/11/2020  Hospital Length of Stay: 13 days  Attending Provider: Enmanuel Hu MD   Primary Care Physician: Carmel Tabares MD  Principal Problem:Acute respiratory failure with hypoxia    Consults  Subjective:     HPI: Jonas Abdalla is a 35-year-old  man with history of hypertension, he was admitted to the hospital about 2 weeks ago for shortness of breath due to COVID infection, patient was intubated and placed in intensive care unit on vent support, prolonged ICU stay, in the last few days his serum potassium has been elevated, etiology unclear, medical management has failed, we were consulted for possible dialysis treatment to lower the potassium levels, kidney function is stable with serum creatinine at 0.8 mg/dL,        Past Medical History:   Diagnosis Date    Carpal tunnel syndrome     Gallstone     Hypertension        Past Surgical History:   Procedure Laterality Date    arm surgery Left     CHOLECYSTECTOMY      FRACTURE SURGERY      (L) arm       Review of patient's allergies indicates:   Allergen Reactions    Acetaminophen Hives     Current Facility-Administered Medications   Medication Frequency    0.9%  NaCl infusion (for blood administration) Q24H PRN    albuterol-ipratropium 2.5 mg-0.5 mg/3 mL nebulizer solution 3 mL Q4H    ascorbic acid (vitamin C) tablet 500 mg BID    bisacodyL suppository 10 mg Daily PRN    calcium gluconate 1g in dextrose 5% 100mL (ready to mix system) Q10 Min PRN    cefepime in dextrose 5 % IVPB 2 g Q8H    chlorhexidine 0.12 % solution 15 mL BID    dextrose 50% injection 12.5 g PRN    dextrose 50% injection 25 g PRN    dextrose 50% injection 25 g PRN    famotidine (PF) injection 20 mg BID    fentaNYL 2500 mcg in D5W 250 mL infusion premix (titrating) (conc: 10 mcg/mL) Continuous    fludrocortisone tablet 200 mcg  Q4H    glucagon (human recombinant) injection 1 mg PRN    glucose chewable tablet 16 g PRN    glucose chewable tablet 24 g PRN    insulin aspart U-100 pen 1-10 Units Q4H PRN    ketamine 500 mg/250 mL (2 mg/mL) in sodium chloride 0.9% infusion Continuous    melatonin tablet 6 mg Nightly    metronidazole IVPB 500 mg Q8H    midazolam  50 mg/50 mL in dextrose 5% infusion Continuous    multivit-min-ferrous gluconate 9 mg iron/15 mL Liqd 5 mL Daily    norepinephrine 32 mg in dextrose 5 % 250 mL infusion Continuous    ondansetron injection 4 mg Q8H PRN    pneumoc 13-melvina conj-dip cr(PF) (PREVNAR 13 (PF)) 0.5 mL vaccine x 1 dose    polyethylene glycol packet 17 g BID    promethazine (PHENERGAN) 6.25 mg in dextrose 5 % 50 mL IVPB Q6H PRN    sodium chloride 0.9% flush 10 mL PRN    sodium zirconium cyclosilicate packet 10 g Q8H    white petrolatum 41 % ointment Once    white petrolatum-mineral oil (LUBIFRESH P.M.) ophthalmic ointment Q8H     Family History     Problem Relation (Age of Onset)    Diabetes Father    Heart disease Paternal Grandfather        Tobacco Use    Smoking status: Never Smoker    Smokeless tobacco: Never Used   Substance and Sexual Activity    Alcohol use: No     Frequency: Monthly or less     Drinks per session: 1 or 2     Binge frequency: Never    Drug use: No    Sexual activity: Yes     Partners: Female     Review of Systems   Unable to perform ROS: Intubated     Objective:     Vital Signs (Most Recent):  Temp: 99.5 °F (37.5 °C) (07/26/20 0301)  Pulse: (!) 119 (07/26/20 1145)  Resp: (!) 27 (07/26/20 1145)  BP: (!) 108/54 (07/26/20 1100)  SpO2: 98 % (07/26/20 1145)  O2 Device (Oxygen Therapy): ventilator (07/26/20 0719) Vital Signs (24h Range):  Temp:  [98.4 °F (36.9 °C)-99.8 °F (37.7 °C)] 99.5 °F (37.5 °C)  Pulse:  [107-141] 119  Resp:  [25-37] 27  SpO2:  [89 %-99 %] 98 %  BP: ()/(35-80) 108/54     Weight: 132.3 kg (291 lb 10.7 oz) (07/15/20 0039)  Body mass index is 45.68  kg/m².  Body surface area is 2.5 meters squared.    I/O last 3 completed shifts:  In: 4880.4 [I.V.:2782.3; NG/GT:720; IV Piggyback:1378.1]  Out: 4125 [Urine:4125]    Physical Exam     Not done due to COVID isolation, findings discussed with CC team          Significant Labs:  CBC:   Recent Labs   Lab 07/26/20  0405   WBC 14.27*   RBC 2.87*   HGB 8.7*   HCT 29.6*      *   MCH 30.3   MCHC 29.4*     CMP:   Recent Labs   Lab 07/26/20  0405      CALCIUM 10.1   ALBUMIN 1.7*   PROT 6.6   *   K 7.0*   CO2 27   CL 99   BUN 35*   CREATININE 0.8   ALKPHOS 67   ALT 45*   AST 38   BILITOT 0.6     Coagulation:   Recent Labs   Lab 07/26/20  0752   APTT 37.4*     LFTs:   Recent Labs   Lab 07/26/20  0405   ALT 45*   AST 38   ALKPHOS 67   BILITOT 0.6   PROT 6.6   ALBUMIN 1.7*     All labs within the past 24 hours have been reviewed.    Significant Imaging:  Reviewed    Lab Results   Component Value Date    CALCIUM 10.1 07/26/2020    PHOS 4.0 07/26/2020       Lab Results   Component Value Date    ALBUMIN 1.7 (L) 07/26/2020           Assessment/Plan:     Hyperkalemia  1. Hyperkalemia , etiology unclear, renal function is normal with a serum creatinine of 0.8 mg/dL, good urine output, failed medical management, discussed with primary and Critical Care team's, discussed with patient's wife on the phone, consent obtained for hemodialysis, will plan a short session of hemodialysis on a 1 K bath, serum potassium today is 7, will repeat labs after the dialysis,         Thank you for your consult. I will follow-up with patient. Please contact us if you have any additional questions.     Total critical care time spent  60 minutes including time needed to review the records,  patient  evaluation, documentation, face-to-face discussion with the patient's spouse, primary and ICU teams,    more than 50% of the time was spent on coordination of care and counseling.       Moises Jay MD   Nephrology  [facility]

## 2020-07-26 NOTE — PROGRESS NOTES
[facility] Hospital Medicine  Progress Note    Patient Name: Jonas Abdalla  MRN: [id number]  Patient Class: IP- Inpatient   Admission Date: 7/11/2020  Length of Stay: 12 days  Attending Physician: Enmanuel Hu MD  Primary Care Provider: Carmel Tabares MD        Subjective:     Principal Problem:Acute respiratory failure with hypoxia    HPI:  Jonas Abdalla is a 35 year old male with a pMHx of HTN who presented to the Emergency Department with c/o SOB. Associated symptoms include: generalized weakness, fatigue, and fever. Pt reports symptoms started Monday, 07/06. He reports he thought he had COVID and went to get tested taht day but did not get test results. Went to local urgent care on Tuesday, 07/07 -- diagnosed with PNA and given Levaquin 500 mg po daily. Symptoms continued despite abx and wife took pt to COVID testing site which he was negative for COVID. Wife reports the COVID test he took on Monday came back negative, notified yesterday of results. ED workup showed: no leukocytosis/leukopenia, stable Hgb/Hct, mild hyponatremia. CXR showed focal right infrahilar/medial lung base consolidation concerning for pneumonia. Febrile upon arrival to . Pt received 30mL/kg fluid resuscitation in ED along with one time doses of IV rocephin/azithromycin. Pt placed on observation for Fever believed to be secondary to RML PNA. COVID rapid screening pending upon assessment, now POSITIVE.     Overview/Hospital Course:  Pt presented to the ED due to weakness, nausea/vomiting with concerns for dehydration. COVID ++. Running temps 103, 102.4, 101.8. He denied any SOB and DRAKE. He describes an occasional dry cough. On 2 L nasal cannula. On azithromycin, Rocephin, decadron and scheduled albuterol.   7/13/2020 - pt has continued to be febrile and now reporting SOB and productive cough. Provided remdesivir information and patient has consented. Oxygen escalated to 4 L.  7/14/2020 - Sudden  increase to oxygen 10 L, oxygen saturation 85 %. Temp 101.6 at 4 AM this AM. Current plan of care continued.   7/18- Pt was transferred to ICU on 7/15 with worsening respiratory status requiring NIPPV . Pt is awake , alert and oriented today .Mexed out on  vapotherm with hypoxia and placed on  NIPPV / BiPAP support. Remains  Tachypneic, easily desats with exertion or movement . Continue to monitor closely respiratory status  in ICU setting .   7/19 - Intubated electively  overnight due to increased WOB on bipap . Sedated on propofol, fentanyl. Levophed gtt added to maintain MAP>65. Heparin high intensity nomogram continues. Pt completed 5 days Remdesivir. On Decadron x 10 days . WBC count is markedly elevated . Likely reactive . Procalcitinin is normal. Creatinine bumped to 1.3 from 0.8 overnight.   7/2- Remains intubated . Tmax 100. Tachycardia and hypotension noted . On Levo to maintain MAP >65. Vent setting and weaning per ICU. 1st unit of convalescent plasma transfused  Overnight with no adverse effect. WBC trended down to 25.3 from 46.3, Pl down to 265 from 637, D d-rosa 3.7, CRP trended up 218>109.  7/21- Tmax 100.4 last evening and none since . Remains intubated . Some improvement with decreased oxygen demand. fiO2 down to 60%. Completed 2 units of convalescent plasma infusion without adverse effects. WBC slowly trending down 22.1, Hgb 11.6, Pl normal today . Ferritin uptrend 5752, D-dimer 3.57, Creatinine bumps to 1.5 from 1.0 yesterday . LDL trending down 886  7/22- Fever noted again. tmax 101.3. Noted pt be tachycardic. Remains intubated and sedated . Remains on Levo for to maintain MAP. Noted bolus fluid given. FiO2 bumped to 70%. Current SpO2 is satisfactory . Received 2 unit of convalescent plasma. WBC trending down. Hgb 11.4 , Pl count stable . Serum creatinine improved to 1.0 today     7/23- Tmax 100.3. Overnight developed decompensation with tachycardia , irregular rhythm , vent dyssynchrony ,  increased oxygen demand required paralytics Nimbex . FiO2 now 100% yet hypoxia. . Labs are fairly stable.     7/24- Tamx 100.2 last night. Remains intubated and sedated . Worsening hypoxia requiring high PEEP. fiO2 remains 100%. Unable to wean. ICU will try prone position today . WBC count bumped . H/H fairly stable. Hyperkalemia is noted in am lab.        Interval History:  Persistent hypoxia requiring PEEP 20 and fiO2 100%.  Hemodynamically stable.    Review of Systems   Unable to perform ROS: Intubated     Objective:     Vital Signs (Most Recent):  Temp: 98.7 °F (37.1 °C) (07/25/20 1505)  Pulse: (!) 117 (07/25/20 1705)  Resp: (!) 32 (07/25/20 1705)  BP: 106/65 (07/25/20 1705)  SpO2: 98 % (07/25/20 1705) Vital Signs (24h Range):  Temp:  [97.3 °F (36.3 °C)-98.8 °F (37.1 °C)] 98.7 °F (37.1 °C)  Pulse:  [107-133] 117  Resp:  [29-35] 32  SpO2:  [87 %-98 %] 98 %  BP: ()/(35-84) 106/65     Weight: 132.3 kg (291 lb 10.7 oz)  Body mass index is 45.68 kg/m².    Intake/Output Summary (Last 24 hours) at 7/25/2020 1854  Last data filed at 7/25/2020 0613  Gross per 24 hour   Intake 1684.9 ml   Output 1475 ml   Net 209.9 ml      Physical Exam  Constitutional:       General: He is not in acute distress.     Appearance: He is well-developed. He is not diaphoretic.      Comments: Sedated on vent   HENT:      Head: Normocephalic and atraumatic.      Mouth/Throat:      Pharynx: No oropharyngeal exudate.   Eyes:      Conjunctiva/sclera: Conjunctivae normal.      Pupils: Pupils are equal, round, and reactive to light.   Neck:      Thyroid: No thyromegaly.      Vascular: No JVD.   Cardiovascular:      Rate and Rhythm: Normal rate and regular rhythm.      Heart sounds: Normal heart sounds. No murmur.   Pulmonary:      Effort: No respiratory distress.      Breath sounds: No wheezing or rales.      Comments: On vent   Chest:      Chest wall: No tenderness.   Abdominal:      General: Bowel sounds are normal. There is no distension.       Palpations: Abdomen is soft.      Tenderness: There is no abdominal tenderness. There is no guarding or rebound.   Lymphadenopathy:      Cervical: No cervical adenopathy.   Skin:     General: Skin is warm and dry.      Findings: No rash.   Neurological:      Cranial Nerves: No cranial nerve deficit.      Sensory: No sensory deficit.      Deep Tendon Reflexes: Reflexes normal.      Comments: On vent , sedated         Significant Labs:   CBC:   Recent Labs   Lab 07/24/20  0355 07/24/20  1241 07/25/20  0410   WBC 26.51*  --  20.40*   HGB 9.4*  --  9.1*   HCT 29.0* 30* 28.2*     --  218     CMP:   Recent Labs   Lab 07/24/20  0355  07/24/20  1541 07/25/20  0410 07/25/20  1638   *   < > 133* 133* 134*   K 6.3*   < > 6.3* 6.5* 6.7*   CL 99   < > 98 98 97   CO2 23   < > 24 28 27   *   < > 124* 119* 117*   BUN 55*   < > 52* 42* 46*   CREATININE 1.5*   < > 1.2 0.9 0.9   CALCIUM 8.3*   < > 8.9 9.2 9.0   PROT 6.3  --   --  6.5  --    ALBUMIN 1.8*  --   --  1.7*  --    BILITOT 0.7  --   --  0.5  --    ALKPHOS 60  --   --  74  --    AST 37  --   --  37  --    ALT 65*  --   --  52*  --    ANIONGAP 11   < > 11 7* 10   EGFRNONAA >60   < > >60 >60 >60    < > = values in this interval not displayed.       Significant Imaging:       Assessment/Plan:      * Acute respiratory failure with hypoxia  Encouraged deep breathing and coughing.  Transferred to ICU 14 July for respiratory distress.  Intubated 19 July.  Remains intubated persistent hypoxia.  PEEP 20 and fiO2 100%.    Pneumonia due to COVID-19 virus  - COVID-19 testing Collection Date: 7/11/2020 Collection Time:   11:55 AM  - Infection Control notified     - Isolation:   - Airborne, Contact and Droplet Precautions  - Cohort patients into COVID units  - N95 mask, wear eye protection  - 20 second hand hygiene              - Limit visitors per hospital policy              - Consolidating lab draws, nursing care, provider visits, and interventions    -  Diagnostics: (leukopenia, hyponatremia, hyperferritinemia, elevated troponin, elevated d-dimer, age, and comorbidities are significant predictors of poor clinical outcome)  CBC, CMP, Procalcitonin, Ferritin, CRP, LDH, BNP, Troponin, Rapid Flu, Blood Culture x2 and Portable CXR    - Management:  Supplemental O2 to maintain SpO2 >92%  Telemetry  Continuous/intermittent Pulse Ox  Albuterol treatment PRN  IV Rocephin/PO Azithromycin  Dexamethasone   Vit C, MVI  Scheduled albuterol  7/13/2020 - pt agreeable to receive Remdesivir - information received  7/19- Completed 5 days Remdesivir . On Decadron for 10 days   7/20- 1st unit of convalescent plasma transfused  with no adverse effect.  7/21-Completed 2 units of convalescent plasma infusion without adverse effects.Some improvement with decreased oxygen demand. fiO2 down to 60%.      Acute respiratory distress syndrome (ARDS) due to COVID-19 virus        Leukocytosis and thrombocytosis   7/19- Marked leukocytosis and thrombocytosis are noted. Likely reactive . Procalcitonin is neg. Blood cultures - NGTD. Endotracheal aspirate obtained for gram stain and culture.   7/21- Improving   7/24- Monitor counts       Pneumonia of right lower lobe due to infectious organism  IV Rocephin/Azithromycin  - empiric treatment completed   Supplemental oxygen  Sputum culture    Sepsis  - Sepsis criteria: Current temp of 101.3, RR 21, source PNA due to COVID-19 virus  - Blood cultures obtained >>>>> NGTD  - Recent COVID test outpt on 07/06/ 20 negative. Repeat COVID today in ED (+)  - Will treat underlying cause with abx, supplemental oxygen, MDI, oral steroids per COVID-19 protocol      Essential hypertension  - Pt normally takes lisinopril-hctz 20-12.5 po daily  - Will hold hctz and continue lisinopril at current dose and frequency  7/24-  All antihypertensives are on hold.  Continue pressors to keep MAP >65        VTE Risk Mitigation (From admission, onward)         Ordered     IP VTE  HIGH RISK PATIENT  Once      07/19/20 0337     Place sequential compression device  Until discontinued      07/19/20 0337     heparin 25,000 units in dextrose 5% 250 mL (100 units/mL) infusion HIGH INTENSITY nomogram - [facility]  Continuous     Question:  Heparin Infusion Adjustment (DO NOT MODIFY ANSWER)  Answer:  \\[email or web address]\Images\Pharmacy\HeparinInfusions\heparin HIGH INTENSITY nomogram for [facility] [id number]    07/16/20 0727     heparin 25,000 units in dextrose 5% (100 units/ml) IV bolus from bag - ADDITIONAL PRN BOLUS - 60 units/kg  As needed (PRN)     Question:  Heparin Infusion Adjustment (DO NOT MODIFY ANSWER)  Answer:  \[email or web address]\epic\Images\Pharmacy\HeparinInfusions\heparin HIGH INTENSITY nomogram for [facility] [id number]    07/16/20 0727     heparin 25,000 units in dextrose 5% (100 units/ml) IV bolus from bag - ADDITIONAL PRN BOLUS - 30 units/kg  As needed (PRN)     Question:  Heparin Infusion Adjustment (DO NOT MODIFY ANSWER)  Answer:  \\[email or web address]\epic\Images\Pharmacy\HeparinInfusions\heparin HIGH INTENSITY nomogram for [facility] [id number]    07/16/20 0727                Critical care time spent on the evaluation and treatment of severe organ dysfunction, review of pertinent labs and imaging studies, discussions with consulting providers and discussions with patient/family: 30 minutes.      Enmanuel Hu MD  Department of Hospital Medicine   [facility]

## 2020-07-26 NOTE — SUBJECTIVE & OBJECTIVE
Review of Systems   Unable to perform ROS: Intubated         Objective:     Vital Signs (Most Recent):  Temp: 99.5 °F (37.5 °C) (07/26/20 0301)  Pulse: (!) 113 (07/26/20 1300)  Resp: (!) 28 (07/26/20 1300)  BP: 109/60 (07/26/20 1300)  SpO2: 95 % (07/26/20 1300) Vital Signs (24h Range):  Temp:  [98.4 °F (36.9 °C)-99.8 °F (37.7 °C)] 99.5 °F (37.5 °C)  Pulse:  [107-141] 113  Resp:  [25-37] 28  SpO2:  [89 %-99 %] 95 %  BP: ()/(43-80) 109/60     Weight: 132.3 kg (291 lb 10.7 oz)  Body mass index is 45.68 kg/m².      Intake/Output Summary (Last 24 hours) at 7/26/2020 1307  Last data filed at 7/26/2020 0600  Gross per 24 hour   Intake 3120.82 ml   Output 2175 ml   Net 945.82 ml       Physical Exam  Vitals signs and nursing note reviewed.   Constitutional:       Appearance: He is obese. He is ill-appearing, toxic-appearing and diaphoretic.      Interventions: He is sedated, intubated and restrained.   HENT:      Head: Normocephalic and atraumatic.      Mouth/Throat:      Mouth: Mucous membranes are moist.   Eyes:      General: Lids are normal.      Conjunctiva/sclera: Conjunctivae normal.      Pupils: Pupils are equal, round, and reactive to light.   Neck:      Musculoskeletal: Neck supple.   Cardiovascular:      Rate and Rhythm: Regular rhythm. Tachycardia present.      Pulses:           Radial pulses are 1+ on the right side and 1+ on the left side.        Dorsalis pedis pulses are 1+ on the right side and 1+ on the left side.      Heart sounds: Heart sounds are distant.   Pulmonary:      Effort: Tachypnea, accessory muscle usage and respiratory distress (mild and improved with increased sedation) present. No retractions. He is intubated.      Breath sounds: Decreased breath sounds and rales present.   Abdominal:      General: Bowel sounds are decreased. There is no distension.      Palpations: Abdomen is soft.      Comments: Obese   Genitourinary:     Penis: Normal.       Comments: Bagley in place  Musculoskeletal:       Right lower le+ Pitting Edema present.      Left lower le+ Pitting Edema present.   Lymphadenopathy:      Cervical: No cervical adenopathy.   Skin:     General: Skin is warm and moist.      Capillary Refill: Capillary refill takes 2 to 3 seconds.      Coloration: Skin is not cyanotic.          Neurological:      Comments: Heavily sedated         Vents:  Vent Mode: A/C (20)  Ventilator Initiated: Yes (205)  Set Rate: 30 BPM (20)  Vt Set: 0 mL (20 1300)  Pressure Support: 0 cmH20 (20 1300)  PEEP/CPAP: 20 cmH20 (20)  Oxygen Concentration (%): 100 (20)  Peak Airway Pressure: 41 cmH2O (20)  Plateau Pressure: 47 cmH20 (20 1300)  Total Ve: 16.1 mL (20)  F/VT Ratio<105 (RSBI): (!) 69.42 (20 0431)    Lines/Drains/Airways     Peripherally Inserted Central Catheter Line            PICC Double Lumen 20 1130 right brachial 10 days          Central Venous Catheter Line                 Hemodialysis Catheter 20 1002 right femoral less than 1 day          Drain                 NG/OG Tube 20 0530 orogastric 16 Fr. 7 days         Urethral Catheter 20 0330 7 days          Airway                 Airway - Non-Surgical 20 0420 Endotracheal Tube 7 days          Arterial Line                 Arterial Line 20 1144 Right Radial 7 days                Significant Labs:    CBC/Anemia Profile:  Recent Labs   Lab 20  0410 20  0405   WBC 20.40* 14.27*   HGB 9.1* 8.7*   HCT 28.2* 29.6*    225   MCV 99* 103*   RDW 14.3 14.4   FERRITIN 5,892*  --         Chemistries:  Recent Labs   Lab 20  0410 20  1638 20  2200 20  0405   * 134* 132* 132*   K 6.5* 6.7* 7.3* 7.0*   CL 98 97 98 99   CO2 28 27 29 27   BUN 42* 46* 40* 35*   CREATININE 0.9 0.9 0.8 0.8   CALCIUM 9.2 9.0 9.8 10.1   ALBUMIN 1.7*  --   --  1.7*   PROT 6.5  --   --  6.6   BILITOT 0.5  --   --  0.6    ALKPHOS 74  --   --  67   ALT 52*  --   --  45*   AST 37  --   --  38   MG 2.5  --   --  2.2   PHOS 4.0  --   --  4.0       ABGs:   Recent Labs   Lab 07/26/20  0408   PH 7.311*   PCO2 61.9*   HCO3 31.2*   POCSATURATED 98   BE 5     Coagulation:   Recent Labs   Lab 07/26/20  0752   APTT 37.4*     POCT Glucose:   Recent Labs   Lab 07/26/20  0406 07/26/20  0804 07/26/20  1131   POCTGLUCOSE 113* 116* 129*     All pertinent labs within the past 24 hours have been reviewed.

## 2020-07-26 NOTE — ASSESSMENT & PLAN NOTE
Likely complicated from sedation  Levophed weaned off  ICU hemodynamic monitoring  Blood and Sputum cultures done recently still NGTD  Cont IVAB

## 2020-07-26 NOTE — EICU
Notified of K 7.3    Patient has been persistently hyperkalemic, already on Lokelma q 8  Creatinine 0.8 with adequate urine output    · Ordered Calcium, D50 and Insulin  · Repeat K to monitor

## 2020-07-26 NOTE — PROGRESS NOTES
[facility] -   Critical Care Medicine  Progress Note    Patient Name: Jonas Abdalla  MRN: [id number]  Admission Date: 7/11/2020  Hospital Length of Stay: 13 days  Code Status: Full Code  Attending Provider: Enmanuel Hu MD  Primary Care Provider: Carmel Tabares MD   Principal Problem: Acute respiratory failure with hypoxia    Subjective:     HPI:  Mr Abdalla is a morbidly obese WM with a PMH of cholelithiasis and HTN who was admitted 7/12 with Sepsis and COVID PNA after presentation with weakness, persistent fever, chills and N/V/D for 6 days progressive.  Had 102.4 temp in ED.  Admitted to [facility] started on emperic IVAB, Decadron and NC low flow.  Oxygenation worsened over next few days.  He consented and was started on Remdesivir 7/14.  On evening of 7/14.  At [place] last night patient became more hypoxic despite being maxed out on VapoTherm.  He was placed in prone position, which improved O2 sat to 90-93% but patient remained in notable respiratory distress.  He was transferred to ICU and initiated on BiPAP.      Hospital/ICU Course:  7/15 - This AM upright in bed fully awake, alert and responsive not in distress but has tachypnea and mild work of breathing while on NPPV and FiO2 at .95.    7/16 - remains on NIPPV with some decline in oxygen demand with FiO2 0.75 but continued tachypnea; ongoing desaturation with exertion or momentary removal of NIPPV for fluid intake  7/17 - tolerated short time on vapotherm support last evening, returned to NIPPV for sleep and has remained today s/t tachypnea, hypoxia; afebrile but wbc slight trend up 14.4k   7/18 - awake, alert,oriented; continues to alternate max support vapotherm with NIPPV support, tachypnea at baseline and worsens along with drop in pulse ox sat with exertion/movement that slowly recovers with rest  7/19 - respiratory distress overnight; required intubation, heavy sedation, mechanical ventilation, low dose pressor support;  post intubation abg shows continued worsening acidosis along with now worsening leukocytosis, ddimer, LD, and CRP  7/20 - remains intubated and sedated on mechanical ventilation with inverse ratio ventilation, some decrease in oxygen demand today; tmax yesterday 101.6; requiring low dose pressor support with sedation  7/21 - remains intubated and sedated on mechanical inverse ratio ventilation, oxygen demand down to 60%; t max 100.4; tolerating TF  7/22 - remains intubated and sedated, mode of ventilation adjusted to PCV today with high PEEP, moderate oxygen demand; tmax today 101.8 with wbc down to 14.7k and LD trend down  7/23 - Semi-erect in bed intubated on mech ventilation and sedated heavily on Fentanyl and Propofol infusions.  Requiring some Levophed infusion.  Tolerating TF.  Restless on vent overnight and this AM tachypnic and without vent synchrony.  A-flutter yesterday.    7/24 - Supine in bed sedated on Fentanyl, Propofol and Versed infusions with intubation on mech ventilation.  On low dose Levophed infusion.  Tolerating TF.  Still severe hypoxemia requiring high PEEP and 100% FiO2.  7/25 - Prone in bed since yesterday afternoon.  Intubated on mech ventilation and sedated on Versed, Propofol and Versed infusions.  Off TF while prone.  On Heparin infusion and low dose Levophed infusion  7/26 - Returned to supine position yesterday afternoon.  Semi-erect in bed intubated on mech ventilation some increased resp distress improved with increasing Versed infusion.  Persistent Hyperkalemia refractory to Rx discussed with Renal post consult and placed VasCath starting RRT.  Still requiring Versed, Fentanyl and Ketamine infusions for sedation.  Weaned off Levophed infusion.  Good UOP.  Heparin infusion stopped due to concern with hyperkalemia    Review of Systems   Unable to perform ROS: Intubated         Objective:     Vital Signs (Most Recent):  Temp: 99.5 °F (37.5 °C) (07/26/20 0301)  Pulse: (!) 113 (07/26/20  1300)  Resp: (!) 28 (20 1300)  BP: 109/60 (20 1300)  SpO2: 95 % (20 1300) Vital Signs (24h Range):  Temp:  [98.4 °F (36.9 °C)-99.8 °F (37.7 °C)] 99.5 °F (37.5 °C)  Pulse:  [107-141] 113  Resp:  [25-37] 28  SpO2:  [89 %-99 %] 95 %  BP: ()/(43-80) 109/60     Weight: 132.3 kg (291 lb 10.7 oz)  Body mass index is 45.68 kg/m².      Intake/Output Summary (Last 24 hours) at 2020 1307  Last data filed at 2020 0600  Gross per 24 hour   Intake 3120.82 ml   Output 2175 ml   Net 945.82 ml       Physical Exam  Vitals signs and nursing note reviewed.   Constitutional:       Appearance: He is obese. He is ill-appearing, toxic-appearing and diaphoretic.      Interventions: He is sedated, intubated and restrained.   HENT:      Head: Normocephalic and atraumatic.      Mouth/Throat:      Mouth: Mucous membranes are moist.   Eyes:      General: Lids are normal.      Conjunctiva/sclera: Conjunctivae normal.      Pupils: Pupils are equal, round, and reactive to light.   Neck:      Musculoskeletal: Neck supple.   Cardiovascular:      Rate and Rhythm: Regular rhythm. Tachycardia present.      Pulses:           Radial pulses are 1+ on the right side and 1+ on the left side.        Dorsalis pedis pulses are 1+ on the right side and 1+ on the left side.      Heart sounds: Heart sounds are distant.   Pulmonary:      Effort: Tachypnea, accessory muscle usage and respiratory distress (mild and improved with increased sedation) present. No retractions. He is intubated.      Breath sounds: Decreased breath sounds and rales present.   Abdominal:      General: Bowel sounds are decreased. There is no distension.      Palpations: Abdomen is soft.      Comments: Obese   Genitourinary:     Penis: Normal.       Comments: Bagley in place  Musculoskeletal:      Right lower le+ Pitting Edema present.      Left lower le+ Pitting Edema present.   Lymphadenopathy:      Cervical: No cervical adenopathy.   Skin:      General: Skin is warm and moist.      Capillary Refill: Capillary refill takes 2 to 3 seconds.      Coloration: Skin is not cyanotic.          Neurological:      Comments: Heavily sedated         Vents:  Vent Mode: A/C (07/26/20 0720)  Ventilator Initiated: Yes (07/19/20 0415)  Set Rate: 30 BPM (07/26/20 0720)  Vt Set: 0 mL (07/25/20 1300)  Pressure Support: 0 cmH20 (07/25/20 1300)  PEEP/CPAP: 20 cmH20 (07/26/20 0720)  Oxygen Concentration (%): 100 (07/26/20 0719)  Peak Airway Pressure: 41 cmH2O (07/26/20 0720)  Plateau Pressure: 47 cmH20 (07/25/20 1300)  Total Ve: 16.1 mL (07/26/20 0431)  F/VT Ratio<105 (RSBI): (!) 69.42 (07/26/20 0431)    Lines/Drains/Airways     Peripherally Inserted Central Catheter Line            PICC Double Lumen 07/16/20 1130 right brachial 10 days          Central Venous Catheter Line                 Hemodialysis Catheter 07/26/20 1002 right femoral less than 1 day          Drain                 NG/OG Tube 07/19/20 0530 orogastric 16 Fr. 7 days         Urethral Catheter 07/19/20 0330 7 days          Airway                 Airway - Non-Surgical 07/19/20 0420 Endotracheal Tube 7 days          Arterial Line                 Arterial Line 07/19/20 1144 Right Radial 7 days                Significant Labs:    CBC/Anemia Profile:  Recent Labs   Lab 07/25/20  0410 07/26/20  0405   WBC 20.40* 14.27*   HGB 9.1* 8.7*   HCT 28.2* 29.6*    225   MCV 99* 103*   RDW 14.3 14.4   FERRITIN 5,892*  --         Chemistries:  Recent Labs   Lab 07/25/20  0410 07/25/20  1638 07/25/20  2200 07/26/20  0405   * 134* 132* 132*   K 6.5* 6.7* 7.3* 7.0*   CL 98 97 98 99   CO2 28 27 29 27   BUN 42* 46* 40* 35*   CREATININE 0.9 0.9 0.8 0.8   CALCIUM 9.2 9.0 9.8 10.1   ALBUMIN 1.7*  --   --  1.7*   PROT 6.5  --   --  6.6   BILITOT 0.5  --   --  0.6   ALKPHOS 74  --   --  67   ALT 52*  --   --  45*   AST 37  --   --  38   MG 2.5  --   --  2.2   PHOS 4.0  --   --  4.0       ABGs:   Recent Labs   Lab 07/26/20  0404    PH 7.311*   PCO2 61.9*   HCO3 31.2*   POCSATURATED 98   BE 5     Coagulation:   Recent Labs   Lab 07/26/20  0752   APTT 37.4*     POCT Glucose:   Recent Labs   Lab 07/26/20  0406 07/26/20  0804 07/26/20  1131   POCTGLUCOSE 113* 116* 129*     All pertinent labs within the past 24 hours have been reviewed.        ABG  Recent Labs   Lab 07/26/20  0408   PH 7.311*   PO2 111*   PCO2 61.9*   HCO3 31.2*   BE 5     Assessment/Plan:     Pulmonary  * Acute respiratory failure with hypoxia  Cont full vent support  Cont PCV  Vent settings reviewed   VAP prophylaxis  [email or web address] protocol  Daily ABGs  Too unstable for SAT/SBT    Cardiac/Vascular  High triglycerides  Stop Propofol infusion 7/25  Repeat Trig in AM    Renal/  Hyperkalemia  Persistent post Na Zirconium per OG, Insulin/D50, CaCl, Albuterol, Florinif and Lasix  Follow up labs  Placed VasCath for emergent RRT  Renal consulted and following  Reviewed all meds with pharmacy for source of hyperkalemia and essentially unremarkable ([facility] risk of hyperkalemia 7%)    ID  Severe sepsis with acute organ dysfunction  Likely complicated from sedation  Levophed weaned off  ICU hemodynamic monitoring  Blood and Sputum cultures done recently still NGTD  Cont IVAB    Oncology  Anemia, unspecified  No active bleeding  Conservative transfusion protocol  Daily CBC     Endocrine  Morbid obesity with BMI of 45.0-49.9, adult  Recommend diet and lifestyle modification for goal wt loss once medically stable    Severe protein-calorie malnutrition  Resumed TF and tolerating    Other  Pneumonia due to COVID-19 virus  Respiratory/Contact/Droplet Isolation with appropriate PPE -N95 mask, gown, goggles and gloves  Fluid Sparing Resuscitation  Completed course of empiric Antb earlier in admit  IVAB resumed 7/23 due to persistent fever and Leukocytosis  No Visitors  Consolidation of tests, nursing care and provider visits  Completed completed 5 day course of Rendisivir and 10 day course of  Decadron  Zinc, Melatonin, and Vit C    Acute respiratory distress syndrome (ARDS) due to COVID-19 virus  Lung Protective Mechanical Ventilation w/ ARDSnet protocol  High PEEP and Low Vt  Attempt to maintain PIP < 30 cm H2O  Fluid Sparing Resuscitation  Permissive Hypercapnea  Prone to supine yesterday        Preventive Measures and Monitoring:   Stress Ulcer: Pepcid  Nutrition: TF resumed  Glucose control: SSI  Bowel prophylaxis: Miralax  DVT prophylaxis: LMWH  Hx CAD on B-Blocker: no hx CAD  Head of Bed/Reposition: Elevate HOB and turn Q1-2 hours   Early Mobility: bed rest  SAT/SBT: too unstable  RASS goal:-4  Vent Day: #8  OG Day: #8  Central Line RUE PICC Day: #11  Right Radial Arterial Line Day: #8  Right Femoral VasCath Day: #1  Bagley Day: #8  IVAB Day: #4  Code Status: Full     Counseling/Consultation:I have discussed the care of this patient in detail with the bedside nursing staff and Dr. Hernandez and Dr. Hu and Dr. Jay     Called spouse today, Joan, and gave full report and all questions answered.      Critical Care Time: 85 minutes  Critical secondary to Patient has a condition that poses threat to life and bodily function: Acute Resp Failure intubated on mech ventilation in ARDS with COVID PNA  And Hyperkalemia requiring emergent RRT  Patient is currently receiving parenteral controlled substances: Versed and Fentanyl and Ketamine infusions      Critical care was time spent personally by me on the following activities: development of treatment plan with patient or surrogate and bedside caregivers, discussions with consultants, evaluation of patient's response to treatment, examination of patient, ordering and performing treatments and interventions, ordering and review of laboratory studies, ordering and review of radiographic studies, pulse oximetry, re-evaluation of patient's condition. This critical care time did not overlap with that of any other provider or involve time for any  procedures.     Yemi Koroma NP  Critical Care Medicine  [facility]

## 2020-07-26 NOTE — ASSESSMENT & PLAN NOTE
1. Hyperkalemia , etiology unclear, renal function is normal with a serum creatinine of 0.8 mg/dL, good urine output, failed medical management, discussed with primary and Critical Care team's, discussed with patient's wife on the phone, consent obtained for hemodialysis, will plan a short session of hemodialysis on a 1 K bath, serum potassium today is 7, will repeat labs after the dialysis,

## 2020-07-26 NOTE — PROCEDURES
"Jonas Abdalla is a 35 y.o. male patient.    Temp: 99.5 °F (37.5 °C) (07/26/20 0301)  Pulse: (!) 141 (07/26/20 0719)  Resp: (!) 36 (07/26/20 0719)  BP: 133/64 (07/26/20 0600)  SpO2: 97 % (07/26/20 0719)  Weight: 132.3 kg (291 lb 10.7 oz) (07/15/20 0039)  Height: 5' 7" (170.2 cm) (07/11/20 0950)       Central Line    Date/Time: 7/26/2020 10:03 AM  Location procedure was performed: [facility] INTENSIVE CARE UNIT  Performed by: Yemi Koroma NP  Pre-operative Diagnosis: hyperkalemia  Post-operative diagnosis: hyperkalemia  Consent Done: Yes  Time out: Immediately prior to procedure a "time out" was called to verify the correct patient, procedure, equipment, support staff and site/side marked as required.  Indications: hemodialysis  Anesthesia: local infiltration    Anesthesia:  Local Anesthetic: lidocaine 1% without epinephrine  Anesthetic total: 2 mL  Preparation: skin prepped with ChloraPrep  Skin prep agent dried: skin prep agent completely dried prior to procedure  Sterile barriers: all five maximum sterile barriers used - cap, mask, sterile gown, sterile gloves, and large sterile sheet  Hand hygiene: hand hygiene performed prior to central venous catheter insertion  Location details: right femoral  Site selection rationale: emergent vascath on patient on Heparin infusion  Catheter type: trialysis  Catheter size: 12 Fr  Catheter Length: 16cm    Ultrasound guidance: yes  Vessel Caliber: medium, patent, compressibility normal  Vascular Doppler: not done  Needle advanced into vessel with real time Ultrasound guidance.  Guidewire confirmed in vessel.  Sterile sheath used.  Manometry: No   Number of attempts: 1  Assessment: successful placement  Complications: none  Estimated blood loss (mL): 0  Specimens: No  Implants: No  Post-procedure: line sutured,  chlorhexidine patch,  sterile dressing applied and blood return through all ports  Complications: No          Yemi Koroma  7/26/2020  "

## 2020-07-26 NOTE — PROGRESS NOTES
Pt completed 2hr HD tx. No complications with Access or vital signs. No fluid removed. Dr Jay saw patient while on HD. No medicine given with HD. Post Renal Labs drawn after HD.

## 2020-07-26 NOTE — PLAN OF CARE
Patient currently resting quietly in bed. Patient sinus tachycardic on monitor at this time. Patient currently receiving oxygen via ventilator and tolerating well. Patient on fentanyl, ketamine, and versed drips for sedation and to promote ventilator synchrony. Patient also on heparin drip at this time.. Patient turned in bed every 2 hours to avoid skin breakdown to back side and prevent wound development. Patient turned with 1 nursing assist and positioned with pillows. No sign of wound development or skin breakdown noted. Patient remains in bilateral soft wrist restraints at this time. No restraint related injuries noted. Plan of care updated with family earlier in shift. There are no further questions after updated on plan of care at this time. No distress noted. Will continue to monitor.

## 2020-07-26 NOTE — ASSESSMENT & PLAN NOTE
Persistent post Na Zirconium per OG, Insulin/D50, CaCl, Albuterol, Florinif and Lasix  Follow up labs  Placed VasCath for emergent RRT  Renal consulted and following  Reviewed all meds with pharmacy for source of hyperkalemia and essentially unremarkable ([facility] risk of hyperkalemia 7%)

## 2020-07-26 NOTE — HPI
Jonas Abdalla is a 35-year-old  man with history of hypertension, he was admitted to the hospital about 2 weeks ago for shortness of breath due to COVID infection, patient was intubated and placed in intensive care unit on vent support, prolonged ICU stay, in the last few days his serum potassium has been elevated, etiology unclear, medical management has failed, we were consulted for possible dialysis treatment to lower the potassium levels, kidney function is stable with serum creatinine at 0.8 mg/dL,

## 2020-07-26 NOTE — SUBJECTIVE & OBJECTIVE
Past Medical History:   Diagnosis Date    Carpal tunnel syndrome     Gallstone     Hypertension        Past Surgical History:   Procedure Laterality Date    arm surgery Left     CHOLECYSTECTOMY      FRACTURE SURGERY      (L) arm       Review of patient's allergies indicates:   Allergen Reactions    Acetaminophen Hives     Current Facility-Administered Medications   Medication Frequency    0.9%  NaCl infusion (for blood administration) Q24H PRN    albuterol-ipratropium 2.5 mg-0.5 mg/3 mL nebulizer solution 3 mL Q4H    ascorbic acid (vitamin C) tablet 500 mg BID    bisacodyL suppository 10 mg Daily PRN    calcium gluconate 1g in dextrose 5% 100mL (ready to mix system) Q10 Min PRN    cefepime in dextrose 5 % IVPB 2 g Q8H    chlorhexidine 0.12 % solution 15 mL BID    dextrose 50% injection 12.5 g PRN    dextrose 50% injection 25 g PRN    dextrose 50% injection 25 g PRN    famotidine (PF) injection 20 mg BID    fentaNYL 2500 mcg in D5W 250 mL infusion premix (titrating) (conc: 10 mcg/mL) Continuous    fludrocortisone tablet 200 mcg Q4H    glucagon (human recombinant) injection 1 mg PRN    glucose chewable tablet 16 g PRN    glucose chewable tablet 24 g PRN    insulin aspart U-100 pen 1-10 Units Q4H PRN    ketamine 500 mg/250 mL (2 mg/mL) in sodium chloride 0.9% infusion Continuous    melatonin tablet 6 mg Nightly    metronidazole IVPB 500 mg Q8H    midazolam  50 mg/50 mL in dextrose 5% infusion Continuous    multivit-min-ferrous gluconate 9 mg iron/15 mL Liqd 5 mL Daily    norepinephrine 32 mg in dextrose 5 % 250 mL infusion Continuous    ondansetron injection 4 mg Q8H PRN    pneumoc 13-melvina conj-dip cr(PF) (PREVNAR 13 (PF)) 0.5 mL vaccine x 1 dose    polyethylene glycol packet 17 g BID    promethazine (PHENERGAN) 6.25 mg in dextrose 5 % 50 mL IVPB Q6H PRN    sodium chloride 0.9% flush 10 mL PRN    sodium zirconium cyclosilicate packet 10 g Q8H    white petrolatum 41 % ointment Once     white petrolatum-mineral oil (LUBIFRESH P.M.) ophthalmic ointment Q8H     Family History     Problem Relation (Age of Onset)    Diabetes Father    Heart disease Paternal Grandfather        Tobacco Use    Smoking status: Never Smoker    Smokeless tobacco: Never Used   Substance and Sexual Activity    Alcohol use: No     Frequency: Monthly or less     Drinks per session: 1 or 2     Binge frequency: Never    Drug use: No    Sexual activity: Yes     Partners: Female     Review of Systems   Unable to perform ROS: Intubated     Objective:     Vital Signs (Most Recent):  Temp: 99.5 °F (37.5 °C) (07/26/20 0301)  Pulse: (!) 119 (07/26/20 1145)  Resp: (!) 27 (07/26/20 1145)  BP: (!) 108/54 (07/26/20 1100)  SpO2: 98 % (07/26/20 1145)  O2 Device (Oxygen Therapy): ventilator (07/26/20 0719) Vital Signs (24h Range):  Temp:  [98.4 °F (36.9 °C)-99.8 °F (37.7 °C)] 99.5 °F (37.5 °C)  Pulse:  [107-141] 119  Resp:  [25-37] 27  SpO2:  [89 %-99 %] 98 %  BP: ()/(35-80) 108/54     Weight: 132.3 kg (291 lb 10.7 oz) (07/15/20 0039)  Body mass index is 45.68 kg/m².  Body surface area is 2.5 meters squared.    I/O last 3 completed shifts:  In: 4880.4 [I.V.:2782.3; NG/GT:720; IV Piggyback:1378.1]  Out: 4125 [Urine:4125]    Physical Exam     Not done due to COVID isolation, findings discussed with CC team          Significant Labs:  CBC:   Recent Labs   Lab 07/26/20  0405   WBC 14.27*   RBC 2.87*   HGB 8.7*   HCT 29.6*      *   MCH 30.3   MCHC 29.4*     CMP:   Recent Labs   Lab 07/26/20  0405      CALCIUM 10.1   ALBUMIN 1.7*   PROT 6.6   *   K 7.0*   CO2 27   CL 99   BUN 35*   CREATININE 0.8   ALKPHOS 67   ALT 45*   AST 38   BILITOT 0.6     Coagulation:   Recent Labs   Lab 07/26/20  0752   APTT 37.4*     LFTs:   Recent Labs   Lab 07/26/20  0405   ALT 45*   AST 38   ALKPHOS 67   BILITOT 0.6   PROT 6.6   ALBUMIN 1.7*     All labs within the past 24 hours have been reviewed.    Significant Imaging:   Reviewed    Lab Results   Component Value Date    CALCIUM 10.1 07/26/2020    PHOS 4.0 07/26/2020       Lab Results   Component Value Date    ALBUMIN 1.7 (L) 07/26/2020

## 2020-07-27 NOTE — ASSESSMENT & PLAN NOTE
Cont full vent support  Cont PCV  Vent settings reviewed and adjusted multiple times  VAP prophylaxis  [email or web address] protocol  Daily ABGs  Too unstable for SAT/SBT

## 2020-07-27 NOTE — PLAN OF CARE
Patient currently resting quietly in bed. VS currently stable. Patient NSR on monitor at this time. Patient currently receiving oxygen via ventilator and tolerating well. Patient currently on fentanyl, versed, and ketamine for sedation and to promote ventilator synchrony. Patient turned in bed every 2 hours to avoid skin breakdown to back side and prevent wound development. Patient turned with 2 nursing assist and positioned with pillows. No sign of wound development or skin breakdown noted. Plan of care updated with family earlier in shift. Patient bilateral soft wrist restraints remain in place at this time. Patient free of restraint related injuries at this time. There are no further questions after updated on plan of care at this time. No distress noted. Will continue to monitor.

## 2020-07-27 NOTE — ASSESSMENT & PLAN NOTE
Levophed weaned off  ICU hemodynamic monitoring  Blood and Sputum cultures done recently still NGTD  Cont IVAB for 7 days

## 2020-07-27 NOTE — ASSESSMENT & PLAN NOTE
Encouraged deep breathing and coughing.  Transferred to ICU 14 July for respiratory distress.  Intubated 19 July.  Remains intubated persistent hypoxia.  PEEP 16 and fiO2 100%.  Wean respiratory support as able.

## 2020-07-27 NOTE — ASSESSMENT & PLAN NOTE
Urine output is 3.5 L.  Kidney function is normal.  Stop heparin.  Potassium is down to 5.7.  Hold off on hemodialysis today.

## 2020-07-27 NOTE — PROGRESS NOTES
"  [facility]  Adult Nutrition  Progress Note    SUMMARY     Recommendations    Recommendation:   1. Increase Peptamen VHP to 65mL/hr (840mL/day) + 2 packets beneprotein TID. Flushes of [id number] or per NP.  Provides 1710kcal, 180g protein, 1310mL free water+ 400mL in flushes .   2. RD to f/u    Goals: Meet >50% EEN/EPN by RD f/u  Nutrition Goal Status: goal met  Communication of RD Recs: POC, sticky note    Reason for Assessment    Reason For Assessment: follow up   Diagnosis: (COVID-19)  Relevant Medical History: HTN, gallstones  Interdisciplinary Rounds: attended    General Information Comments:   7/20/20 RD consulted after pt in resp failure. He was in ICU since the 15th, but resp status worsened on the 19th, requiring intubatation. He is sedated with propofol and has trickle feeds of Peptamen VHP initiated this am via NG.  NFPE not performed due to COVID, but weight trends appear steady per epic.   7/22/20 TF were initiated but then were held overnight due to ~200mL residuals. Discussed in rounds, RN to re-initiate and progress towards goal. Pt hypotensive this am, with a fever.   7/27/20 TF is running at 45mL/hr, tolerated. Propogol d/c'd, so recommending increase to 65mL/hr. He had HD yesterday. Slight hyponatremia today. Hyperkalemia- given lasix.     Nutrition Discharge Planning: pending medical course    Nutrition Risk Screen    Nutrition Risk Screen: tube feeding or parenteral nutrition    Nutrition/Diet History    Food Allergies: NKFA  Factors Affecting Nutritional Intake: NPO, on mechanical ventilation    Anthropometrics    Temp: 98.3 °F (36.8 °C)  Height Method: Stated  Height: 5' 7" (170.2 cm)  Height (inches): 67 in  Weight Method: Bed Scale  Weight: 132.3 kg (291 lb 10.7 oz)  Weight (lb): 291.67 lb  Ideal Body Weight (IBW), Male: 148 lb  % Ideal Body Weight, Male (lb): 197.97 %  BMI (Calculated): 45.7       Lab/Procedures/Meds  Pertinent Labs: reviewed  BUN 34, Albumin 1.9, Na 135, K " 5.7  Pertinent Medications: reviewed  Vitamin C, lasix, multivitamin, metoprolol     Estimated/Assessed Needs    Weight Used For Calorie Calculations: 132.2 kg (291 lb 7.2 oz)  Energy Calorie Requirements (kcal): 1450- 1850  Energy Need Method: Kcal/kg(11-14 permissive underfeeding)  Protein Requirements: 170g(2.5g/kg ideal)  Weight Used For Protein Calculations: 67.1 kg (148 lb)(IBW)     Estimated Fluid Requirement Method: RDA Method  RDA Method (mL): 1450  CHO Requirement: 200g    Nutrition Prescription Ordered    Current Diet Order: NPO  Current Nutrition Support Formula Ordered: Peptamen Intense VHP  Current Nutrition Support Rate Ordered: 45 (ml)  Current Nutrition Support Frequency Ordered: continuous  Oral Nutrition Supplement: beneprotein 6x/day    Evaluation of Received Nutrient/Fluid Intake    Enteral Calories (kcal): 1230  Enteral Protein (gm): 135  Enteral (Free Water) Fluid (mL): 907  Other Calories (kcal): 0    Intake/Output Summary (Last 24 hours) at 7/27/2020 1329  Last data filed at 7/27/2020 1305  Gross per 24 hour   Intake 4188.86 ml   Output 4375 ml   Net -186.14 ml     % Intake of Estimated Energy Needs: 75 - 100 %  % Meal Intake: NPO    Nutrition Risk  1x week    Assessment and Plan    Nutrition Problem  Inadequate energy intake    Related to (etiology):   Decreased ability to consume sufficient energy    Signs and Symptoms (as evidenced by):   Estimated enteral nutrition intake insufficient to meet needs based on estimated metabolic rate    Interventions:  Enteral nutrition  Collaboration with other providers    Nutrition Diagnosis Status:   Improving    Monitor and Evaluation    Food and Nutrient Intake: energy intake  Food and Nutrient Adminstration: enteral and parenteral nutrition administration  Anthropometric Measurements: weight  Biochemical Data, Medical Tests and Procedures: electrolyte and renal panel, glucose/endocrine profile  Nutrition-Focused Physical Findings: overall appearance      Malnutrition Assessment         NFPE not performed, patient is noted as being positive for COVID-19.    Nutrition Follow-Up    RD Follow-up?: Yes    Thanks!  Yennifer Torre MPH RD LDN

## 2020-07-27 NOTE — PLAN OF CARE
Recommendations    Recommendation:   1. Increase Peptamen VHP to 65mL/hr (840mL/day) + 2 packets beneprotein TID. Flushes of [id number] or per NP.  Provides 1710kcal, 180g protein, 1310mL free water+ 400mL in flushes .   2. RD to f/u    Goals: Meet >50% EEN/EPN by RD f/u  Nutrition Goal Status: goal met  Communication of RD Recs: POC, sticky note

## 2020-07-27 NOTE — SUBJECTIVE & OBJECTIVE
Interval History:  Urine output is 3.5 L.  Kidney function is normal.  Stop heparin.  Potassium is down to 5.7.  Hold off on hemodialysis today.    Review of patient's allergies indicates:   Allergen Reactions    Acetaminophen Hives     Current Facility-Administered Medications   Medication Frequency    0.9%  NaCl infusion (for blood administration) Q24H PRN    albuterol-ipratropium 2.5 mg-0.5 mg/3 mL nebulizer solution 3 mL Q4H    ascorbic acid (vitamin C) tablet 500 mg BID    bisacodyL suppository 10 mg Daily PRN    calcium gluconate 1g in dextrose 5% 100mL (ready to mix system) Q10 Min PRN    cefepime in dextrose 5 % IVPB 2 g Q8H    chlorhexidine 0.12 % solution 15 mL BID    dextrose 50% injection 12.5 g PRN    dextrose 50% injection 25 g PRN    enoxaparin injection 40 mg Q24H    famotidine (PF) injection 20 mg BID    fentaNYL 2500 mcg in D5W 250 mL infusion premix (titrating) (conc: 10 mcg/mL) Continuous    furosemide injection 40 mg Q8H    glucagon (human recombinant) injection 1 mg PRN    glucose chewable tablet 16 g PRN    glucose chewable tablet 24 g PRN    ibuprofen 100 mg/5 mL suspension 200 mg Q8H PRN    insulin aspart U-100 pen 1-10 Units Q4H PRN    ketamine (KETALAR) 1,000 mg in sodium chloride 0.9% 500 mL infusion Continuous    melatonin tablet 6 mg Nightly    metronidazole IVPB 500 mg Q8H    midazolam 100 mg/100 mL in dextrose 5% infusion Continuous    multivit-min-ferrous gluconate 9 mg iron/15 mL Liqd 5 mL Daily    norepinephrine 32 mg in dextrose 5 % 250 mL infusion Continuous    ondansetron injection 4 mg Q8H PRN    pneumoc 13-melvina conj-dip cr(PF) (PREVNAR 13 (PF)) 0.5 mL vaccine x 1 dose    polyethylene glycol packet 17 g BID    promethazine (PHENERGAN) 6.25 mg in dextrose 5 % 50 mL IVPB Q6H PRN    sodium chloride 0.9% flush 10 mL PRN    sodium zirconium cyclosilicate packet 10 g Q8H    white petrolatum 41 % ointment Once    white petrolatum-mineral oil (LUBIFRESH  P.M.) ophthalmic ointment Q8H       Objective:     Vital Signs (Most Recent):  Temp: 98.3 °F (36.8 °C) (07/27/20 0710)  Pulse: (!) 125 (07/27/20 1300)  Resp: (!) 37 (07/27/20 1300)  BP: (!) 96/38 (07/27/20 1200)  SpO2: (!) 92 % (07/27/20 1300)  O2 Device (Oxygen Therapy): ventilator (07/27/20 1300) Vital Signs (24h Range):  Temp:  [97.5 °F (36.4 °C)-98.8 °F (37.1 °C)] 98.3 °F (36.8 °C)  Pulse:  [111-140] 125  Resp:  [30-40] 37  SpO2:  [87 %-100 %] 92 %  BP: ()/(32-77) 96/38     Weight: 132.3 kg (291 lb 10.7 oz) (07/15/20 0039)  Body mass index is 45.68 kg/m².  Body surface area is 2.5 meters squared.    I/O last 3 completed shifts:  In: 5642.4 [I.V.:2572.1; Other:500; NG/GT:1620; IV Piggyback:950.3]  Out: 5550 [Urine:5050; Other:500]    Physical Exam  Vitals signs and nursing note reviewed.   Constitutional:       Appearance: He is obese. He is ill-appearing, toxic-appearing and diaphoretic.      Interventions: He is sedated, intubated and restrained.   HENT:      Head: Normocephalic and atraumatic.      Mouth/Throat:      Mouth: Mucous membranes are moist.   Eyes:      General: Lids are normal.      Conjunctiva/sclera: Conjunctivae normal.      Pupils: Pupils are equal, round, and reactive to light.   Neck:      Musculoskeletal: Neck supple.   Cardiovascular:      Rate and Rhythm: Regular rhythm. Tachycardia present.      Pulses:           Radial pulses are 1+ on the right side and 1+ on the left side.        Dorsalis pedis pulses are 1+ on the right side and 1+ on the left side.      Heart sounds: Heart sounds are distant.   Pulmonary:      Effort: Tachypnea, accessory muscle usage and respiratory distress (mild and improved with increased sedation) present. No retractions. He is intubated.      Breath sounds: Decreased breath sounds and rales present.   Abdominal:      General: Bowel sounds are decreased. There is no distension.      Palpations: Abdomen is soft.      Comments: Obese   Genitourinary:      Penis: Normal.       Comments: Bagley in place  Musculoskeletal:      Right lower le+ Pitting Edema present.      Left lower le+ Pitting Edema present.   Lymphadenopathy:      Cervical: No cervical adenopathy.   Skin:     General: Skin is warm and moist.      Capillary Refill: Capillary refill takes 2 to 3 seconds.      Coloration: Skin is not cyanotic.          Neurological:      Comments: Heavily sedated         Significant Labs:  All labs within the past 24 hours have been reviewed.     Significant Imaging:  X-Ray: Reviewed

## 2020-07-27 NOTE — SUBJECTIVE & OBJECTIVE
Review of Systems   Unable to perform ROS: Intubated         Objective:     Vital Signs (Most Recent):  Temp: 99.2 °F (37.3 °C) (20 1115)  Pulse: (!) 136 (20 1345)  Resp: (!) 36 (20 1345)  BP: (!) 106/53 (20 1300)  SpO2: 95 % (20 1345) Vital Signs (24h Range):  Temp:  [97.5 °F (36.4 °C)-99.2 °F (37.3 °C)] 99.2 °F (37.3 °C)  Pulse:  [111-140] 136  Resp:  [31-40] 36  SpO2:  [87 %-100 %] 95 %  BP: ()/(32-77) 106/53     Weight: 132.3 kg (291 lb 10.7 oz)  Body mass index is 45.68 kg/m².      Intake/Output Summary (Last 24 hours) at 2020 1400  Last data filed at 2020 1305  Gross per 24 hour   Intake 3643.86 ml   Output 3775 ml   Net -131.14 ml       Physical Exam  Vitals signs and nursing note reviewed.   Constitutional:       Appearance: He is obese. He is ill-appearing, toxic-appearing and diaphoretic.      Interventions: He is sedated, intubated and restrained.   HENT:      Head: Normocephalic and atraumatic.      Mouth/Throat:      Mouth: Mucous membranes are moist.   Eyes:      General: Lids are normal.      Conjunctiva/sclera: Conjunctivae normal.      Pupils: Pupils are equal, round, and reactive to light.   Neck:      Musculoskeletal: Neck supple.   Cardiovascular:      Rate and Rhythm: Regular rhythm. Tachycardia present.      Pulses:           Radial pulses are 1+ on the right side and 1+ on the left side.        Dorsalis pedis pulses are 1+ on the right side and 1+ on the left side.      Heart sounds: Heart sounds are distant.   Pulmonary:      Effort: Tachypnea, accessory muscle usage and respiratory distress present. No retractions. He is intubated.      Breath sounds: Decreased breath sounds and rales present.   Abdominal:      General: Bowel sounds are decreased. There is no distension.      Palpations: Abdomen is soft.      Comments: Obese   Genitourinary:     Penis: Normal.       Comments: Bagley in place  Musculoskeletal:      Right lower le+ Pitting Edema  present.      Left lower le+ Pitting Edema present.   Lymphadenopathy:      Cervical: No cervical adenopathy.   Skin:     General: Skin is warm and moist.      Capillary Refill: Capillary refill takes 2 to 3 seconds.      Coloration: Skin is not cyanotic.          Neurological:      Comments: Heavily sedated         Vents:  Vent Mode: A/C (20 1300)  Ventilator Initiated: Yes (20)  Set Rate: 30 BPM (20)  Vt Set: 0 mL (20)  Pressure Support: 0 cmH20 (20)  PEEP/CPAP: 18 cmH20 (20)  Oxygen Concentration (%): 100 (20)  Peak Airway Pressure: 37 cmH2O (20)  Plateau Pressure: 47 cmH20 (20)  Total Ve: 20.5 mL (20)  F/VT Ratio<105 (RSBI): (!) 66.31 (20)    Lines/Drains/Airways     Peripherally Inserted Central Catheter Line            PICC Double Lumen 20 1130 right brachial 11 days          Central Venous Catheter Line                 Hemodialysis Catheter 20 1002 right femoral 1 day          Drain                 NG/OG Tube 20 0530 orogastric 16 Fr. 8 days         Urethral Catheter 20 0330 8 days          Airway                 Airway - Non-Surgical 20 0420 Endotracheal Tube 8 days          Arterial Line                 Arterial Line 20 1144 Right Radial 8 days                Significant Labs:    CBC/Anemia Profile:  Recent Labs   Lab 20  0405 20  0415 20  0728 20  1309   WBC 14.27* 17.81*  --   --    HGB 8.7* 8.4*  --   --    HCT 29.6* 27.8* 26* 28*    223  --   --    * 102*  --   --    RDW 14.4 14.6*  --   --    FERRITIN 6,524* 4,134*  --   --         Chemistries:  Recent Labs   Lab 20  0405 20  1303 20  1822 20  0415   * 134*  134* 133* 135*   K 7.0* 4.6  4.6 6.0* 5.7*   CL 99 97  97 97 96   CO2 27 28  28 27 29   BUN 35* 16  16 23* 34*   CREATININE 0.8 0.6  0.6 0.8 0.8   CALCIUM 10.1 9.3  9.3  9.6 9.4   ALBUMIN 1.7* 1.7*  --  1.9*   PROT 6.6  --   --  6.6   BILITOT 0.6  --   --  0.4   ALKPHOS 67  --   --  66   ALT 45*  --   --  41   AST 38  --   --  35   MG 2.2  --   --  1.6   PHOS 4.0 2.9  --  4.8*       ABGs:   Recent Labs   Lab 07/27/20  1309   PH 7.338*   PCO2 61.9*   HCO3 33.3*   POCSATURATED 94*   BE 7     Coagulation:   Recent Labs   Lab 07/26/20  0752   APTT 37.4*     POCT Glucose:   Recent Labs   Lab 07/27/20  0417 07/27/20  0811 07/27/20  1251   POCTGLUCOSE 117* 139* 112*     All pertinent labs within the past 24 hours have been reviewed.  Trig 432    Significant Imaging:  CXR: I have reviewed all pertinent results/findings within the past 24 hours and my personal findings are:  Patchy bilateral airspace disease appears slightly worse at the left perihilar region and left lung base compared to prior exams. No pneumothorax. Tiny left-sided pleural effusion.  Lung volumes are slightly low.

## 2020-07-27 NOTE — PLAN OF CARE
Pt intubated and sedated to RASS -4. Sinus tach on the monitor 120's-130's. Metoprolol 75mg q6h initiated today. Pt mechanically ventilated, 100% FiO2, 16 PEEP.  OGT in place, infusing Peptamen VHP @ goal rate of 45cc/hr. Pt proned today @ 1500. R radial A-line in place, CDI. R fem vascath in place, lines clamped, CDI. R UE PICC in place, dressing changed today, infusing Ketamine @ 12.5 mcg/kg/min, Fent @ 200mcg/hr, Versed @ 10mg/hr.   Bed low, wheels locked, alarms audible. POC reviewed and skyped with wife today.

## 2020-07-27 NOTE — SUBJECTIVE & OBJECTIVE
Interval History:  Persistent hypoxia requiring PEEP 16 and fiO2 100%.  Hemodynamically stable.    Review of Systems   Unable to perform ROS: Intubated     Objective:     Vital Signs (Most Recent):  Temp: 97.5 °F (36.4 °C) (07/26/20 1905)  Pulse: (!) 127 (07/26/20 2200)  Resp: (!) 34 (07/26/20 2200)  BP: (!) 110/51 (07/26/20 2200)  SpO2: 98 % (07/26/20 2200) Vital Signs (24h Range):  Temp:  [97.5 °F (36.4 °C)-100.8 °F (38.2 °C)] 97.5 °F (36.4 °C)  Pulse:  [110-141] 127  Resp:  [25-37] 34  SpO2:  [90 %-99 %] 98 %  BP: ()/(43-64) 110/51     Weight: 132.3 kg (291 lb 10.7 oz)  Body mass index is 45.68 kg/m².    Intake/Output Summary (Last 24 hours) at 7/26/2020 2232  Last data filed at 7/26/2020 2200  Gross per 24 hour   Intake 3710.66 ml   Output 3550 ml   Net 160.66 ml      Physical Exam  Constitutional:       General: He is not in acute distress.     Appearance: He is well-developed. He is not diaphoretic.      Comments: Sedated on vent   HENT:      Head: Normocephalic and atraumatic.      Mouth/Throat:      Pharynx: No oropharyngeal exudate.   Eyes:      Conjunctiva/sclera: Conjunctivae normal.      Pupils: Pupils are equal, round, and reactive to light.   Neck:      Thyroid: No thyromegaly.      Vascular: No JVD.   Cardiovascular:      Rate and Rhythm: Normal rate and regular rhythm.      Heart sounds: Normal heart sounds. No murmur.   Pulmonary:      Effort: No respiratory distress.      Breath sounds: No wheezing or rales.      Comments: On vent   Chest:      Chest wall: No tenderness.   Abdominal:      General: Bowel sounds are normal. There is no distension.      Palpations: Abdomen is soft.      Tenderness: There is no abdominal tenderness. There is no guarding or rebound.   Lymphadenopathy:      Cervical: No cervical adenopathy.   Skin:     General: Skin is warm and dry.      Findings: No rash.   Neurological:      Cranial Nerves: No cranial nerve deficit.      Sensory: No sensory deficit.      Deep  Tendon Reflexes: Reflexes normal.      Comments: On vent , sedated         Significant Labs:   CBC:   Recent Labs   Lab 07/25/20  0410 07/26/20  0405   WBC 20.40* 14.27*   HGB 9.1* 8.7*   HCT 28.2* 29.6*    225     CMP:   Recent Labs   Lab 07/25/20  0410  07/26/20  0405 07/26/20  1303 07/26/20  1822   *   < > 132* 134*  134* 133*   K 6.5*   < > 7.0* 4.6  4.6 6.0*   CL 98   < > 99 97  97 97   CO2 28   < > 27 28  28 27   *   < > 103 110  110 121*   BUN 42*   < > 35* 16  16 23*   CREATININE 0.9   < > 0.8 0.6  0.6 0.8   CALCIUM 9.2   < > 10.1 9.3  9.3 9.6   PROT 6.5  --  6.6  --   --    ALBUMIN 1.7*  --  1.7* 1.7*  --    BILITOT 0.5  --  0.6  --   --    ALKPHOS 74  --  67  --   --    AST 37  --  38  --   --    ALT 52*  --  45*  --   --    ANIONGAP 7*   < > 6* 9  9 9   EGFRNONAA >60   < > >60 >60  >60 >60    < > = values in this interval not displayed.       Significant Imaging:

## 2020-07-27 NOTE — PROGRESS NOTES
Patient serum potassium at 1822 was 6.0. Dr. Jay notified via secure chat. MD orders a one time dose of 60mg lasix IV push. Patient currently sinus tachycardic on monitor and resting comfortably on ventilator. No distress noted. Will continue to monitor.

## 2020-07-27 NOTE — ASSESSMENT & PLAN NOTE
Lung Protective Mechanical Ventilation w/ ARDSnet protocol  High PEEP and Low Vt  Attempt to maintain PIP < 30 cm H2O  Fluid Sparing Resuscitation  Permissive Hypercapnea  Plan prone this afternoon for 18 hours

## 2020-07-27 NOTE — PROGRESS NOTES
[facility] Hospital Medicine  Progress Note    Patient Name: Jonas Abdalla  MRN: [id number]  Patient Class: IP- Inpatient   Admission Date: 7/11/2020  Length of Stay: 13 days  Attending Physician: Enmanuel Hu MD  Primary Care Provider: Carmel Tabares MD        Subjective:     Principal Problem:Acute respiratory failure with hypoxia        HPI:  Jonas Abdalla is a 35 year old male with a pMHx of HTN who presented to the Emergency Department with c/o SOB. Associated symptoms include: generalized weakness, fatigue, and fever. Pt reports symptoms started Monday, 07/06. He reports he thought he had COVID and went to get tested taht day but did not get test results. Went to local urgent care on Tuesday, 07/07 -- diagnosed with PNA and given Levaquin 500 mg po daily. Symptoms continued despite abx and wife took pt to COVID testing site which he was negative for COVID. Wife reports the COVID test he took on Monday came back negative, notified yesterday of results. ED workup showed: no leukocytosis/leukopenia, stable Hgb/Hct, mild hyponatremia. CXR showed focal right infrahilar/medial lung base consolidation concerning for pneumonia. Febrile upon arrival to . Pt received 30mL/kg fluid resuscitation in ED along with one time doses of IV rocephin/azithromycin. Pt placed on observation for Fever believed to be secondary to RML PNA. COVID rapid screening pending upon assessment, now POSITIVE.     Overview/Hospital Course:  Pt presented to the ED due to weakness, nausea/vomiting with concerns for dehydration. COVID ++. Running temps 103, 102.4, 101.8. He denied any SOB and DRAKE. He describes an occasional dry cough. On 2 L nasal cannula. On azithromycin, Rocephin, decadron and scheduled albuterol.   7/13/2020 - pt has continued to be febrile and now reporting SOB and productive cough. Provided remdesivir information and patient has consented. Oxygen escalated to 4 L.  7/14/2020 -  Sudden increase to oxygen 10 L, oxygen saturation 85 %. Temp 101.6 at 4 AM this AM. Current plan of care continued.   7/18- Pt was transferred to ICU on 7/15 with worsening respiratory status requiring NIPPV . Pt is awake , alert and oriented today .Mexed out on  vapotherm with hypoxia and placed on  NIPPV / BiPAP support. Remains  Tachypneic, easily desats with exertion or movement . Continue to monitor closely respiratory status  in ICU setting .   7/19 - Intubated electively  overnight due to increased WOB on bipap . Sedated on propofol, fentanyl. Levophed gtt added to maintain MAP>65. Heparin high intensity nomogram continues. Pt completed 5 days Remdesivir. On Decadron x 10 days . WBC count is markedly elevated . Likely reactive . Procalcitinin is normal. Creatinine bumped to 1.3 from 0.8 overnight.   7/2- Remains intubated . Tmax 100. Tachycardia and hypotension noted . On Levo to maintain MAP >65. Vent setting and weaning per ICU. 1st unit of convalescent plasma transfused  Overnight with no adverse effect. WBC trended down to 25.3 from 46.3, Pl down to 265 from 637, D d-rosa 3.7, CRP trended up 218>109.  7/21- Tmax 100.4 last evening and none since . Remains intubated . Some improvement with decreased oxygen demand. fiO2 down to 60%. Completed 2 units of convalescent plasma infusion without adverse effects. WBC slowly trending down 22.1, Hgb 11.6, Pl normal today . Ferritin uptrend 5752, D-dimer 3.57, Creatinine bumps to 1.5 from 1.0 yesterday . LDL trending down 886  7/22- Fever noted again. tmax 101.3. Noted pt be tachycardic. Remains intubated and sedated . Remains on Levo for to maintain MAP. Noted bolus fluid given. FiO2 bumped to 70%. Current SpO2 is satisfactory . Received 2 unit of convalescent plasma. WBC trending down. Hgb 11.4 , Pl count stable . Serum creatinine improved to 1.0 today     7/23- Tmax 100.3. Overnight developed decompensation with tachycardia , irregular rhythm , vent dyssynchrony ,  increased oxygen demand required paralytics Nimbex . FiO2 now 100% yet hypoxia. . Labs are fairly stable.     7/24- Tamx 100.2 last night. Remains intubated and sedated . Worsening hypoxia requiring high PEEP. fiO2 remains 100%. Unable to wean. ICU will try prone position today . WBC count bumped . H/H fairly stable. Hyperkalemia is noted in am lab.        Interval History:  Persistent hypoxia requiring PEEP 16 and fiO2 100%.  Hemodynamically stable.    Review of Systems   Unable to perform ROS: Intubated     Objective:     Vital Signs (Most Recent):  Temp: 97.5 °F (36.4 °C) (07/26/20 1905)  Pulse: (!) 127 (07/26/20 2200)  Resp: (!) 34 (07/26/20 2200)  BP: (!) 110/51 (07/26/20 2200)  SpO2: 98 % (07/26/20 2200) Vital Signs (24h Range):  Temp:  [97.5 °F (36.4 °C)-100.8 °F (38.2 °C)] 97.5 °F (36.4 °C)  Pulse:  [110-141] 127  Resp:  [25-37] 34  SpO2:  [90 %-99 %] 98 %  BP: ()/(43-64) 110/51     Weight: 132.3 kg (291 lb 10.7 oz)  Body mass index is 45.68 kg/m².    Intake/Output Summary (Last 24 hours) at 7/26/2020 2232  Last data filed at 7/26/2020 2200  Gross per 24 hour   Intake 3710.66 ml   Output 3550 ml   Net 160.66 ml      Physical Exam  Constitutional:       General: He is not in acute distress.     Appearance: He is well-developed. He is not diaphoretic.      Comments: Sedated on vent   HENT:      Head: Normocephalic and atraumatic.      Mouth/Throat:      Pharynx: No oropharyngeal exudate.   Eyes:      Conjunctiva/sclera: Conjunctivae normal.      Pupils: Pupils are equal, round, and reactive to light.   Neck:      Thyroid: No thyromegaly.      Vascular: No JVD.   Cardiovascular:      Rate and Rhythm: Normal rate and regular rhythm.      Heart sounds: Normal heart sounds. No murmur.   Pulmonary:      Effort: No respiratory distress.      Breath sounds: No wheezing or rales.      Comments: On vent   Chest:      Chest wall: No tenderness.   Abdominal:      General: Bowel sounds are normal. There is no  distension.      Palpations: Abdomen is soft.      Tenderness: There is no abdominal tenderness. There is no guarding or rebound.   Lymphadenopathy:      Cervical: No cervical adenopathy.   Skin:     General: Skin is warm and dry.      Findings: No rash.   Neurological:      Cranial Nerves: No cranial nerve deficit.      Sensory: No sensory deficit.      Deep Tendon Reflexes: Reflexes normal.      Comments: On vent , sedated         Significant Labs:   CBC:   Recent Labs   Lab 07/25/20  0410 07/26/20  0405   WBC 20.40* 14.27*   HGB 9.1* 8.7*   HCT 28.2* 29.6*    225     CMP:   Recent Labs   Lab 07/25/20  0410  07/26/20  0405 07/26/20  1303 07/26/20  1822   *   < > 132* 134*  134* 133*   K 6.5*   < > 7.0* 4.6  4.6 6.0*   CL 98   < > 99 97  97 97   CO2 28   < > 27 28  28 27   *   < > 103 110  110 121*   BUN 42*   < > 35* 16  16 23*   CREATININE 0.9   < > 0.8 0.6  0.6 0.8   CALCIUM 9.2   < > 10.1 9.3  9.3 9.6   PROT 6.5  --  6.6  --   --    ALBUMIN 1.7*  --  1.7* 1.7*  --    BILITOT 0.5  --  0.6  --   --    ALKPHOS 74  --  67  --   --    AST 37  --  38  --   --    ALT 52*  --  45*  --   --    ANIONGAP 7*   < > 6* 9  9 9   EGFRNONAA >60   < > >60 >60  >60 >60    < > = values in this interval not displayed.       Significant Imaging:       Assessment/Plan:      * Acute respiratory failure with hypoxia  Encouraged deep breathing and coughing.  Transferred to ICU 14 July for respiratory distress.  Intubated 19 July.  Remains intubated persistent hypoxia.  PEEP 16 and fiO2 100%.  Wean respiratory support as able.    Pneumonia due to COVID-19 virus  - COVID-19 testing Collection Date: 7/11/2020 Collection Time:   11:55 AM  - Infection Control notified     - Isolation:   - Airborne, Contact and Droplet Precautions  - Cohort patients into COVID units  - N95 mask, wear eye protection  - 20 second hand hygiene              - Limit visitors per hospital policy              - Consolidating lab draws,  nursing care, provider visits, and interventions    - Diagnostics: (leukopenia, hyponatremia, hyperferritinemia, elevated troponin, elevated d-dimer, age, and comorbidities are significant predictors of poor clinical outcome)  CBC, CMP, Procalcitonin, Ferritin, CRP, LDH, BNP, Troponin, Rapid Flu, Blood Culture x2 and Portable CXR    - Management:  Supplemental O2 to maintain SpO2 >92%  Telemetry  Continuous/intermittent Pulse Ox  Albuterol treatment PRN  IV Rocephin/PO Azithromycin  Dexamethasone   Vit C, MVI  Scheduled albuterol  7/13/2020 - pt agreeable to receive Remdesivir - information received  7/19- Completed 5 days Remdesivir . On Decadron for 10 days   7/20- 1st unit of convalescent plasma transfused  with no adverse effect.  7/21-Completed 2 units of convalescent plasma infusion without adverse effects.Some improvement with decreased oxygen demand. fiO2 down to 60%.      Acute respiratory distress syndrome (ARDS) due to COVID-19 virus        Leukocytosis and thrombocytosis   7/19- Marked leukocytosis and thrombocytosis are noted. Likely reactive . Procalcitonin is neg. Blood cultures - NGTD. Endotracheal aspirate obtained for gram stain and culture.   7/21- Improving   7/24- Monitor counts       Pneumonia of right lower lobe due to infectious organism  IV Rocephin/Azithromycin  - empiric treatment completed   Supplemental oxygen  Sputum culture    Sepsis  - Sepsis criteria: Current temp of 101.3, RR 21, source PNA due to COVID-19 virus  - Blood cultures obtained >>>>> NGTD  - Recent COVID test outpt on 07/06/ 20 negative. Repeat COVID today in ED (+)  - Will treat underlying cause with abx, supplemental oxygen, MDI, oral steroids per COVID-19 protocol      Essential hypertension  - Pt normally takes lisinopril-hctz 20-12.5 po daily  - Will hold hctz and continue lisinopril at current dose and frequency  7/24-  All antihypertensives are on hold.  Continue pressors to keep MAP >65        VTE Risk Mitigation  (From admission, onward)         Ordered     enoxaparin injection 40 mg  Every 24 hours      07/26/20 1326     IP VTE HIGH RISK PATIENT  Once      07/19/20 0337     Place sequential compression device  Until discontinued      07/19/20 0337                Critical care time spent on the evaluation and treatment of severe organ dysfunction, review of pertinent labs and imaging studies, discussions with consulting providers and discussions with patient/family: 30 minutes.      Enmanuel Hu MD  Department of Hospital Medicine   [facility]

## 2020-07-27 NOTE — PLAN OF CARE
Patient remained safe and stable for the duration of the shift. Please see assessment flowsheets for more details. Safety and fall prevention measures are in place. Tolerated HD without issues. Potassium level 4.6. Versed gtt @ 7, fentanyl gtt @ 200, ketamine gtt @ 10. Heparin gtt. Discontinued. Urine output adequate. Tolerating tubefeeding diet. Will monitor for needs/changes. Plan of care discussed with spouse. Verbalized understanding.

## 2020-07-27 NOTE — PROGRESS NOTES
[facility]   Critical Care Medicine  Progress Note    Patient Name: Jonas Abdalla  MRN: [id number]  Admission Date: 7/11/2020  Hospital Length of Stay: 14 days  Code Status: Full Code  Attending Provider: Enmanuel Hu MD  Primary Care Provider: Carmel Tabares MD   Principal Problem: Acute respiratory failure with hypoxia    Subjective:     HPI:  Mr Abdalla is a morbidly obese WM with a PMH of cholelithiasis and HTN who was admitted 7/12 with Sepsis and COVID PNA after presentation with weakness, persistent fever, chills and N/V/D for 6 days progressive.  Had 102.4 temp in ED.  Admitted to [facility] started on emperic IVAB, Decadron and NC low flow.  Oxygenation worsened over next few days.  He consented and was started on Remdesivir 7/14.  On evening of 7/14.  At [place] last night patient became more hypoxic despite being maxed out on VapoTherm.  He was placed in prone position, which improved O2 sat to 90-93% but patient remained in notable respiratory distress.  He was transferred to ICU and initiated on BiPAP.      Hospital/ICU Course:  7/15 - This AM upright in bed fully awake, alert and responsive not in distress but has tachypnea and mild work of breathing while on NPPV and FiO2 at .95.    7/16 - remains on NIPPV with some decline in oxygen demand with FiO2 0.75 but continued tachypnea; ongoing desaturation with exertion or momentary removal of NIPPV for fluid intake  7/17 - tolerated short time on vapotherm support last evening, returned to NIPPV for sleep and has remained today s/t tachypnea, hypoxia; afebrile but wbc slight trend up 14.4k   7/18 - awake, alert,oriented; continues to alternate max support vapotherm with NIPPV support, tachypnea at baseline and worsens along with drop in pulse ox sat with exertion/movement that slowly recovers with rest  7/19 - respiratory distress overnight; required intubation, heavy sedation, mechanical ventilation, low dose pressor support;  post intubation abg shows continued worsening acidosis along with now worsening leukocytosis, ddimer, LD, and CRP  7/20 - remains intubated and sedated on mechanical ventilation with inverse ratio ventilation, some decrease in oxygen demand today; tmax yesterday 101.6; requiring low dose pressor support with sedation  7/21 - remains intubated and sedated on mechanical inverse ratio ventilation, oxygen demand down to 60%; t max 100.4; tolerating TF  7/22 - remains intubated and sedated, mode of ventilation adjusted to PCV today with high PEEP, moderate oxygen demand; tmax today 101.8 with wbc down to 14.7k and LD trend down  7/23 - Semi-erect in bed intubated on mech ventilation and sedated heavily on Fentanyl and Propofol infusions.  Requiring some Levophed infusion.  Tolerating TF.  Restless on vent overnight and this AM tachypnic and without vent synchrony.  A-flutter yesterday.    7/24 - Supine in bed sedated on Fentanyl, Propofol and Versed infusions with intubation on mech ventilation.  On low dose Levophed infusion.  Tolerating TF.  Still severe hypoxemia requiring high PEEP and 100% FiO2.  7/25 - Prone in bed since yesterday afternoon.  Intubated on mech ventilation and sedated on Versed, Propofol and Versed infusions.  Off TF while prone.  On Heparin infusion and low dose Levophed infusion  7/26 - Returned to supine position yesterday afternoon.  Semi-erect in bed intubated on mech ventilation some increased resp distress improved with increasing Versed infusion.  Persistent Hyperkalemia refractory to Rx discussed with Renal post consult and placed VasCath starting RRT.  Still requiring Versed, Fentanyl and Ketamine infusions for sedation.  Weaned off Levophed infusion.  Good UOP.  Heparin infusion stopped due to concern with hyperkalemia  7/27 - Supine in bed intubated still on mech ventilation with high O2 demand heavily sedated on Ketamine, Versed and Fentanyl infusions.  Still off pressors.  Tachycardia  persistent in 130s.  Good UOP.    Review of Systems   Unable to perform ROS: Intubated         Objective:     Vital Signs (Most Recent):  Temp: 99.2 °F (37.3 °C) (07/27/20 1115)  Pulse: (!) 136 (07/27/20 1345)  Resp: (!) 36 (07/27/20 1345)  BP: (!) 106/53 (07/27/20 1300)  SpO2: 95 % (07/27/20 1345) Vital Signs (24h Range):  Temp:  [97.5 °F (36.4 °C)-99.2 °F (37.3 °C)] 99.2 °F (37.3 °C)  Pulse:  [111-140] 136  Resp:  [31-40] 36  SpO2:  [87 %-100 %] 95 %  BP: ()/(32-77) 106/53     Weight: 132.3 kg (291 lb 10.7 oz)  Body mass index is 45.68 kg/m².      Intake/Output Summary (Last 24 hours) at 7/27/2020 1400  Last data filed at 7/27/2020 1305  Gross per 24 hour   Intake 3643.86 ml   Output 3775 ml   Net -131.14 ml       Physical Exam  Vitals signs and nursing note reviewed.   Constitutional:       Appearance: He is obese. He is ill-appearing, toxic-appearing and diaphoretic.      Interventions: He is sedated, intubated and restrained.   HENT:      Head: Normocephalic and atraumatic.      Mouth/Throat:      Mouth: Mucous membranes are moist.   Eyes:      General: Lids are normal.      Conjunctiva/sclera: Conjunctivae normal.      Pupils: Pupils are equal, round, and reactive to light.   Neck:      Musculoskeletal: Neck supple.   Cardiovascular:      Rate and Rhythm: Regular rhythm. Tachycardia present.      Pulses:           Radial pulses are 1+ on the right side and 1+ on the left side.        Dorsalis pedis pulses are 1+ on the right side and 1+ on the left side.      Heart sounds: Heart sounds are distant.   Pulmonary:      Effort: Tachypnea, accessory muscle usage and respiratory distress present. No retractions. He is intubated.      Breath sounds: Decreased breath sounds and rales present.   Abdominal:      General: Bowel sounds are decreased. There is no distension.      Palpations: Abdomen is soft.      Comments: Obese   Genitourinary:     Penis: Normal.       Comments: Bagley in place  Musculoskeletal:       Right lower le+ Pitting Edema present.      Left lower le+ Pitting Edema present.   Lymphadenopathy:      Cervical: No cervical adenopathy.   Skin:     General: Skin is warm and moist.      Capillary Refill: Capillary refill takes 2 to 3 seconds.      Coloration: Skin is not cyanotic.          Neurological:      Comments: Heavily sedated         Vents:  Vent Mode: A/C (20 1300)  Ventilator Initiated: Yes (20)  Set Rate: 30 BPM (20)  Vt Set: 0 mL (20)  Pressure Support: 0 cmH20 (20)  PEEP/CPAP: 18 cmH20 (20)  Oxygen Concentration (%): 100 (20)  Peak Airway Pressure: 37 cmH2O (20)  Plateau Pressure: 47 cmH20 (20)  Total Ve: 20.5 mL (20)  F/VT Ratio<105 (RSBI): (!) 66.31 (20)    Lines/Drains/Airways     Peripherally Inserted Central Catheter Line            PICC Double Lumen 20 1130 right brachial 11 days          Central Venous Catheter Line                 Hemodialysis Catheter 20 1002 right femoral 1 day          Drain                 NG/OG Tube 20 0530 orogastric 16 Fr. 8 days         Urethral Catheter 20 0330 8 days          Airway                 Airway - Non-Surgical 20 0420 Endotracheal Tube 8 days          Arterial Line                 Arterial Line 20 1144 Right Radial 8 days                Significant Labs:    CBC/Anemia Profile:  Recent Labs   Lab 20  0405 20  0415 20  0728 20  1309   WBC 14.27* 17.81*  --   --    HGB 8.7* 8.4*  --   --    HCT 29.6* 27.8* 26* 28*    223  --   --    * 102*  --   --    RDW 14.4 14.6*  --   --    FERRITIN 6,524* 4,134*  --   --         Chemistries:  Recent Labs   Lab 20  0405 20  1303 20  1822 20  0415   * 134*  134* 133* 135*   K 7.0* 4.6  4.6 6.0* 5.7*   CL 99 97  97 97 96   CO2 27 28  28 27 29   BUN 35* 16  16 23* 34*   CREATININE 0.8 0.6  0.6  0.8 0.8   CALCIUM 10.1 9.3  9.3 9.6 9.4   ALBUMIN 1.7* 1.7*  --  1.9*   PROT 6.6  --   --  6.6   BILITOT 0.6  --   --  0.4   ALKPHOS 67  --   --  66   ALT 45*  --   --  41   AST 38  --   --  35   MG 2.2  --   --  1.6   PHOS 4.0 2.9  --  4.8*       ABGs:   Recent Labs   Lab 07/27/20  1309   PH 7.338*   PCO2 61.9*   HCO3 33.3*   POCSATURATED 94*   BE 7     Coagulation:   Recent Labs   Lab 07/26/20  0752   APTT 37.4*     POCT Glucose:   Recent Labs   Lab 07/27/20  0417 07/27/20  0811 07/27/20  1251   POCTGLUCOSE 117* 139* 112*     All pertinent labs within the past 24 hours have been reviewed.  Trig 432    Significant Imaging:  CXR: I have reviewed all pertinent results/findings within the past 24 hours and my personal findings are:  Patchy bilateral airspace disease appears slightly worse at the left perihilar region and left lung base compared to prior exams. No pneumothorax. Tiny left-sided pleural effusion.  Lung volumes are slightly low.      ABG  Recent Labs   Lab 07/27/20  1309   PH 7.338*   PO2 78*   PCO2 61.9*   HCO3 33.3*   BE 7     Assessment/Plan:     Pulmonary  * Acute respiratory failure with hypoxia  Cont full vent support  Cont PCV  Vent settings reviewed and adjusted multiple times  VAP prophylaxis  [email or web address] protocol  Daily ABGs  Too unstable for SAT/SBT    On mechanically assisted ventilation  See resp failure plan    Cardiac/Vascular  Essential hypertension  Hold Lisinopril while requiring low dose pressor    High triglycerides  Stop Propofol infusion 7/25  Repeat Trig still elevated repeat in 2 days    Renal/  Hyperkalemia  Cont Na Zirconium per OG with Albuterol and Lasix  Follow up labs  Placed VasCath 7/26 for emergent RRT done 7/26  Renal following  Reviewed all meds with pharmacy for source of hyperkalemia and essentially unremarkable ([facility] risk of hyperkalemia 7%)  Follow K+ and repeat RRT if needed.     ID  Severe sepsis with acute organ dysfunction  Levophed weaned off  ICU hemodynamic  monitoring  Blood and Sputum cultures done recently still NGTD  Cont IVAB for 7 days    Oncology  Anemia, unspecified  No active bleeding  Conservative transfusion protocol  Daily CBC     Endocrine  Morbid obesity with BMI of 45.0-49.9, adult  Recommend diet and lifestyle modification for goal wt loss once medically stable    Severe protein-calorie malnutrition  Resumed TF and tolerating    GI  RD recs noted  Advance TF    Other  Pneumonia due to COVID-19 virus  Respiratory/Contact/Droplet Isolation with appropriate PPE -N95 mask, gown, goggles and gloves  Fluid Sparing Resuscitation  Completed course of empiric Antb earlier in admit  IVAB resumed 7/23 due to persistent fever and Leukocytosis  No Visitors  Consolidation of tests, nursing care and provider visits  Completed completed 5 day course of Rendisivir and 10 day course of Decadron  Zinc, Melatonin, and Vit C    Acute respiratory distress syndrome (ARDS) due to COVID-19 virus  Lung Protective Mechanical Ventilation w/ ARDSnet protocol  High PEEP and Low Vt  Attempt to maintain PIP < 30 cm H2O  Fluid Sparing Resuscitation  Permissive Hypercapnea  Plan prone this afternoon for 18 hours      Preventive Measures and Monitoring:   Stress Ulcer: Pepcid  Nutrition: TF resumed  Glucose control: SSI  Bowel prophylaxis: Miralax  DVT prophylaxis: LMWH  Hx CAD on B-Blocker: no hx CAD  Head of Bed/Reposition: Elevate HOB and turn Q1-2 hours   Early Mobility: bed rest  SAT/SBT: too unstable  RASS goal:-4  Vent Day: #9  OG Day: #9  Central Line RUE PICC Day: #12  Right Radial Arterial Line Day: #9  Right Femoral VasCath Day: #2  Bagley Day: #9  IVAB Day: #5  Code Status: Full     Counseling/Consultation:I have discussed the care of this patient in detail with the bedside nursing staff and Dr. Tena and Dr. Hu and Dr. Caputo     Called spouse today, Joan, and gave full report and all questions answered.      Critical Care Time: 80 minutes  Critical secondary  to Patient has a condition that poses threat to life and bodily function: Acute Resp Failure intubated on mech ventilation in ARDS with COVID PNA  And Hyperkalemia requiring emergent RRT  Patient is currently receiving parenteral controlled substances: Versed and Fentanyl and Ketamine infusions      Critical care was time spent personally by me on the following activities: development of treatment plan with patient or surrogate and bedside caregivers, discussions with consultants, evaluation of patient's response to treatment, examination of patient, ordering and performing treatments and interventions, ordering and review of laboratory studies, ordering and review of radiographic studies, pulse oximetry, re-evaluation of patient's condition. This critical care time did not overlap with that of any other provider or involve time for any procedures.        Yemi Koroma NP  Critical Care Medicine  [facility]

## 2020-07-27 NOTE — ASSESSMENT & PLAN NOTE
Cont Na Zirconium per OG with Albuterol and Lasix  Follow up labs  Placed VasCath 7/26 for emergent RRT done 7/26  Renal following  Reviewed all meds with pharmacy for source of hyperkalemia and essentially unremarkable ([facility] risk of hyperkalemia 7%)  Follow K+ and repeat RRT if needed.

## 2020-07-27 NOTE — PROGRESS NOTES
[facility] -   Nephrology  Progress Note    Patient Name: Jonas Abdalla  MRN: [id number]  Admission Date: 7/11/2020  Hospital Length of Stay: 14 days  Attending Provider: Enmanuel Hu MD   Primary Care Physician: Carmel Tabares MD  Principal Problem:Acute respiratory failure with hypoxia    Subjective:     HPI: Jonas Abdalla is a 35-year-old  man with history of hypertension, he was admitted to the hospital about 2 weeks ago for shortness of breath due to COVID infection, patient was intubated and placed in intensive care unit on vent support, prolonged ICU stay, in the last few days his serum potassium has been elevated, etiology unclear, medical management has failed, we were consulted for possible dialysis treatment to lower the potassium levels, kidney function is stable with serum creatinine at 0.8 mg/dL,        Interval History:  Urine output is 3.5 L.  Kidney function is normal.  Stop heparin.  Potassium is down to 5.7.  Hold off on hemodialysis today.    Review of patient's allergies indicates:   Allergen Reactions    Acetaminophen Hives     Current Facility-Administered Medications   Medication Frequency    0.9%  NaCl infusion (for blood administration) Q24H PRN    albuterol-ipratropium 2.5 mg-0.5 mg/3 mL nebulizer solution 3 mL Q4H    ascorbic acid (vitamin C) tablet 500 mg BID    bisacodyL suppository 10 mg Daily PRN    calcium gluconate 1g in dextrose 5% 100mL (ready to mix system) Q10 Min PRN    cefepime in dextrose 5 % IVPB 2 g Q8H    chlorhexidine 0.12 % solution 15 mL BID    dextrose 50% injection 12.5 g PRN    dextrose 50% injection 25 g PRN    enoxaparin injection 40 mg Q24H    famotidine (PF) injection 20 mg BID    fentaNYL 2500 mcg in D5W 250 mL infusion premix (titrating) (conc: 10 mcg/mL) Continuous    furosemide injection 40 mg Q8H    glucagon (human recombinant) injection 1 mg PRN    glucose chewable tablet 16 g PRN    glucose  chewable tablet 24 g PRN    ibuprofen 100 mg/5 mL suspension 200 mg Q8H PRN    insulin aspart U-100 pen 1-10 Units Q4H PRN    ketamine (KETALAR) 1,000 mg in sodium chloride 0.9% 500 mL infusion Continuous    melatonin tablet 6 mg Nightly    metronidazole IVPB 500 mg Q8H    midazolam 100 mg/100 mL in dextrose 5% infusion Continuous    multivit-min-ferrous gluconate 9 mg iron/15 mL Liqd 5 mL Daily    norepinephrine 32 mg in dextrose 5 % 250 mL infusion Continuous    ondansetron injection 4 mg Q8H PRN    pneumoc 13-melvina conj-dip cr(PF) (PREVNAR 13 (PF)) 0.5 mL vaccine x 1 dose    polyethylene glycol packet 17 g BID    promethazine (PHENERGAN) 6.25 mg in dextrose 5 % 50 mL IVPB Q6H PRN    sodium chloride 0.9% flush 10 mL PRN    sodium zirconium cyclosilicate packet 10 g Q8H    white petrolatum 41 % ointment Once    white petrolatum-mineral oil (LUBIFRESH P.M.) ophthalmic ointment Q8H       Objective:     Vital Signs (Most Recent):  Temp: 98.3 °F (36.8 °C) (07/27/20 0710)  Pulse: (!) 125 (07/27/20 1300)  Resp: (!) 37 (07/27/20 1300)  BP: (!) 96/38 (07/27/20 1200)  SpO2: (!) 92 % (07/27/20 1300)  O2 Device (Oxygen Therapy): ventilator (07/27/20 1300) Vital Signs (24h Range):  Temp:  [97.5 °F (36.4 °C)-98.8 °F (37.1 °C)] 98.3 °F (36.8 °C)  Pulse:  [111-140] 125  Resp:  [30-40] 37  SpO2:  [87 %-100 %] 92 %  BP: ()/(32-77) 96/38     Weight: 132.3 kg (291 lb 10.7 oz) (07/15/20 0039)  Body mass index is 45.68 kg/m².  Body surface area is 2.5 meters squared.    I/O last 3 completed shifts:  In: 5642.4 [I.V.:2572.1; Other:500; NG/GT:1620; IV Piggyback:950.3]  Out: 5550 [Urine:5050; Other:500]    Physical Exam  Vitals signs and nursing note reviewed.   Constitutional:       Appearance: He is obese. He is ill-appearing, toxic-appearing and diaphoretic.      Interventions: He is sedated, intubated and restrained.   HENT:      Head: Normocephalic and atraumatic.      Mouth/Throat:      Mouth: Mucous membranes  are moist.   Eyes:      General: Lids are normal.      Conjunctiva/sclera: Conjunctivae normal.      Pupils: Pupils are equal, round, and reactive to light.   Neck:      Musculoskeletal: Neck supple.   Cardiovascular:      Rate and Rhythm: Regular rhythm. Tachycardia present.      Pulses:           Radial pulses are 1+ on the right side and 1+ on the left side.        Dorsalis pedis pulses are 1+ on the right side and 1+ on the left side.      Heart sounds: Heart sounds are distant.   Pulmonary:      Effort: Tachypnea, accessory muscle usage and respiratory distress (mild and improved with increased sedation) present. No retractions. He is intubated.      Breath sounds: Decreased breath sounds and rales present.   Abdominal:      General: Bowel sounds are decreased. There is no distension.      Palpations: Abdomen is soft.      Comments: Obese   Genitourinary:     Penis: Normal.       Comments: Bagley in place  Musculoskeletal:      Right lower le+ Pitting Edema present.      Left lower le+ Pitting Edema present.   Lymphadenopathy:      Cervical: No cervical adenopathy.   Skin:     General: Skin is warm and moist.      Capillary Refill: Capillary refill takes 2 to 3 seconds.      Coloration: Skin is not cyanotic.          Neurological:      Comments: Heavily sedated         Significant Labs:  All labs within the past 24 hours have been reviewed.     Significant Imaging:  X-Ray: Reviewed    Assessment/Plan:     Hyperkalemia  Urine output is 3.5 L.  Kidney function is normal.  Stop heparin.  Potassium is down to 5.7.  Hold off on hemodialysis today.          Thank you for your consult.     Denton Caputo MD  Nephrology  [facility] -

## 2020-07-28 NOTE — SUBJECTIVE & OBJECTIVE
Interval History:  Urine output 2.7 L.  Potassium 5.5.  Mild increase in creatinine noted.  No renal replacement therapy plan today    Review of patient's allergies indicates:   Allergen Reactions    Acetaminophen Hives     Current Facility-Administered Medications   Medication Frequency    0.9%  NaCl infusion (for blood administration) Q24H PRN    albuterol-ipratropium 2.5 mg-0.5 mg/3 mL nebulizer solution 3 mL Q4H    ascorbic acid (vitamin C) tablet 500 mg BID    bisacodyL suppository 10 mg Daily PRN    calcium gluconate 1g in dextrose 5% 100mL (ready to mix system) Q10 Min PRN    cefepime in dextrose 5 % IVPB 2 g Q8H    chlorhexidine 0.12 % solution 15 mL BID    dextrose 50% injection 12.5 g PRN    dextrose 50% injection 25 g PRN    enoxaparin injection 40 mg Q24H    famotidine (PF) injection 20 mg BID    fentaNYL 2500 mcg in D5W 250 mL infusion premix (titrating) (conc: 10 mcg/mL) Continuous    glucagon (human recombinant) injection 1 mg PRN    glucose chewable tablet 16 g PRN    glucose chewable tablet 24 g PRN    ibuprofen 100 mg/5 mL suspension 200 mg Once PRN    insulin aspart U-100 pen 1-10 Units Q4H PRN    ketamine (KETALAR) 1,000 mg in sodium chloride 0.9% 500 mL infusion Continuous    melatonin tablet 6 mg Nightly    metoprolol tartrate (LOPRESSOR) tablet 50 mg Q6H    metronidazole IVPB 500 mg Q8H    midazolam 100 mg/100 mL in dextrose 5% infusion Continuous    multivit-min-ferrous gluconate 9 mg iron/15 mL Liqd 5 mL Daily    norepinephrine 32 mg in dextrose 5 % 250 mL infusion Continuous    ondansetron injection 4 mg Q8H PRN    pneumoc 13-melvina conj-dip cr(PF) (PREVNAR 13 (PF)) 0.5 mL vaccine x 1 dose    polyethylene glycol packet 17 g BID    promethazine (PHENERGAN) 6.25 mg in dextrose 5 % 50 mL IVPB Q6H PRN    sodium chloride 0.9% flush 10 mL PRN    sodium zirconium cyclosilicate packet 5 g BID    vancomycin - pharmacy to dose pharmacy to manage frequency    vancomycin 2  g in dextrose 5 % 500 mL IVPB Once    white petrolatum 41 % ointment Once    white petrolatum-mineral oil (LUBIFRESH P.M.) ophthalmic ointment Q8H       Objective:     Vital Signs (Most Recent):  Temp: 99.3 °F (37.4 °C) (07/28/20 1115)  Pulse: (!) 115 (07/28/20 1550)  Resp: (!) 32 (07/28/20 1550)  BP: (!) 81/25 (07/28/20 1500)  SpO2: 97 % (07/28/20 1550)  O2 Device (Oxygen Therapy): ventilator (07/28/20 1550) Vital Signs (24h Range):  Temp:  [98.9 °F (37.2 °C)-102.5 °F (39.2 °C)] 99.3 °F (37.4 °C)  Pulse:  [100-139] 115  Resp:  [21-43] 32  SpO2:  [91 %-100 %] 97 %  BP: ()/(16-54) 81/25     Weight: 132.3 kg (291 lb 10.7 oz) (07/15/20 0039)  Body mass index is 45.68 kg/m².  Body surface area is 2.5 meters squared.    I/O last 3 completed shifts:  In: 4416.5 [I.V.:2666.5; NG/GT:1000; IV Piggyback:750]  Out: 5775 [Urine:5775]    Physical Exam  Vitals signs and nursing note reviewed.   Constitutional:       Appearance: He is obese. He is ill-appearing, toxic-appearing and diaphoretic.      Interventions: He is sedated, intubated and restrained.   HENT:      Head: Normocephalic and atraumatic.      Mouth/Throat:      Mouth: Mucous membranes are moist.   Eyes:      General: Lids are normal.      Conjunctiva/sclera: Conjunctivae normal.      Pupils: Pupils are equal, round, and reactive to light.   Neck:      Musculoskeletal: Neck supple.   Cardiovascular:      Rate and Rhythm: Regular rhythm. Tachycardia present.      Pulses:           Radial pulses are 1+ on the right side and 1+ on the left side.        Dorsalis pedis pulses are 1+ on the right side and 1+ on the left side.      Heart sounds: Heart sounds are distant.   Pulmonary:      Effort: Tachypnea, accessory muscle usage and respiratory distress present. No retractions. He is intubated.      Breath sounds: Decreased breath sounds and rales present.   Abdominal:      General: Bowel sounds are decreased. There is no distension.      Palpations: Abdomen is  soft.      Comments: Obese   Genitourinary:     Penis: Normal.       Comments: Bagley in place  Musculoskeletal:      Right lower le+ Pitting Edema present.      Left lower le+ Pitting Edema present.   Lymphadenopathy:      Cervical: No cervical adenopathy.   Skin:     General: Skin is warm and moist.      Capillary Refill: Capillary refill takes 2 to 3 seconds.      Coloration: Skin is not cyanotic.          Neurological:      Comments: Heavily sedated, intubated         Significant Labs:  All labs within the past 24 hours have been reviewed.     Significant Imaging:

## 2020-07-28 NOTE — PROGRESS NOTES
Turned head to right side and swimmers position changed with help of RT [place].  After position change small ETT cuff leak could be heard.  TV are 500-600 per breath and Minute Vent. >20.  Sats 94%.  Will monitor.

## 2020-07-28 NOTE — PROGRESS NOTES
[facility] -   Critical Care Medicine  Progress Note    Patient Name: Jonas Abdalla  MRN: [id number]  Admission Date: 7/11/2020  Hospital Length of Stay: 15 days  Code Status: Full Code  Attending Provider: Enmanuel Hu MD  Primary Care Provider: Carmel Tabares MD   Principal Problem: Acute respiratory failure with hypoxia    Subjective:     HPI:  Mr Abdalla is a morbidly obese WM with a PMH of cholelithiasis and HTN who was admitted 7/12 with Sepsis and COVID PNA after presentation with weakness, persistent fever, chills and N/V/D for 6 days progressive.  Had 102.4 temp in ED.  Admitted to [facility] started on emperic IVAB, Decadron and NC low flow.  Oxygenation worsened over next few days.  He consented and was started on Remdesivir 7/14.  On evening of 7/14.  At [place] last night patient became more hypoxic despite being maxed out on VapoTherm.  He was placed in prone position, which improved O2 sat to 90-93% but patient remained in notable respiratory distress.  He was transferred to ICU and initiated on BiPAP.      Hospital/ICU Course:  7/15 - This AM upright in bed fully awake, alert and responsive not in distress but has tachypnea and mild work of breathing while on NPPV and FiO2 at .95.    7/16 - remains on NIPPV with some decline in oxygen demand with FiO2 0.75 but continued tachypnea; ongoing desaturation with exertion or momentary removal of NIPPV for fluid intake  7/17 - tolerated short time on vapotherm support last evening, returned to NIPPV for sleep and has remained today s/t tachypnea, hypoxia; afebrile but wbc slight trend up 14.4k   7/18 - awake, alert,oriented; continues to alternate max support vapotherm with NIPPV support, tachypnea at baseline and worsens along with drop in pulse ox sat with exertion/movement that slowly recovers with rest  7/19 - respiratory distress overnight; required intubation, heavy sedation, mechanical ventilation, low dose pressor support;  post intubation abg shows continued worsening acidosis along with now worsening leukocytosis, ddimer, LD, and CRP  7/20 - remains intubated and sedated on mechanical ventilation with inverse ratio ventilation, some decrease in oxygen demand today; tmax yesterday 101.6; requiring low dose pressor support with sedation  7/21 - remains intubated and sedated on mechanical inverse ratio ventilation, oxygen demand down to 60%; t max 100.4; tolerating TF  7/22 - remains intubated and sedated, mode of ventilation adjusted to PCV today with high PEEP, moderate oxygen demand; tmax today 101.8 with wbc down to 14.7k and LD trend down  7/23 - Semi-erect in bed intubated on mech ventilation and sedated heavily on Fentanyl and Propofol infusions.  Requiring some Levophed infusion.  Tolerating TF.  Restless on vent overnight and this AM tachypnic and without vent synchrony.  A-flutter yesterday.    7/24 - Supine in bed sedated on Fentanyl, Propofol and Versed infusions with intubation on mech ventilation.  On low dose Levophed infusion.  Tolerating TF.  Still severe hypoxemia requiring high PEEP and 100% FiO2.  7/25 - Prone in bed since yesterday afternoon.  Intubated on mech ventilation and sedated on Versed, Propofol and Versed infusions.  Off TF while prone.  On Heparin infusion and low dose Levophed infusion  7/26 - Returned to supine position yesterday afternoon.  Semi-erect in bed intubated on mech ventilation some increased resp distress improved with increasing Versed infusion.  Persistent Hyperkalemia refractory to Rx discussed with Renal post consult and placed VasCath starting RRT.  Still requiring Versed, Fentanyl and Ketamine infusions for sedation.  Weaned off Levophed infusion.  Good UOP.  Heparin infusion stopped due to concern with hyperkalemia  7/27 - Supine in bed intubated still on mech ventilation with high O2 demand heavily sedated on Ketamine, Versed and Fentanyl infusions.  Still off pressors.  Tachycardia  persistent in 130s.  Good UOP.  7/28 taken off proning this am, fair urine output    Review of Systems   Unable to perform ROS: Intubated         Objective:     Vital Signs (Most Recent):  Temp: (!) 100.6 °F (38.1 °C) (07/28/20 0715)  Pulse: (!) 118 (07/28/20 0845)  Resp: (!) 30 (07/28/20 0845)  BP: (!) 97/30 (07/28/20 0830)  SpO2: 97 % (07/28/20 0845) Vital Signs (24h Range):  Temp:  [98.9 °F (37.2 °C)-102.5 °F (39.2 °C)] 100.6 °F (38.1 °C)  Pulse:  [102-145] 118  Resp:  [21-43] 30  SpO2:  [86 %-97 %] 97 %  BP: ()/(16-54) 97/30     Weight: 132.3 kg (291 lb 10.7 oz)  Body mass index is 45.68 kg/m².      Intake/Output Summary (Last 24 hours) at 7/28/2020 1125  Last data filed at 7/28/2020 0900  Gross per 24 hour   Intake 2377.5 ml   Output 3175 ml   Net -797.5 ml       Physical Exam  Vitals signs and nursing note reviewed.   Constitutional:       Appearance: He is obese. He is ill-appearing, toxic-appearing and diaphoretic.      Interventions: He is sedated, intubated and restrained.   HENT:      Head: Normocephalic and atraumatic.      Mouth/Throat:      Mouth: Mucous membranes are moist.   Eyes:      General: Lids are normal.      Conjunctiva/sclera: Conjunctivae normal.      Pupils: Pupils are equal, round, and reactive to light.   Neck:      Musculoskeletal: Neck supple.   Cardiovascular:      Rate and Rhythm: Regular rhythm. Tachycardia present.      Pulses:           Radial pulses are 1+ on the right side and 1+ on the left side.        Dorsalis pedis pulses are 1+ on the right side and 1+ on the left side.      Heart sounds: Heart sounds are distant.   Pulmonary:      Effort: Tachypnea, accessory muscle usage and respiratory distress present. No retractions. He is intubated.      Breath sounds: Decreased breath sounds and rales present.   Abdominal:      General: Bowel sounds are decreased. There is no distension.      Palpations: Abdomen is soft.      Comments: Obese   Genitourinary:     Penis: Normal.        Comments: Bagley in place  Musculoskeletal:      Right lower le+ Pitting Edema present.      Left lower le+ Pitting Edema present.   Lymphadenopathy:      Cervical: No cervical adenopathy.   Skin:     General: Skin is warm and moist.      Capillary Refill: Capillary refill takes 2 to 3 seconds.      Coloration: Skin is not cyanotic.          Neurological:      Comments: Heavily sedated, intubated         Vents:  Vent Mode: A/C (20)  Ventilator Initiated: Yes (20)  Set Rate: 30 BPM (20)  Vt Set: 0 mL (20 0200)  Pressure Support: 0 cmH20 (20 0500)  PEEP/CPAP: 18 cmH20 (20)  Oxygen Concentration (%): 100 (20)  Peak Airway Pressure: 36 cmH2O (20)  Plateau Pressure: 47 cmH20 (20 0200)  Total Ve: 18.5 mL (20)  F/VT Ratio<105 (RSBI): (!) 52.72 (20)    Lines/Drains/Airways     Peripherally Inserted Central Catheter Line            PICC Double Lumen 20 1130 right brachial 11 days          Central Venous Catheter Line                 Hemodialysis Catheter 20 1002 right femoral 2 days          Drain                 NG/OG Tube 20 0530 orogastric 16 Fr. 9 days         Urethral Catheter 20 0330 9 days          Airway                 Airway - Non-Surgical 20 0420 Endotracheal Tube 9 days          Arterial Line                 Arterial Line 20 1144 Right Radial 8 days                Significant Labs:    CBC/Anemia Profile:  Recent Labs   Lab 20  0415  20  0020 20  0232 20  0532   WBC 17.81*  --   --  28.35*  --    HGB 8.4*  --   --  8.7*  --    HCT 27.8*   < > 28* 29.5* 33*     --   --  299  --    *  --   --  104*  --    RDW 14.6*  --   --  15.0*  --    FERRITIN 4,134*  --   --   --   --     < > = values in this interval not displayed.        Chemistries:  Recent Labs   Lab 20  1303 20  1822 20  0415 20  0232   *   134* 133* 135* 137   K 4.6  4.6 6.0* 5.7* 5.5*   CL 97  97 97 96 97   CO2 28  28 27 29 29   BUN 16  16 23* 34* 52*   CREATININE 0.6  0.6 0.8 0.8 1.2   CALCIUM 9.3  9.3 9.6 9.4 9.2   ALBUMIN 1.7*  --  1.9* 2.0*   PROT  --   --  6.6 6.6   BILITOT  --   --  0.4 0.4   ALKPHOS  --   --  66 69   ALT  --   --  41 41   AST  --   --  35 40   MG  --   --  1.6 2.2   PHOS 2.9  --  4.8* 5.6*       ABGs:   Recent Labs   Lab 07/28/20  0532   PH 7.267*   PCO2 68.1*   HCO3 31.1*   POCSATURATED 98   BE 4     Coagulation:   No results for input(s): PT, INR, APTT in the last 48 hours.  POCT Glucose:   Recent Labs   Lab 07/27/20  1917 07/27/20  2300 07/28/20  0754   POCTGLUCOSE 128* 117* 76     All pertinent labs within the past 24 hours have been reviewed.  Trig 432    Significant Imaging:  CXR: I have reviewed all pertinent results/findings within the past 24 hours and my personal findings are:  Patchy bilateral airspace disease appears slightly worse at the left perihilar region and left lung base compared to prior exams. No pneumothorax. Tiny left-sided pleural effusion.  Lung volumes are slightly low.     X-Ray Chest 1 View  Narrative: EXAMINATION:  XR CHEST 1 VIEW    CLINICAL HISTORY:  PNA and resp failure;.    TECHNIQUE:  Single frontal portable view of the chest was performed.    COMPARISON:  07/23/2020    FINDINGS:  Support devices: Endotracheal tube, right-sided PICC line, esophagogastric tube in satisfactory position.    Patchy bilateral airspace disease appears slightly worse at the left perihilar region and left lung base compared to prior exams.  No pneumothorax.  Tiny left-sided pleural effusion.  Heart normal size.    Bones are intact.  Impression: Patchy bilateral airspace disease appears slightly worse at the left perihilar region and left lung base compared to prior exams. No pneumothorax. Tiny left-sided pleural effusion.  Lung volumes are slightly low.    Electronically signed by: Mohamud  Davin  Date:    07/27/2020  Time:    07:34          ABG  Recent Labs   Lab 07/28/20  0532   PH 7.267*   PO2 124*   PCO2 68.1*   HCO3 31.1*   BE 4     Assessment/Plan:     Pulmonary  * Acute respiratory failure with hypoxia  Cont full vent support  Cont PCV  Vent settings reviewed and adjusted multiple times  VAP prophylaxis  [email or web address] protocol  Daily ABGs  Too unstable for SAT/SBT    On mechanically assisted ventilation  See resp failure plan    Cardiac/Vascular  High triglycerides  Stop Propofol infusion 7/25  Repeat Trig still elevated repeat in 2 days    Essential hypertension  Hold Lisinopril while requiring low dose pressor    Renal/  Hyperkalemia  Cont Na Zirconium per OG with Albuterol and Lasix  Follow up labs  Placed VasCath 7/26 for emergent RRT done 7/26  Renal following  Reviewed all meds with pharmacy for source of hyperkalemia and essentially unremarkable (UFH risk of hyperkalemia 7%)  Follow K+ and repeat RRT if needed.     ID  Severe sepsis with acute organ dysfunction  Levophed weaned off  ICU hemodynamic monitoring  Blood and Sputum cultures done recently still NGTD  Cont IVAB for 7 days    Oncology  Anemia, unspecified  No active bleeding  Conservative transfusion protocol  Daily CBC     Endocrine  Severe protein-calorie malnutrition  Resumed TF and tolerating    Morbid obesity with BMI of 45.0-49.9, adult  Recommend diet and lifestyle modification for goal wt loss once medically stable    GI  RD recs noted  Advance TF    Other  Acute respiratory distress syndrome (ARDS) due to COVID-19 virus  Lung Protective Mechanical Ventilation w/ ARDSnet protocol  High PEEP and Low Vt  Attempt to maintain PIP < 30 cm H2O  Fluid Sparing Resuscitation  Permissive Hypercapnea  Plan prone this afternoon for 18 hours  7/28 Completed proning this am   Low Tidal Volume, Permissive hypercapnia mechanical ventilation:  Low Tidal volume - 4 to 8 mL/kg predicted body weight [PBW]) with assist control (AC) ventilation  mode.  Predicted body weight :  For females: PBW (kg) = 45.5 + 0.91 * (height [cm] - 152.4)  For males: PBW (kg) = 50 + 0.91 * (height [cm] - 152.4)  Plateau Pressure: goal Pplat of ?30 cm H2O           Pneumonia due to COVID-19 virus  Respiratory/Contact/Droplet Isolation with appropriate PPE -N95 mask, gown, goggles and gloves  Fluid Sparing Resuscitation  Completed course of empiric Antb earlier in admit  IVAB resumed 7/23 due to persistent fever and Leukocytosis  No Visitors  Consolidation of tests, nursing care and provider visits  Completed completed 5 day course of Rendisivir and 10 day course of Decadron  Zinc, Melatonin, and Vit C  7/28 continue support       Critical Care Daily Checklist:    A: Awake: RASS Goal/Actual Goal: RASS Goal: -4-->deep sedation  Actual: [place] Agitation Sedation Scale (RASS): Deep sedation   B: Spontaneous Breathing Trial Performed?     C: SAT & SBT Coordinated?  na                      D: Delirium: CAM-ICU Overall CAM-ICU: Positive   E: Early Mobility Performed? No   F: Feeding Goal: Goals: Meet >50% EEN/EPN by RD f/u  Status: Nutrition Goal Status: new   Current Diet Order   Procedures    Diet NPO      AS: Analgesia/Sedation adequate   T: Thromboembolic Prophylaxis needed   H: HOB > 300 Yes   U: Stress Ulcer Prophylaxis (if needed) y   G: Glucose Control adequate   B: Bowel Function Stool Occurrence: 1   I: Indwelling Catheter (Lines & Bagley) Necessity needed   D: De-escalation of Antimicrobials/Pharmacotherapies na    Plan for the day/ETD support    Code Status:  Family/Goals of Care: Full Code  No one available     Critical Care Time: 40 minutes  Critical secondary to Patient has a condition that poses threat to life and bodily function: Severe Respiratory Distress      Critical care was time spent personally by me on the following activities: development of treatment plan with patient or surrogate and bedside caregivers, discussions with consultants, evaluation of  patient's response to treatment, examination of patient, ordering and performing treatments and interventions, ordering and review of laboratory studies, ordering and review of radiographic studies, pulse oximetry, re-evaluation of patient's condition. This critical care time did not overlap with that of any other provider or involve time for any procedures.     Juan F Tena MD  Critical Care Medicine  [facility] [place]

## 2020-07-28 NOTE — PLAN OF CARE
Pt remains intubated and sedated to RASS -4. Pt resting more comfortably on vent today, PEEP 16, FiO2 decreased to 90% per RT, maintaining spO2 90-95%. Sinus tach on monitor, 110-120's. Metoprolol decreased to 50mg q6h to maintain BP. Tube feedings continued today at goal rate of 45cc/hr. Pt voiding per garcia with good UOP. Non-blanchable erythema and blister noted to ELLIE ears today, WC notified. Pt un-proned today at 1030. Lateral rotation set on bed for weight shift assistance. R UE PICC, CDI, infusing Ketamine @ 12.5 mcg/kg/min, Fent @ 200 mcg/hr, and Versed @ 10mg/hr. T max for shift was 100.6. Pt started on Vanc today.  Bed low, wheels locked, alarms audible. POC reviewed with wife today.

## 2020-07-28 NOTE — ASSESSMENT & PLAN NOTE
Lung Protective Mechanical Ventilation w/ ARDSnet protocol  High PEEP and Low Vt  Attempt to maintain PIP < 30 cm H2O  Fluid Sparing Resuscitation  Permissive Hypercapnea  Plan prone this afternoon for 18 hours  7/28 Completed proning this am   Low Tidal Volume, Permissive hypercapnia mechanical ventilation:  Low Tidal volume - 4 to 8 mL/kg predicted body weight [PBW]) with assist control (AC) ventilation mode.  Predicted body weight :  For females: PBW (kg) = 45.5 + 0.91 * (height [cm] - 152.4)  For males: PBW (kg) = 50 + 0.91 * (height [cm] - 152.4)  Plateau Pressure: goal Pplat of ?30 cm H2O

## 2020-07-28 NOTE — PROGRESS NOTES
[facility] Hospital Medicine  Progress Note    Patient Name: Jonas Abdalla  MRN: [id number]  Patient Class: IP- Inpatient   Admission Date: 7/11/2020  Length of Stay: 14 days  Attending Physician: Enmanuel Hu MD  Primary Care Provider: Carmel Tabares MD        Subjective:     Principal Problem:Acute respiratory failure with hypoxia        HPI:  Jonas Abdalla is a 35 year old male with a pMHx of HTN who presented to the Emergency Department with c/o SOB. Associated symptoms include: generalized weakness, fatigue, and fever. Pt reports symptoms started Monday, 07/06. He reports he thought he had COVID and went to get tested taht day but did not get test results. Went to local urgent care on Tuesday, 07/07 -- diagnosed with PNA and given Levaquin 500 mg po daily. Symptoms continued despite abx and wife took pt to COVID testing site which he was negative for COVID. Wife reports the COVID test he took on Monday came back negative, notified yesterday of results. ED workup showed: no leukocytosis/leukopenia, stable Hgb/Hct, mild hyponatremia. CXR showed focal right infrahilar/medial lung base consolidation concerning for pneumonia. Febrile upon arrival to . Pt received 30mL/kg fluid resuscitation in ED along with one time doses of IV rocephin/azithromycin. Pt placed on observation for Fever believed to be secondary to RML PNA. COVID rapid screening pending upon assessment, now POSITIVE.     Overview/Hospital Course:  Pt presented to the ED due to weakness, nausea/vomiting with concerns for dehydration. COVID ++. Running temps 103, 102.4, 101.8. He denied any SOB and DRAKE. He describes an occasional dry cough. On 2 L nasal cannula. On azithromycin, Rocephin, decadron and scheduled albuterol.   7/13/2020 - pt has continued to be febrile and now reporting SOB and productive cough. Provided remdesivir information and patient has consented. Oxygen escalated to 4 L.  7/14/2020 -  Sudden increase to oxygen 10 L, oxygen saturation 85 %. Temp 101.6 at 4 AM this AM. Current plan of care continued.   7/18- Pt was transferred to ICU on 7/15 with worsening respiratory status requiring NIPPV . Pt is awake , alert and oriented today .Mexed out on  vapotherm with hypoxia and placed on  NIPPV / BiPAP support. Remains  Tachypneic, easily desats with exertion or movement . Continue to monitor closely respiratory status  in ICU setting .   7/19 - Intubated electively  overnight due to increased WOB on bipap . Sedated on propofol, fentanyl. Levophed gtt added to maintain MAP>65. Heparin high intensity nomogram continues. Pt completed 5 days Remdesivir. On Decadron x 10 days . WBC count is markedly elevated . Likely reactive . Procalcitinin is normal. Creatinine bumped to 1.3 from 0.8 overnight.   7/2- Remains intubated . Tmax 100. Tachycardia and hypotension noted . On Levo to maintain MAP >65. Vent setting and weaning per ICU. 1st unit of convalescent plasma transfused  Overnight with no adverse effect. WBC trended down to 25.3 from 46.3, Pl down to 265 from 637, D d-rosa 3.7, CRP trended up 218>109.  7/21- Tmax 100.4 last evening and none since . Remains intubated . Some improvement with decreased oxygen demand. fiO2 down to 60%. Completed 2 units of convalescent plasma infusion without adverse effects. WBC slowly trending down 22.1, Hgb 11.6, Pl normal today . Ferritin uptrend 5752, D-dimer 3.57, Creatinine bumps to 1.5 from 1.0 yesterday . LDL trending down 886  7/22- Fever noted again. tmax 101.3. Noted pt be tachycardic. Remains intubated and sedated . Remains on Levo for to maintain MAP. Noted bolus fluid given. FiO2 bumped to 70%. Current SpO2 is satisfactory . Received 2 unit of convalescent plasma. WBC trending down. Hgb 11.4 , Pl count stable . Serum creatinine improved to 1.0 today     7/23- Tmax 100.3. Overnight developed decompensation with tachycardia , irregular rhythm , vent dyssynchrony ,  increased oxygen demand required paralytics Nimbex . FiO2 now 100% yet hypoxia. . Labs are fairly stable.     7/24- Tamx 100.2 last night. Remains intubated and sedated . Worsening hypoxia requiring high PEEP. fiO2 remains 100%. Unable to wean. ICU will try prone position today . WBC count bumped . H/H fairly stable. Hyperkalemia is noted in am lab.        Interval History:  Persistent hypoxia requiring PEEP 18 and fiO2 100%.  Hemodynamically stable.  Briefly required renal replacement therapy.    Review of Systems   Unable to perform ROS: Intubated     Objective:     Vital Signs (Most Recent):  Temp: 98.9 °F (37.2 °C) (07/27/20 1900)  Pulse: (!) 125 (07/27/20 2015)  Resp: (!) 37 (07/27/20 2015)  BP: (!) 96/54 (07/27/20 2000)  SpO2: (!) 94 % (07/27/20 2015) Vital Signs (24h Range):  Temp:  [98 °F (36.7 °C)-99.2 °F (37.3 °C)] 98.9 °F (37.2 °C)  Pulse:  [113-145] 125  Resp:  [22-43] 37  SpO2:  [86 %-100 %] 94 %  BP: ()/(24-77) 96/54     Weight: 132.3 kg (291 lb 10.7 oz)  Body mass index is 45.68 kg/m².    Intake/Output Summary (Last 24 hours) at 7/27/2020 2151  Last data filed at 7/27/2020 2000  Gross per 24 hour   Intake 2871.5 ml   Output 3800 ml   Net -928.5 ml      Physical Exam  Constitutional:       General: He is not in acute distress.     Appearance: He is well-developed. He is not diaphoretic.      Comments: Sedated on vent   HENT:      Head: Normocephalic and atraumatic.      Mouth/Throat:      Pharynx: No oropharyngeal exudate.   Eyes:      Conjunctiva/sclera: Conjunctivae normal.      Pupils: Pupils are equal, round, and reactive to light.   Neck:      Thyroid: No thyromegaly.      Vascular: No JVD.   Cardiovascular:      Rate and Rhythm: Normal rate and regular rhythm.      Heart sounds: Normal heart sounds. No murmur.   Pulmonary:      Effort: No respiratory distress.      Breath sounds: No wheezing or rales.      Comments: On vent   Chest:      Chest wall: No tenderness.   Abdominal:      General:  Bowel sounds are normal. There is no distension.      Palpations: Abdomen is soft.      Tenderness: There is no abdominal tenderness. There is no guarding or rebound.   Lymphadenopathy:      Cervical: No cervical adenopathy.   Skin:     General: Skin is warm and dry.      Findings: No rash.   Neurological:      Cranial Nerves: No cranial nerve deficit.      Sensory: No sensory deficit.      Deep Tendon Reflexes: Reflexes normal.      Comments: On vent , sedated         Significant Labs:   CBC:   Recent Labs   Lab 07/26/20  0405 07/27/20  0415 07/27/20  0728 07/27/20  1309 07/27/20  1927   WBC 14.27* 17.81*  --   --   --    HGB 8.7* 8.4*  --   --   --    HCT 29.6* 27.8* 26* 28* 29*    223  --   --   --      CMP:   Recent Labs   Lab 07/26/20  0405 07/26/20  1303 07/26/20  1822 07/27/20  0415   * 134*  134* 133* 135*   K 7.0* 4.6  4.6 6.0* 5.7*   CL 99 97  97 97 96   CO2 27 28  28 27 29    110  110 121* 103   BUN 35* 16  16 23* 34*   CREATININE 0.8 0.6  0.6 0.8 0.8   CALCIUM 10.1 9.3  9.3 9.6 9.4   PROT 6.6  --   --  6.6   ALBUMIN 1.7* 1.7*  --  1.9*   BILITOT 0.6  --   --  0.4   ALKPHOS 67  --   --  66   AST 38  --   --  35   ALT 45*  --   --  41   ANIONGAP 6* 9  9 9 10   EGFRNONAA >60 >60  >60 >60 >60.0       Significant Imaging:       Assessment/Plan:      * Acute respiratory failure with hypoxia  Encouraged deep breathing and coughing.  Transferred to ICU 14 July for respiratory distress.  Intubated 19 July.  Remains intubated persistent hypoxia.  PEEP 18 and fiO2 100%.  Wean respiratory support as able.    Pneumonia due to COVID-19 virus  - COVID-19 testing Collection Date: 7/11/2020 Collection Time:   11:55 AM  - Infection Control notified     - Isolation:   - Airborne, Contact and Droplet Precautions  - Cohort patients into COVID units  - N95 mask, wear eye protection  - 20 second hand hygiene              - Limit visitors per hospital policy              - Consolidating lab draws,  nursing care, provider visits, and interventions    - Diagnostics: (leukopenia, hyponatremia, hyperferritinemia, elevated troponin, elevated d-dimer, age, and comorbidities are significant predictors of poor clinical outcome)  CBC, CMP, Procalcitonin, Ferritin, CRP, LDH, BNP, Troponin, Rapid Flu, Blood Culture x2 and Portable CXR    - Management:  Supplemental O2 to maintain SpO2 >92%  Telemetry  Continuous/intermittent Pulse Ox  Albuterol treatment PRN  IV Rocephin/PO Azithromycin  Dexamethasone   Vit C, MVI  Scheduled albuterol  7/13/2020 - pt agreeable to receive Remdesivir - information received  7/19- Completed 5 days Remdesivir . On Decadron for 10 days   7/20- 1st unit of convalescent plasma transfused  with no adverse effect.  7/21-Completed 2 units of convalescent plasma infusion without adverse effects.Some improvement with decreased oxygen demand. fiO2 down to 60%.      Acute respiratory distress syndrome (ARDS) due to COVID-19 virus        Leukocytosis and thrombocytosis   7/19- Marked leukocytosis and thrombocytosis are noted. Likely reactive . Procalcitonin is neg. Blood cultures - NGTD. Endotracheal aspirate obtained for gram stain and culture.   7/21- Improving   7/24- Monitor counts       Pneumonia of right lower lobe due to infectious organism  IV Rocephin/Azithromycin  - empiric treatment completed   Supplemental oxygen  Sputum culture    Sepsis  - Sepsis criteria: Current temp of 101.3, RR 21, source PNA due to COVID-19 virus  - Blood cultures obtained >>>>> NGTD  - Recent COVID test outpt on 07/06/ 20 negative. Repeat COVID today in ED (+)  - Will treat underlying cause with abx, supplemental oxygen, MDI, oral steroids per COVID-19 protocol      Essential hypertension  - Pt normally takes lisinopril-hctz 20-12.5 po daily  - Will hold hctz and continue lisinopril at current dose and frequency  7/24-  All antihypertensives are on hold.  Continue pressors to keep MAP >65        VTE Risk Mitigation  (From admission, onward)         Ordered     enoxaparin injection 40 mg  Every 24 hours      07/26/20 1326     IP VTE HIGH RISK PATIENT  Once      07/19/20 0337     Place sequential compression device  Until discontinued      07/19/20 0337                Critical care time spent on the evaluation and treatment of severe organ dysfunction, review of pertinent labs and imaging studies, discussions with consulting providers and discussions with patient/family: 30 minutes.      Enmanuel Hu MD  Department of Hospital Medicine   [facility]

## 2020-07-28 NOTE — EICU
Notified of fever 102.5 request for Ibuprofen    Patient has been febrile the past few days  WBC trended up slightly  CXR with increasing infiltrate on the left as compared to CXR on 7/23 when Cefepime was resumed. Blood culture at that time NGTD.    · A one time dose of Ibuprofen ordered as with reported allergy to acetaminophen however hyperkalemic of unclear etiology  · Ordered to repeat blood, sputum and urine culture. May need to escalate antibiotics if patient deteriorates

## 2020-07-28 NOTE — PLAN OF CARE
POC and interventions discussed with family per Day shift according to what I was told in report.  Intubated, sedated and in Prone position all shift.  Oxygenating well - see serial ABG's.  Tmax 102.5, Fan on with Ibuprofen given, effective.  Heart Stach.  Hemodynamically stable, no vasopressors at this time.  Turned head q2h and swimmers position changed as well.

## 2020-07-28 NOTE — SUBJECTIVE & OBJECTIVE
Interval History:  Persistent hypoxia requiring PEEP 18 and fiO2 100%.  Hemodynamically stable.  Briefly required renal replacement therapy.    Review of Systems   Unable to perform ROS: Intubated     Objective:     Vital Signs (Most Recent):  Temp: 98.9 °F (37.2 °C) (07/27/20 1900)  Pulse: (!) 125 (07/27/20 2015)  Resp: (!) 37 (07/27/20 2015)  BP: (!) 96/54 (07/27/20 2000)  SpO2: (!) 94 % (07/27/20 2015) Vital Signs (24h Range):  Temp:  [98 °F (36.7 °C)-99.2 °F (37.3 °C)] 98.9 °F (37.2 °C)  Pulse:  [113-145] 125  Resp:  [22-43] 37  SpO2:  [86 %-100 %] 94 %  BP: ()/(24-77) 96/54     Weight: 132.3 kg (291 lb 10.7 oz)  Body mass index is 45.68 kg/m².    Intake/Output Summary (Last 24 hours) at 7/27/2020 2151  Last data filed at 7/27/2020 2000  Gross per 24 hour   Intake 2871.5 ml   Output 3800 ml   Net -928.5 ml      Physical Exam  Constitutional:       General: He is not in acute distress.     Appearance: He is well-developed. He is not diaphoretic.      Comments: Sedated on vent   HENT:      Head: Normocephalic and atraumatic.      Mouth/Throat:      Pharynx: No oropharyngeal exudate.   Eyes:      Conjunctiva/sclera: Conjunctivae normal.      Pupils: Pupils are equal, round, and reactive to light.   Neck:      Thyroid: No thyromegaly.      Vascular: No JVD.   Cardiovascular:      Rate and Rhythm: Normal rate and regular rhythm.      Heart sounds: Normal heart sounds. No murmur.   Pulmonary:      Effort: No respiratory distress.      Breath sounds: No wheezing or rales.      Comments: On vent   Chest:      Chest wall: No tenderness.   Abdominal:      General: Bowel sounds are normal. There is no distension.      Palpations: Abdomen is soft.      Tenderness: There is no abdominal tenderness. There is no guarding or rebound.   Lymphadenopathy:      Cervical: No cervical adenopathy.   Skin:     General: Skin is warm and dry.      Findings: No rash.   Neurological:      Cranial Nerves: No cranial nerve deficit.       Sensory: No sensory deficit.      Deep Tendon Reflexes: Reflexes normal.      Comments: On vent , sedated         Significant Labs:   CBC:   Recent Labs   Lab 07/26/20  0405 07/27/20  0415 07/27/20  0728 07/27/20  1309 07/27/20  1927   WBC 14.27* 17.81*  --   --   --    HGB 8.7* 8.4*  --   --   --    HCT 29.6* 27.8* 26* 28* 29*    223  --   --   --      CMP:   Recent Labs   Lab 07/26/20  0405 07/26/20  1303 07/26/20  1822 07/27/20  0415   * 134*  134* 133* 135*   K 7.0* 4.6  4.6 6.0* 5.7*   CL 99 97  97 97 96   CO2 27 28  28 27 29    110  110 121* 103   BUN 35* 16  16 23* 34*   CREATININE 0.8 0.6  0.6 0.8 0.8   CALCIUM 10.1 9.3  9.3 9.6 9.4   PROT 6.6  --   --  6.6   ALBUMIN 1.7* 1.7*  --  1.9*   BILITOT 0.6  --   --  0.4   ALKPHOS 67  --   --  66   AST 38  --   --  35   ALT 45*  --   --  41   ANIONGAP 6* 9  9 9 10   EGFRNONAA >60 >60  >60 >60 >60.0       Significant Imaging:

## 2020-07-28 NOTE — ASSESSMENT & PLAN NOTE
Respiratory/Contact/Droplet Isolation with appropriate PPE -N95 mask, gown, goggles and gloves  Fluid Sparing Resuscitation  Completed course of empiric Antb earlier in admit  IVAB resumed 7/23 due to persistent fever and Leukocytosis  No Visitors  Consolidation of tests, nursing care and provider visits  Completed completed 5 day course of Rendisivir and 10 day course of Decadron  Zinc, Melatonin, and Vit C  7/28 continue support

## 2020-07-28 NOTE — CONSULTS
Pharmacokinetic Initial Assessment: IV Vancomycin    Assessment/Plan:  Initiate intravenous vancomycin with a loading dose of 2000 mg once with subsequent doses when random concentrations are less than 20 mcg/mL  Desired empiric serum trough concentration is 10 to 20 mcg/mL  Draw vancomycin random level on 7/29 at 0430 with AM labs. (~12-hr level)  Pharmacy will continue to follow and monitor vancomycin.      Please contact pharmacy at extension [phone] with any questions regarding this assessment.     Thank you for the consult,   Katherine McArdle, Pharm.D. 7/28/2020 3:56 PM       Patient brief summary:  Jonas Abdalla is a 35 y.o. male initiated on antimicrobial therapy with IV Vancomycin for empiric coverage of leukocytosis, fever    Drug Allergies:   Review of patient's allergies indicates:   Allergen Reactions    Acetaminophen Hives       Actual Body Weight:   132.3 kg    Renal Function:   Estimated Creatinine Clearance: 114.7 mL/min (based on SCr of 1.2 mg/dL). -- up from 0.8; will pulse dose out of caution       CBC (last 72 hours):  Recent Labs   Lab Result Units 07/26/20  0405 07/27/20  0415 07/28/20  0232   WBC K/uL 14.27* 17.81* 28.35*   Hemoglobin g/dL 8.7* 8.4* 8.7*   Hematocrit % 29.6* 27.8* 29.5*   Platelets K/uL 225 223 299   Gran% % 77.0* 75.0* 74.0*   Lymph% % 4.0* 5.0* 8.0*   Mono% % 4.0 11.0 7.0   Eosinophil% % 3.0 1.0 2.0   Basophil% % 0.0 0.0 1.0   Differential Method  Manual Manual Manual       Metabolic Panel (last 72 hours):  Recent Labs   Lab Result Units 07/25/20  1638 07/25/20  2200 07/26/20  0405 07/26/20  1303 07/26/20  1822 07/27/20  0415 07/27/20  2314 07/28/20  0232   Sodium mmol/L 134* 132* 132* 134*  134* 133* 135*  --  137   Potassium mmol/L 6.7* 7.3* 7.0* 4.6  4.6 6.0* 5.7*  --  5.5*   Chloride mmol/L 97 98 99 97  97 97 96  --  97   CO2 mmol/L 27 29 27 28  28 27 29  --  29   Glucose mg/dL 117* 115* 103 110  110 121* 103  --  108   Glucose, UA   --   --   --   --    --   --  Negative  --    BUN, Bld mg/dL 46* 40* 35* 16  16 23* 34*  --  52*   Creatinine mg/dL 0.9 0.8 0.8 0.6  0.6 0.8 0.8  --  1.2   Albumin g/dL  --   --  1.7* 1.7*  --  1.9*  --  2.0*   Total Bilirubin mg/dL  --   --  0.6  --   --  0.4  --  0.4   Alkaline Phosphatase U/L  --   --  67  --   --  66  --  69   AST U/L  --   --  38  --   --  35  --  40   ALT U/L  --   --  45*  --   --  41  --  41   Magnesium mg/dL  --   --  2.2  --   --  1.6  --  2.2   Phosphorus mg/dL  --   --  4.0 2.9  --  4.8*  --  5.6*       Microbiologic Results:  Microbiology Results (last 7 days)     Procedure Component Value Units Date/Time    Blood culture [[id number]] Collected: 07/27/20 2320    Order Status: Sent Specimen: Blood Updated: 07/28/20 1348    Blood culture [[id number]] Collected: 07/27/20 2320    Order Status: Sent Specimen: Blood Updated: 07/28/20 1348    Culture, Respiratory with Gram Stain [[id number]] Collected: 07/27/20 2313    Order Status: Sent Specimen: Respiratory from Tracheal Aspirate Updated: 07/28/20 1036    Blood culture [[id number]] Collected: 07/22/20 1036    Order Status: Completed Specimen: Blood Updated: 07/27/20 2212     Blood Culture, Routine No growth after 5 days.    Blood culture [[id number]] Collected: 07/22/20 1036    Order Status: Completed Specimen: Blood Updated: 07/27/20 2212     Blood Culture, Routine No growth after 5 days.    Culture, Respiratory with Gram Stain [[id number]] Collected: 07/19/20 1138    Order Status: Completed Specimen: Respiratory from Endotracheal Aspirate Updated: 07/22/20 0857     Respiratory Culture Normal respiratory jay      No S aureus or Pseudomonas isolated.     Gram Stain (Respiratory) <10 epithelial cells per low power field.     Gram Stain (Respiratory) Rare WBC's     Gram Stain (Respiratory) Rare Gram positive cocci     Gram Stain (Respiratory) Rare Gram negative rods

## 2020-07-28 NOTE — PROGRESS NOTES
[facility] -   Nephrology  Progress Note    Patient Name: Jonas Abdalla  MRN: [id number]  Admission Date: 7/11/2020  Hospital Length of Stay: 15 days  Attending Provider: Enmanuel Hu MD   Primary Care Physician: Carmel Tabares MD  Principal Problem:Acute respiratory failure with hypoxia    Subjective:     HPI: Jonas Abdalla is a 35-year-old  man with history of hypertension, he was admitted to the hospital about 2 weeks ago for shortness of breath due to COVID infection, patient was intubated and placed in intensive care unit on vent support, prolonged ICU stay, in the last few days his serum potassium has been elevated, etiology unclear, medical management has failed, we were consulted for possible dialysis treatment to lower the potassium levels, kidney function is stable with serum creatinine at 0.8 mg/dL,        Interval History:  Urine output 2.7 L.  Potassium 5.5.  Mild increase in creatinine noted.  No renal replacement therapy plan today    Review of patient's allergies indicates:   Allergen Reactions    Acetaminophen Hives     Current Facility-Administered Medications   Medication Frequency    0.9%  NaCl infusion (for blood administration) Q24H PRN    albuterol-ipratropium 2.5 mg-0.5 mg/3 mL nebulizer solution 3 mL Q4H    ascorbic acid (vitamin C) tablet 500 mg BID    bisacodyL suppository 10 mg Daily PRN    calcium gluconate 1g in dextrose 5% 100mL (ready to mix system) Q10 Min PRN    cefepime in dextrose 5 % IVPB 2 g Q8H    chlorhexidine 0.12 % solution 15 mL BID    dextrose 50% injection 12.5 g PRN    dextrose 50% injection 25 g PRN    enoxaparin injection 40 mg Q24H    famotidine (PF) injection 20 mg BID    fentaNYL 2500 mcg in D5W 250 mL infusion premix (titrating) (conc: 10 mcg/mL) Continuous    glucagon (human recombinant) injection 1 mg PRN    glucose chewable tablet 16 g PRN    glucose chewable tablet 24 g PRN    ibuprofen 100 mg/5 mL  suspension 200 mg Once PRN    insulin aspart U-100 pen 1-10 Units Q4H PRN    ketamine (KETALAR) 1,000 mg in sodium chloride 0.9% 500 mL infusion Continuous    melatonin tablet 6 mg Nightly    metoprolol tartrate (LOPRESSOR) tablet 50 mg Q6H    metronidazole IVPB 500 mg Q8H    midazolam 100 mg/100 mL in dextrose 5% infusion Continuous    multivit-min-ferrous gluconate 9 mg iron/15 mL Liqd 5 mL Daily    norepinephrine 32 mg in dextrose 5 % 250 mL infusion Continuous    ondansetron injection 4 mg Q8H PRN    pneumoc 13-melvina conj-dip cr(PF) (PREVNAR 13 (PF)) 0.5 mL vaccine x 1 dose    polyethylene glycol packet 17 g BID    promethazine (PHENERGAN) 6.25 mg in dextrose 5 % 50 mL IVPB Q6H PRN    sodium chloride 0.9% flush 10 mL PRN    sodium zirconium cyclosilicate packet 5 g BID    vancomycin - pharmacy to dose pharmacy to manage frequency    vancomycin 2 g in dextrose 5 % 500 mL IVPB Once    white petrolatum 41 % ointment Once    white petrolatum-mineral oil (LUBIFRESH P.M.) ophthalmic ointment Q8H       Objective:     Vital Signs (Most Recent):  Temp: 99.3 °F (37.4 °C) (07/28/20 1115)  Pulse: (!) 115 (07/28/20 1550)  Resp: (!) 32 (07/28/20 1550)  BP: (!) 81/25 (07/28/20 1500)  SpO2: 97 % (07/28/20 1550)  O2 Device (Oxygen Therapy): ventilator (07/28/20 1550) Vital Signs (24h Range):  Temp:  [98.9 °F (37.2 °C)-102.5 °F (39.2 °C)] 99.3 °F (37.4 °C)  Pulse:  [100-139] 115  Resp:  [21-43] 32  SpO2:  [91 %-100 %] 97 %  BP: ()/(16-54) 81/25     Weight: 132.3 kg (291 lb 10.7 oz) (07/15/20 0039)  Body mass index is 45.68 kg/m².  Body surface area is 2.5 meters squared.    I/O last 3 completed shifts:  In: 4416.5 [I.V.:2666.5; NG/GT:1000; IV Piggyback:750]  Out: 5775 [Urine:5775]    Physical Exam  Vitals signs and nursing note reviewed.   Constitutional:       Appearance: He is obese. He is ill-appearing, toxic-appearing and diaphoretic.      Interventions: He is sedated, intubated and restrained.   HENT:       Head: Normocephalic and atraumatic.      Mouth/Throat:      Mouth: Mucous membranes are moist.   Eyes:      General: Lids are normal.      Conjunctiva/sclera: Conjunctivae normal.      Pupils: Pupils are equal, round, and reactive to light.   Neck:      Musculoskeletal: Neck supple.   Cardiovascular:      Rate and Rhythm: Regular rhythm. Tachycardia present.      Pulses:           Radial pulses are 1+ on the right side and 1+ on the left side.        Dorsalis pedis pulses are 1+ on the right side and 1+ on the left side.      Heart sounds: Heart sounds are distant.   Pulmonary:      Effort: Tachypnea, accessory muscle usage and respiratory distress present. No retractions. He is intubated.      Breath sounds: Decreased breath sounds and rales present.   Abdominal:      General: Bowel sounds are decreased. There is no distension.      Palpations: Abdomen is soft.      Comments: Obese   Genitourinary:     Penis: Normal.       Comments: Bagley in place  Musculoskeletal:      Right lower le+ Pitting Edema present.      Left lower le+ Pitting Edema present.   Lymphadenopathy:      Cervical: No cervical adenopathy.   Skin:     General: Skin is warm and moist.      Capillary Refill: Capillary refill takes 2 to 3 seconds.      Coloration: Skin is not cyanotic.          Neurological:      Comments: Heavily sedated, intubated         Significant Labs:  All labs within the past 24 hours have been reviewed.     Significant Imaging:      Assessment/Plan:     Hyperkalemia  Urine output 2.7 L.  Potassium 5.5.  Mild increase in creatinine noted.  No renal replacement therapy plan today            Thank you for your consult.     Denton Caputo MD  Nephrology  [facility]

## 2020-07-28 NOTE — PROGRESS NOTES
High risk for skin breakdown screening completed on Mr. Abdalla. He is admitted in ICU > 2 weeks with acute respiratory failure with hypoxia, pneumonia and ARDS due to COVID 19 virus, sepsis, anemia, morbid obesity. He is intubated and sedated, currently off IV pressors. He was Proned overnight and upon returning to supine this morning, nonblanchable redness was noted to Left ear and intact serous fluid filled blister is noted to right lateral ear (measures 0.4x0.2 cm). Painted both with cavilon. Recommend apply foam dressing to bilateral ears if re-proning patient. Will follow.

## 2020-07-28 NOTE — PLAN OF CARE
Patient remains intubated.       07/28/20 1314   Discharge Reassessment   Assessment Type Discharge Planning Reassessment   Provided patient/caregiver education on the expected discharge date and the discharge plan No   Do you have any problems affording any of your prescribed medications? TBD   Discharge Plan A Long-term acute care facility (LTAC)   Discharge Plan B Home with family   DME Needed Upon Discharge  oxygen   Patient choice form signed by patient/caregiver N/A   Anticipated Discharge Disposition LTAC   Can the patient/caregiver answer the patient profile reliably? No historian available   Describe the patient's ability to walk at the present time. Does not walk or unable to take any steps at all

## 2020-07-28 NOTE — SUBJECTIVE & OBJECTIVE
Review of Systems   Unable to perform ROS: Intubated         Objective:     Vital Signs (Most Recent):  Temp: (!) 100.6 °F (38.1 °C) (20 0715)  Pulse: (!) 118 (20 0845)  Resp: (!) 30 (20 0845)  BP: (!) 97/30 (20 0830)  SpO2: 97 % (20 0845) Vital Signs (24h Range):  Temp:  [98.9 °F (37.2 °C)-102.5 °F (39.2 °C)] 100.6 °F (38.1 °C)  Pulse:  [102-145] 118  Resp:  [21-43] 30  SpO2:  [86 %-97 %] 97 %  BP: ()/(16-54) 97/30     Weight: 132.3 kg (291 lb 10.7 oz)  Body mass index is 45.68 kg/m².      Intake/Output Summary (Last 24 hours) at 2020 1125  Last data filed at 2020 0900  Gross per 24 hour   Intake 2377.5 ml   Output 3175 ml   Net -797.5 ml       Physical Exam  Vitals signs and nursing note reviewed.   Constitutional:       Appearance: He is obese. He is ill-appearing, toxic-appearing and diaphoretic.      Interventions: He is sedated, intubated and restrained.   HENT:      Head: Normocephalic and atraumatic.      Mouth/Throat:      Mouth: Mucous membranes are moist.   Eyes:      General: Lids are normal.      Conjunctiva/sclera: Conjunctivae normal.      Pupils: Pupils are equal, round, and reactive to light.   Neck:      Musculoskeletal: Neck supple.   Cardiovascular:      Rate and Rhythm: Regular rhythm. Tachycardia present.      Pulses:           Radial pulses are 1+ on the right side and 1+ on the left side.        Dorsalis pedis pulses are 1+ on the right side and 1+ on the left side.      Heart sounds: Heart sounds are distant.   Pulmonary:      Effort: Tachypnea, accessory muscle usage and respiratory distress present. No retractions. He is intubated.      Breath sounds: Decreased breath sounds and rales present.   Abdominal:      General: Bowel sounds are decreased. There is no distension.      Palpations: Abdomen is soft.      Comments: Obese   Genitourinary:     Penis: Normal.       Comments: Bagley in place  Musculoskeletal:      Right lower le+ Pitting  Edema present.      Left lower le+ Pitting Edema present.   Lymphadenopathy:      Cervical: No cervical adenopathy.   Skin:     General: Skin is warm and moist.      Capillary Refill: Capillary refill takes 2 to 3 seconds.      Coloration: Skin is not cyanotic.          Neurological:      Comments: Heavily sedated, intubated         Vents:  Vent Mode: A/C (20 075)  Ventilator Initiated: Yes (20 0415)  Set Rate: 30 BPM (20)  Vt Set: 0 mL (20 0200)  Pressure Support: 0 cmH20 (20 0500)  PEEP/CPAP: 18 cmH20 (20 075)  Oxygen Concentration (%): 100 (20)  Peak Airway Pressure: 36 cmH2O (20)  Plateau Pressure: 47 cmH20 (20 0200)  Total Ve: 18.5 mL (20 075)  F/VT Ratio<105 (RSBI): (!) 52.72 (20)    Lines/Drains/Airways     Peripherally Inserted Central Catheter Line            PICC Double Lumen 20 1130 right brachial 11 days          Central Venous Catheter Line                 Hemodialysis Catheter 20 1002 right femoral 2 days          Drain                 NG/OG Tube 20 0530 orogastric 16 Fr. 9 days         Urethral Catheter 20 0330 9 days          Airway                 Airway - Non-Surgical 20 0420 Endotracheal Tube 9 days          Arterial Line                 Arterial Line 20 1144 Right Radial 8 days                Significant Labs:    CBC/Anemia Profile:  Recent Labs   Lab 20  0415  20  0020 20  0232 20  0532   WBC 17.81*  --   --  28.35*  --    HGB 8.4*  --   --  8.7*  --    HCT 27.8*   < > 28* 29.5* 33*     --   --  299  --    *  --   --  104*  --    RDW 14.6*  --   --  15.0*  --    FERRITIN 4,134*  --   --   --   --     < > = values in this interval not displayed.        Chemistries:  Recent Labs   Lab 20  1303 20  1822 20  0415 20  0232   *  134* 133* 135* 137   K 4.6  4.6 6.0* 5.7* 5.5*   CL 97  97 97 96 97   CO2 28   28 27 29 29   BUN 16  16 23* 34* 52*   CREATININE 0.6  0.6 0.8 0.8 1.2   CALCIUM 9.3  9.3 9.6 9.4 9.2   ALBUMIN 1.7*  --  1.9* 2.0*   PROT  --   --  6.6 6.6   BILITOT  --   --  0.4 0.4   ALKPHOS  --   --  66 69   ALT  --   --  41 41   AST  --   --  35 40   MG  --   --  1.6 2.2   PHOS 2.9  --  4.8* 5.6*       ABGs:   Recent Labs   Lab 07/28/20  0532   PH 7.267*   PCO2 68.1*   HCO3 31.1*   POCSATURATED 98   BE 4     Coagulation:   No results for input(s): PT, INR, APTT in the last 48 hours.  POCT Glucose:   Recent Labs   Lab 07/27/20  1917 07/27/20  2300 07/28/20  0754   POCTGLUCOSE 128* 117* 76     All pertinent labs within the past 24 hours have been reviewed.  Trig 432    Significant Imaging:  CXR: I have reviewed all pertinent results/findings within the past 24 hours and my personal findings are:  Patchy bilateral airspace disease appears slightly worse at the left perihilar region and left lung base compared to prior exams. No pneumothorax. Tiny left-sided pleural effusion.  Lung volumes are slightly low.     X-Ray Chest 1 View  Narrative: EXAMINATION:  XR CHEST 1 VIEW    CLINICAL HISTORY:  PNA and resp failure;.    TECHNIQUE:  Single frontal portable view of the chest was performed.    COMPARISON:  07/23/2020    FINDINGS:  Support devices: Endotracheal tube, right-sided PICC line, esophagogastric tube in satisfactory position.    Patchy bilateral airspace disease appears slightly worse at the left perihilar region and left lung base compared to prior exams.  No pneumothorax.  Tiny left-sided pleural effusion.  Heart normal size.    Bones are intact.  Impression: Patchy bilateral airspace disease appears slightly worse at the left perihilar region and left lung base compared to prior exams. No pneumothorax. Tiny left-sided pleural effusion.  Lung volumes are slightly low.    Electronically signed by: Mohamud Jin  Date:    07/27/2020  Time:    07:34

## 2020-07-28 NOTE — ASSESSMENT & PLAN NOTE
Encouraged deep breathing and coughing.  Transferred to ICU 14 July for respiratory distress.  Intubated 19 July.  Remains intubated persistent hypoxia.  PEEP 18 and fiO2 100%.  Wean respiratory support as able.

## 2020-07-28 NOTE — ASSESSMENT & PLAN NOTE
Urine output 2.7 L.  Potassium 5.5.  Mild increase in creatinine noted.  No renal replacement therapy plan today

## 2020-07-29 NOTE — PROGRESS NOTES
[facility] Hospital Medicine  Progress Note    Patient Name: Jonas Abdalla  MRN: [id number]  Patient Class: IP- Inpatient   Admission Date: 7/11/2020  Length of Stay: 15 days  Attending Physician: Enmanuel Hu MD  Primary Care Provider: Carmel Tabares MD        Subjective:     Principal Problem:Acute respiratory failure with hypoxia        HPI:  Jonas Abdalla is a 35 year old male with a pMHx of HTN who presented to the Emergency Department with c/o SOB. Associated symptoms include: generalized weakness, fatigue, and fever. Pt reports symptoms started Monday, 07/06. He reports he thought he had COVID and went to get tested taht day but did not get test results. Went to local urgent care on Tuesday, 07/07 -- diagnosed with PNA and given Levaquin 500 mg po daily. Symptoms continued despite abx and wife took pt to COVID testing site which he was negative for COVID. Wife reports the COVID test he took on Monday came back negative, notified yesterday of results. ED workup showed: no leukocytosis/leukopenia, stable Hgb/Hct, mild hyponatremia. CXR showed focal right infrahilar/medial lung base consolidation concerning for pneumonia. Febrile upon arrival to . Pt received 30mL/kg fluid resuscitation in ED along with one time doses of IV rocephin/azithromycin. Pt placed on observation for Fever believed to be secondary to RML PNA. COVID rapid screening pending upon assessment, now POSITIVE.     Overview/Hospital Course:  Pt presented to the ED due to weakness, nausea/vomiting with concerns for dehydration. COVID ++. Running temps 103, 102.4, 101.8. He denied any SOB and DRAKE. He describes an occasional dry cough. On 2 L nasal cannula. On azithromycin, Rocephin, decadron and scheduled albuterol.   7/13/2020 - pt has continued to be febrile and now reporting SOB and productive cough. Provided remdesivir information and patient has consented. Oxygen escalated to 4 L.  7/14/2020 -  Sudden increase to oxygen 10 L, oxygen saturation 85 %. Temp 101.6 at 4 AM this AM. Current plan of care continued.   7/18- Pt was transferred to ICU on 7/15 with worsening respiratory status requiring NIPPV . Pt is awake , alert and oriented today .Mexed out on  vapotherm with hypoxia and placed on  NIPPV / BiPAP support. Remains  Tachypneic, easily desats with exertion or movement . Continue to monitor closely respiratory status  in ICU setting .   7/19 - Intubated electively  overnight due to increased WOB on bipap . Sedated on propofol, fentanyl. Levophed gtt added to maintain MAP>65. Heparin high intensity nomogram continues. Pt completed 5 days Remdesivir. On Decadron x 10 days . WBC count is markedly elevated . Likely reactive . Procalcitinin is normal. Creatinine bumped to 1.3 from 0.8 overnight.   7/2- Remains intubated . Tmax 100. Tachycardia and hypotension noted . On Levo to maintain MAP >65. Vent setting and weaning per ICU. 1st unit of convalescent plasma transfused  Overnight with no adverse effect. WBC trended down to 25.3 from 46.3, Pl down to 265 from 637, D d-rosa 3.7, CRP trended up 218>109.  7/21- Tmax 100.4 last evening and none since . Remains intubated . Some improvement with decreased oxygen demand. fiO2 down to 60%. Completed 2 units of convalescent plasma infusion without adverse effects. WBC slowly trending down 22.1, Hgb 11.6, Pl normal today . Ferritin uptrend 5752, D-dimer 3.57, Creatinine bumps to 1.5 from 1.0 yesterday . LDL trending down 886  7/22- Fever noted again. tmax 101.3. Noted pt be tachycardic. Remains intubated and sedated . Remains on Levo for to maintain MAP. Noted bolus fluid given. FiO2 bumped to 70%. Current SpO2 is satisfactory . Received 2 unit of convalescent plasma. WBC trending down. Hgb 11.4 , Pl count stable . Serum creatinine improved to 1.0 today     7/23- Tmax 100.3. Overnight developed decompensation with tachycardia , irregular rhythm , vent dyssynchrony ,  increased oxygen demand required paralytics Nimbex . FiO2 now 100% yet hypoxia. . Labs are fairly stable.     7/24- Tamx 100.2 last night. Remains intubated and sedated . Worsening hypoxia requiring high PEEP. fiO2 remains 100%. Unable to wean. ICU will try prone position today . WBC count bumped . H/H fairly stable. Hyperkalemia is noted in am lab.        Interval History:  Persistent hypoxia requiring PEEP 16 and fiO2 100%.  Hemodynamically stable.  Briefly required renal replacement therapy.    Review of Systems   Unable to perform ROS: Intubated     Objective:     Vital Signs (Most Recent):  Temp: 99.1 °F (37.3 °C) (07/28/20 1900)  Pulse: (!) 124 (07/28/20 2118)  Resp: (!) 34 (07/28/20 1945)  BP: (!) 95/47 (07/28/20 1915)  SpO2: 95 % (07/28/20 2118) Vital Signs (24h Range):  Temp:  [99.1 °F (37.3 °C)-102.5 °F (39.2 °C)] 99.1 °F (37.3 °C)  Pulse:  [100-138] 124  Resp:  [21-40] 34  SpO2:  [90 %-100 %] 95 %  BP: ()/(16-47) 95/47     Weight: 132.3 kg (291 lb 10.7 oz)  Body mass index is 45.68 kg/m².    Intake/Output Summary (Last 24 hours) at 7/28/2020 2133  Last data filed at 7/28/2020 1900  Gross per 24 hour   Intake 3153.2 ml   Output 2550 ml   Net 603.2 ml      Physical Exam  Constitutional:       General: He is not in acute distress.     Appearance: He is well-developed. He is not diaphoretic.      Comments: Sedated on vent   HENT:      Head: Normocephalic and atraumatic.      Mouth/Throat:      Pharynx: No oropharyngeal exudate.   Eyes:      Conjunctiva/sclera: Conjunctivae normal.      Pupils: Pupils are equal, round, and reactive to light.   Neck:      Thyroid: No thyromegaly.      Vascular: No JVD.   Cardiovascular:      Rate and Rhythm: Normal rate and regular rhythm.      Heart sounds: Normal heart sounds. No murmur.   Pulmonary:      Effort: No respiratory distress.      Breath sounds: No wheezing or rales.      Comments: On vent   Chest:      Chest wall: No tenderness.   Abdominal:      General:  Bowel sounds are normal. There is no distension.      Palpations: Abdomen is soft.      Tenderness: There is no abdominal tenderness. There is no guarding or rebound.   Lymphadenopathy:      Cervical: No cervical adenopathy.   Skin:     General: Skin is warm and dry.      Findings: No rash.   Neurological:      Cranial Nerves: No cranial nerve deficit.      Sensory: No sensory deficit.      Deep Tendon Reflexes: Reflexes normal.      Comments: On vent , sedated         Significant Labs:   CBC:   Recent Labs   Lab 07/27/20  0415  07/28/20  0232 07/28/20  0532 07/28/20  1250   WBC 17.81*  --  28.35*  --   --    HGB 8.4*  --  8.7*  --   --    HCT 27.8*   < > 29.5* 33* 25*     --  299  --   --     < > = values in this interval not displayed.     CMP:   Recent Labs   Lab 07/27/20 0415 07/28/20  0232   * 137   K 5.7* 5.5*   CL 96 97   CO2 29 29    108   BUN 34* 52*   CREATININE 0.8 1.2   CALCIUM 9.4 9.2   PROT 6.6 6.6   ALBUMIN 1.9* 2.0*   BILITOT 0.4 0.4   ALKPHOS 66 69   AST 35 40   ALT 41 41   ANIONGAP 10 11   EGFRNONAA >60.0 >60       Significant Imaging:       Assessment/Plan:      * Acute respiratory failure with hypoxia  Encouraged deep breathing and coughing.  Transferred to ICU 14 July for respiratory distress.  Intubated 19 July.  Remains intubated persistent hypoxia.  PEEP 16 and fiO2 100%.  Wean respiratory support as able.    Pneumonia due to COVID-19 virus  - COVID-19 testing Collection Date: 7/11/2020 Collection Time:   11:55 AM  - Infection Control notified     - Isolation:   - Airborne, Contact and Droplet Precautions  - Cohort patients into COVID units  - N95 mask, wear eye protection  - 20 second hand hygiene              - Limit visitors per hospital policy              - Consolidating lab draws, nursing care, provider visits, and interventions    - Diagnostics: (leukopenia, hyponatremia, hyperferritinemia, elevated troponin, elevated d-dimer, age, and comorbidities are significant  predictors of poor clinical outcome)  CBC, CMP, Procalcitonin, Ferritin, CRP, LDH, BNP, Troponin, Rapid Flu, Blood Culture x2 and Portable CXR    - Management:  Supplemental O2 to maintain SpO2 >92%  Telemetry  Continuous/intermittent Pulse Ox  Albuterol treatment PRN  IV Rocephin/PO Azithromycin  Dexamethasone   Vit C, MVI  Scheduled albuterol  7/13/2020 - pt agreeable to receive Remdesivir - information received  7/19- Completed 5 days Remdesivir . On Decadron for 10 days   7/20- 1st unit of convalescent plasma transfused  with no adverse effect.  7/21-Completed 2 units of convalescent plasma infusion without adverse effects.Some improvement with decreased oxygen demand. fiO2 down to 60%.      Acute respiratory distress syndrome (ARDS) due to COVID-19 virus        Leukocytosis and thrombocytosis   7/19- Marked leukocytosis and thrombocytosis are noted. Likely reactive . Procalcitonin is neg. Blood cultures - NGTD. Endotracheal aspirate obtained for gram stain and culture.   7/21- Improving   7/24- Monitor counts       Pneumonia of right lower lobe due to infectious organism  IV Rocephin/Azithromycin  - empiric treatment completed   Supplemental oxygen  Sputum culture    Sepsis  - Sepsis criteria: Current temp of 101.3, RR 21, source PNA due to COVID-19 virus  - Blood cultures obtained >>>>> NGTD  - Recent COVID test outpt on 07/06/ 20 negative. Repeat COVID today in ED (+)  - Will treat underlying cause with abx, supplemental oxygen, MDI, oral steroids per COVID-19 protocol      Essential hypertension  - Pt normally takes lisinopril-hctz 20-12.5 po daily  - Will hold hctz and continue lisinopril at current dose and frequency  7/24-  All antihypertensives are on hold.  Continue pressors to keep MAP >65        VTE Risk Mitigation (From admission, onward)         Ordered     heparin (porcine) injection 1,200 Units  As needed (PRN)      07/28/20 1935     enoxaparin injection 40 mg  Every 24 hours      07/26/20 6471      IP VTE HIGH RISK PATIENT  Once      07/19/20 0337     Place sequential compression device  Until discontinued      07/19/20 0337                Critical care time spent on the evaluation and treatment of severe organ dysfunction, review of pertinent labs and imaging studies, discussions with consulting providers and discussions with patient/family: 30 minutes.      Enmanuel Hu MD  Department of Hospital Medicine   [facility]

## 2020-07-29 NOTE — SUBJECTIVE & OBJECTIVE
Interval History:  Persistent hypoxia requiring PEEP 16 and fiO2 100%.  Hemodynamically stable.  Briefly required renal replacement therapy.    Review of Systems   Unable to perform ROS: Intubated     Objective:     Vital Signs (Most Recent):  Temp: 99.1 °F (37.3 °C) (07/28/20 1900)  Pulse: (!) 124 (07/28/20 2118)  Resp: (!) 34 (07/28/20 1945)  BP: (!) 95/47 (07/28/20 1915)  SpO2: 95 % (07/28/20 2118) Vital Signs (24h Range):  Temp:  [99.1 °F (37.3 °C)-102.5 °F (39.2 °C)] 99.1 °F (37.3 °C)  Pulse:  [100-138] 124  Resp:  [21-40] 34  SpO2:  [90 %-100 %] 95 %  BP: ()/(16-47) 95/47     Weight: 132.3 kg (291 lb 10.7 oz)  Body mass index is 45.68 kg/m².    Intake/Output Summary (Last 24 hours) at 7/28/2020 2133  Last data filed at 7/28/2020 1900  Gross per 24 hour   Intake 3153.2 ml   Output 2550 ml   Net 603.2 ml      Physical Exam  Constitutional:       General: He is not in acute distress.     Appearance: He is well-developed. He is not diaphoretic.      Comments: Sedated on vent   HENT:      Head: Normocephalic and atraumatic.      Mouth/Throat:      Pharynx: No oropharyngeal exudate.   Eyes:      Conjunctiva/sclera: Conjunctivae normal.      Pupils: Pupils are equal, round, and reactive to light.   Neck:      Thyroid: No thyromegaly.      Vascular: No JVD.   Cardiovascular:      Rate and Rhythm: Normal rate and regular rhythm.      Heart sounds: Normal heart sounds. No murmur.   Pulmonary:      Effort: No respiratory distress.      Breath sounds: No wheezing or rales.      Comments: On vent   Chest:      Chest wall: No tenderness.   Abdominal:      General: Bowel sounds are normal. There is no distension.      Palpations: Abdomen is soft.      Tenderness: There is no abdominal tenderness. There is no guarding or rebound.   Lymphadenopathy:      Cervical: No cervical adenopathy.   Skin:     General: Skin is warm and dry.      Findings: No rash.   Neurological:      Cranial Nerves: No cranial nerve deficit.       Sensory: No sensory deficit.      Deep Tendon Reflexes: Reflexes normal.      Comments: On vent , sedated         Significant Labs:   CBC:   Recent Labs   Lab 07/27/20  0415  07/28/20  0232 07/28/20  0532 07/28/20  1250   WBC 17.81*  --  28.35*  --   --    HGB 8.4*  --  8.7*  --   --    HCT 27.8*   < > 29.5* 33* 25*     --  299  --   --     < > = values in this interval not displayed.     CMP:   Recent Labs   Lab 07/27/20 0415 07/28/20  0232   * 137   K 5.7* 5.5*   CL 96 97   CO2 29 29    108   BUN 34* 52*   CREATININE 0.8 1.2   CALCIUM 9.4 9.2   PROT 6.6 6.6   ALBUMIN 1.9* 2.0*   BILITOT 0.4 0.4   ALKPHOS 66 69   AST 35 40   ALT 41 41   ANIONGAP 10 11   EGFRNONAA >60.0 >60       Significant Imaging:

## 2020-07-29 NOTE — SUBJECTIVE & OBJECTIVE
Interval History:  Kidney function and potassium are stable without any need for renal replacement therapy    Review of patient's allergies indicates:   Allergen Reactions    Acetaminophen Hives     Current Facility-Administered Medications   Medication Frequency    0.9%  NaCl infusion (for blood administration) Q24H PRN    albuterol-ipratropium 2.5 mg-0.5 mg/3 mL nebulizer solution 3 mL Q4H    ascorbic acid (vitamin C) tablet 500 mg BID    bisacodyL suppository 10 mg Daily PRN    calcium gluconate 1g in dextrose 5% 100mL (ready to mix system) Q10 Min PRN    cefepime in dextrose 5 % IVPB 2 g Q8H    chlorhexidine 0.12 % solution 15 mL BID    enoxaparin injection 40 mg Q24H    famotidine (PF) injection 20 mg BID    fentaNYL 2500 mcg in D5W 250 mL infusion premix (titrating) (conc: 10 mcg/mL) Continuous    heparin (porcine) injection 1,200 Units PRN    ketamine (KETALAR) 1,000 mg in sodium chloride 0.9% 500 mL infusion Continuous    melatonin tablet 6 mg Nightly    metoprolol tartrate tablet 75 mg Q6H    metronidazole IVPB 500 mg Q8H    midazolam 100 mg/100 mL in dextrose 5% infusion Continuous    multivit-min-ferrous gluconate 9 mg iron/15 mL Liqd 5 mL Daily    norepinephrine 32 mg in dextrose 5 % 250 mL infusion Continuous    ondansetron injection 4 mg Q8H PRN    pneumoc 13-melvina conj-dip cr(PF) (PREVNAR 13 (PF)) 0.5 mL vaccine x 1 dose    polyethylene glycol packet 17 g BID    promethazine (PHENERGAN) 6.25 mg in dextrose 5 % 50 mL IVPB Q6H PRN    sodium chloride 0.9% flush 10 mL PRN    sodium zirconium cyclosilicate packet 5 g BID    vancomycin - pharmacy to dose pharmacy to manage frequency    white petrolatum-mineral oil (LUBIFRESH P.M.) ophthalmic ointment Q8H       Objective:     Vital Signs (Most Recent):  Temp: 99.2 °F (37.3 °C) (07/29/20 1115)  Pulse: (!) 120 (07/29/20 1315)  Resp: (!) 30 (07/29/20 1315)  BP: (!) 98/32 (07/29/20 1300)  SpO2: 96 % (07/29/20 1315)  O2 Device (Oxygen  Therapy): ventilator (07/29/20 1310) Vital Signs (24h Range):  Temp:  [99.1 °F (37.3 °C)-101.7 °F (38.7 °C)] 99.2 °F (37.3 °C)  Pulse:  [106-138] 120  Resp:  [19-41] 30  SpO2:  [84 %-100 %] 96 %  BP: ()/(26-60) 98/32     Weight: 132.3 kg (291 lb 10.7 oz) (07/15/20 0039)  Body mass index is 45.68 kg/m².  Body surface area is 2.5 meters squared.    I/O last 3 completed shifts:  In: 5068.8 [I.V.:2728.8; NG/GT:1090; IV Piggyback:1250]  Out: 4245 [Urine:4245]    Physical Exam   Please see  details of the examination by Hospital Medicine.  Due to COVID-19 we as consultants are relying mostly on Hospital Medicine physical examination in order to conserve the PPE and exposure to COVID-19 virus      Significant Labs:  All labs within the past 24 hours have been reviewed.     Significant Imaging:

## 2020-07-29 NOTE — ASSESSMENT & PLAN NOTE
Lung Protective Mechanical Ventilation w/ ARDSnet protocol  High PEEP and Low Vt  Attempt to maintain PIP < 30 cm H2O  Fluid Sparing Resuscitation  Permissive Hypercapnea  Returned from prone to supine position yesterday will consider re-prone position this afternoon 7/28 Completed proning this am   Low Tidal Volume, Permissive hypercapnia mechanical ventilation:  Low Tidal volume - 4 to 8 mL/kg predicted body weight [PBW]) with assist control (AC) ventilation mode.  Predicted body weight :  For females: PBW (kg) = 45.5 + 0.91 * (height [cm] - 152.4)  For males: PBW (kg) = 50 + 0.91 * (height [cm] - 152.4)  Plateau Pressure: goal Pplat of ?30 cm H2O

## 2020-07-29 NOTE — PROGRESS NOTES
Patient's was not receiving tidal volumes and ETT cuff was leaking. Exchanged ETT using a bougie, ETT 8.0 secured at 25 cm at the lips. Verified with X-Ray Bilateral breath sounds and colormetric device. Patient tolerated well. Patients O2 saturation was 95-96%. Respiratory to follow.

## 2020-07-29 NOTE — PROGRESS NOTES
Pharmacokinetic Assessment Follow Up: IV Vancomycin    Vancomycin serum concentration assessment(s):    The random level was drawn correctly and can be used to guide therapy at this time. The measurement is below the desired definitive target range of 15 to 20 mcg/mL.    Vancomycin Regimen Plan:    Re-dose when the random level is less than 20 mcg/mL, next level to be drawn at 2300 on 7/29    Drug levels (last 3 results):  Recent Labs   Lab Result Units 07/29/20  0345   Vancomycin, Random ug/mL 9.6       Pharmacy will continue to follow and monitor vancomycin.    Please contact pharmacy at extension [phone] for questions regarding this assessment.    Thank you for the consult,   Temitope Zabala       Patient brief summary:  Jonas Abdalla is a 35 y.o. male initiated on antimicrobial therapy with IV Vancomycin for treatment of lower respiratory infection    The patient's current regimen is pulse dosing    Drug Allergies:   Review of patient's allergies indicates:   Allergen Reactions    Acetaminophen Hives       Actual Body Weight:   132.3kg    Renal Function:   Estimated Creatinine Clearance: 152.9 mL/min (based on SCr of 0.9 mg/dL).,     Dialysis Method (if applicable):  N/A    CBC (last 72 hours):  Recent Labs   Lab Result Units 07/27/20  0415 07/28/20 0232 07/29/20  0345   WBC K/uL 17.81* 28.35* 25.17*   Hemoglobin g/dL 8.4* 8.7* 7.6*   Hematocrit % 27.8* 29.5* 26.3*   Platelets K/uL 223 299 264   Gran% % 75.0* 74.0* 76.0*   Lymph% % 5.0* 8.0* 9.0*   Mono% % 11.0 7.0 1.0*   Eosinophil% % 1.0 2.0 1.0   Basophil% % 0.0 1.0 0.0   Differential Method  Manual Manual Manual       Metabolic Panel (last 72 hours):  Recent Labs   Lab Result Units 07/26/20  1303 07/26/20  1822 07/27/20  0415 07/27/20  2314 07/28/20  0232 07/29/20  0345   Sodium mmol/L 134*  134* 133* 135*  --  137 137   Potassium mmol/L 4.6  4.6 6.0* 5.7*  --  5.5* 4.7   Chloride mmol/L 97  97 97 96  --  97 97   CO2 mmol/L 28  28 27 29  --   29 30*   Glucose mg/dL 110  110 121* 103  --  108 124*   Glucose, UA   --   --   --  Negative  --   --    BUN, Bld mg/dL 16  16 23* 34*  --  52* 52*   Creatinine mg/dL 0.6  0.6 0.8 0.8  --  1.2 0.9   Albumin g/dL 1.7*  --  1.9*  --  2.0* 1.9*   Total Bilirubin mg/dL  --   --  0.4  --  0.4 0.3   Alkaline Phosphatase U/L  --   --  66  --  69 69   AST U/L  --   --  35  --  40 40   ALT U/L  --   --  41  --  41 36   Magnesium mg/dL  --   --  1.6  --  2.2 2.2   Phosphorus mg/dL 2.9  --  4.8*  --  5.6* 3.0       Vancomycin Administrations:  vancomycin given in the last 96 hours                   vancomycin 2 g in dextrose 5 % 500 mL IVPB (mg) 2,000 mg New Bag 07/28/20 1544                Microbiologic Results:  Microbiology Results (last 7 days)     Procedure Component Value Units Date/Time    Blood culture [[id number]] Collected: 07/27/20 2320    Order Status: Completed Specimen: Blood Updated: 07/29/20 0805     Blood Culture, Routine No growth to date    Narrative:      Central line    Blood culture [[id number]] Collected: 07/27/20 2320    Order Status: Completed Specimen: Blood Updated: 07/29/20 0805     Blood Culture, Routine No growth to date    Narrative:      Arterial line    Culture, Respiratory with Gram Stain [[id number]] Collected: 07/27/20 2313    Order Status: Completed Specimen: Respiratory from Tracheal Aspirate Updated: 07/28/20 1628     Gram Stain (Respiratory) Rare WBC's     Gram Stain (Respiratory) Rare Gram negative rods    Blood culture [[id number]] Collected: 07/22/20 1036    Order Status: Completed Specimen: Blood Updated: 07/27/20 2212     Blood Culture, Routine No growth after 5 days.    Blood culture [[id number]] Collected: 07/22/20 1036    Order Status: Completed Specimen: Blood Updated: 07/27/20 2212     Blood Culture, Routine No growth after 5 days.

## 2020-07-29 NOTE — ASSESSMENT & PLAN NOTE
Kidney function and potassium are stable without any need for renal replacement therapy    Still has the dialysis catheter in place

## 2020-07-29 NOTE — PLAN OF CARE
Pt remains sedated/ intubated on Fentanyl, Versed, and Ketamine with Levo drip started this afternoon for hypotension. Pt currently NSR , sat 99 % on the monitor. UO adequate for shift. Diamox IVPB given x 1. POC reviewed with family. Turned q 2 hours, heels floated. Plans to prone this evening. Pt appears in no distress. Will continue to monitor.

## 2020-07-29 NOTE — PLAN OF CARE
Patient remains intubated and sedated. Tachycardic, HR 120s-130s. Tube exchanged performed overnight, tolerated well. PEEP 16, FiO2 100%. RR 30-40s, saturation 88-92%. TF at goal of 45cc/hr, patient tolerating well. Updated wife and mother of POC via telephone.

## 2020-07-29 NOTE — ASSESSMENT & PLAN NOTE
Cont full vent support  Cont PCV  Vent settings reviewed and adjusted multiple times  Increased PEEP to +20 due to SAT 82%  VAP prophylaxis  ARDS.net protocol  Daily ABGs  Too unstable for SAT/SBT

## 2020-07-29 NOTE — PROGRESS NOTES
[facility]  Nephrology  Progress Note    Patient Name: Jonas Abdalla  MRN: [id number]  Admission Date: 7/11/2020  Hospital Length of Stay: 16 days  Attending Provider: Enmanuel Hu MD   Primary Care Physician: Carmel Tabares MD  Principal Problem:Acute respiratory failure with hypoxia    Subjective:     HPI: Jonas Abdalla is a 35-year-old  man with history of hypertension, he was admitted to the hospital about 2 weeks ago for shortness of breath due to COVID infection, patient was intubated and placed in intensive care unit on vent support, prolonged ICU stay, in the last few days his serum potassium has been elevated, etiology unclear, medical management has failed, we were consulted for possible dialysis treatment to lower the potassium levels, kidney function is stable with serum creatinine at 0.8 mg/dL,        Interval History:  Kidney function and potassium are stable without any need for renal replacement therapy    Review of patient's allergies indicates:   Allergen Reactions    Acetaminophen Hives     Current Facility-Administered Medications   Medication Frequency    0.9%  NaCl infusion (for blood administration) Q24H PRN    albuterol-ipratropium 2.5 mg-0.5 mg/3 mL nebulizer solution 3 mL Q4H    ascorbic acid (vitamin C) tablet 500 mg BID    bisacodyL suppository 10 mg Daily PRN    calcium gluconate 1g in dextrose 5% 100mL (ready to mix system) Q10 Min PRN    cefepime in dextrose 5 % IVPB 2 g Q8H    chlorhexidine 0.12 % solution 15 mL BID    enoxaparin injection 40 mg Q24H    famotidine (PF) injection 20 mg BID    fentaNYL 2500 mcg in D5W 250 mL infusion premix (titrating) (conc: 10 mcg/mL) Continuous    heparin (porcine) injection 1,200 Units PRN    ketamine (KETALAR) 1,000 mg in sodium chloride 0.9% 500 mL infusion Continuous    melatonin tablet 6 mg Nightly    metoprolol tartrate tablet 75 mg Q6H    metronidazole IVPB 500 mg Q8H     midazolam 100 mg/100 mL in dextrose 5% infusion Continuous    multivit-min-ferrous gluconate 9 mg iron/15 mL Liqd 5 mL Daily    norepinephrine 32 mg in dextrose 5 % 250 mL infusion Continuous    ondansetron injection 4 mg Q8H PRN    pneumoc 13-melvina conj-dip cr(PF) (PREVNAR 13 (PF)) 0.5 mL vaccine x 1 dose    polyethylene glycol packet 17 g BID    promethazine (PHENERGAN) 6.25 mg in dextrose 5 % 50 mL IVPB Q6H PRN    sodium chloride 0.9% flush 10 mL PRN    sodium zirconium cyclosilicate packet 5 g BID    vancomycin - pharmacy to dose pharmacy to manage frequency    white petrolatum-mineral oil (LUBIFRESH P.M.) ophthalmic ointment Q8H       Objective:     Vital Signs (Most Recent):  Temp: 99.2 °F (37.3 °C) (07/29/20 1115)  Pulse: (!) 120 (07/29/20 1315)  Resp: (!) 30 (07/29/20 1315)  BP: (!) 98/32 (07/29/20 1300)  SpO2: 96 % (07/29/20 1315)  O2 Device (Oxygen Therapy): ventilator (07/29/20 1310) Vital Signs (24h Range):  Temp:  [99.1 °F (37.3 °C)-101.7 °F (38.7 °C)] 99.2 °F (37.3 °C)  Pulse:  [106-138] 120  Resp:  [19-41] 30  SpO2:  [84 %-100 %] 96 %  BP: ()/(26-60) 98/32     Weight: 132.3 kg (291 lb 10.7 oz) (07/15/20 0039)  Body mass index is 45.68 kg/m².  Body surface area is 2.5 meters squared.    I/O last 3 completed shifts:  In: 5068.8 [I.V.:2728.8; NG/GT:1090; IV Piggyback:1250]  Out: 4245 [Urine:4245]    Physical Exam   Please see  details of the examination by Hospital Medicine.  Due to COVID-19 we as consultants are relying mostly on Hospital Medicine physical examination in order to conserve the PPE and exposure to COVID-19 virus      Significant Labs:  All labs within the past 24 hours have been reviewed.     Significant Imaging:      Assessment/Plan:     Hyperkalemia  Kidney function and potassium are stable without any need for renal replacement therapy    Still has the dialysis catheter in place          Thank you for your consult. I will sign off. Please contact us if you have any  additional questions.    eDnton Caputo MD  Nephrology  [facility]

## 2020-07-29 NOTE — ASSESSMENT & PLAN NOTE
Cont Na Zirconium per OG   Follow up labs  Placed VasCath 7/26 for emergent RRT done 7/26 has not needed since and off Heparin infusion  Renal following  Reviewed all meds with pharmacy for source of hyperkalemia and essentially unremarkable (UFH risk of hyperkalemia 7% stopped 7/26)  Follow K+ and repeat RRT if needed.

## 2020-07-29 NOTE — PROGRESS NOTES
[facility]   Critical Care Medicine  Progress Note    Patient Name: Jonas Abdalla  MRN: [id number]  Admission Date: 7/11/2020  Hospital Length of Stay: 16 days  Code Status: Full Code  Attending Provider: Enmanuel Hu MD  Primary Care Provider: Carmel Tabares MD   Principal Problem: Acute respiratory failure with hypoxia    Subjective:     HPI:  Mr Abdalla is a morbidly obese WM with a PMH of cholelithiasis and HTN who was admitted 7/12 with Sepsis and COVID PNA after presentation with weakness, persistent fever, chills and N/V/D for 6 days progressive.  Had 102.4 temp in ED.  Admitted to [facility] started on emperic IVAB, Decadron and NC low flow.  Oxygenation worsened over next few days.  He consented and was started on Remdesivir 7/14.  On evening of 7/14.  At [place] last night patient became more hypoxic despite being maxed out on VapoTherm.  He was placed in prone position, which improved O2 sat to 90-93% but patient remained in notable respiratory distress.  He was transferred to ICU and initiated on BiPAP.      Hospital/ICU Course:  7/15 - This AM upright in bed fully awake, alert and responsive not in distress but has tachypnea and mild work of breathing while on NPPV and FiO2 at .95.    7/16 - remains on NIPPV with some decline in oxygen demand with FiO2 0.75 but continued tachypnea; ongoing desaturation with exertion or momentary removal of NIPPV for fluid intake  7/17 - tolerated short time on vapotherm support last evening, returned to NIPPV for sleep and has remained today s/t tachypnea, hypoxia; afebrile but wbc slight trend up 14.4k   7/18 - awake, alert,oriented; continues to alternate max support vapotherm with NIPPV support, tachypnea at baseline and worsens along with drop in pulse ox sat with exertion/movement that slowly recovers with rest  7/19 - respiratory distress overnight; required intubation, heavy sedation, mechanical ventilation, low dose pressor support;  post intubation abg shows continued worsening acidosis along with now worsening leukocytosis, ddimer, LD, and CRP  7/20 - remains intubated and sedated on mechanical ventilation with inverse ratio ventilation, some decrease in oxygen demand today; tmax yesterday 101.6; requiring low dose pressor support with sedation  7/21 - remains intubated and sedated on mechanical inverse ratio ventilation, oxygen demand down to 60%; t max 100.4; tolerating TF  7/22 - remains intubated and sedated, mode of ventilation adjusted to PCV today with high PEEP, moderate oxygen demand; tmax today 101.8 with wbc down to 14.7k and LD trend down  7/23 - Semi-erect in bed intubated on mech ventilation and sedated heavily on Fentanyl and Propofol infusions.  Requiring some Levophed infusion.  Tolerating TF.  Restless on vent overnight and this AM tachypnic and without vent synchrony.  A-flutter yesterday.    7/24 - Supine in bed sedated on Fentanyl, Propofol and Versed infusions with intubation on mech ventilation.  On low dose Levophed infusion.  Tolerating TF.  Still severe hypoxemia requiring high PEEP and 100% FiO2.  7/25 - Prone in bed since yesterday afternoon.  Intubated on mech ventilation and sedated on Versed, Propofol and Versed infusions.  Off TF while prone.  On Heparin infusion and low dose Levophed infusion  7/26 - Returned to supine position yesterday afternoon.  Semi-erect in bed intubated on mech ventilation some increased resp distress improved with increasing Versed infusion.  Persistent Hyperkalemia refractory to Rx discussed with Renal post consult and placed VasCath starting RRT.  Still requiring Versed, Fentanyl and Ketamine infusions for sedation.  Weaned off Levophed infusion.  Good UOP.  Heparin infusion stopped due to concern with hyperkalemia  7/27 - Supine in bed intubated still on mech ventilation with high O2 demand heavily sedated on Ketamine, Versed and Fentanyl infusions.  Still off pressors.  Tachycardia  persistent in 130s.  Good UOP.  7/28 taken off proning this am, fair urine output  7/29 - Upright in bed intubated on [facility] ventilation with work of breathing heavily sedated on Ketamine, Fentanyl and Versed infusions.  More hypoxic, tachypnic and dyspnic today compared to yesterday.  Good UOP still + I/O balance.     Review of Systems   Unable to perform ROS: Intubated         Objective:     Vital Signs (Most Recent):  Temp: (!) 101.7 °F (38.7 °C) (07/29/20 0715)  Pulse: (!) 135 (07/29/20 0815)  Resp: (!) 40 (07/29/20 0815)  BP: (!) 102/26 (07/29/20 0700)  SpO2: (!) 87 % (07/29/20 0815) Vital Signs (24h Range):  Temp:  [99.1 °F (37.3 °C)-101.7 °F (38.7 °C)] 101.7 °F (38.7 °C)  Pulse:  [100-135] 135  Resp:  [19-41] 40  SpO2:  [84 %-100 %] 87 %  BP: ()/(26-50) 102/26     Weight: 132.3 kg (291 lb 10.7 oz)  Body mass index is 45.68 kg/m².      Intake/Output Summary (Last 24 hours) at 7/29/2020 0831  Last data filed at 7/29/2020 0800  Gross per 24 hour   Intake 3716.6 ml   Output 2405 ml   Net 1311.6 ml       Physical Exam  Vitals signs and nursing note reviewed.   Constitutional:       Appearance: He is obese. He is ill-appearing, toxic-appearing and diaphoretic.      Interventions: He is sedated, intubated and restrained.   HENT:      Head: Normocephalic and atraumatic.      Mouth/Throat:      Mouth: Mucous membranes are moist.   Eyes:      General: Lids are normal.      Conjunctiva/sclera: Conjunctivae normal.   Neck:      Musculoskeletal: Neck supple.   Cardiovascular:      Rate and Rhythm: Regular rhythm. Tachycardia present.      Pulses:           Radial pulses are 1+ on the right side and 1+ on the left side.        Dorsalis pedis pulses are 1+ on the right side and 1+ on the left side.      Heart sounds: Heart sounds are distant.   Pulmonary:      Effort: Tachypnea, accessory muscle usage and respiratory distress present. No retractions. He is intubated.      Breath sounds: Decreased breath sounds and rales  present.   Abdominal:      General: Bowel sounds are decreased. There is no distension.      Palpations: Abdomen is soft.      Comments: Obese   Genitourinary:     Penis: Normal.       Comments: Bagley in place  Musculoskeletal:      Right lower le+ Pitting Edema present.      Left lower le+ Pitting Edema present.   Lymphadenopathy:      Cervical: No cervical adenopathy.   Skin:     General: Skin is warm and moist.      Capillary Refill: Capillary refill takes 2 to 3 seconds.      Coloration: Skin is not cyanotic.          Neurological:      Comments: Heavily sedated         Vents:  Vent Mode: A/C (20)  Ventilator Initiated: Yes (20 0415)  Set Rate: 30 BPM (20)  Vt Set: 0 mL (20)  Pressure Support: 0 cmH20 (20)  PEEP/CPAP: (S) 20 cmH20 (20)  Oxygen Concentration (%): 100 (20)  Peak Airway Pressure: 39 cmH2O (20)  Plateau Pressure: 0 cmH20 (20)  Total Ve: 19.1 mL (20)  F/VT Ratio<105 (RSBI): (!) 68.45 (20 07)    Lines/Drains/Airways     Peripherally Inserted Central Catheter Line            PICC Double Lumen 20 1130 right brachial 12 days          Central Venous Catheter Line                 Hemodialysis Catheter 20 1002 right femoral 2 days          Drain                 NG/OG Tube 20 0530 orogastric 16 Fr. 10 days         Urethral Catheter 20 0330 10 days          Airway                 Airway - Non-Surgical 20 0420 Endotracheal Tube 10 days          Arterial Line                 Arterial Line 20 2200 Left Brachial less than 1 day                Significant Labs:    CBC/Anemia Profile:  Recent Labs   Lab 20  0232  20  2334 20  0100 20  0345   WBC 28.35*  --   --   --  25.17*   HGB 8.7*  --   --   --  7.6*   HCT 29.5*   < > 22* 24* 26.3*     --   --   --  264   *  --   --   --  104*   RDW 15.0*  --   --   --  15.0*   FERRITIN  2,992*  --   --   --   --     < > = values in this interval not displayed.        Chemistries:  Recent Labs   Lab 07/28/20  0232 07/29/20  0345    137   K 5.5* 4.7   CL 97 97   CO2 29 30*   BUN 52* 52*   CREATININE 1.2 0.9   CALCIUM 9.2 8.9   ALBUMIN 2.0* 1.9*   PROT 6.6 6.3   BILITOT 0.4 0.3   ALKPHOS 69 69   ALT 41 36   AST 40 40   MG 2.2 2.2   PHOS 5.6* 3.0       ABGs:   Recent Labs   Lab 07/29/20  0729   PH 7.347*   PCO2 60.0*   HCO3 32.9*   POCSATURATED 89*   BE 7     POCT Glucose:   Recent Labs   Lab 07/28/20  1938 07/29/20  0037 07/29/20  0324   POCTGLUCOSE 141* 125* 139*     All pertinent labs within the past 24 hours have been reviewed.        ABG  Recent Labs   Lab 07/29/20  0729   PH 7.347*   PO2 61*   PCO2 60.0*   HCO3 32.9*   BE 7     Assessment/Plan:     Pulmonary  * Acute respiratory failure with hypoxia  Cont full vent support  Cont PCV  Vent settings reviewed and adjusted multiple times  Increased PEEP to +20 due to SAT 82%  VAP prophylaxis  ARDS.net protocol  Daily ABGs  Too unstable for SAT/SBT    Cardiac/Vascular    High triglycerides  Stopped Propofol infusion 7/25  Repeat Trig pending this AM    Renal/  Hyperkalemia  Cont Na Zirconium per OG   Follow up labs  Placed VasCath 7/26 for emergent RRT done 7/26 has not needed since and off Heparin infusion  Renal following  Reviewed all meds with pharmacy for source of hyperkalemia and essentially unremarkable (UFH risk of hyperkalemia 7% stopped 7/26)  Follow K+ and repeat RRT if needed.     ID  Severe sepsis with acute organ dysfunction  Levophed weaned off for 48 hours now  ICU hemodynamic monitoring  Blood and Sputum cultures done recently still NGTD  Cont IVAB for 7 days stopped post today but still running low grade temps    Oncology  Anemia, unspecified  No active bleeding  Conservative transfusion protocol  Daily CBC     Endocrine  Morbid obesity with BMI of 45.0-49.9, adult  Recommend diet and lifestyle modification for goal wt loss  once medically stable    Severe protein-calorie malnutrition  TF and tolerating    Other  Pneumonia due to COVID-19 virus  Respiratory/Contact/Droplet Isolation with appropriate PPE -N95 mask, gown, goggles and gloves  Fluid Sparing Resuscitation  Completed course of empiric Antb earlier in admit  IVAB resumed 7/23 due to persistent fever and Leukocytosis  No Visitors  Consolidation of tests, nursing care and provider visits  Completed completed 5 day course of Rendisivir and 10 day course of Decadron  Melatonin, and Vit C    Acute respiratory distress syndrome (ARDS) due to COVID-19 virus  Lung Protective Mechanical Ventilation w/ ARDSnet protocol  High PEEP and Low Vt  Attempt to maintain PIP < 30 cm H2O  Fluid Sparing Resuscitation  Permissive Hypercapnea  Returned from prone to supine position yesterday will consider re-prone position this afternoon 7/28 Completed proning this am   Low Tidal Volume, Permissive hypercapnia mechanical ventilation:  Low Tidal volume - 4 to 8 mL/kg predicted body weight [PBW]) with assist control (AC) ventilation mode.  Predicted body weight :  For females: PBW (kg) = 45.5 + 0.91 * (height [cm] - 152.4)  For males: PBW (kg) = 50 + 0.91 * (height [cm] - 152.4)  Plateau Pressure: goal Pplat of ?30 cm H2O             Preventive Measures and Monitoring:   Stress Ulcer: Pepcid  Nutrition: TF   Glucose control: Stable  Bowel prophylaxis: Miralax  DVT prophylaxis: LMWH  Hx CAD on B-Blocker: no hx CAD  Head of Bed/Reposition: Elevate HOB and turn Q1-2 hours   Early Mobility: bed rest  SAT/SBT: too unstable  RASS goal:-4  Vent Day: #11  OG Day: #11  Central Line RUE PICC Day: #14  Right Radial Arterial Line Day: #11  Right Femoral VasCath Day: #4  Bagley Day: #11  IVAB Day: #7  Code Status: Full     Counseling/Consultation:I have discussed the care of this patient in detail with the bedside nursing staff and Dr. Tena and Dr. Hu and Dr. Caputo     Called spouse today, Joan, and gave  full report and all questions answered.      Critical Care Time: 84 minutes  Critical secondary to Patient has a condition that poses threat to life and bodily function: Acute Resp Failure intubated on mech ventilation in ARDS with COVID PNA  And Hyperkalemia required emergent RRT 7/26  Patient is currently receiving parenteral controlled substances: Versed and Fentanyl and Ketamine infusions      Critical care was time spent personally by me on the following activities: development of treatment plan with patient or surrogate and bedside caregivers, discussions with consultants, evaluation of patient's response to treatment, examination of patient, ordering and performing treatments and interventions, ordering and review of laboratory studies, ordering and review of radiographic studies, pulse oximetry, re-evaluation of patient's condition. This critical care time did not overlap with that of any other provider or involve time for any procedures.     Yemi Koroma NP  Critical Care Medicine  [facility]

## 2020-07-29 NOTE — ASSESSMENT & PLAN NOTE
Respiratory/Contact/Droplet Isolation with appropriate PPE -N95 mask, gown, goggles and gloves  Fluid Sparing Resuscitation  Completed course of empiric Antb earlier in admit  IVAB resumed 7/23 due to persistent fever and Leukocytosis  No Visitors  Consolidation of tests, nursing care and provider visits  Completed completed 5 day course of Rendisivir and 10 day course of Decadron  Melatonin, and Vit C

## 2020-07-29 NOTE — SUBJECTIVE & OBJECTIVE
Review of Systems   Unable to perform ROS: Intubated         Objective:     Vital Signs (Most Recent):  Temp: (!) 101.7 °F (38.7 °C) (20)  Pulse: (!) 135 (20)  Resp: (!) 40 (20)  BP: (!) 102/26 (20 0700)  SpO2: (!) 87 % (20) Vital Signs (24h Range):  Temp:  [99.1 °F (37.3 °C)-101.7 °F (38.7 °C)] 101.7 °F (38.7 °C)  Pulse:  [100-135] 135  Resp:  [19-41] 40  SpO2:  [84 %-100 %] 87 %  BP: ()/(26-50) 102/26     Weight: 132.3 kg (291 lb 10.7 oz)  Body mass index is 45.68 kg/m².      Intake/Output Summary (Last 24 hours) at 2020 0831  Last data filed at 2020 0800  Gross per 24 hour   Intake 3716.6 ml   Output 2405 ml   Net 1311.6 ml       Physical Exam  Vitals signs and nursing note reviewed.   Constitutional:       Appearance: He is obese. He is ill-appearing, toxic-appearing and diaphoretic.      Interventions: He is sedated, intubated and restrained.   HENT:      Head: Normocephalic and atraumatic.      Mouth/Throat:      Mouth: Mucous membranes are moist.   Eyes:      General: Lids are normal.      Conjunctiva/sclera: Conjunctivae normal.   Neck:      Musculoskeletal: Neck supple.   Cardiovascular:      Rate and Rhythm: Regular rhythm. Tachycardia present.      Pulses:           Radial pulses are 1+ on the right side and 1+ on the left side.        Dorsalis pedis pulses are 1+ on the right side and 1+ on the left side.      Heart sounds: Heart sounds are distant.   Pulmonary:      Effort: Tachypnea, accessory muscle usage and respiratory distress present. No retractions. He is intubated.      Breath sounds: Decreased breath sounds and rales present.   Abdominal:      General: Bowel sounds are decreased. There is no distension.      Palpations: Abdomen is soft.      Comments: Obese   Genitourinary:     Penis: Normal.       Comments: Bagley in place  Musculoskeletal:      Right lower le+ Pitting Edema present.      Left lower le+ Pitting Edema  present.   Lymphadenopathy:      Cervical: No cervical adenopathy.   Skin:     General: Skin is warm and moist.      Capillary Refill: Capillary refill takes 2 to 3 seconds.      Coloration: Skin is not cyanotic.          Neurological:      Comments: Heavily sedated         Vents:  Vent Mode: A/C (07/29/20 0817)  Ventilator Initiated: Yes (07/19/20 0415)  Set Rate: 30 BPM (07/29/20 0817)  Vt Set: 0 mL (07/29/20 0817)  Pressure Support: 0 cmH20 (07/29/20 0817)  PEEP/CPAP: (S) 20 cmH20 (07/29/20 0817)  Oxygen Concentration (%): 100 (07/29/20 0815)  Peak Airway Pressure: 39 cmH2O (07/29/20 0817)  Plateau Pressure: 0 cmH20 (07/29/20 0817)  Total Ve: 19.1 mL (07/29/20 0817)  F/VT Ratio<105 (RSBI): (!) 68.45 (07/29/20 0729)    Lines/Drains/Airways     Peripherally Inserted Central Catheter Line            PICC Double Lumen 07/16/20 1130 right brachial 12 days          Central Venous Catheter Line                 Hemodialysis Catheter 07/26/20 1002 right femoral 2 days          Drain                 NG/OG Tube 07/19/20 0530 orogastric 16 Fr. 10 days         Urethral Catheter 07/19/20 0330 10 days          Airway                 Airway - Non-Surgical 07/19/20 0420 Endotracheal Tube 10 days          Arterial Line                 Arterial Line 07/28/20 2200 Left Brachial less than 1 day                Significant Labs:    CBC/Anemia Profile:  Recent Labs   Lab 07/28/20  0232  07/28/20  2334 07/29/20  0100 07/29/20  0345   WBC 28.35*  --   --   --  25.17*   HGB 8.7*  --   --   --  7.6*   HCT 29.5*   < > 22* 24* 26.3*     --   --   --  264   *  --   --   --  104*   RDW 15.0*  --   --   --  15.0*   FERRITIN 2,992*  --   --   --   --     < > = values in this interval not displayed.        Chemistries:  Recent Labs   Lab 07/28/20  0232 07/29/20  0345    137   K 5.5* 4.7   CL 97 97   CO2 29 30*   BUN 52* 52*   CREATININE 1.2 0.9   CALCIUM 9.2 8.9   ALBUMIN 2.0* 1.9*   PROT 6.6 6.3   BILITOT 0.4 0.3   ALKPHOS 69  69   ALT 41 36   AST 40 40   MG 2.2 2.2   PHOS 5.6* 3.0       ABGs:   Recent Labs   Lab 07/29/20  0729   PH 7.347*   PCO2 60.0*   HCO3 32.9*   POCSATURATED 89*   BE 7     POCT Glucose:   Recent Labs   Lab 07/28/20  1938 07/29/20  0037 07/29/20  0324   POCTGLUCOSE 141* 125* 139*     All pertinent labs within the past 24 hours have been reviewed.

## 2020-07-29 NOTE — ASSESSMENT & PLAN NOTE
Levophed weaned off for 48 hours now  ICU hemodynamic monitoring  Blood and Sputum cultures done recently still NGTD  Cont IVAB for 7 days stopped post today but still running low grade temps

## 2020-07-30 NOTE — ASSESSMENT & PLAN NOTE
Encouraged deep breathing and coughing.  Transferred to ICU 14 July for respiratory distress.  Intubated 19 July.  Remains intubated persistent hypoxia.  PEEP 17 and fiO2 100%.  Wean respiratory support as able.

## 2020-07-30 NOTE — SUBJECTIVE & OBJECTIVE
Interval History:  Persistent hypoxia requiring PEEP 17 and fiO2 100%.  Hemodynamically stable.  Briefly required renal replacement therapy.    Review of Systems   Unable to perform ROS: Intubated     Objective:     Vital Signs (Most Recent):  Temp: 96.9 °F (36.1 °C) (07/29/20 1901)  Pulse: 95 (07/29/20 1945)  Resp: 17 (07/29/20 1945)  BP: (!) 89/44 (07/29/20 1901)  SpO2: 100 % (07/29/20 1945) Vital Signs (24h Range):  Temp:  [96.9 °F (36.1 °C)-101.7 °F (38.7 °C)] 96.9 °F (36.1 °C)  Pulse:  [] 95  Resp:  [15-41] 17  SpO2:  [84 %-100 %] 100 %  BP: ()/(26-66) 89/44     Weight: 132.3 kg (291 lb 10.7 oz)  Body mass index is 45.68 kg/m².    Intake/Output Summary (Last 24 hours) at 7/29/2020 2013  Last data filed at 7/29/2020 2000  Gross per 24 hour   Intake 4681.23 ml   Output 2775 ml   Net 1906.23 ml      Physical Exam  Constitutional:       General: He is not in acute distress.     Appearance: He is well-developed. He is not diaphoretic.      Comments: Sedated on vent   HENT:      Head: Normocephalic and atraumatic.      Mouth/Throat:      Pharynx: No oropharyngeal exudate.   Eyes:      Conjunctiva/sclera: Conjunctivae normal.      Pupils: Pupils are equal, round, and reactive to light.   Neck:      Thyroid: No thyromegaly.      Vascular: No JVD.   Cardiovascular:      Rate and Rhythm: Normal rate and regular rhythm.      Heart sounds: Normal heart sounds. No murmur.   Pulmonary:      Effort: No respiratory distress.      Breath sounds: No wheezing or rales.      Comments: On vent   Chest:      Chest wall: No tenderness.   Abdominal:      General: Bowel sounds are normal. There is no distension.      Palpations: Abdomen is soft.      Tenderness: There is no abdominal tenderness. There is no guarding or rebound.   Lymphadenopathy:      Cervical: No cervical adenopathy.   Skin:     General: Skin is warm and dry.      Findings: No rash.   Neurological:      Cranial Nerves: No cranial nerve deficit.       Sensory: No sensory deficit.      Deep Tendon Reflexes: Reflexes normal.      Comments: On vent , sedated         Significant Labs:   CBC:   Recent Labs   Lab 07/28/20  0232  07/28/20  2334 07/29/20  0100 07/29/20  0345   WBC 28.35*  --   --   --  25.17*   HGB 8.7*  --   --   --  7.6*   HCT 29.5*   < > 22* 24* 26.3*     --   --   --  264    < > = values in this interval not displayed.     CMP:   Recent Labs   Lab 07/28/20  0232 07/29/20  0345    137   K 5.5* 4.7   CL 97 97   CO2 29 30*    124*   BUN 52* 52*   CREATININE 1.2 0.9   CALCIUM 9.2 8.9   PROT 6.6 6.3   ALBUMIN 2.0* 1.9*   BILITOT 0.4 0.3   ALKPHOS 69 69   AST 40 40   ALT 41 36   ANIONGAP 11 10   EGFRNONAA >60 >60       Significant Imaging:

## 2020-07-30 NOTE — SUBJECTIVE & OBJECTIVE
Interval History:  DC femoral dialysis catheter    Review of patient's allergies indicates:   Allergen Reactions    Acetaminophen Hives     Current Facility-Administered Medications   Medication Frequency    0.9%  NaCl infusion (for blood administration) Q24H PRN    0.9%  NaCl infusion (for blood administration) Q24H PRN    albuterol-ipratropium 2.5 mg-0.5 mg/3 mL nebulizer solution 3 mL Q4H    ascorbic acid (vitamin C) tablet 500 mg BID    bisacodyL suppository 10 mg Daily PRN    calcium gluconate 1g in dextrose 5% 100mL (ready to mix system) Q10 Min PRN    chlorhexidine 0.12 % solution 15 mL BID    enoxaparin injection 70 mg Q12H    famotidine (PF) injection 20 mg BID    fentaNYL 2500 mcg in D5W 250 mL infusion premix (titrating) (conc: 10 mcg/mL) Continuous    heparin (porcine) injection 1,200 Units PRN    ketamine (KETALAR) 1,000 mg in sodium chloride 0.9% 500 mL infusion Continuous    levoFLOXacin 750 mg/150 mL IVPB 750 mg Q24H    melatonin tablet 6 mg Nightly    midazolam 100 mg/100 mL in dextrose 5% infusion Continuous    multivit-min-ferrous gluconate 9 mg iron/15 mL Liqd 5 mL Daily    norepinephrine 32 mg in dextrose 5 % 250 mL infusion Continuous    ondansetron injection 4 mg Q8H PRN    pneumoc 13-melvina conj-dip cr(PF) (PREVNAR 13 (PF)) 0.5 mL vaccine x 1 dose    polyethylene glycol packet 17 g BID    promethazine (PHENERGAN) 6.25 mg in dextrose 5 % 50 mL IVPB Q6H PRN    sodium chloride 0.9% flush 10 mL PRN    sodium zirconium cyclosilicate packet 5 g BID    white petrolatum-mineral oil (LUBIFRESH P.M.) ophthalmic ointment Q8H       Objective:     Vital Signs (Most Recent):  Temp: 97.8 °F (36.6 °C) (07/30/20 0715)  Pulse: (!) 128 (07/30/20 1100)  Resp: (!) 32 (07/30/20 1100)  BP: (!) 113/44 (07/30/20 1100)  SpO2: (!) 89 % (07/30/20 1100)  O2 Device (Oxygen Therapy): ventilator (07/30/20 1027) Vital Signs (24h Range):  Temp:  [96.9 °F (36.1 °C)-99.6 °F (37.6 °C)] 97.8 °F (36.6  °C)  Pulse:  [] 128  Resp:  [14-40] 32  SpO2:  [87 %-100 %] 89 %  BP: ()/(32-66) 113/44     Weight: 132.3 kg (291 lb 10.7 oz) (07/15/20 0039)  Body mass index is 45.68 kg/m².  Body surface area is 2.5 meters squared.    I/O last 3 completed shifts:  In: 7384.8 [I.V.:3564.8; NG/GT:2370; IV Piggyback:1450]  Out: 4235 [Urine:4235]    Physical Exam  Vitals signs and nursing note reviewed.   Constitutional:       Appearance: He is obese. He is ill-appearing. He is not toxic-appearing or diaphoretic.      Interventions: He is sedated, intubated and restrained.   HENT:      Head: Atraumatic.      Mouth/Throat:      Mouth: Mucous membranes are dry.   Eyes:      General: No scleral icterus.     Comments: Bilateral scleral edema   Neck:      Vascular: No JVD.   Cardiovascular:      Rate and Rhythm: Regular rhythm. Tachycardia present.      Pulses:           Radial pulses are 1+ on the right side and 1+ on the left side.        Dorsalis pedis pulses are 1+ on the right side and 1+ on the left side.   Pulmonary:      Effort: Tachypnea present. No retractions. He is intubated.      Breath sounds: Decreased breath sounds and rhonchi present.   Abdominal:      General: Abdomen is protuberant. There is no distension.      Palpations: Abdomen is soft.   Musculoskeletal:      Right lower leg: No edema.      Left lower leg: No edema.   Skin:     General: Skin is warm and dry.      Capillary Refill: Capillary refill takes 2 to 3 seconds.         Significant Labs:  All labs within the past 24 hours have been reviewed.     Significant Imaging:

## 2020-07-30 NOTE — ASSESSMENT & PLAN NOTE
Cont full vent support  Vent settings reviewed and adjusted multiple times with abg to eval response  -does not tolerate attempt to wean PEEP  VAP prophylaxis  [email or web address] protocol  SAT/SBT contraindicated with current support requirement

## 2020-07-30 NOTE — PROGRESS NOTES
Pharmacokinetic Assessment Follow Up: IV Vancomycin    Vancomycin serum concentration assessment(s):    The random level was drawn correctly and can be used to guide therapy at this time. The measurement is below the desired definitive target range of 15 to 20 mcg/mL.    Vancomycin Regimen Plan:  Administer 2000mg dose (15mg/kg)  Re-dose when the random level is less than 20 mcg/mL, next level to be drawn at 13:00 on 7/30    Drug levels (last 3 results):  Recent Labs   Lab Result Units 07/29/20 0345 07/29/20  2230   Vancomycin, Random ug/mL 9.6 13.1       Pharmacy will continue to follow and monitor vancomycin.    Please contact pharmacy at extension [phone] for questions regarding this assessment.    Thank you for the consult,   Tylor Mike       Patient brief summary:  Jonas Abdalla is a 35 y.o. male initiated on antimicrobial therapy with IV Vancomycin for treatment of leukocytosis    The patient's current regimen is pulse dosing    Drug Allergies:   Review of patient's allergies indicates:   Allergen Reactions    Acetaminophen Hives       Actual Body Weight:   132.3kg    Renal Function:   Estimated Creatinine Clearance: 152.9 mL/min (based on SCr of 0.9 mg/dL).,     Dialysis Method (if applicable):  N/A    CBC (last 72 hours):  Recent Labs   Lab Result Units 07/27/20 0415 07/28/20 0232 07/29/20  0345   WBC K/uL 17.81* 28.35* 25.17*   Hemoglobin g/dL 8.4* 8.7* 7.6*   Hematocrit % 27.8* 29.5* 26.3*   Platelets K/uL 223 299 264   Gran% % 75.0* 74.0* 76.0*   Lymph% % 5.0* 8.0* 9.0*   Mono% % 11.0 7.0 1.0*   Eosinophil% % 1.0 2.0 1.0   Basophil% % 0.0 1.0 0.0   Differential Method  Manual Manual Manual       Metabolic Panel (last 72 hours):  Recent Labs   Lab Result Units 07/27/20 0415 07/27/20  2314 07/28/20  0232 07/29/20  0345   Sodium mmol/L 135*  --  137 137   Potassium mmol/L 5.7*  --  5.5* 4.7   Chloride mmol/L 96  --  97 97   CO2 mmol/L 29  --  29 30*   Glucose mg/dL 103  --  108 124*    Glucose, UA   --  Negative  --   --    BUN, Bld mg/dL 34*  --  52* 52*   Creatinine mg/dL 0.8  --  1.2 0.9   Albumin g/dL 1.9*  --  2.0* 1.9*   Total Bilirubin mg/dL 0.4  --  0.4 0.3   Alkaline Phosphatase U/L 66  --  69 69   AST U/L 35  --  40 40   ALT U/L 41  --  41 36   Magnesium mg/dL 1.6  --  2.2 2.2   Phosphorus mg/dL 4.8*  --  5.6* 3.0       Vancomycin Administrations:  vancomycin given in the last 96 hours                   vancomycin 2 g in dextrose 5 % 500 mL IVPB (mg) 2,000 mg New Bag 07/30/20 0105    vancomycin 2 g in dextrose 5 % 500 mL IVPB (mg) 2,000 mg New Bag 07/29/20 1038    vancomycin 2 g in dextrose 5 % 500 mL IVPB (mg) 2,000 mg New Bag 07/28/20 1544                Microbiologic Results:  Microbiology Results (last 7 days)     Procedure Component Value Units Date/Time    Blood culture [[id number]] Collected: 07/27/20 2320    Order Status: Completed Specimen: Blood Updated: 07/29/20 1612     Blood Culture, Routine No growth to date      No Growth to date    Narrative:      Arterial line    Blood culture [[id number]] Collected: 07/27/20 2320    Order Status: Completed Specimen: Blood Updated: 07/29/20 1612     Blood Culture, Routine No growth to date      No Growth to date    Narrative:      Central line    Culture, Respiratory with Gram Stain [[id number]]  (Abnormal) Collected: 07/27/20 2313    Order Status: Completed Specimen: Respiratory from Tracheal Aspirate Updated: 07/29/20 1141     Respiratory Culture STENOTROPHOMONAS (X.) MALTOPHILIA  Moderate  Susceptibility pending       Gram Stain (Respiratory) Rare WBC's     Gram Stain (Respiratory) Rare Gram negative rods    Blood culture [[id number]] Collected: 07/22/20 1036    Order Status: Completed Specimen: Blood Updated: 07/27/20 2212     Blood Culture, Routine No growth after 5 days.    Blood culture [[id number]] Collected: 07/22/20 1036    Order Status: Completed Specimen: Blood Updated: 07/27/20 2212     Blood Culture, Routine No growth after 5  days.

## 2020-07-30 NOTE — SUBJECTIVE & OBJECTIVE
Objective:     Vital Signs (Most Recent):  Temp: 97.8 °F (36.6 °C) (07/30/20 0715)  Pulse: (!) 125 (07/30/20 1027)  Resp: (!) 33 (07/30/20 1027)  BP: (!) 118/57 (07/30/20 0900)  SpO2: (!) 87 % (07/30/20 1027) Vital Signs (24h Range):  Temp:  [96.9 °F (36.1 °C)-99.6 °F (37.6 °C)] 97.8 °F (36.6 °C)  Pulse:  [] 125  Resp:  [14-40] 33  SpO2:  [87 %-100 %] 87 %  BP: ()/(32-66) 118/57     Weight: 132.3 kg (291 lb 10.7 oz)  Body mass index is 45.68 kg/m².      Intake/Output Summary (Last 24 hours) at 7/30/2020 1053  Last data filed at 7/30/2020 0900  Gross per 24 hour   Intake 5023.73 ml   Output 2580 ml   Net 2443.73 ml      fentanyl 200 mcg/hr (07/30/20 1016)    ketamine (KETALAR) infusion (titrating) 20 mcg/kg/min (07/30/20 0930)    midazolam 10 mg/hr (07/30/20 0900)    norepinephrine bitartrate-D5W 0.08 mcg/kg/min (07/30/20 0900)       Physical Exam  Vitals signs and nursing note reviewed.   Constitutional:       Appearance: He is obese. He is ill-appearing. He is not toxic-appearing or diaphoretic.      Interventions: He is sedated, intubated and restrained.   HENT:      Head: Atraumatic.      Mouth/Throat:      Mouth: Mucous membranes are dry.   Eyes:      General: No scleral icterus.     Comments: Bilateral scleral edema   Neck:      Vascular: No JVD.   Cardiovascular:      Rate and Rhythm: Regular rhythm. Tachycardia present.      Pulses:           Radial pulses are 1+ on the right side and 1+ on the left side.        Dorsalis pedis pulses are 1+ on the right side and 1+ on the left side.   Pulmonary:      Effort: Tachypnea present. No retractions. He is intubated.      Breath sounds: Decreased breath sounds and rhonchi present.   Abdominal:      General: Abdomen is protuberant. There is no distension.      Palpations: Abdomen is soft.   Musculoskeletal:      Right lower leg: No edema.      Left lower leg: No edema.   Skin:     General: Skin is warm and dry.      Capillary Refill: Capillary  refill takes 2 to 3 seconds.         Vents:  Vent Mode: A/C (07/30/20 1027)  Ventilator Initiated: Yes (07/19/20 0415)  Set Rate: 30 BPM (07/30/20 1027)  Vt Set: 0 mL (07/30/20 1027)  Pressure Support: 0 cmH20 (07/30/20 1027)  PEEP/CPAP: 17 cmH20 (07/30/20 1027)  Oxygen Concentration (%): 100 (07/30/20 1027)  Peak Airway Pressure: 42 cmH2O (07/30/20 1027)  Plateau Pressure: 37 cmH20 (07/30/20 1027)  Total Ve: 20.3 mL (07/30/20 1027)  F/VT Ratio<105 (RSBI): (!) 58.93 (07/30/20 1027)    Lines/Drains/Airways     Peripherally Inserted Central Catheter Line            PICC Double Lumen 07/16/20 1130 right brachial 13 days          Central Venous Catheter Line                 Hemodialysis Catheter 07/26/20 1002 right femoral 4 days          Drain                 NG/OG Tube 07/19/20 0530 orogastric 16 Fr. 11 days         Urethral Catheter 07/19/20 0330 11 days          Airway                 Airway - Non-Surgical 07/19/20 0420 Endotracheal Tube 11 days          Arterial Line                 Arterial Line 07/28/20 2200 Left Brachial 1 day                Significant Labs:    CBC/Anemia Profile:  Recent Labs   Lab 07/29/20  0345  07/30/20  0121 07/30/20  0400 07/30/20  0753   WBC 25.17*  --   --  21.29*  --    HGB 7.6*  --   --  7.3*  --    HCT 26.3*   < > 22* 24.5* 22*     --   --  242  --    *  --   --  105*  --    RDW 15.0*  --   --  15.5*  --    FERRITIN 3,088*  --   --   --   --     < > = values in this interval not displayed.        Chemistries:  Recent Labs   Lab 07/29/20  0345 07/30/20  0400    141   K 4.7 3.9   CL 97 101   CO2 30* 32*   BUN 52* 44*   CREATININE 0.9 0.8   CALCIUM 8.9 8.8   ALBUMIN 1.9* 1.9*   PROT 6.3 6.0   BILITOT 0.3 0.2   ALKPHOS 69 68   ALT 36 40   AST 40 38   MG 2.2 2.3   PHOS 3.0 2.7       All pertinent labs within the past 24 hours have been reviewed.    Significant Imaging:  I have reviewed all pertinent imaging results/findings within the past 24 hours.

## 2020-07-30 NOTE — PLAN OF CARE
Levophed infusing, BP stable. Tachycardic in the 110's. Ketamine, fentanyl, and versed infusing to maintain a RASS of -4 and promote vent synchrony. Tolerating TF at goal rate. Maintaining O2 sats of 100% on 100% FiO2 and 17 of PEEP. Afebrile. Adequate urine output (see flowsheet) Turned and repositioned with the use of pillows. Updated wife, Joan, via phone. Will continue to monitor.

## 2020-07-30 NOTE — ASSESSMENT & PLAN NOTE
Kidney function and potassium are stable without any need for renal replacement therapy    Still has the dialysis catheter in place.  Will remove the dialysis catheter today

## 2020-07-30 NOTE — PROGRESS NOTES
RT dressed in PPE assisted RNS and NP turning PTS head away from the vent. ET tube secured and moved to the right. RT sx large amount of red brown secretions from  Nose.

## 2020-07-30 NOTE — PROGRESS NOTES
F/U visit with Mr. Abdalla. Bilateral heels remain intact with no redness or breakdown noted. Turned to right side. Sacrum, coccyx, bilateral buttock intact with no redness or breakdown noted. Left lateral flank patch of erythema is noted with irregular edges measures 4x4cm, unknown etiology. No other issues with back/shoulders. Positioned with wedge. Right ear previously noted serous fluid filled blister has resolved, blanchable redness is still noted. Painted with cavilon and foam dressing applied for prevention. Left ear discoloration is now deep red/maroon and small open blistered area. Painted with cavilon and foam dressing applied. Discussed findings with DESHAUN CHAVEZ. Will follow.

## 2020-07-30 NOTE — ASSESSMENT & PLAN NOTE
Cont Na Zirconium per OG   Follow up labs  Placed VasCath 7/26 for emergent RRT done 7/26 has not needed since and off Heparin infusion  Renal following  Reviewed all meds with pharmacy for source of hyperkalemia and essentially unremarkable (UFH risk of hyperkalemia 7% stopped 7/26)  Follow K+ and repeat RRT if needed.   7/30 - potassium stable, continue Na zirconium, plan remove vas cath;

## 2020-07-30 NOTE — ASSESSMENT & PLAN NOTE
Respiratory/Contact/Droplet Isolation   Completed course of empiric Antb earlier in admit, now levaquin for stenotrophomonas from sputum  Completed completed 5 day course of Rendisivir and 10 day course of Decadron  Continue multi vitamin,Melatonin, and Vit C; monitor mag for goal 2-2.5; continue pepcid  High d dimer warrants full anticoagulation, concern for heparin influence on potassium; discussed with multidisciplinary team and will use 0.5mg/kg dosing of lovenox as recommended as the lower range by the MATH+ protocol

## 2020-07-30 NOTE — PLAN OF CARE
Pt remains intubated/ sedated on Fentanyl, Versed, and Ketamine. Proned at 1500, Sinus tach on the monitor, Sats around 90% most of shift, sat 83% immediately post prone, slowly increasing. MD aware. UOP adequate for shift. Trialysis catheter removed. T max 100.4, Ice packs placed and PRN  fever reducing meds given. 1 unit of blood given. POC reviewed with family. Turned q 2 hours, heel elevation maintained. Will continue to monitor.

## 2020-07-30 NOTE — ASSESSMENT & PLAN NOTE
ICU hemodynamic monitoring  Blood and Sputum cultures reviewed, sputum now +stenotrophomonas, d/c vanco, start levaquin to cover and await sensitivities

## 2020-07-30 NOTE — ASSESSMENT & PLAN NOTE
Lung Protective Mechanical Ventilation w/ ARDSnet protocol  High PEEP and Low Vt  Attempt to maintain PIP < 30 cm H2O  Fluid Sparing Resuscitation  Permissive Hypercapnea  Returned from prone to supine position yesterday will consider re-prone position this afternoon 7/28 Completed proning this am  7/30 - Unable to wean PEEP, plan prone positioning x 16 hour cycle today

## 2020-07-30 NOTE — PROGRESS NOTES
[facility]  Nephrology  Progress Note    Patient Name: Jonas Abdalla  MRN: [id number]  Admission Date: 7/11/2020  Hospital Length of Stay: 17 days  Attending Provider: Enmanuel Hu MD   Primary Care Physician: Carmel Tabares MD  Principal Problem:Acute respiratory failure with hypoxia    Subjective:     HPI: Jonas Abdalla is a 35-year-old  man with history of hypertension, he was admitted to the hospital about 2 weeks ago for shortness of breath due to COVID infection, patient was intubated and placed in intensive care unit on vent support, prolonged ICU stay, in the last few days his serum potassium has been elevated, etiology unclear, medical management has failed, we were consulted for possible dialysis treatment to lower the potassium levels, kidney function is stable with serum creatinine at 0.8 mg/dL,        Interval History:  DC femoral dialysis catheter    Review of patient's allergies indicates:   Allergen Reactions    Acetaminophen Hives     Current Facility-Administered Medications   Medication Frequency    0.9%  NaCl infusion (for blood administration) Q24H PRN    0.9%  NaCl infusion (for blood administration) Q24H PRN    albuterol-ipratropium 2.5 mg-0.5 mg/3 mL nebulizer solution 3 mL Q4H    ascorbic acid (vitamin C) tablet 500 mg BID    bisacodyL suppository 10 mg Daily PRN    calcium gluconate 1g in dextrose 5% 100mL (ready to mix system) Q10 Min PRN    chlorhexidine 0.12 % solution 15 mL BID    enoxaparin injection 70 mg Q12H    famotidine (PF) injection 20 mg BID    fentaNYL 2500 mcg in D5W 250 mL infusion premix (titrating) (conc: 10 mcg/mL) Continuous    heparin (porcine) injection 1,200 Units PRN    ketamine (KETALAR) 1,000 mg in sodium chloride 0.9% 500 mL infusion Continuous    levoFLOXacin 750 mg/150 mL IVPB 750 mg Q24H    melatonin tablet 6 mg Nightly    midazolam 100 mg/100 mL in dextrose 5% infusion Continuous     multivit-min-ferrous gluconate 9 mg iron/15 mL Liqd 5 mL Daily    norepinephrine 32 mg in dextrose 5 % 250 mL infusion Continuous    ondansetron injection 4 mg Q8H PRN    pneumoc 13-melvina conj-dip cr(PF) (PREVNAR 13 (PF)) 0.5 mL vaccine x 1 dose    polyethylene glycol packet 17 g BID    promethazine (PHENERGAN) 6.25 mg in dextrose 5 % 50 mL IVPB Q6H PRN    sodium chloride 0.9% flush 10 mL PRN    sodium zirconium cyclosilicate packet 5 g BID    white petrolatum-mineral oil (LUBIFRESH P.M.) ophthalmic ointment Q8H       Objective:     Vital Signs (Most Recent):  Temp: 97.8 °F (36.6 °C) (07/30/20 0715)  Pulse: (!) 128 (07/30/20 1100)  Resp: (!) 32 (07/30/20 1100)  BP: (!) 113/44 (07/30/20 1100)  SpO2: (!) 89 % (07/30/20 1100)  O2 Device (Oxygen Therapy): ventilator (07/30/20 1027) Vital Signs (24h Range):  Temp:  [96.9 °F (36.1 °C)-99.6 °F (37.6 °C)] 97.8 °F (36.6 °C)  Pulse:  [] 128  Resp:  [14-40] 32  SpO2:  [87 %-100 %] 89 %  BP: ()/(32-66) 113/44     Weight: 132.3 kg (291 lb 10.7 oz) (07/15/20 0039)  Body mass index is 45.68 kg/m².  Body surface area is 2.5 meters squared.    I/O last 3 completed shifts:  In: 7384.8 [I.V.:3564.8; NG/GT:2370; IV Piggyback:1450]  Out: 4235 [Urine:4235]    Physical Exam  Vitals signs and nursing note reviewed.   Constitutional:       Appearance: He is obese. He is ill-appearing. He is not toxic-appearing or diaphoretic.      Interventions: He is sedated, intubated and restrained.   HENT:      Head: Atraumatic.      Mouth/Throat:      Mouth: Mucous membranes are dry.   Eyes:      General: No scleral icterus.     Comments: Bilateral scleral edema   Neck:      Vascular: No JVD.   Cardiovascular:      Rate and Rhythm: Regular rhythm. Tachycardia present.      Pulses:           Radial pulses are 1+ on the right side and 1+ on the left side.        Dorsalis pedis pulses are 1+ on the right side and 1+ on the left side.   Pulmonary:      Effort: Tachypnea present. No  retractions. He is intubated.      Breath sounds: Decreased breath sounds and rhonchi present.   Abdominal:      General: Abdomen is protuberant. There is no distension.      Palpations: Abdomen is soft.   Musculoskeletal:      Right lower leg: No edema.      Left lower leg: No edema.   Skin:     General: Skin is warm and dry.      Capillary Refill: Capillary refill takes 2 to 3 seconds.         Significant Labs:  All labs within the past 24 hours have been reviewed.     Significant Imaging:      Assessment/Plan:     Hyperkalemia  Kidney function and potassium are stable without any need for renal replacement therapy    Still has the dialysis catheter in place.  Will remove the dialysis catheter today        Thank you for your consult.     Denton Caputo MD  Nephrology  [facility]

## 2020-07-30 NOTE — PROGRESS NOTES
Pharmacy Vancomycin Consult Note    Therapy with Vancomycin complete and/or consult discontinued by provider.  Pharmacy will sign off, please re-consult as needed.    Reynaldo Cardenas D 7/30/2020 11:54 AM

## 2020-07-30 NOTE — PROGRESS NOTES
[facility]   Critical Care Medicine  Progress Note    Patient Name: Jonas Abdalla  MRN: [id number]  Admission Date: 7/11/2020  Hospital Length of Stay: 17 days  Code Status: Full Code  Attending Provider: Enmanuel Hu MD  Primary Care Provider: Carmel Tabares MD   Principal Problem: Acute respiratory failure with hypoxia    Subjective:     HPI:  Mr Abdalla is a morbidly obese WM with a PMH of cholelithiasis and HTN who was admitted 7/12 with Sepsis and COVID PNA after presentation with weakness, persistent fever, chills and N/V/D for 6 days progressive.  Had 102.4 temp in ED.  Admitted to [facility] started on emperic IVAB, Decadron and NC low flow.  Oxygenation worsened over next few days.  He consented and was started on Remdesivir 7/14.  On evening of 7/14.  At [place] last night patient became more hypoxic despite being maxed out on VapoTherm.  He was placed in prone position, which improved O2 sat to 90-93% but patient remained in notable respiratory distress.  He was transferred to ICU and initiated on BiPAP.      Hospital/ICU Course:  7/15 - This AM upright in bed fully awake, alert and responsive not in distress but has tachypnea and mild work of breathing while on NPPV and FiO2 at .95.    7/16 - remains on NIPPV with some decline in oxygen demand with FiO2 0.75 but continued tachypnea; ongoing desaturation with exertion or momentary removal of NIPPV for fluid intake  7/17 - tolerated short time on vapotherm support last evening, returned to NIPPV for sleep and has remained today s/t tachypnea, hypoxia; afebrile but wbc slight trend up 14.4k   7/18 - awake, alert,oriented; continues to alternate max support vapotherm with NIPPV support, tachypnea at baseline and worsens along with drop in pulse ox sat with exertion/movement that slowly recovers with rest  7/19 - respiratory distress overnight; required intubation, heavy sedation, mechanical ventilation, low dose pressor support;  post intubation abg shows continued worsening acidosis along with now worsening leukocytosis, ddimer, LD, and CRP  7/20 - remains intubated and sedated on mechanical ventilation with inverse ratio ventilation, some decrease in oxygen demand today; tmax yesterday 101.6; requiring low dose pressor support with sedation  7/21 - remains intubated and sedated on mechanical inverse ratio ventilation, oxygen demand down to 60%; t max 100.4; tolerating TF  7/22 - remains intubated and sedated, mode of ventilation adjusted to PCV today with high PEEP, moderate oxygen demand; tmax today 101.8 with wbc down to 14.7k and LD trend down  7/23 - Semi-erect in bed intubated on mech ventilation and sedated heavily on Fentanyl and Propofol infusions.  Requiring some Levophed infusion.  Tolerating TF.  Restless on vent overnight and this AM tachypnic and without vent synchrony.  A-flutter yesterday.    7/24 - Supine in bed sedated on Fentanyl, Propofol and Versed infusions with intubation on mech ventilation.  On low dose Levophed infusion.  Tolerating TF.  Still severe hypoxemia requiring high PEEP and 100% FiO2.  7/25 - Prone in bed since yesterday afternoon.  Intubated on mech ventilation and sedated on Versed, Propofol and Versed infusions.  Off TF while prone.  On Heparin infusion and low dose Levophed infusion  7/26 - Returned to supine position yesterday afternoon.  Semi-erect in bed intubated on mech ventilation some increased resp distress improved with increasing Versed infusion.  Persistent Hyperkalemia refractory to Rx discussed with Renal post consult and placed VasCath starting RRT.  Still requiring Versed, Fentanyl and Ketamine infusions for sedation.  Weaned off Levophed infusion.  Good UOP.  Heparin infusion stopped due to concern with hyperkalemia  7/27 - Supine in bed intubated still on mech ventilation with high O2 demand heavily sedated on Ketamine, Versed and Fentanyl infusions.  Still off pressors.  Tachycardia  persistent in 130s.  Good UOP.  7/28 taken off proning this am, fair urine output  7/29 - Upright in bed intubated on [facility] ventilation with work of breathing heavily sedated on Ketamine, Fentanyl and Versed infusions.  More hypoxic, tachypnic and dyspnic today compared to yesterday.  Good UOP still + I/O balance.   7/30 - remains intubated, heavily sedated on mechanical ventilation with high PEEP and 100%FiO2; potassium stable        Objective:     Vital Signs (Most Recent):  Temp: 97.8 °F (36.6 °C) (07/30/20 0715)  Pulse: (!) 125 (07/30/20 1027)  Resp: (!) 33 (07/30/20 1027)  BP: (!) 118/57 (07/30/20 0900)  SpO2: (!) 87 % (07/30/20 1027) Vital Signs (24h Range):  Temp:  [96.9 °F (36.1 °C)-99.6 °F (37.6 °C)] 97.8 °F (36.6 °C)  Pulse:  [] 125  Resp:  [14-40] 33  SpO2:  [87 %-100 %] 87 %  BP: ()/(32-66) 118/57     Weight: 132.3 kg (291 lb 10.7 oz)  Body mass index is 45.68 kg/m².      Intake/Output Summary (Last 24 hours) at 7/30/2020 1053  Last data filed at 7/30/2020 0900  Gross per 24 hour   Intake 5023.73 ml   Output 2580 ml   Net 2443.73 ml      fentanyl 200 mcg/hr (07/30/20 1016)    ketamine (KETALAR) infusion (titrating) 20 mcg/kg/min (07/30/20 0930)    midazolam 10 mg/hr (07/30/20 0900)    norepinephrine bitartrate-D5W 0.08 mcg/kg/min (07/30/20 0900)       Physical Exam  Vitals signs and nursing note reviewed.   Constitutional:       Appearance: He is obese. He is ill-appearing. He is not toxic-appearing or diaphoretic.      Interventions: He is sedated, intubated and restrained.   HENT:      Head: Atraumatic.      Mouth/Throat:      Mouth: Mucous membranes are dry.   Eyes:      General: No scleral icterus.     Comments: Bilateral scleral edema   Neck:      Vascular: No JVD.   Cardiovascular:      Rate and Rhythm: Regular rhythm. Tachycardia present.      Pulses:           Radial pulses are 1+ on the right side and 1+ on the left side.        Dorsalis pedis pulses are 1+ on the right side and  1+ on the left side.   Pulmonary:      Effort: Tachypnea present. No retractions. He is intubated.      Breath sounds: Decreased breath sounds and rhonchi present.   Abdominal:      General: Abdomen is protuberant. There is no distension.      Palpations: Abdomen is soft.   Musculoskeletal:      Right lower leg: No edema.      Left lower leg: No edema.   Skin:     General: Skin is warm and dry.      Capillary Refill: Capillary refill takes 2 to 3 seconds.         Vents:  Vent Mode: A/C (07/30/20 1027)  Ventilator Initiated: Yes (07/19/20 0415)  Set Rate: 30 BPM (07/30/20 1027)  Vt Set: 0 mL (07/30/20 1027)  Pressure Support: 0 cmH20 (07/30/20 1027)  PEEP/CPAP: 17 cmH20 (07/30/20 1027)  Oxygen Concentration (%): 100 (07/30/20 1027)  Peak Airway Pressure: 42 cmH2O (07/30/20 1027)  Plateau Pressure: 37 cmH20 (07/30/20 1027)  Total Ve: 20.3 mL (07/30/20 1027)  F/VT Ratio<105 (RSBI): (!) 58.93 (07/30/20 1027)    Lines/Drains/Airways     Peripherally Inserted Central Catheter Line            PICC Double Lumen 07/16/20 1130 right brachial 13 days          Central Venous Catheter Line                 Hemodialysis Catheter 07/26/20 1002 right femoral 4 days          Drain                 NG/OG Tube 07/19/20 0530 orogastric 16 Fr. 11 days         Urethral Catheter 07/19/20 0330 11 days          Airway                 Airway - Non-Surgical 07/19/20 0420 Endotracheal Tube 11 days          Arterial Line                 Arterial Line 07/28/20 2200 Left Brachial 1 day                Significant Labs:    CBC/Anemia Profile:  Recent Labs   Lab 07/29/20  0345  07/30/20  0121 07/30/20  0400 07/30/20  0753   WBC 25.17*  --   --  21.29*  --    HGB 7.6*  --   --  7.3*  --    HCT 26.3*   < > 22* 24.5* 22*     --   --  242  --    *  --   --  105*  --    RDW 15.0*  --   --  15.5*  --    FERRITIN 3,088*  --   --   --   --     < > = values in this interval not displayed.        Chemistries:  Recent Labs   Lab 07/29/20  9924  07/30/20  0400    141   K 4.7 3.9   CL 97 101   CO2 30* 32*   BUN 52* 44*   CREATININE 0.9 0.8   CALCIUM 8.9 8.8   ALBUMIN 1.9* 1.9*   PROT 6.3 6.0   BILITOT 0.3 0.2   ALKPHOS 69 68   ALT 36 40   AST 40 38   MG 2.2 2.3   PHOS 3.0 2.7       All pertinent labs within the past 24 hours have been reviewed.    Significant Imaging:  I have reviewed all pertinent imaging results/findings within the past 24 hours.      ABG  Recent Labs   Lab 07/30/20  0753   PH 7.293*   PO2 82   PCO2 68.7*   HCO3 33.2*   BE 7     Assessment/Plan:     Pulmonary  * Acute respiratory failure with hypoxia  Cont full vent support  Vent settings reviewed and adjusted multiple times with abg to eval response  -does not tolerate attempt to wean PEEP  VAP prophylaxis  [email or web address] protocol  SAT/SBT contraindicated with current support requirement    On mechanically assisted ventilation  See resp failure plan    Cardiac/Vascular  High triglycerides  Stopped Propofol infusion 7/25  TGL trend down    Essential hypertension  Hold metoprolol given pressor need yesterday, continue ICU hemodynamic monitoring    Renal/  Hyperkalemia  Cont Na Zirconium per OG   Follow up labs  Placed VasCath 7/26 for emergent RRT done 7/26 has not needed since and off Heparin infusion  Renal following  Reviewed all meds with pharmacy for source of hyperkalemia and essentially unremarkable (UFH risk of hyperkalemia 7% stopped 7/26)  Follow K+ and repeat RRT if needed.   7/30 - potassium stable, continue Na zirconium, plan remove vas cath;     ID  Severe sepsis with acute organ dysfunction  ICU hemodynamic monitoring  Blood and Sputum cultures reviewed, sputum now +stenotrophomonas, d/c vanco, start levaquin to cover and await sensitivities    Oncology  Anemia, unspecified  No active bleeding  Conservative transfusion protocol - one unit PRBC today  Daily CBC     Endocrine  Severe protein-calorie malnutrition  Continue TF per RD goals    Morbid obesity with BMI of  45.0-49.9, adult  Recommend diet and lifestyle modification for goal wt loss once medically stable    GI  .    Other  Acute respiratory distress syndrome (ARDS) due to COVID-19 virus  Lung Protective Mechanical Ventilation w/ ARDSnet protocol  High PEEP and Low Vt  Attempt to maintain PIP < 30 cm H2O  Fluid Sparing Resuscitation  Permissive Hypercapnea  Returned from prone to supine position yesterday will consider re-prone position this afternoon  7/28 Completed proning this am  7/30 - Unable to wean PEEP, plan prone positioning x 16 hour cycle today    Pneumonia due to COVID-19 virus  Respiratory/Contact/Droplet Isolation   Completed course of empiric Antb earlier in admit, now levaquin for stenotrophomonas from sputum  Completed completed 5 day course of Rendisivir and 10 day course of Decadron  Continue multi vitamin,Melatonin, and Vit C; monitor mag for goal 2-2.5; continue pepcid  High d dimer warrants full anticoagulation, concern for heparin influence on potassium; discussed with multidisciplinary team and will use 0.5mg/kg dosing of lovenox as recommended as the lower range by the MATH+ protocol     Critical Care Daily Checklist:    A: Awake: RASS Goal/Actual Goal: RASS Goal: -4-->deep sedation  Actual: [place] Agitation Sedation Scale (RASS): Deep sedation   B: Spontaneous Breathing Trial Performed?     C: SAT & SBT Coordinated?  Not candidate                    D: Delirium: CAM-ICU Overall CAM-ICU: Positive   E: Early Mobility Performed? ROM   F: Feeding Goal: Goals: Meet >50% EEN/EPN by RD f/u  Status: Nutrition Goal Status: new   Current Diet Order   Procedures    Diet NPO      AS: Analgesia/Sedation Fentanyl, ketamine, versed infusions   T: Thromboembolic Prophylaxis lovenox    H: HOB > 300 Yes   U: Stress Ulcer Prophylaxis (if needed) pepcid   G: Glucose Control monitoring   B: Bowel Function Stool Occurrence: 1   I: Indwelling Catheter (Lines & Bagley) Necessity reviewed   D: De-escalation of  Antimicrobials/Pharmacotherapies reviewed    Plan for the day/ETD As above    Code Status:  Family/Goals of Care: Full Code  Spouse, Joan, updated ([phone])    I have discussed case and plan of care in detail with Dr Tena; Status and plan of care were discussed with team on multidisciplinary rounds.    Critical Care Time: 92 minutes  Critical secondary to severe hypoxemic respiratory failure s/t covid pneumonia; Critical care was time spent personally by me on the following activities: development of treatment plan with patient or surrogate and bedside caregivers, discussions with consultants, evaluation of patient's response to treatment, examination of patient, ordering and performing treatments and interventions, ordering and review of laboratory studies, ordering and review of radiographic studies, pulse oximetry, re-evaluation of patient's condition. This critical care time did not overlap with that of any other provider or involve time for any procedures.     LOC Chowdhury-BC  Critical Care Medicine  [facility]

## 2020-07-30 NOTE — PROGRESS NOTES
[facility] Hospital Medicine  Progress Note    Patient Name: Jonas Abdalla  MRN: [id number]  Patient Class: IP- Inpatient   Admission Date: 7/11/2020  Length of Stay: 16 days  Attending Physician: Enmanuel Hu MD  Primary Care Provider: Carmel Tabares MD        Subjective:     Principal Problem:Acute respiratory failure with hypoxia        HPI:  Jonas Abdalla is a 35 year old male with a pMHx of HTN who presented to the Emergency Department with c/o SOB. Associated symptoms include: generalized weakness, fatigue, and fever. Pt reports symptoms started Monday, 07/06. He reports he thought he had COVID and went to get tested taht day but did not get test results. Went to local urgent care on Tuesday, 07/07 -- diagnosed with PNA and given Levaquin 500 mg po daily. Symptoms continued despite abx and wife took pt to COVID testing site which he was negative for COVID. Wife reports the COVID test he took on Monday came back negative, notified yesterday of results. ED workup showed: no leukocytosis/leukopenia, stable Hgb/Hct, mild hyponatremia. CXR showed focal right infrahilar/medial lung base consolidation concerning for pneumonia. Febrile upon arrival to . Pt received 30mL/kg fluid resuscitation in ED along with one time doses of IV rocephin/azithromycin. Pt placed on observation for Fever believed to be secondary to RML PNA. COVID rapid screening pending upon assessment, now POSITIVE.     Overview/Hospital Course:  Pt presented to the ED due to weakness, nausea/vomiting with concerns for dehydration. COVID ++. Running temps 103, 102.4, 101.8. He denied any SOB and DRAKE. He describes an occasional dry cough. On 2 L nasal cannula. On azithromycin, Rocephin, decadron and scheduled albuterol.   7/13/2020 - pt has continued to be febrile and now reporting SOB and productive cough. Provided remdesivir information and patient has consented. Oxygen escalated to 4 L.  7/14/2020 -  Sudden increase to oxygen 10 L, oxygen saturation 85 %. Temp 101.6 at 4 AM this AM. Current plan of care continued.   7/18- Pt was transferred to ICU on 7/15 with worsening respiratory status requiring NIPPV . Pt is awake , alert and oriented today .Mexed out on  vapotherm with hypoxia and placed on  NIPPV / BiPAP support. Remains  Tachypneic, easily desats with exertion or movement . Continue to monitor closely respiratory status  in ICU setting .   7/19 - Intubated electively  overnight due to increased WOB on bipap . Sedated on propofol, fentanyl. Levophed gtt added to maintain MAP>65. Heparin high intensity nomogram continues. Pt completed 5 days Remdesivir. On Decadron x 10 days . WBC count is markedly elevated . Likely reactive . Procalcitinin is normal. Creatinine bumped to 1.3 from 0.8 overnight.   7/2- Remains intubated . Tmax 100. Tachycardia and hypotension noted . On Levo to maintain MAP >65. Vent setting and weaning per ICU. 1st unit of convalescent plasma transfused  Overnight with no adverse effect. WBC trended down to 25.3 from 46.3, Pl down to 265 from 637, D d-rosa 3.7, CRP trended up 218>109.  7/21- Tmax 100.4 last evening and none since . Remains intubated . Some improvement with decreased oxygen demand. fiO2 down to 60%. Completed 2 units of convalescent plasma infusion without adverse effects. WBC slowly trending down 22.1, Hgb 11.6, Pl normal today . Ferritin uptrend 5752, D-dimer 3.57, Creatinine bumps to 1.5 from 1.0 yesterday . LDL trending down 886  7/22- Fever noted again. tmax 101.3. Noted pt be tachycardic. Remains intubated and sedated . Remains on Levo for to maintain MAP. Noted bolus fluid given. FiO2 bumped to 70%. Current SpO2 is satisfactory . Received 2 unit of convalescent plasma. WBC trending down. Hgb 11.4 , Pl count stable . Serum creatinine improved to 1.0 today     7/23- Tmax 100.3. Overnight developed decompensation with tachycardia , irregular rhythm , vent dyssynchrony ,  increased oxygen demand required paralytics Nimbex . FiO2 now 100% yet hypoxia. . Labs are fairly stable.     7/24- Tamx 100.2 last night. Remains intubated and sedated . Worsening hypoxia requiring high PEEP. fiO2 remains 100%. Unable to wean. ICU will try prone position today . WBC count bumped . H/H fairly stable. Hyperkalemia is noted in am lab.        Interval History:  Persistent hypoxia requiring PEEP 17 and fiO2 100%.  Hemodynamically stable.  Briefly required renal replacement therapy.    Review of Systems   Unable to perform ROS: Intubated     Objective:     Vital Signs (Most Recent):  Temp: 96.9 °F (36.1 °C) (07/29/20 1901)  Pulse: 95 (07/29/20 1945)  Resp: 17 (07/29/20 1945)  BP: (!) 89/44 (07/29/20 1901)  SpO2: 100 % (07/29/20 1945) Vital Signs (24h Range):  Temp:  [96.9 °F (36.1 °C)-101.7 °F (38.7 °C)] 96.9 °F (36.1 °C)  Pulse:  [] 95  Resp:  [15-41] 17  SpO2:  [84 %-100 %] 100 %  BP: ()/(26-66) 89/44     Weight: 132.3 kg (291 lb 10.7 oz)  Body mass index is 45.68 kg/m².    Intake/Output Summary (Last 24 hours) at 7/29/2020 2013  Last data filed at 7/29/2020 2000  Gross per 24 hour   Intake 4681.23 ml   Output 2775 ml   Net 1906.23 ml      Physical Exam  Constitutional:       General: He is not in acute distress.     Appearance: He is well-developed. He is not diaphoretic.      Comments: Sedated on vent   HENT:      Head: Normocephalic and atraumatic.      Mouth/Throat:      Pharynx: No oropharyngeal exudate.   Eyes:      Conjunctiva/sclera: Conjunctivae normal.      Pupils: Pupils are equal, round, and reactive to light.   Neck:      Thyroid: No thyromegaly.      Vascular: No JVD.   Cardiovascular:      Rate and Rhythm: Normal rate and regular rhythm.      Heart sounds: Normal heart sounds. No murmur.   Pulmonary:      Effort: No respiratory distress.      Breath sounds: No wheezing or rales.      Comments: On vent   Chest:      Chest wall: No tenderness.   Abdominal:      General: Bowel  sounds are normal. There is no distension.      Palpations: Abdomen is soft.      Tenderness: There is no abdominal tenderness. There is no guarding or rebound.   Lymphadenopathy:      Cervical: No cervical adenopathy.   Skin:     General: Skin is warm and dry.      Findings: No rash.   Neurological:      Cranial Nerves: No cranial nerve deficit.      Sensory: No sensory deficit.      Deep Tendon Reflexes: Reflexes normal.      Comments: On vent , sedated         Significant Labs:   CBC:   Recent Labs   Lab 07/28/20  0232  07/28/20  2334 07/29/20  0100 07/29/20  0345   WBC 28.35*  --   --   --  25.17*   HGB 8.7*  --   --   --  7.6*   HCT 29.5*   < > 22* 24* 26.3*     --   --   --  264    < > = values in this interval not displayed.     CMP:   Recent Labs   Lab 07/28/20  0232 07/29/20  0345    137   K 5.5* 4.7   CL 97 97   CO2 29 30*    124*   BUN 52* 52*   CREATININE 1.2 0.9   CALCIUM 9.2 8.9   PROT 6.6 6.3   ALBUMIN 2.0* 1.9*   BILITOT 0.4 0.3   ALKPHOS 69 69   AST 40 40   ALT 41 36   ANIONGAP 11 10   EGFRNONAA >60 >60       Significant Imaging:       Assessment/Plan:      * Acute respiratory failure with hypoxia  Encouraged deep breathing and coughing.  Transferred to ICU 14 July for respiratory distress.  Intubated 19 July.  Remains intubated persistent hypoxia.  PEEP 17 and fiO2 100%.  Wean respiratory support as able.    Pneumonia due to COVID-19 virus  - COVID-19 testing Collection Date: 7/11/2020 Collection Time:   11:55 AM  - Infection Control notified     - Isolation:   - Airborne, Contact and Droplet Precautions  - Cohort patients into COVID units  - N95 mask, wear eye protection  - 20 second hand hygiene              - Limit visitors per hospital policy              - Consolidating lab draws, nursing care, provider visits, and interventions    - Diagnostics: (leukopenia, hyponatremia, hyperferritinemia, elevated troponin, elevated d-dimer, age, and comorbidities are significant  predictors of poor clinical outcome)  CBC, CMP, Procalcitonin, Ferritin, CRP, LDH, BNP, Troponin, Rapid Flu, Blood Culture x2 and Portable CXR    - Management:  Supplemental O2 to maintain SpO2 >92%  Telemetry  Continuous/intermittent Pulse Ox  Albuterol treatment PRN  IV Rocephin/PO Azithromycin  Dexamethasone   Vit C, MVI  Scheduled albuterol  7/13/2020 - pt agreeable to receive Remdesivir - information received  7/19- Completed 5 days Remdesivir . On Decadron for 10 days   7/20- 1st unit of convalescent plasma transfused  with no adverse effect.  7/21-Completed 2 units of convalescent plasma infusion without adverse effects.Some improvement with decreased oxygen demand. fiO2 down to 60%.      Acute respiratory distress syndrome (ARDS) due to COVID-19 virus        Leukocytosis and thrombocytosis   7/19- Marked leukocytosis and thrombocytosis are noted. Likely reactive . Procalcitonin is neg. Blood cultures - NGTD. Endotracheal aspirate obtained for gram stain and culture.   7/21- Improving   7/24- Monitor counts       Pneumonia of right lower lobe due to infectious organism  IV Rocephin/Azithromycin  - empiric treatment completed   Supplemental oxygen  Sputum culture    Sepsis  - Sepsis criteria: Current temp of 101.3, RR 21, source PNA due to COVID-19 virus  - Blood cultures obtained >>>>> NGTD  - Recent COVID test outpt on 07/06/ 20 negative. Repeat COVID today in ED (+)  - Will treat underlying cause with abx, supplemental oxygen, MDI, oral steroids per COVID-19 protocol      Essential hypertension  - Pt normally takes lisinopril-hctz 20-12.5 po daily  - Will hold hctz and continue lisinopril at current dose and frequency  7/24-  All antihypertensives are on hold.  Continue pressors to keep MAP >65        VTE Risk Mitigation (From admission, onward)         Ordered     heparin (porcine) injection 1,200 Units  As needed (PRN)      07/28/20 1935     enoxaparin injection 40 mg  Every 24 hours      07/26/20 9765      IP VTE HIGH RISK PATIENT  Once      07/19/20 0337     Place sequential compression device  Until discontinued      07/19/20 0337                Critical care time spent on the evaluation and treatment of severe organ dysfunction, review of pertinent labs and imaging studies, discussions with consulting providers and discussions with patient/family: 30 minutes.      Enmanuel Hu MD  Department of Hospital Medicine   [facility]

## 2020-07-31 NOTE — PROGRESS NOTES
[facility]   Critical Care Medicine  Progress Note    Patient Name: Jonas Abdalla  MRN: [id number]  Admission Date: 7/11/2020  Hospital Length of Stay: 18 days  Code Status: Full Code  Attending Provider: Enmanuel Hu MD  Primary Care Provider: Carmel Tabares MD   Principal Problem: Acute respiratory failure with hypoxia    Subjective:     HPI:  Mr Abdalla is a morbidly obese WM with a PMH of cholelithiasis and HTN who was admitted 7/12 with Sepsis and COVID PNA after presentation with weakness, persistent fever, chills and N/V/D for 6 days progressive.  Had 102.4 temp in ED.  Admitted to [facility] started on emperic IVAB, Decadron and NC low flow.  Oxygenation worsened over next few days.  He consented and was started on Remdesivir 7/14.  On evening of 7/14.  At [place] last night patient became more hypoxic despite being maxed out on VapoTherm.  He was placed in prone position, which improved O2 sat to 90-93% but patient remained in notable respiratory distress.  He was transferred to ICU and initiated on BiPAP.      Hospital/ICU Course:  7/15 - This AM upright in bed fully awake, alert and responsive not in distress but has tachypnea and mild work of breathing while on NPPV and FiO2 at .95.    7/16 - remains on NIPPV with some decline in oxygen demand with FiO2 0.75 but continued tachypnea; ongoing desaturation with exertion or momentary removal of NIPPV for fluid intake  7/17 - tolerated short time on vapotherm support last evening, returned to NIPPV for sleep and has remained today s/t tachypnea, hypoxia; afebrile but wbc slight trend up 14.4k   7/18 - awake, alert,oriented; continues to alternate max support vapotherm with NIPPV support, tachypnea at baseline and worsens along with drop in pulse ox sat with exertion/movement that slowly recovers with rest  7/19 - respiratory distress overnight; required intubation, heavy sedation, mechanical ventilation, low dose pressor support;  post intubation abg shows continued worsening acidosis along with now worsening leukocytosis, ddimer, LD, and CRP  7/20 - remains intubated and sedated on mechanical ventilation with inverse ratio ventilation, some decrease in oxygen demand today; tmax yesterday 101.6; requiring low dose pressor support with sedation  7/21 - remains intubated and sedated on mechanical inverse ratio ventilation, oxygen demand down to 60%; t max 100.4; tolerating TF  7/22 - remains intubated and sedated, mode of ventilation adjusted to PCV today with high PEEP, moderate oxygen demand; tmax today 101.8 with wbc down to 14.7k and LD trend down  7/23 - Semi-erect in bed intubated on mech ventilation and sedated heavily on Fentanyl and Propofol infusions.  Requiring some Levophed infusion.  Tolerating TF.  Restless on vent overnight and this AM tachypnic and without vent synchrony.  A-flutter yesterday.    7/24 - Supine in bed sedated on Fentanyl, Propofol and Versed infusions with intubation on mech ventilation.  On low dose Levophed infusion.  Tolerating TF.  Still severe hypoxemia requiring high PEEP and 100% FiO2.  7/25 - Prone in bed since yesterday afternoon.  Intubated on mech ventilation and sedated on Versed, Propofol and Versed infusions.  Off TF while prone.  On Heparin infusion and low dose Levophed infusion  7/26 - Returned to supine position yesterday afternoon.  Semi-erect in bed intubated on mech ventilation some increased resp distress improved with increasing Versed infusion.  Persistent Hyperkalemia refractory to Rx discussed with Renal post consult and placed VasCath starting RRT.  Still requiring Versed, Fentanyl and Ketamine infusions for sedation.  Weaned off Levophed infusion.  Good UOP.  Heparin infusion stopped due to concern with hyperkalemia  7/27 - Supine in bed intubated still on mech ventilation with high O2 demand heavily sedated on Ketamine, Versed and Fentanyl infusions.  Still off pressors.  Tachycardia  persistent in 130s.  Good UOP.  7/28 taken off proning this am, fair urine output  7/29 - Upright in bed intubated on [facility] ventilation with work of breathing heavily sedated on Ketamine, Fentanyl and Versed infusions.  More hypoxic, tachypnic and dyspnic today compared to yesterday.  Good UOP still + I/O balance.   7/30 - remains intubated, heavily sedated on mechanical ventilation with high PEEP and 100%FiO2; potassium stable  7/31 - remains intubated, heavily sedated on mechanical ventilation with high PEEP and 100%FiO2, prone position overnight and am abg reveals minimal improvement in oxygenation; t max 100.4; urine output good and potassium stable        Objective:     Vital Signs (Most Recent):  Temp: 98.1 °F (36.7 °C) (07/31/20 0705)  Pulse: (!) 117 (07/31/20 0854)  Resp: (!) 30 (07/31/20 0854)  BP: 117/64 (07/31/20 0838)  SpO2: (!) 91 % (07/31/20 0854) Vital Signs (24h Range):  Temp:  [96.2 °F (35.7 °C)-100.4 °F (38 °C)] 98.1 °F (36.7 °C)  Pulse:  [114-142] 117  Resp:  [25-37] 30  SpO2:  [76 %-94 %] 91 %  BP: (102-137)/(37-70) 117/64     Weight: 132.3 kg (291 lb 10.7 oz)  Body mass index is 45.68 kg/m².      Intake/Output Summary (Last 24 hours) at 7/31/2020 0856  Last data filed at 7/31/2020 0800  Gross per 24 hour   Intake 4461.8 ml   Output 2785 ml   Net 1676.8 ml      fentanyl 200 mcg/hr (07/31/20 0800)    ketamine (KETALAR) infusion (titrating) 20.005 mcg/kg/min (07/31/20 0800)    midazolam 10 mg/hr (07/31/20 0800)    norepinephrine bitartrate-D5W Stopped (07/30/20 1200)     Examined in prone position in am  Physical Exam  Vitals signs and nursing note reviewed.   Constitutional:       Appearance: He is obese. He is ill-appearing. He is not toxic-appearing or diaphoretic.      Interventions: He is sedated, intubated and restrained.   HENT:      Head: Atraumatic.      Mouth/Throat:      Mouth: Mucous membranes are dry.   Eyes:      General: No scleral icterus.     Comments: Bilateral scleral edema    Neck:      Vascular: No JVD.   Cardiovascular:      Rate and Rhythm: Regular rhythm. Tachycardia present.      Pulses:           Radial pulses are 1+ on the right side and 1+ on the left side.        Dorsalis pedis pulses are 1+ on the right side and 1+ on the left side.   Pulmonary:      Effort: Tachypnea present. No retractions. He is intubated.      Breath sounds: Decreased breath sounds present.   Abdominal:      General: There is no distension.      Palpations: Abdomen is soft.   Musculoskeletal:      Right lower leg: No edema.      Left lower leg: No edema.   Skin:     General: Skin is warm and dry.      Capillary Refill: Capillary refill takes 2 to 3 seconds.         Vents:  Vent Mode: A/C (07/31/20 0738)  Ventilator Initiated: Yes (07/19/20 0415)  Set Rate: 30 BPM (07/31/20 0738)  Vt Set: 0 mL (07/31/20 0738)  Pressure Support: 0 cmH20 (07/31/20 0738)  PEEP/CPAP: 17 cmH20 (07/31/20 0738)  Oxygen Concentration (%): 100 (07/31/20 0855)  Peak Airway Pressure: 40 cmH2O (07/31/20 0738)  Plateau Pressure: 37 cmH20 (07/31/20 0738)  Total Ve: 15.2 mL (07/31/20 0738)  F/VT Ratio<105 (RSBI): (!) 55.79 (07/31/20 0738)    Lines/Drains/Airways     Peripherally Inserted Central Catheter Line            PICC Double Lumen 07/16/20 1130 right brachial 14 days          Drain                 NG/OG Tube 07/19/20 0530 orogastric 16 Fr. 12 days         Urethral Catheter 07/19/20 0330 12 days          Airway                 Airway - Non-Surgical 07/19/20 0420 Endotracheal Tube 12 days          Arterial Line                 Arterial Line 07/28/20 2200 Left Brachial 2 days                Significant Labs:    CBC/Anemia Profile:  Recent Labs   Lab 07/30/20  0400 07/30/20  0753 07/31/20  0400   WBC 21.29*  --  20.85*   HGB 7.3*  --  7.9*   HCT 24.5* 22* 27.9*     --  238   *  --  108*   RDW 15.5*  --  16.1*   FERRITIN 2,678*  --   --         Chemistries:  Recent Labs   Lab 07/30/20  0400 07/31/20  0400    143   K  3.9 4.8    104   CO2 32* 30*   BUN 44* 38*   CREATININE 0.8 0.7   CALCIUM 8.8 9.0   ALBUMIN 1.9* 2.0*   PROT 6.0 6.2   BILITOT 0.2 0.3   ALKPHOS 68 63   ALT 40 45*   AST 38 49*   MG 2.3 2.3   PHOS 2.7 3.3       All pertinent labs within the past 24 hours have been reviewed.    Significant Imaging:  I have reviewed all pertinent imaging results/findings within the past 24 hours.      ABG  Recent Labs   Lab 07/31/20  0427   PH 7.265*   PO2 69*   PCO2 70.9*   HCO3 32.2*   BE 5     Assessment/Plan:     Pulmonary  * Acute respiratory failure with hypoxia  Cont full vent support  VAP prophylaxis  [email or web address] protocol  Repeat abg after returned to supine position, if no improvement consider APRV inverse ratio ventilation  SAT/SBT contraindicated with current support requirement    On mechanically assisted ventilation  See resp failure plan    Cardiac/Vascular  High triglycerides  Stopped Propofol infusion 7/25  Monitor TGL    Essential hypertension  Off pressor and trending tachycardia plus hypertensive, will resume lopressor at lower dose  Continue ICU hemodynamic monitoring    Renal/  Hyperkalemia  Cont Na Zirconium per OG   Follow up labs  Placed VasCath 7/26 for emergent RRT done 7/26 has not needed since and off Heparin infusion  Renal following  Reviewed all meds with pharmacy for source of hyperkalemia and essentially unremarkable (UFH risk of hyperkalemia 7% stopped 7/26)  Follow K+ and repeat RRT if needed.   7/30 - potassium stable, continue Na zirconium, plan remove vas cath    ID  Severe sepsis with acute organ dysfunction  ICU hemodynamic monitoring  Blood and Sputum cultures reviewed, sputum now +stenotrophomonas, await sensitivities; continue levaquin    Oncology  Anemia, unspecified  No active bleeding  Conservative transfusion protocol  7/31 - holding post transfusion yesterday, continue monitoring    Endocrine  Severe protein-calorie malnutrition  Continue TF per RD goals    Morbid obesity with BMI  of 45.0-49.9, adult  Recommend diet and lifestyle modification for goal wt loss once medically stable    GI  .    Other  Acute respiratory distress syndrome (ARDS) due to COVID-19 virus  Lung Protective Mechanical Ventilation w/ ARDSnet protocol  High PEEP and Low Vt  Attempt to maintain PIP < 30 cm H2O  Fluid Sparing Resuscitation  Permissive Hypercapnea  Returned from prone to supine position yesterday will consider re-prone position this afternoon  7/28 Completed proning this am  7/30 - Unable to wean PEEP, plan prone positioning x 16 hour cycle today  7/31 - return to supine position; repeat abg, consider transition to APRV inverse ration ventilation if no improvement    Pneumonia due to COVID-19 virus  Respiratory/Contact/Droplet Isolation   Completed course of empiric Antb earlier in admit, now levaquin for stenotrophomonas from sputum  Completed completed 5 day course of Rendisivir and 10 day course of Decadron  Continue multi vitamin,Melatonin, and Vit C; monitor mag for goal 2-2.5; continue pepcid  High d dimer warrants full anticoagulation, concern for heparin influence on potassium; discussed with multidisciplinary team and 0.5mg/kg BID dosing of lovenox initiated on 7/30     Critical Care Daily Checklist:    A: Awake: RASS Goal/Actual Goal: RASS Goal: -4-->deep sedation  Actual: [place] Agitation Sedation Scale (RASS): Deep sedation   B: Spontaneous Breathing Trial Performed?     C: SAT & SBT Coordinated?  Not candidate                    D: Delirium: CAM-ICU Overall CAM-ICU: Positive   E: Early Mobility Performed? ROM   F: Feeding Goal: Goals: Meet >50% EEN/EPN by RD f/u  Status: Nutrition Goal Status: new   Current Diet Order   Procedures    Diet NPO      AS: Analgesia/Sedation Fentanyl, ketamine, versed infusions   T: Thromboembolic Prophylaxis lovenox    H: HOB > 300 Yes   U: Stress Ulcer Prophylaxis (if needed) pepcid   G: Glucose Control monitoring   B: Bowel Function Stool Occurrence: 1   I:  Indwelling Catheter (Lines & Bagley) Necessity reviewed   D: De-escalation of Antimicrobials/Pharmacotherapies reviewed    Plan for the day/ETD As above    Code Status:  Family/Goals of Care: Full Code  Spouse, Joan, [phone]) is surrogate decision maker; phone update daily with status and plan of care   I have discussed case and plan of care in detail with Dr Tena; Status and plan of care were discussed with team on multidisciplinary rounds.    Critical Care Time: 72 minutes  Critical secondary to severe hypoxemic respiratory failure s/t covid pneumonia; Critical care was time spent personally by me on the following activities: development of treatment plan with patient or surrogate and bedside caregivers, discussions with consultants, evaluation of patient's response to treatment, examination of patient, ordering and performing treatments and interventions, ordering and review of laboratory studies, ordering and review of radiographic studies, pulse oximetry, re-evaluation of patient's condition. This critical care time did not overlap with that of any other provider or involve time for any procedures.     LOC Chowdhury-BC  Critical Care Medicine  [facility]

## 2020-07-31 NOTE — ASSESSMENT & PLAN NOTE
No active bleeding  Conservative transfusion protocol  7/31 - holding post transfusion yesterday, continue monitoring

## 2020-07-31 NOTE — ASSESSMENT & PLAN NOTE
ICU hemodynamic monitoring  Blood and Sputum cultures reviewed, sputum now +stenotrophomonas, await sensitivities; continue levaquin

## 2020-07-31 NOTE — PROGRESS NOTES
F/U visit with Mr. Abdalla for continued skin assessment. He was proned overnight. Foam dressings to bilateral ears remain intact. Right ear blanchable redness. Left ear deep red discoloration, small intact dry blister. There is a deep red/maroon discoloration noted to lower/distal nose between nares. Foam dressing applied. Patient turned with max assistance. There is a small deep maroon discoloration to right medial buttock that measures 3x2cm. Painted with cavilon and turned with pillows. Will continue to monitor closely, but fear onset of covid-19 skin manifestations vs. Skin failure in this critically ill patient with multi-organ failure. Will follow closely.

## 2020-07-31 NOTE — ASSESSMENT & PLAN NOTE
Cont full vent support  VAP prophylaxis  [email or web address] protocol  Repeat abg after returned to supine position, if no improvement consider APRV inverse ratio ventilation  SAT/SBT contraindicated with current support requirement

## 2020-07-31 NOTE — SUBJECTIVE & OBJECTIVE
Objective:     Vital Signs (Most Recent):  Temp: 98.1 °F (36.7 °C) (07/31/20 0705)  Pulse: (!) 117 (07/31/20 0854)  Resp: (!) 30 (07/31/20 0854)  BP: 117/64 (07/31/20 0838)  SpO2: (!) 91 % (07/31/20 0854) Vital Signs (24h Range):  Temp:  [96.2 °F (35.7 °C)-100.4 °F (38 °C)] 98.1 °F (36.7 °C)  Pulse:  [114-142] 117  Resp:  [25-37] 30  SpO2:  [76 %-94 %] 91 %  BP: (102-137)/(37-70) 117/64     Weight: 132.3 kg (291 lb 10.7 oz)  Body mass index is 45.68 kg/m².      Intake/Output Summary (Last 24 hours) at 7/31/2020 0856  Last data filed at 7/31/2020 0800  Gross per 24 hour   Intake 4461.8 ml   Output 2785 ml   Net 1676.8 ml      fentanyl 200 mcg/hr (07/31/20 0800)    ketamine (KETALAR) infusion (titrating) 20.005 mcg/kg/min (07/31/20 0800)    midazolam 10 mg/hr (07/31/20 0800)    norepinephrine bitartrate-D5W Stopped (07/30/20 1200)     Examined in prone position in am  Physical Exam  Vitals signs and nursing note reviewed.   Constitutional:       Appearance: He is obese. He is ill-appearing. He is not toxic-appearing or diaphoretic.      Interventions: He is sedated, intubated and restrained.   HENT:      Head: Atraumatic.      Mouth/Throat:      Mouth: Mucous membranes are dry.   Eyes:      General: No scleral icterus.     Comments: Bilateral scleral edema   Neck:      Vascular: No JVD.   Cardiovascular:      Rate and Rhythm: Regular rhythm. Tachycardia present.      Pulses:           Radial pulses are 1+ on the right side and 1+ on the left side.        Dorsalis pedis pulses are 1+ on the right side and 1+ on the left side.   Pulmonary:      Effort: Tachypnea present. No retractions. He is intubated.      Breath sounds: Decreased breath sounds present.   Abdominal:      General: There is no distension.      Palpations: Abdomen is soft.   Musculoskeletal:      Right lower leg: No edema.      Left lower leg: No edema.   Skin:     General: Skin is warm and dry.      Capillary Refill: Capillary refill takes 2 to  3 seconds.         Vents:  Vent Mode: A/C (07/31/20 0738)  Ventilator Initiated: Yes (07/19/20 0415)  Set Rate: 30 BPM (07/31/20 0738)  Vt Set: 0 mL (07/31/20 0738)  Pressure Support: 0 cmH20 (07/31/20 0738)  PEEP/CPAP: 17 cmH20 (07/31/20 0738)  Oxygen Concentration (%): 100 (07/31/20 0855)  Peak Airway Pressure: 40 cmH2O (07/31/20 0738)  Plateau Pressure: 37 cmH20 (07/31/20 0738)  Total Ve: 15.2 mL (07/31/20 0738)  F/VT Ratio<105 (RSBI): (!) 55.79 (07/31/20 0738)    Lines/Drains/Airways     Peripherally Inserted Central Catheter Line            PICC Double Lumen 07/16/20 1130 right brachial 14 days          Drain                 NG/OG Tube 07/19/20 0530 orogastric 16 Fr. 12 days         Urethral Catheter 07/19/20 0330 12 days          Airway                 Airway - Non-Surgical 07/19/20 0420 Endotracheal Tube 12 days          Arterial Line                 Arterial Line 07/28/20 2200 Left Brachial 2 days                Significant Labs:    CBC/Anemia Profile:  Recent Labs   Lab 07/30/20  0400 07/30/20  0753 07/31/20  0400   WBC 21.29*  --  20.85*   HGB 7.3*  --  7.9*   HCT 24.5* 22* 27.9*     --  238   *  --  108*   RDW 15.5*  --  16.1*   FERRITIN 2,678*  --   --         Chemistries:  Recent Labs   Lab 07/30/20  0400 07/31/20  0400    143   K 3.9 4.8    104   CO2 32* 30*   BUN 44* 38*   CREATININE 0.8 0.7   CALCIUM 8.8 9.0   ALBUMIN 1.9* 2.0*   PROT 6.0 6.2   BILITOT 0.2 0.3   ALKPHOS 68 63   ALT 40 45*   AST 38 49*   MG 2.3 2.3   PHOS 2.7 3.3       All pertinent labs within the past 24 hours have been reviewed.    Significant Imaging:  I have reviewed all pertinent imaging results/findings within the past 24 hours.

## 2020-07-31 NOTE — PLAN OF CARE
Pt intubated/sedated with fentanyl, versed, ketamine for RASS -4. ST on monitor, 120s for most of day. 100% FiO2. BP stable. Afebrile. Bagley intact, UOP ~100ml/hr. Wound care saw pt today, noted new pressure injury to nose after unproning (around 10am) Pt turned/repositioned with use of pillows. Heel boots in place. PICC & Art line intact. Bagley, UOP ~100ml/hr. Bed low, wheels locked, alarms audible. POC reviewed with family.

## 2020-07-31 NOTE — ASSESSMENT & PLAN NOTE
Cont Na Zirconium per OG   Follow up labs  Placed VasCath 7/26 for emergent RRT done 7/26 has not needed since and off Heparin infusion  Renal following  Reviewed all meds with pharmacy for source of hyperkalemia and essentially unremarkable (UFH risk of hyperkalemia 7% stopped 7/26)  Follow K+ and repeat RRT if needed.   7/30 - potassium stable, continue Na zirconium, plan remove vas cath

## 2020-07-31 NOTE — PLAN OF CARE
Pt remains proned. Vent settings at 100% and 17 PEEP. O2 sats 90%. Tachycardic in the 120's. BP stable without levophed. Ketamine, versed, and fentanyl infusing to maintain a RASS of (-4). Urine output at least 100ml/hr. Afebrile overnight. Tolerating trickle tube feeds. Bagley bag replaced. TF bag and tubing replaced. CHG wipe bath given. Moved and repositioned arms and legs. [place] turned and ET tube repositioned with the assistance of  RRT. Wife and mother updated via phone

## 2020-07-31 NOTE — PROGRESS NOTES
[facility] Hospital Medicine  Progress Note    Patient Name: Jonas Abdalla  MRN: [id number]  Patient Class: IP- Inpatient   Admission Date: 7/11/2020  Length of Stay: 17 days  Attending Physician: Enmanuel Hu MD  Primary Care Provider: Carmel Tabares MD        Subjective:     Principal Problem:Acute respiratory failure with hypoxia        HPI:  Jonas Abdalla is a 35 year old male with a pMHx of HTN who presented to the Emergency Department with c/o SOB. Associated symptoms include: generalized weakness, fatigue, and fever. Pt reports symptoms started Monday, 07/06. He reports he thought he had COVID and went to get tested taht day but did not get test results. Went to local urgent care on Tuesday, 07/07 -- diagnosed with PNA and given Levaquin 500 mg po daily. Symptoms continued despite abx and wife took pt to COVID testing site which he was negative for COVID. Wife reports the COVID test he took on Monday came back negative, notified yesterday of results. ED workup showed: no leukocytosis/leukopenia, stable Hgb/Hct, mild hyponatremia. CXR showed focal right infrahilar/medial lung base consolidation concerning for pneumonia. Febrile upon arrival to . Pt received 30mL/kg fluid resuscitation in ED along with one time doses of IV rocephin/azithromycin. Pt placed on observation for Fever believed to be secondary to RML PNA. COVID rapid screening pending upon assessment, now POSITIVE.     Overview/Hospital Course:  Pt presented to the ED due to weakness, nausea/vomiting with concerns for dehydration. COVID ++. Running temps 103, 102.4, 101.8. He denied any SOB and DRAKE. He describes an occasional dry cough. On 2 L nasal cannula. On azithromycin, Rocephin, decadron and scheduled albuterol.   7/13/2020 - pt has continued to be febrile and now reporting SOB and productive cough. Provided remdesivir information and patient has consented. Oxygen escalated to 4 L.  7/14/2020 -  Sudden increase to oxygen 10 L, oxygen saturation 85 %. Temp 101.6 at 4 AM this AM. Current plan of care continued.   7/18- Pt was transferred to ICU on 7/15 with worsening respiratory status requiring NIPPV . Pt is awake , alert and oriented today .Mexed out on  vapotherm with hypoxia and placed on  NIPPV / BiPAP support. Remains  Tachypneic, easily desats with exertion or movement . Continue to monitor closely respiratory status  in ICU setting .   7/19 - Intubated electively  overnight due to increased WOB on bipap . Sedated on propofol, fentanyl. Levophed gtt added to maintain MAP>65. Heparin high intensity nomogram continues. Pt completed 5 days Remdesivir. On Decadron x 10 days . WBC count is markedly elevated . Likely reactive . Procalcitinin is normal. Creatinine bumped to 1.3 from 0.8 overnight.   7/2- Remains intubated . Tmax 100. Tachycardia and hypotension noted . On Levo to maintain MAP >65. Vent setting and weaning per ICU. 1st unit of convalescent plasma transfused  Overnight with no adverse effect. WBC trended down to 25.3 from 46.3, Pl down to 265 from 637, D d-rosa 3.7, CRP trended up 218>109.  7/21- Tmax 100.4 last evening and none since . Remains intubated . Some improvement with decreased oxygen demand. fiO2 down to 60%. Completed 2 units of convalescent plasma infusion without adverse effects. WBC slowly trending down 22.1, Hgb 11.6, Pl normal today . Ferritin uptrend 5752, D-dimer 3.57, Creatinine bumps to 1.5 from 1.0 yesterday . LDL trending down 886  7/22- Fever noted again. tmax 101.3. Noted pt be tachycardic. Remains intubated and sedated . Remains on Levo for to maintain MAP. Noted bolus fluid given. FiO2 bumped to 70%. Current SpO2 is satisfactory . Received 2 unit of convalescent plasma. WBC trending down. Hgb 11.4 , Pl count stable . Serum creatinine improved to 1.0 today     7/23- Tmax 100.3. Overnight developed decompensation with tachycardia , irregular rhythm , vent dyssynchrony ,  increased oxygen demand required paralytics Nimbex . FiO2 now 100% yet hypoxia. . Labs are fairly stable.     7/24- Tamx 100.2 last night. Remains intubated and sedated . Worsening hypoxia requiring high PEEP. fiO2 remains 100%. Unable to wean. ICU will try prone position today . WBC count bumped . H/H fairly stable. Hyperkalemia is noted in am lab.        Interval History:  Persistent hypoxia requiring PEEP 17 and fiO2 100%.  Hemodynamically stable.     Review of Systems   Unable to perform ROS: Intubated     Objective:     Vital Signs (Most Recent):  Temp: 97.2 °F (36.2 °C) (07/30/20 1901)  Pulse: (!) 130 (07/30/20 2045)  Resp: (!) 25 (07/30/20 2045)  BP: (!) 102/37 (07/30/20 1900)  SpO2: (!) 85 % (07/30/20 2045) Vital Signs (24h Range):  Temp:  [97.1 °F (36.2 °C)-100.4 °F (38 °C)] 97.2 °F (36.2 °C)  Pulse:  [103-142] 130  Resp:  [14-39] 25  SpO2:  [76 %-100 %] 85 %  BP: ()/(32-70) 102/37     Weight: 132.3 kg (291 lb 10.7 oz)  Body mass index is 45.68 kg/m².    Intake/Output Summary (Last 24 hours) at 7/30/2020 2116  Last data filed at 7/30/2020 2100  Gross per 24 hour   Intake 5561.8 ml   Output 2600 ml   Net 2961.8 ml      Physical Exam  Constitutional:       General: He is not in acute distress.     Appearance: He is well-developed. He is not diaphoretic.      Comments: Sedated on vent   HENT:      Head: Normocephalic and atraumatic.      Mouth/Throat:      Pharynx: No oropharyngeal exudate.   Eyes:      Conjunctiva/sclera: Conjunctivae normal.      Pupils: Pupils are equal, round, and reactive to light.   Neck:      Thyroid: No thyromegaly.      Vascular: No JVD.   Cardiovascular:      Rate and Rhythm: Normal rate and regular rhythm.      Heart sounds: Normal heart sounds. No murmur.   Pulmonary:      Effort: No respiratory distress.      Breath sounds: No wheezing or rales.      Comments: On vent   Chest:      Chest wall: No tenderness.   Abdominal:      General: Bowel sounds are normal. There is no  distension.      Palpations: Abdomen is soft.      Tenderness: There is no abdominal tenderness. There is no guarding or rebound.   Lymphadenopathy:      Cervical: No cervical adenopathy.   Skin:     General: Skin is warm and dry.      Findings: No rash.   Neurological:      Cranial Nerves: No cranial nerve deficit.      Sensory: No sensory deficit.      Deep Tendon Reflexes: Reflexes normal.      Comments: On vent , sedated         Significant Labs:   CBC:   Recent Labs   Lab 07/29/20  0345  07/30/20  0121 07/30/20  0400 07/30/20  0753   WBC 25.17*  --   --  21.29*  --    HGB 7.6*  --   --  7.3*  --    HCT 26.3*   < > 22* 24.5* 22*     --   --  242  --     < > = values in this interval not displayed.     CMP:   Recent Labs   Lab 07/29/20 0345 07/30/20  0400    141   K 4.7 3.9   CL 97 101   CO2 30* 32*   * 139*   BUN 52* 44*   CREATININE 0.9 0.8   CALCIUM 8.9 8.8   PROT 6.3 6.0   ALBUMIN 1.9* 1.9*   BILITOT 0.3 0.2   ALKPHOS 69 68   AST 40 38   ALT 36 40   ANIONGAP 10 8   EGFRNONAA >60 >60       Significant Imaging:       Assessment/Plan:      * Acute respiratory failure with hypoxia  Encouraged deep breathing and coughing.  Transferred to ICU 14 July for respiratory distress.  Intubated 19 July.  Remains intubated persistent hypoxia.  PEEP 17 and fiO2 100%.  Wean respiratory support as able.    Pneumonia due to COVID-19 virus  - COVID-19 testing Collection Date: 7/11/2020 Collection Time:   11:55 AM  - Infection Control notified     - Isolation:   - Airborne, Contact and Droplet Precautions  - Cohort patients into COVID units  - N95 mask, wear eye protection  - 20 second hand hygiene              - Limit visitors per hospital policy              - Consolidating lab draws, nursing care, provider visits, and interventions    - Diagnostics: (leukopenia, hyponatremia, hyperferritinemia, elevated troponin, elevated d-dimer, age, and comorbidities are significant predictors of poor clinical  outcome)  CBC, CMP, Procalcitonin, Ferritin, CRP, LDH, BNP, Troponin, Rapid Flu, Blood Culture x2 and Portable CXR    - Management:  Supplemental O2 to maintain SpO2 >92%  Telemetry  Continuous/intermittent Pulse Ox  Albuterol treatment PRN  IV Rocephin/PO Azithromycin  Dexamethasone   Vit C, MVI  Scheduled albuterol  7/13/2020 - pt agreeable to receive Remdesivir - information received  7/19- Completed 5 days Remdesivir . On Decadron for 10 days   7/20- 1st unit of convalescent plasma transfused  with no adverse effect.  7/21-Completed 2 units of convalescent plasma infusion without adverse effects.Some improvement with decreased oxygen demand. fiO2 down to 60%.      Acute respiratory distress syndrome (ARDS) due to COVID-19 virus        Leukocytosis and thrombocytosis   7/19- Marked leukocytosis and thrombocytosis are noted. Likely reactive . Procalcitonin is neg. Blood cultures - NGTD. Endotracheal aspirate obtained for gram stain and culture.   7/21- Improving   7/24- Monitor counts       Pneumonia of right lower lobe due to infectious organism  IV Rocephin/Azithromycin  - empiric treatment completed   Supplemental oxygen  Sputum culture    Sepsis  - Sepsis criteria: Current temp of 101.3, RR 21, source PNA due to COVID-19 virus  - Blood cultures obtained >>>>> NGTD  - Recent COVID test outpt on 07/06/ 20 negative. Repeat COVID today in ED (+)  - Will treat underlying cause with abx, supplemental oxygen, MDI, oral steroids per COVID-19 protocol      Essential hypertension  - Pt normally takes lisinopril-hctz 20-12.5 po daily  - Will hold hctz and continue lisinopril at current dose and frequency  7/24-  All antihypertensives are on hold.  Continue pressors to keep MAP >65        VTE Risk Mitigation (From admission, onward)         Ordered     enoxaparin injection 70 mg  Every 12 hours      07/30/20 0957     heparin (porcine) injection 1,200 Units  As needed (PRN)      07/28/20 1935     IP VTE HIGH RISK PATIENT   Once      07/19/20 0337     Place sequential compression device  Until discontinued      07/19/20 0337                Critical care time spent on the evaluation and treatment of severe organ dysfunction, review of pertinent labs and imaging studies, discussions with consulting providers and discussions with patient/family: 30 minutes.      Enmanuel Hu MD  Department of Hospital Medicine   [facility]

## 2020-07-31 NOTE — ASSESSMENT & PLAN NOTE
Respiratory/Contact/Droplet Isolation   Completed course of empiric Antb earlier in admit, now levaquin for stenotrophomonas from sputum  Completed completed 5 day course of Rendisivir and 10 day course of Decadron  Continue multi vitamin,Melatonin, and Vit C; monitor mag for goal 2-2.5; continue pepcid  High d dimer warrants full anticoagulation, concern for heparin influence on potassium; discussed with multidisciplinary team and 0.5mg/kg BID dosing of lovenox initiated on 7/30

## 2020-07-31 NOTE — ASSESSMENT & PLAN NOTE
Lung Protective Mechanical Ventilation w/ ARDSnet protocol  High PEEP and Low Vt  Attempt to maintain PIP < 30 cm H2O  Fluid Sparing Resuscitation  Permissive Hypercapnea  Returned from prone to supine position yesterday will consider re-prone position this afternoon 7/28 Completed proning this am  7/30 - Unable to wean PEEP, plan prone positioning x 16 hour cycle today  7/31 - return to supine position; repeat abg, consider transition to APRV inverse ration ventilation if no improvement

## 2020-07-31 NOTE — SUBJECTIVE & OBJECTIVE
Interval History:  Persistent hypoxia requiring PEEP 17 and fiO2 100%.  Hemodynamically stable.     Review of Systems   Unable to perform ROS: Intubated     Objective:     Vital Signs (Most Recent):  Temp: 97.2 °F (36.2 °C) (07/30/20 1901)  Pulse: (!) 130 (07/30/20 2045)  Resp: (!) 25 (07/30/20 2045)  BP: (!) 102/37 (07/30/20 1900)  SpO2: (!) 85 % (07/30/20 2045) Vital Signs (24h Range):  Temp:  [97.1 °F (36.2 °C)-100.4 °F (38 °C)] 97.2 °F (36.2 °C)  Pulse:  [103-142] 130  Resp:  [14-39] 25  SpO2:  [76 %-100 %] 85 %  BP: ()/(32-70) 102/37     Weight: 132.3 kg (291 lb 10.7 oz)  Body mass index is 45.68 kg/m².    Intake/Output Summary (Last 24 hours) at 7/30/2020 2116  Last data filed at 7/30/2020 2100  Gross per 24 hour   Intake 5561.8 ml   Output 2600 ml   Net 2961.8 ml      Physical Exam  Constitutional:       General: He is not in acute distress.     Appearance: He is well-developed. He is not diaphoretic.      Comments: Sedated on vent   HENT:      Head: Normocephalic and atraumatic.      Mouth/Throat:      Pharynx: No oropharyngeal exudate.   Eyes:      Conjunctiva/sclera: Conjunctivae normal.      Pupils: Pupils are equal, round, and reactive to light.   Neck:      Thyroid: No thyromegaly.      Vascular: No JVD.   Cardiovascular:      Rate and Rhythm: Normal rate and regular rhythm.      Heart sounds: Normal heart sounds. No murmur.   Pulmonary:      Effort: No respiratory distress.      Breath sounds: No wheezing or rales.      Comments: On vent   Chest:      Chest wall: No tenderness.   Abdominal:      General: Bowel sounds are normal. There is no distension.      Palpations: Abdomen is soft.      Tenderness: There is no abdominal tenderness. There is no guarding or rebound.   Lymphadenopathy:      Cervical: No cervical adenopathy.   Skin:     General: Skin is warm and dry.      Findings: No rash.   Neurological:      Cranial Nerves: No cranial nerve deficit.      Sensory: No sensory deficit.      Deep  Tendon Reflexes: Reflexes normal.      Comments: On vent , sedated         Significant Labs:   CBC:   Recent Labs   Lab 07/29/20  0345  07/30/20  0121 07/30/20  0400 07/30/20  0753   WBC 25.17*  --   --  21.29*  --    HGB 7.6*  --   --  7.3*  --    HCT 26.3*   < > 22* 24.5* 22*     --   --  242  --     < > = values in this interval not displayed.     CMP:   Recent Labs   Lab 07/29/20  0345 07/30/20  0400    141   K 4.7 3.9   CL 97 101   CO2 30* 32*   * 139*   BUN 52* 44*   CREATININE 0.9 0.8   CALCIUM 8.9 8.8   PROT 6.3 6.0   ALBUMIN 1.9* 1.9*   BILITOT 0.3 0.2   ALKPHOS 69 68   AST 40 38   ALT 36 40   ANIONGAP 10 8   EGFRNONAA >60 >60       Significant Imaging:

## 2020-07-31 NOTE — ASSESSMENT & PLAN NOTE
Off pressor and trending tachycardia plus hypertensive, will resume lopressor at lower dose  Continue ICU hemodynamic monitoring

## 2020-08-01 PROBLEM — E87.5 HYPERKALEMIA: Status: RESOLVED | Noted: 2020-01-01 | Resolved: 2020-01-01

## 2020-08-01 PROBLEM — D72.829 LEUKOCYTOSIS: Status: RESOLVED | Noted: 2020-01-01 | Resolved: 2020-01-01

## 2020-08-01 PROBLEM — J12.82 PNEUMONIA DUE TO COVID-19 VIRUS: Status: RESOLVED | Noted: 2020-01-01 | Resolved: 2020-01-01

## 2020-08-01 PROBLEM — U07.1 ACUTE RESPIRATORY DISTRESS SYNDROME (ARDS) DUE TO COVID-19 VIRUS: Status: RESOLVED | Noted: 2020-01-01 | Resolved: 2020-01-01

## 2020-08-01 PROBLEM — R65.20 SEVERE SEPSIS WITH ACUTE ORGAN DYSFUNCTION: Status: RESOLVED | Noted: 2020-01-01 | Resolved: 2020-01-01

## 2020-08-01 PROBLEM — J96.01 ACUTE RESPIRATORY FAILURE WITH HYPOXIA: Status: RESOLVED | Noted: 2020-01-01 | Resolved: 2020-01-01

## 2020-08-01 PROBLEM — A41.9 SEPSIS: Status: RESOLVED | Noted: 2020-01-01 | Resolved: 2020-01-01

## 2020-08-01 PROBLEM — J80 ACUTE RESPIRATORY DISTRESS SYNDROME (ARDS) DUE TO COVID-19 VIRUS: Status: RESOLVED | Noted: 2020-01-01 | Resolved: 2020-01-01

## 2020-08-01 PROBLEM — Z99.11 ON MECHANICALLY ASSISTED VENTILATION: Status: RESOLVED | Noted: 2020-01-01 | Resolved: 2020-01-01

## 2020-08-01 PROBLEM — A41.9 SEVERE SEPSIS WITH ACUTE ORGAN DYSFUNCTION: Status: RESOLVED | Noted: 2020-01-01 | Resolved: 2020-01-01

## 2020-08-01 PROBLEM — I10 ESSENTIAL HYPERTENSION: Chronic | Status: RESOLVED | Noted: 2020-01-01 | Resolved: 2020-01-01

## 2020-08-01 PROBLEM — U07.1 PNEUMONIA DUE TO COVID-19 VIRUS: Status: RESOLVED | Noted: 2020-01-01 | Resolved: 2020-01-01

## 2020-08-01 PROBLEM — D64.9 ANEMIA, UNSPECIFIED: Status: RESOLVED | Noted: 2020-01-01 | Resolved: 2020-01-01

## 2020-08-01 PROBLEM — J18.9 PNEUMONIA OF RIGHT LOWER LOBE DUE TO INFECTIOUS ORGANISM: Status: RESOLVED | Noted: 2020-01-01 | Resolved: 2020-01-01

## 2020-08-01 PROBLEM — E43 SEVERE PROTEIN-CALORIE MALNUTRITION: Status: RESOLVED | Noted: 2020-01-01 | Resolved: 2020-01-01

## 2020-08-01 NOTE — EICU
Patient's condition continues to decline    ABG 7.2/63/45 on Bilevel  Urine output tapered down  K came back at 6.9, hyperkalemia protocol ordered, nephrology informed, vascular to place line    · Switched back to volume AC 30/550/100%/14 PEEP  · Unable to oxygenate and likely going into multiorgan failure  · Family called in

## 2020-08-01 NOTE — EICU
Rounding (Video Assessment):  No    Intervention Initiated From:  [facility] / EICU    Christina Communicated with Bedside Nurse regarding:  Respiratory    Nurse Notified:  Yes    Doctor Notified:  Yes    Comments: remoted into room due to patient's decreasing oxygen saturations and low BP. Dr. Moscoso also remoted into room to assess patient. Vent changes were made based on ABG results. Currently, patient's VENT settings are: A/C, Xe=539, AlH5=609%, PEEP=12.0, Resp.=30. Patient has SATS= 66%, BP=73/36 via BETSY, SX=725. Elevated K protocol (insulin, D50, calcium) given for K=6.7. Patient was given 1amp BiCarb. Family notified and has been given permission to have two visitors at this time, as patient is critical and unstable.

## 2020-08-01 NOTE — NURSING
Spoke with Dr. Clark on the phone. Dr. Clark asked if anyone in house could place access. Upon answering that there was no physician in house to place access Dr. Clark said he would come to place access.

## 2020-08-01 NOTE — PROCEDURES
"Jonas Abdalla is a 35 y.o. male patient.    Temp: (!) 100.5 °F (38.1 °C) (08/01/20 0020)  Pulse: (!) 150 (08/01/20 0205)  Resp: 12 (08/01/20 0205)  BP: (!) 90/48 (08/01/20 0020)  SpO2: (!) 45 % (08/01/20 0205)  Weight: 132.3 kg (291 lb 10.7 oz) (07/15/20 0039)  Height: 5' 7" (170.2 cm) (07/11/20 0950)       Vascat    Date/Time: 8/1/2020 2:51 AM  Performed by: Colby Clark MD  Indications: hemodialysis  Preparation: skin prepped with ChloraPrep  Location details: right femoral  Site selection rationale: covid  Complications: none  Specimens: No  Implants: No  Post-procedure: line sutured,  blood return through all ports and sterile dressing applied  Complications: No  Comments: 24cm          Colby Clark  8/1/2020  "

## 2020-08-01 NOTE — NURSING
Dr. Ribera notified of need for emergent dialysis per Dr. Jimenez ([facility]). Dr. Ribera will notify dialysis team. Instructed per Dr. Ribera to call vascular on call for access placement.

## 2020-08-01 NOTE — NURSING
Pt's BP dropped rapidly at 0020. Levophed titrated up. As BP increased, SpO2 decreased. Called eICU. Dr. Moscoso has been at the bedside. Vent changes made frequently without success. Levophed titrated up and down with difficulty finding balance. Vasopressin added. All sedation off. Calcium, Bicarb, D50, and insulin given for K+ 6.7. Several rhythm changes. Wife and Mom contacted and on their way. Dr. Moscoso asking for dialysis. Proper MDs notified. Dr. Clark on his way to insert access. MD Blanka aware of all gtt titrations. Pt still unstable with SpO2 in the 40s and HR 160s. CPR pads connected.

## 2020-08-01 NOTE — EICU
eLert for desaturation    Patient intubated for >2 weeks. Now on bilevel ventilation  COVID + has received Remdesevir, dexamethasone and convalescent plasma, NMB. Proning reportedly did not improve oxygenation  Most recent culture 7/27 grew Stenotrophomonas on Levofloxacin  Given metoprolol earlier with decrease in BP requiring resumption of norepinephrine and discontinuation of ketamine  Increased work of breathing, HR 140s despite Fentanyl and Versed drip  Receiving about 100 cc/hr with hourly urine output of 100 cc as well  H/H 7.9/27.9, WBC 20.85, temp 100    · Resume sedation  · Hold metoprolol  · Obtain CXR and diurese if with worsening  · Transfuse 1 unit pPRBC in the hopes of improving gas exchange

## 2020-08-01 NOTE — NURSING
Vas Cath inserted. Flexi in. Kayaxalate being given. Junctional rhythm with wide complex. SBP 50s-60s. FiO2 60s-80s. Dr. Moscoso aware. Vasopressin increased to 0.08. BP steady dropping. Wife at bedside and aware of status.

## 2020-08-01 NOTE — SUBJECTIVE & OBJECTIVE
Interval History:  Persistent hypoxia.  Changed ventilator to bilevel.  Continued intermittent proning.  Hemodynamically stable.     Review of Systems   Unable to perform ROS: Intubated     Objective:     Vital Signs (Most Recent):  Temp: 100.2 °F (37.9 °C) (07/31/20 1905)  Pulse: (!) 133 (07/31/20 2105)  Resp: (!) 28 (07/31/20 2105)  BP: (!) 105/39 (07/31/20 2105)  SpO2: (!) 75 % (07/31/20 2105) Vital Signs (24h Range):  Temp:  [96.2 °F (35.7 °C)-100.2 °F (37.9 °C)] 100.2 °F (37.9 °C)  Pulse:  [115-135] 133  Resp:  [19-40] 28  SpO2:  [75 %-96 %] 75 %  BP: (100-117)/(39-64) 105/39     Weight: 132.3 kg (291 lb 10.7 oz)  Body mass index is 45.68 kg/m².    Intake/Output Summary (Last 24 hours) at 7/31/2020 2112  Last data filed at 7/31/2020 2100  Gross per 24 hour   Intake 3391.32 ml   Output 2550 ml   Net 841.32 ml      Physical Exam  Constitutional:       General: He is not in acute distress.     Appearance: He is well-developed. He is not diaphoretic.      Comments: Sedated on vent   HENT:      Head: Normocephalic and atraumatic.      Mouth/Throat:      Pharynx: No oropharyngeal exudate.   Eyes:      Conjunctiva/sclera: Conjunctivae normal.      Pupils: Pupils are equal, round, and reactive to light.   Neck:      Thyroid: No thyromegaly.      Vascular: No JVD.   Cardiovascular:      Rate and Rhythm: Normal rate and regular rhythm.      Heart sounds: Normal heart sounds. No murmur.   Pulmonary:      Effort: No respiratory distress.      Breath sounds: No wheezing or rales.      Comments: On vent   Chest:      Chest wall: No tenderness.   Abdominal:      General: Bowel sounds are normal. There is no distension.      Palpations: Abdomen is soft.      Tenderness: There is no abdominal tenderness. There is no guarding or rebound.   Lymphadenopathy:      Cervical: No cervical adenopathy.   Skin:     General: Skin is warm and dry.      Findings: No rash.   Neurological:      Cranial Nerves: No cranial nerve deficit.       Sensory: No sensory deficit.      Deep Tendon Reflexes: Reflexes normal.      Comments: On vent , sedated         Significant Labs:   CBC:   Recent Labs   Lab 07/30/20  0400 07/30/20  0753 07/31/20  0400 07/31/20  1116   WBC 21.29*  --  20.85*  --    HGB 7.3*  --  7.9*  --    HCT 24.5* 22* 27.9* 24*     --  238  --      CMP:   Recent Labs   Lab 07/30/20  0400 07/31/20  0400    143   K 3.9 4.8    104   CO2 32* 30*   * 127*   BUN 44* 38*   CREATININE 0.8 0.7   CALCIUM 8.8 9.0   PROT 6.0 6.2   ALBUMIN 1.9* 2.0*   BILITOT 0.2 0.3   ALKPHOS 68 63   AST 38 49*   ALT 40 45*   ANIONGAP 8 9   EGFRNONAA >60 >60       Significant Imaging:

## 2020-08-01 NOTE — PROGRESS NOTES
[facility] Hospital Medicine  Progress Note    Patient Name: Jonas Abdalla  MRN: [id number]  Patient Class: IP- Inpatient   Admission Date: 7/11/2020  Length of Stay: 18 days  Attending Physician: Enmanuel Hu MD  Primary Care Provider: Carmel Tabares MD        Subjective:     Principal Problem:Acute respiratory failure with hypoxia        HPI:  Jonas Abdalla is a 35 year old male with a pMHx of HTN who presented to the Emergency Department with c/o SOB. Associated symptoms include: generalized weakness, fatigue, and fever. Pt reports symptoms started Monday, 07/06. He reports he thought he had COVID and went to get tested taht day but did not get test results. Went to local urgent care on Tuesday, 07/07 -- diagnosed with PNA and given Levaquin 500 mg po daily. Symptoms continued despite abx and wife took pt to COVID testing site which he was negative for COVID. Wife reports the COVID test he took on Monday came back negative, notified yesterday of results. ED workup showed: no leukocytosis/leukopenia, stable Hgb/Hct, mild hyponatremia. CXR showed focal right infrahilar/medial lung base consolidation concerning for pneumonia. Febrile upon arrival to . Pt received 30mL/kg fluid resuscitation in ED along with one time doses of IV rocephin/azithromycin. Pt placed on observation for Fever believed to be secondary to RML PNA. COVID rapid screening pending upon assessment, now POSITIVE.     Overview/Hospital Course:  Pt presented to the ED due to weakness, nausea/vomiting with concerns for dehydration. COVID ++. Running temps 103, 102.4, 101.8. He denied any SOB and DRAKE. He describes an occasional dry cough. On 2 L nasal cannula. On azithromycin, Rocephin, decadron and scheduled albuterol.   7/13/2020 - pt has continued to be febrile and now reporting SOB and productive cough. Provided remdesivir information and patient has consented. Oxygen escalated to 4 L.  7/14/2020 -  Sudden increase to oxygen 10 L, oxygen saturation 85 %. Temp 101.6 at 4 AM this AM. Current plan of care continued.   7/18- Pt was transferred to ICU on 7/15 with worsening respiratory status requiring NIPPV . Pt is awake , alert and oriented today .Mexed out on  vapotherm with hypoxia and placed on  NIPPV / BiPAP support. Remains  Tachypneic, easily desats with exertion or movement . Continue to monitor closely respiratory status  in ICU setting .   7/19 - Intubated electively  overnight due to increased WOB on bipap . Sedated on propofol, fentanyl. Levophed gtt added to maintain MAP>65. Heparin high intensity nomogram continues. Pt completed 5 days Remdesivir. On Decadron x 10 days . WBC count is markedly elevated . Likely reactive . Procalcitinin is normal. Creatinine bumped to 1.3 from 0.8 overnight.   7/2- Remains intubated . Tmax 100. Tachycardia and hypotension noted . On Levo to maintain MAP >65. Vent setting and weaning per ICU. 1st unit of convalescent plasma transfused  Overnight with no adverse effect. WBC trended down to 25.3 from 46.3, Pl down to 265 from 637, D d-rosa 3.7, CRP trended up 218>109.  7/21- Tmax 100.4 last evening and none since . Remains intubated . Some improvement with decreased oxygen demand. fiO2 down to 60%. Completed 2 units of convalescent plasma infusion without adverse effects. WBC slowly trending down 22.1, Hgb 11.6, Pl normal today . Ferritin uptrend 5752, D-dimer 3.57, Creatinine bumps to 1.5 from 1.0 yesterday . LDL trending down 886  7/22- Fever noted again. tmax 101.3. Noted pt be tachycardic. Remains intubated and sedated . Remains on Levo for to maintain MAP. Noted bolus fluid given. FiO2 bumped to 70%. Current SpO2 is satisfactory . Received 2 unit of convalescent plasma. WBC trending down. Hgb 11.4 , Pl count stable . Serum creatinine improved to 1.0 today     7/23- Tmax 100.3. Overnight developed decompensation with tachycardia , irregular rhythm , vent dyssynchrony ,  increased oxygen demand required paralytics Nimbex . FiO2 now 100% yet hypoxia. . Labs are fairly stable.     7/24- Tamx 100.2 last night. Remains intubated and sedated . Worsening hypoxia requiring high PEEP. fiO2 remains 100%. Unable to wean. ICU will try prone position today . WBC count bumped . H/H fairly stable. Hyperkalemia is noted in am lab.        Interval History:  Persistent hypoxia.  Changed ventilator to bilevel.  Continued intermittent proning.  Hemodynamically stable.     Review of Systems   Unable to perform ROS: Intubated     Objective:     Vital Signs (Most Recent):  Temp: 100.2 °F (37.9 °C) (07/31/20 1905)  Pulse: (!) 133 (07/31/20 2105)  Resp: (!) 28 (07/31/20 2105)  BP: (!) 105/39 (07/31/20 2105)  SpO2: (!) 75 % (07/31/20 2105) Vital Signs (24h Range):  Temp:  [96.2 °F (35.7 °C)-100.2 °F (37.9 °C)] 100.2 °F (37.9 °C)  Pulse:  [115-135] 133  Resp:  [19-40] 28  SpO2:  [75 %-96 %] 75 %  BP: (100-117)/(39-64) 105/39     Weight: 132.3 kg (291 lb 10.7 oz)  Body mass index is 45.68 kg/m².    Intake/Output Summary (Last 24 hours) at 7/31/2020 2112  Last data filed at 7/31/2020 2100  Gross per 24 hour   Intake 3391.32 ml   Output 2550 ml   Net 841.32 ml      Physical Exam  Constitutional:       General: He is not in acute distress.     Appearance: He is well-developed. He is not diaphoretic.      Comments: Sedated on vent   HENT:      Head: Normocephalic and atraumatic.      Mouth/Throat:      Pharynx: No oropharyngeal exudate.   Eyes:      Conjunctiva/sclera: Conjunctivae normal.      Pupils: Pupils are equal, round, and reactive to light.   Neck:      Thyroid: No thyromegaly.      Vascular: No JVD.   Cardiovascular:      Rate and Rhythm: Normal rate and regular rhythm.      Heart sounds: Normal heart sounds. No murmur.   Pulmonary:      Effort: No respiratory distress.      Breath sounds: No wheezing or rales.      Comments: On vent   Chest:      Chest wall: No tenderness.   Abdominal:      General:  Bowel sounds are normal. There is no distension.      Palpations: Abdomen is soft.      Tenderness: There is no abdominal tenderness. There is no guarding or rebound.   Lymphadenopathy:      Cervical: No cervical adenopathy.   Skin:     General: Skin is warm and dry.      Findings: No rash.   Neurological:      Cranial Nerves: No cranial nerve deficit.      Sensory: No sensory deficit.      Deep Tendon Reflexes: Reflexes normal.      Comments: On vent , sedated         Significant Labs:   CBC:   Recent Labs   Lab 07/30/20  0400 07/30/20  0753 07/31/20  0400 07/31/20  1116   WBC 21.29*  --  20.85*  --    HGB 7.3*  --  7.9*  --    HCT 24.5* 22* 27.9* 24*     --  238  --      CMP:   Recent Labs   Lab 07/30/20  0400 07/31/20  0400    143   K 3.9 4.8    104   CO2 32* 30*   * 127*   BUN 44* 38*   CREATININE 0.8 0.7   CALCIUM 8.8 9.0   PROT 6.0 6.2   ALBUMIN 1.9* 2.0*   BILITOT 0.2 0.3   ALKPHOS 68 63   AST 38 49*   ALT 40 45*   ANIONGAP 8 9   EGFRNONAA >60 >60       Significant Imaging:       Assessment/Plan:      * Acute respiratory failure with hypoxia  Encouraged deep breathing and coughing.  Transferred to ICU 14 July for respiratory distress.  Intubated 19 July.  Remains intubated persistent hypoxia.  Wean respiratory support as able.  Changed to Bilevel ventilation and continued intermittent prone positioning.    Pneumonia due to COVID-19 virus  - COVID-19 testing Collection Date: 7/11/2020 Collection Time:   11:55 AM  - Infection Control notified     - Isolation:   - Airborne, Contact and Droplet Precautions  - Cohort patients into COVID units  - N95 mask, wear eye protection  - 20 second hand hygiene              - Limit visitors per hospital policy              - Consolidating lab draws, nursing care, provider visits, and interventions    - Diagnostics: (leukopenia, hyponatremia, hyperferritinemia, elevated troponin, elevated d-dimer, age, and comorbidities are significant predictors of  poor clinical outcome)  CBC, CMP, Procalcitonin, Ferritin, CRP, LDH, BNP, Troponin, Rapid Flu, Blood Culture x2 and Portable CXR    - Management:  Supplemental O2 to maintain SpO2 >92%  Telemetry  Continuous/intermittent Pulse Ox  Albuterol treatment PRN  IV Rocephin/PO Azithromycin  Dexamethasone   Vit C, MVI  Scheduled albuterol  7/13/2020 - pt agreeable to receive Remdesivir - information received  7/19- Completed 5 days Remdesivir . On Decadron for 10 days   7/20- 1st unit of convalescent plasma transfused  with no adverse effect.  7/21-Completed 2 units of convalescent plasma infusion without adverse effects.Some improvement with decreased oxygen demand. fiO2 down to 60%.      Acute respiratory distress syndrome (ARDS) due to COVID-19 virus        Leukocytosis and thrombocytosis   7/19- Marked leukocytosis and thrombocytosis are noted. Likely reactive . Procalcitonin is neg. Blood cultures - NGTD. Endotracheal aspirate obtained for gram stain and culture.   7/21- Improving   7/24- Monitor counts       Pneumonia of right lower lobe due to infectious organism  IV Rocephin/Azithromycin  - empiric treatment completed   Supplemental oxygen  Sputum culture    Sepsis  - Sepsis criteria: Current temp of 101.3, RR 21, source PNA due to COVID-19 virus  - Blood cultures obtained >>>>> NGTD  - Recent COVID test outpt on 07/06/ 20 negative. Repeat COVID today in ED (+)  - Will treat underlying cause with abx, supplemental oxygen, MDI, oral steroids per COVID-19 protocol      Essential hypertension  - Pt normally takes lisinopril-hctz 20-12.5 po daily  - Will hold hctz and continue lisinopril at current dose and frequency  7/24-  All antihypertensives are on hold.  Continue pressors to keep MAP >65        VTE Risk Mitigation (From admission, onward)         Ordered     enoxaparin injection 70 mg  Every 12 hours      07/30/20 0957     heparin (porcine) injection 1,200 Units  As needed (PRN)      07/28/20 1935     IP VTE HIGH  RISK PATIENT  Once      07/19/20 0337     Place sequential compression device  Until discontinued      07/19/20 0337                Critical care time spent on the evaluation and treatment of severe organ dysfunction, review of pertinent labs and imaging studies, discussions with consulting providers and discussions with patient/family: 30 minutes.      Enmanuel Hu MD  Department of Hospital Medicine   [facility]

## 2020-08-01 NOTE — DISCHARGE SUMMARY
[facility] Hospital Medicine  Discharge Summary      Patient Name: Jonas Abdalla  MRN: [id number]  Admission Date: 7/11/2020  Hospital Length of Stay: 19 days  Discharge Date and Time: 8/1/2020     Attending Physician: Enmanuel Hu MD   Discharging Provider: Golden Smith MD  Primary Care Provider: Carmel Tabares MD      HPI:   Jonas Abdalla is a 35 year old male with a pMHx of HTN who presented to the Emergency Department with c/o SOB. Associated symptoms include: generalized weakness, fatigue, and fever. Pt reports symptoms started Monday, 07/06. He reports he thought he had COVID and went to get tested taht day but did not get test results. Went to local urgent care on Tuesday, 07/07 -- diagnosed with PNA and given Levaquin 500 mg po daily. Symptoms continued despite abx and wife took pt to COVID testing site which he was negative for COVID. Wife reports the COVID test he took on Monday came back negative, notified yesterday of results. ED workup showed: no leukocytosis/leukopenia, stable Hgb/Hct, mild hyponatremia. CXR showed focal right infrahilar/medial lung base consolidation concerning for pneumonia. Febrile upon arrival to . Pt received 30mL/kg fluid resuscitation in ED along with one time doses of IV rocephin/azithromycin. Pt placed on observation for Fever believed to be secondary to RML PNA. COVID rapid screening pending upon assessment, now POSITIVE.     * No surgery found *      Hospital Course:   Pt presented to the ED due to weakness, nausea/vomiting with concerns for dehydration. COVID ++. Running temps 103, 102.4, 101.8. He denied any SOB and DRAKE. He describes an occasional dry cough. On 2 L nasal cannula. On azithromycin, Rocephin, decadron and scheduled albuterol.   7/13/2020 - pt has continued to be febrile and now reporting SOB and productive cough. Provided remdesivir information and patient has consented. Oxygen escalated to 4 L.  7/14/2020  - Sudden increase to oxygen 10 L, oxygen saturation 85 %. Temp 101.6 at 4 AM this AM. Current plan of care continued.   7/18- Pt was transferred to ICU on 7/15 with worsening respiratory status requiring NIPPV . Pt is awake , alert and oriented today .Mexed out on  vapotherm with hypoxia and placed on  NIPPV / BiPAP support. Remains  Tachypneic, easily desats with exertion or movement . Continue to monitor closely respiratory status  in ICU setting .   7/19 - Intubated electively  overnight due to increased WOB on bipap . Sedated on propofol, fentanyl. Levophed gtt added to maintain MAP>65. Heparin high intensity nomogram continues. Pt completed 5 days Remdesivir. On Decadron x 10 days . WBC count is markedly elevated . Likely reactive . Procalcitinin is normal. Creatinine bumped to 1.3 from 0.8 overnight.   7/2- Remains intubated . Tmax 100. Tachycardia and hypotension noted . On Levo to maintain MAP >65. Vent setting and weaning per ICU. 1st unit of convalescent plasma transfused  Overnight with no adverse effect. WBC trended down to 25.3 from 46.3, Pl down to 265 from 637, D d-rosa 3.7, CRP trended up 218>109.  7/21- Tmax 100.4 last evening and none since . Remains intubated . Some improvement with decreased oxygen demand. fiO2 down to 60%. Completed 2 units of convalescent plasma infusion without adverse effects. WBC slowly trending down 22.1, Hgb 11.6, Pl normal today . Ferritin uptrend 5752, D-dimer 3.57, Creatinine bumps to 1.5 from 1.0 yesterday . LDL trending down 886  7/22- Fever noted again. tmax 101.3. Noted pt be tachycardic. Remains intubated and sedated . Remains on Levo for to maintain MAP. Noted bolus fluid given. FiO2 bumped to 70%. Current SpO2 is satisfactory . Received 2 unit of convalescent plasma. WBC trending down. Hgb 11.4 , Pl count stable . Serum creatinine improved to 1.0 today     7/23- Tmax 100.3. Overnight developed decompensation with tachycardia , irregular rhythm , vent dyssynchrony  , increased oxygen demand required paralytics Nimbex . FiO2 now 100% yet hypoxia. . Labs are fairly stable.     - Tamx 100.2 last night. Remains intubated and sedated . Worsening hypoxia requiring high PEEP. fiO2 remains 100%. Unable to wean. ICU will try prone position today . WBC count bumped . H/H fairly stable. Hyperkalemia is noted in am lab.        20 - This morning patient was deteriorating, became hypoxic while on vent, acidotic, hyperkalemia. Family was notified and presented to MICU. Discussed goals of care and patient wife wanted everything done, Dr. Clark placed vasc cath and HD called out to dialyses patient. At 3:07AM patient went into cardiac arrest, CPR started. Patients wife at bedside informed team to stop and patient . No cardiac, respiratory or neuro activity detected. TOD 3:09AM.      Consults:   Consults (From admission, onward)        Status Ordering Provider     Inpatient consult to Infectious Diseases  Once     Provider:  Damien Dong MD    Acknowledged ALENA KOHLER     Inpatient consult to Nephrology  Once     Provider:  Moises Jay MD    Completed DAMIEN HOFFMAN     Inpatient consult to Pulmonology  Once     Provider:  Damien Hoffman NP    Acknowledged MOE ROLLE     Inpatient consult to Registered Dietitian/Nutritionist  Once     Provider:  (Not yet assigned)    Completed [address]     Pharmacy Remdesivir Consult  Once     Provider:  (Not yet assigned)    Acknowledged JODI SAUNDERS     Pharmacy Remdesivir Consult  Once     Provider:  (Not yet assigned)    Acknowledged JODI SAUNDERS          No new Assessment & Plan notes have been filed under this hospital service since the last note was generated.  Service: Hospital Medicine    Final Active Diagnoses:    Diagnosis Date Noted POA    High triglycerides [E78.1] 2020 No    Morbid obesity with BMI of 45.0-49.9, adult [E66.01, Z68.42] 2018 Not Applicable     Chronic       Problems Resolved During this Admission:    Diagnosis Date Noted Date Resolved POA    PRINCIPAL PROBLEM:  Acute respiratory failure with hypoxia [J96.01] 2020 Yes    Severe protein-calorie malnutrition [E43] 2020 No    Hyperkalemia [E87.5] 2020 No    Anemia, unspecified [D64.9] 2020 No    Acute respiratory distress syndrome (ARDS) due to COVID-19 virus [U07.1, J80] 2020 Yes    Atrial flutter [I48.92] 2020 No    Severe sepsis with acute organ dysfunction [A41.9, R65.20] 2020 Yes    BRANDAN (acute kidney injury) [N17.9] 2020 No    On mechanically assisted ventilation [Z99.11] 2020 Not Applicable    Leukocytosis and thrombocytosis  [D72.829] 2020 No    Pneumonia of right lower lobe due to infectious organism [J18.1] 2020 Yes    Pneumonia of right lower lobe due to infectious organism [J18.1] 2020 Yes    Essential hypertension [I10] 2020 Yes     Chronic    Hyponatremia [E87.1] 2020 Yes    Sepsis [A41.9] 2020 Yes    Pneumonia due to COVID-19 virus [U07.1, J12.89] 2020 Yes       Discharged Condition:     Disposition:     Follow Up:    Patient Instructions:   No discharge procedures on file.    Significant Diagnostic Studies: Labs: All labs within the past 24 hours have been reviewed    Pending Diagnostic Studies:     Procedure Component Value Units Date/Time    CBC auto differential [[id number]] Collected: 07/15/20 0757    Order Status: Sent Lab Status: In process Updated: 07/15/20 0757    Specimen: Blood     X-Ray Chest 1 View [[id number]]     Order Status: Sent Lab Status: No result          Medications:  None (patient  at medical facility)    Indwelling Lines/Drains at time of discharge:   Lines/Drains/Airways      Peripherally Inserted Central Catheter Line            PICC Double Lumen 07/16/20 1130 right brachial 15 days          Drain                 Urethral Catheter 07/19/20 0330 13 days         NG/OG Tube 07/19/20 0530 orogastric 16 Fr. 12 days          Airway                 Airway - Non-Surgical 07/19/20 0420 Endotracheal Tube 12 days          Arterial Line                 Arterial Line 07/28/20 2200 Left Brachial 3 days                Time spent on the discharge of patient: greater than 35 minutes  Patient was seen and examined on the date of discharge and determined to be suitable for discharge.    Critical care time spent on the evaluation and treatment of severe organ dysfunction, review of pertinent labs and imaging studies, discussions with consulting providers and discussions with patient/family: greater than 35 minutes.     Golden Smith MD  Department of Hospital Medicine  [facility]

## 2020-08-01 NOTE — NURSING
Called answering service for for on call vascular surgeon, Dr. Colby Clark. Dr. Clark will call back per answering service.

## 2020-08-01 NOTE — NURSING
eICU notified of decrease in SpO2. Rapid drop in BP required restart of levophed and pausing of ketamine. Restarted ketamine per Dr. Moscoso. CXR and 1 unit of blood ordered.

## 2020-08-01 NOTE — NURSING
Pt's HR decreased, BP dropped, and QRS widened. Arterial line without waveform. Compressions started then immediately stopped per wife's wishes. Pt asystole shortly after. Dr. Smith confirmed death.  and SHAYY contacted. Released and not a candidate. No  home known at this moment. Wife will call later today.

## 2020-08-01 NOTE — ASSESSMENT & PLAN NOTE
Encouraged deep breathing and coughing.  Transferred to ICU 14 July for respiratory distress.  Intubated 19 July.  Remains intubated persistent hypoxia.  Wean respiratory support as able.  Changed to Bilevel ventilation and continued intermittent prone positioning.

## 2020-08-02 LAB
BACTERIA BLD CULT: NORMAL
BACTERIA BLD CULT: NORMAL

## 2020-08-03 LAB
BLD PROD TYP BPU: NORMAL
BLOOD UNIT EXPIRATION DATE: NORMAL
BLOOD UNIT TYPE CODE: 6200
BLOOD UNIT TYPE: NORMAL
CODING SYSTEM: NORMAL
DISPENSE STATUS: NORMAL
NUM UNITS TRANS PACKED RBC: NORMAL

## 2020-08-03 NOTE — PLAN OF CARE
20 1636   Final Note   Assessment Type Final Discharge Note   Anticipated Discharge Disposition

## 2020-11-09 NOTE — ASSESSMENT & PLAN NOTE
Respiratory/Contact/Droplet Isolation   Fluid Sparing Resuscitation, strict I/O  Empiric Antibiotics - completed 5 days Rocephin and Zithromax  LDH and D-dimer increased; will start heparin anticoagulation to prevent microthrombosis  Continue Zinc, melatonin, and Vit C  Remdisivir completed  Decadron Day #9  7/18 - overall status holding steady with significant hypoxia but not requiring intubation/invasive ventilation; plan continue current course with ICU pulmonary monitoring  7/19 - decompensated overnight; aggressive supportive care with mech ventilation and pressor support; keep MAP > 65 to maintain organ perfusion; consider extension of remdisivir along with eligibility for convalescent plasma transfusion  7/20 - continue aggressive support care measures; received one unit convalescent plasma overnight, 2nd unit pending  7/21 - completed convalescent plasma; supportive care; ICU monitoring   See ocular meds.

## 2021-10-29 NOTE — PROGRESS NOTES
Subjective:       Patient ID: Jonas Abdalla is a 35 y.o. male.    Chief Complaint: Follow-up    The patient location is: home  The chief complaint leading to consultation is: follow up lab review  Visit type: audiovisual  Total time spent with patient: 10 minutes  Each patient to whom he or she provides medical services by telemedicine is:  (1) informed of the relationship between the physician and patient and the respective role of any other health care provider with respect to management of the patient; and (2) notified that he or she may decline to receive medical services by telemedicine and may withdraw from such care at any time.    Patient doing telemed visit to review recent labs  Wife is Dr. Tabares's patient, Bambi  Had elevated liver enzymes, ultrasound shows fatty liver.  Also had elevated insulin level- on 2g metformin per day. Had initial diarrhea, tolerating well now  On Lisinopril/hztz for htn, taking, no issues. Has not been checking bp. Willing to check bp and send us readings as well as a weight in.  Admits he has not implemented any diet or lifestyle changes since seeing Dr. Tabares. Not eating right, no exercising.  No abdominal pain  Cholesterol was elevated on labs as well. He has not changed diet or started exercising. He does understand that he will need diet and lifestyle changes in order to help improve cho labs as well as pre diabetic labs.  Has a flat affect. Not interested in taking control of his lifestyle or being active in his healthcare journey.    US Abdomen Limited  Narrative: EXAMINATION:  US ABDOMEN LIMITED    CLINICAL HISTORY:  Abnormal levels of other serum enzymes    TECHNIQUE:  Limited ultrasound of the right upper quadrant of the abdomen (including pancreas, liver, gallbladder, common bile duct, and right kidney) was performed.    COMPARISON:  None.    FINDINGS:  Pancreas: Obscured by overlying bowel gas    Liver: Enlarged measuring 19.1 cm. Diffuse increased hepatic  parenchymal echogenicity is in keeping with fatty infiltration. No focal hepatic lesions.    Gallbladder: Prior cholecystectomy    Biliary system: The common duct is not dilated, measuring 3.0 mm.  No intrahepatic ductal dilatation.    Right Kidney: Normal in size, measuring 11.9 cm. with no hydronephrosis    Miscellaneous: No upper abdominal ascites.  Impression: Hepatomegaly with diffuse fatty infiltration of the liver.    Prior cholecystectomy.    Electronically signed by: Vinnie Acosta MD  Date:    03/20/2020  Time:    14:35          There were no vitals taken for this visit.    Review of Systems   Constitutional: Negative for activity change, appetite change, chills, diaphoresis, fatigue, fever and unexpected weight change.   HENT: Negative.  Negative for hearing loss, rhinorrhea and trouble swallowing.    Eyes: Negative.  Negative for discharge and visual disturbance.   Respiratory: Negative for apnea, chest tightness, shortness of breath, wheezing and stridor.    Cardiovascular: Negative for chest pain, palpitations and leg swelling.   Gastrointestinal: Negative.  Negative for blood in stool, constipation, diarrhea and vomiting.   Endocrine: Negative.  Negative for polydipsia and polyuria.   Genitourinary: Negative.  Negative for difficulty urinating, hematuria and urgency.   Musculoskeletal: Negative for arthralgias, joint swelling, myalgias and neck pain.   Skin: Negative for color change, pallor, rash and wound.   Allergic/Immunologic: Negative.    Neurological: Negative for dizziness, facial asymmetry, weakness, light-headedness and headaches.   Hematological: Negative for adenopathy.   Psychiatric/Behavioral: Negative for agitation, behavioral problems, confusion and dysphoric mood.       Objective:      Physical Exam   Constitutional: He appears well-developed and well-nourished.   HENT:   Head: Normocephalic and atraumatic.   Pulmonary/Chest: Effort normal. No accessory muscle usage or stridor. No  tachypnea. No respiratory distress.   Neurological: He is alert.   Skin: He is not diaphoretic.   Psychiatric: His speech is normal and behavior is normal. Judgment and thought content normal. Cognition and memory are normal.   Flat affect. Not interested or engaged in healthcare or lifestyle changes       Assessment:       1. Elevated liver enzymes    2. Elevated triglycerides with high cholesterol    3. Insulin resistance    4. Morbid obesity with BMI of 45.0-49.9, adult    5. Hypertension, unspecified type        Plan:       Jonas was seen today for follow-up.    Diagnoses and all orders for this visit:    Elevated liver enzymes - noted, reviewed repeat labs, reviewed imaging, needs diet, exercise, lifestyle changes, weight loss. Patient verbalized understanding.  -     Lipid panel; Future  -     Hemoglobin A1c; Future  -     INSULIN, RANDOM; Future  -     Comprehensive metabolic panel; Future    Elevated triglycerides with high cholesterol-noted, reviewed repeat labs, reviewed imaging, needs diet, exercise, lifestyle changes, weight loss. Patient verbalized understanding.  -     Lipid panel; Future  -     Hemoglobin A1c; Future  -     INSULIN, RANDOM; Future  -     Comprehensive metabolic panel; Future    Insulin resistance- reviewed labs, continue metformin, discussed diet and lifestyle changes, discussed exercise, weight loss, limiting carbs and sugars  -     Lipid panel; Future  -     Hemoglobin A1c; Future  -     INSULIN, RANDOM; Future  -     Comprehensive metabolic panel; Future    Morbid obesity with BMI of 45.0-49.9, adult-reviewed labs, continue metformin, discussed diet and lifestyle changes, discussed exercise, weight loss, limiting carbs and sugars  -     Lipid panel; Future  -     Hemoglobin A1c; Future  -     INSULIN, RANDOM; Future  -     Comprehensive metabolic panel; Future    Hypertension, unspecified type, will send bp readings and weigh in to portal next week, continue lisinopril hctz, dash  diet, exercise, weight loss  -     Lipid panel; Future  -     Hemoglobin A1c; Future  -     INSULIN, RANDOM; Future  -     Comprehensive metabolic panel; Future    Follow up Dr. Tabares 3 months with labs prior       no
